# Patient Record
Sex: FEMALE | Race: WHITE | NOT HISPANIC OR LATINO | URBAN - METROPOLITAN AREA
[De-identification: names, ages, dates, MRNs, and addresses within clinical notes are randomized per-mention and may not be internally consistent; named-entity substitution may affect disease eponyms.]

---

## 2017-02-21 ENCOUNTER — INPATIENT (INPATIENT)
Facility: HOSPITAL | Age: 82
LOS: 2 days | Discharge: NURSING, REHAB OR LONG-TERM CARE | End: 2017-02-24
Attending: INTERNAL MEDICINE | Admitting: INTERNAL MEDICINE
Payer: MEDICARE

## 2017-02-21 VITALS
SYSTOLIC BLOOD PRESSURE: 182 MMHG | HEART RATE: 63 BPM | OXYGEN SATURATION: 100 % | DIASTOLIC BLOOD PRESSURE: 61 MMHG | TEMPERATURE: 98 F | RESPIRATION RATE: 14 BRPM

## 2017-02-21 DIAGNOSIS — R07.81 PLEURODYNIA: ICD-10-CM

## 2017-02-21 DIAGNOSIS — R55 SYNCOPE AND COLLAPSE: ICD-10-CM

## 2017-02-21 DIAGNOSIS — Z41.8 ENCOUNTER FOR OTHER PROCEDURES FOR PURPOSES OTHER THAN REMEDYING HEALTH STATE: ICD-10-CM

## 2017-02-21 DIAGNOSIS — M48.00 SPINAL STENOSIS, SITE UNSPECIFIED: ICD-10-CM

## 2017-02-21 DIAGNOSIS — F03.90 UNSPECIFIED DEMENTIA, UNSPECIFIED SEVERITY, WITHOUT BEHAVIORAL DISTURBANCE, PSYCHOTIC DISTURBANCE, MOOD DISTURBANCE, AND ANXIETY: ICD-10-CM

## 2017-02-21 LAB
ALBUMIN SERPL ELPH-MCNC: 4 G/DL — SIGNIFICANT CHANGE UP (ref 3.3–5)
ALP SERPL-CCNC: 72 U/L — SIGNIFICANT CHANGE UP (ref 40–120)
ALT FLD-CCNC: 22 U/L — SIGNIFICANT CHANGE UP (ref 4–33)
APPEARANCE UR: CLEAR — SIGNIFICANT CHANGE UP
AST SERPL-CCNC: 32 U/L — SIGNIFICANT CHANGE UP (ref 4–32)
BASE EXCESS BLDV CALC-SCNC: 1.6 MMOL/L — SIGNIFICANT CHANGE UP
BASOPHILS # BLD AUTO: 0.04 K/UL — SIGNIFICANT CHANGE UP (ref 0–0.2)
BASOPHILS NFR BLD AUTO: 0.4 % — SIGNIFICANT CHANGE UP (ref 0–2)
BILIRUB SERPL-MCNC: 0.4 MG/DL — SIGNIFICANT CHANGE UP (ref 0.2–1.2)
BILIRUB UR-MCNC: NEGATIVE — SIGNIFICANT CHANGE UP
BLOOD GAS VENOUS - CREATININE: SIGNIFICANT CHANGE UP MG/DL (ref 0.5–1.3)
BLOOD UR QL VISUAL: NEGATIVE — SIGNIFICANT CHANGE UP
BUN SERPL-MCNC: 22 MG/DL — SIGNIFICANT CHANGE UP (ref 7–23)
CALCIUM SERPL-MCNC: 9.1 MG/DL — SIGNIFICANT CHANGE UP (ref 8.4–10.5)
CHLORIDE BLDV-SCNC: 98 MMOL/L — SIGNIFICANT CHANGE UP (ref 96–108)
CHLORIDE SERPL-SCNC: 93 MMOL/L — LOW (ref 98–107)
CK MB BLD-MCNC: 24.37 NG/ML — HIGH (ref 1–4.7)
CK MB BLD-MCNC: 3.4 — HIGH (ref 0–2.5)
CK MB BLD-MCNC: 4 — HIGH (ref 0–2.5)
CK MB BLD-MCNC: 8.88 NG/ML — HIGH (ref 1–4.7)
CK SERPL-CCNC: 259 U/L — HIGH (ref 25–170)
CK SERPL-CCNC: 614 U/L — HIGH (ref 25–170)
CO2 SERPL-SCNC: 21 MMOL/L — LOW (ref 22–31)
COLOR SPEC: SIGNIFICANT CHANGE UP
CREAT SERPL-MCNC: 0.7 MG/DL — SIGNIFICANT CHANGE UP (ref 0.5–1.3)
EOSINOPHIL # BLD AUTO: 0.12 K/UL — SIGNIFICANT CHANGE UP (ref 0–0.5)
EOSINOPHIL NFR BLD AUTO: 1.2 % — SIGNIFICANT CHANGE UP (ref 0–6)
GAS PNL BLDV: 128 MMOL/L — LOW (ref 136–146)
GLUCOSE BLDV-MCNC: 93 — SIGNIFICANT CHANGE UP (ref 70–99)
GLUCOSE SERPL-MCNC: 93 MG/DL — SIGNIFICANT CHANGE UP (ref 70–99)
GLUCOSE UR-MCNC: NEGATIVE — SIGNIFICANT CHANGE UP
HCO3 BLDV-SCNC: 23 MMOL/L — SIGNIFICANT CHANGE UP (ref 20–27)
HCT VFR BLD CALC: 39.4 % — SIGNIFICANT CHANGE UP (ref 34.5–45)
HCT VFR BLDV CALC: 42 % — SIGNIFICANT CHANGE UP (ref 34.5–45)
HGB BLD-MCNC: 13.2 G/DL — SIGNIFICANT CHANGE UP (ref 11.5–15.5)
HGB BLDV-MCNC: 13.7 G/DL — SIGNIFICANT CHANGE UP (ref 11.5–15.5)
IMM GRANULOCYTES NFR BLD AUTO: 0.2 % — SIGNIFICANT CHANGE UP (ref 0–1.5)
KETONES UR-MCNC: NEGATIVE — SIGNIFICANT CHANGE UP
LACTATE BLDV-MCNC: 1.9 MMOL/L — SIGNIFICANT CHANGE UP (ref 0.5–2)
LEUKOCYTE ESTERASE UR-ACNC: NEGATIVE — SIGNIFICANT CHANGE UP
LIDOCAIN IGE QN: 60.3 U/L — HIGH (ref 7–60)
LYMPHOCYTES # BLD AUTO: 2 K/UL — SIGNIFICANT CHANGE UP (ref 1–3.3)
LYMPHOCYTES # BLD AUTO: 20.3 % — SIGNIFICANT CHANGE UP (ref 13–44)
MCHC RBC-ENTMCNC: 30.9 PG — SIGNIFICANT CHANGE UP (ref 27–34)
MCHC RBC-ENTMCNC: 33.5 % — SIGNIFICANT CHANGE UP (ref 32–36)
MCV RBC AUTO: 92.3 FL — SIGNIFICANT CHANGE UP (ref 80–100)
MONOCYTES # BLD AUTO: 1.01 K/UL — HIGH (ref 0–0.9)
MONOCYTES NFR BLD AUTO: 10.3 % — SIGNIFICANT CHANGE UP (ref 2–14)
NEUTROPHILS # BLD AUTO: 6.64 K/UL — SIGNIFICANT CHANGE UP (ref 1.8–7.4)
NEUTROPHILS NFR BLD AUTO: 67.6 % — SIGNIFICANT CHANGE UP (ref 43–77)
NITRITE UR-MCNC: NEGATIVE — SIGNIFICANT CHANGE UP
PCO2 BLDV: 56 MMHG — HIGH (ref 41–51)
PH BLDV: 7.31 PH — LOW (ref 7.32–7.43)
PH UR: 6.5 — SIGNIFICANT CHANGE UP (ref 4.6–8)
PLATELET # BLD AUTO: 195 K/UL — SIGNIFICANT CHANGE UP (ref 150–400)
PMV BLD: 10.6 FL — SIGNIFICANT CHANGE UP (ref 7–13)
PO2 BLDV: 27 MMHG — LOW (ref 35–40)
POTASSIUM BLDV-SCNC: 4.8 MMOL/L — HIGH (ref 3.4–4.5)
POTASSIUM SERPL-MCNC: 5.5 MMOL/L — HIGH (ref 3.5–5.3)
POTASSIUM SERPL-SCNC: 5.5 MMOL/L — HIGH (ref 3.5–5.3)
PROT SERPL-MCNC: 7 G/DL — SIGNIFICANT CHANGE UP (ref 6–8.3)
PROT UR-MCNC: NEGATIVE — SIGNIFICANT CHANGE UP
RBC # BLD: 4.27 M/UL — SIGNIFICANT CHANGE UP (ref 3.8–5.2)
RBC # FLD: 13.1 % — SIGNIFICANT CHANGE UP (ref 10.3–14.5)
SAO2 % BLDV: 40.2 % — LOW (ref 60–85)
SODIUM SERPL-SCNC: 131 MMOL/L — LOW (ref 135–145)
SP GR SPEC: 1.01 — SIGNIFICANT CHANGE UP (ref 1–1.03)
SQUAMOUS # UR AUTO: SIGNIFICANT CHANGE UP
TROPONIN T SERPL-MCNC: < 0.06 NG/ML — SIGNIFICANT CHANGE UP (ref 0–0.06)
TROPONIN T SERPL-MCNC: < 0.06 NG/ML — SIGNIFICANT CHANGE UP (ref 0–0.06)
TSH SERPL-MCNC: 5.05 UIU/ML — HIGH (ref 0.27–4.2)
UROBILINOGEN FLD QL: NORMAL E.U. — SIGNIFICANT CHANGE UP (ref 0.1–0.2)
VALPROATE SERPL-MCNC: 27.9 UG/ML — LOW (ref 50–100)
WBC # BLD: 9.83 K/UL — SIGNIFICANT CHANGE UP (ref 3.8–10.5)
WBC # FLD AUTO: 9.83 K/UL — SIGNIFICANT CHANGE UP (ref 3.8–10.5)
WBC UR QL: SIGNIFICANT CHANGE UP (ref 0–?)

## 2017-02-21 PROCEDURE — 74177 CT ABD & PELVIS W/CONTRAST: CPT | Mod: 26

## 2017-02-21 PROCEDURE — 70450 CT HEAD/BRAIN W/O DYE: CPT | Mod: 26

## 2017-02-21 PROCEDURE — 72125 CT NECK SPINE W/O DYE: CPT | Mod: 26

## 2017-02-21 PROCEDURE — 71260 CT THORAX DX C+: CPT | Mod: 26

## 2017-02-21 RX ORDER — HALOPERIDOL DECANOATE 100 MG/ML
2.5 INJECTION INTRAMUSCULAR ONCE
Qty: 0 | Refills: 0 | Status: DISCONTINUED | OUTPATIENT
Start: 2017-02-21 | End: 2017-02-21

## 2017-02-21 RX ORDER — METOPROLOL TARTRATE 50 MG
25 TABLET ORAL ONCE
Qty: 0 | Refills: 0 | Status: COMPLETED | OUTPATIENT
Start: 2017-02-21 | End: 2017-02-21

## 2017-02-21 RX ORDER — DIPHENHYDRAMINE HCL 50 MG
25 CAPSULE ORAL ONCE
Qty: 0 | Refills: 0 | Status: COMPLETED | OUTPATIENT
Start: 2017-02-21 | End: 2017-02-21

## 2017-02-21 RX ORDER — HALOPERIDOL DECANOATE 100 MG/ML
2.5 INJECTION INTRAMUSCULAR ONCE
Qty: 0 | Refills: 0 | Status: COMPLETED | OUTPATIENT
Start: 2017-02-21 | End: 2017-02-21

## 2017-02-21 RX ORDER — NIFEDIPINE 30 MG
60 TABLET, EXTENDED RELEASE 24 HR ORAL DAILY
Qty: 0 | Refills: 0 | Status: DISCONTINUED | OUTPATIENT
Start: 2017-02-21 | End: 2017-02-22

## 2017-02-21 RX ORDER — MORPHINE SULFATE 50 MG/1
2 CAPSULE, EXTENDED RELEASE ORAL ONCE
Qty: 0 | Refills: 0 | Status: DISCONTINUED | OUTPATIENT
Start: 2017-02-21 | End: 2017-02-21

## 2017-02-21 RX ORDER — MIDAZOLAM HYDROCHLORIDE 1 MG/ML
2 INJECTION, SOLUTION INTRAMUSCULAR; INTRAVENOUS ONCE
Qty: 0 | Refills: 0 | Status: DISCONTINUED | OUTPATIENT
Start: 2017-02-21 | End: 2017-02-21

## 2017-02-21 RX ORDER — ALPRAZOLAM 0.25 MG
0.25 TABLET ORAL
Qty: 0 | Refills: 0 | Status: DISCONTINUED | OUTPATIENT
Start: 2017-02-21 | End: 2017-02-24

## 2017-02-21 RX ORDER — ACETAMINOPHEN 500 MG
650 TABLET ORAL EVERY 6 HOURS
Qty: 0 | Refills: 0 | Status: DISCONTINUED | OUTPATIENT
Start: 2017-02-21 | End: 2017-02-24

## 2017-02-21 RX ORDER — ENOXAPARIN SODIUM 100 MG/ML
30 INJECTION SUBCUTANEOUS EVERY 24 HOURS
Qty: 0 | Refills: 0 | Status: DISCONTINUED | OUTPATIENT
Start: 2017-02-21 | End: 2017-02-24

## 2017-02-21 RX ORDER — DIVALPROEX SODIUM 500 MG/1
500 TABLET, DELAYED RELEASE ORAL
Qty: 0 | Refills: 0 | Status: DISCONTINUED | OUTPATIENT
Start: 2017-02-21 | End: 2017-02-23

## 2017-02-21 RX ORDER — SODIUM CHLORIDE 9 MG/ML
1000 INJECTION INTRAMUSCULAR; INTRAVENOUS; SUBCUTANEOUS
Qty: 0 | Refills: 0 | Status: COMPLETED | OUTPATIENT
Start: 2017-02-21 | End: 2017-02-21

## 2017-02-21 RX ADMIN — MIDAZOLAM HYDROCHLORIDE 2 MILLIGRAM(S): 1 INJECTION, SOLUTION INTRAMUSCULAR; INTRAVENOUS at 11:15

## 2017-02-21 RX ADMIN — DIVALPROEX SODIUM 500 MILLIGRAM(S): 500 TABLET, DELAYED RELEASE ORAL at 19:48

## 2017-02-21 RX ADMIN — SODIUM CHLORIDE 75 MILLILITER(S): 9 INJECTION INTRAMUSCULAR; INTRAVENOUS; SUBCUTANEOUS at 15:57

## 2017-02-21 RX ADMIN — Medication 25 MILLIGRAM(S): at 09:55

## 2017-02-21 RX ADMIN — HALOPERIDOL DECANOATE 2.5 MILLIGRAM(S): 100 INJECTION INTRAMUSCULAR at 11:15

## 2017-02-21 RX ADMIN — Medication 0.5 MILLIGRAM(S): at 20:25

## 2017-02-21 RX ADMIN — HALOPERIDOL DECANOATE 2.5 MILLIGRAM(S): 100 INJECTION INTRAMUSCULAR at 18:47

## 2017-02-21 RX ADMIN — Medication 25 MILLIGRAM(S): at 22:48

## 2017-02-21 RX ADMIN — MIDAZOLAM HYDROCHLORIDE 2 MILLIGRAM(S): 1 INJECTION, SOLUTION INTRAMUSCULAR; INTRAVENOUS at 10:44

## 2017-02-21 RX ADMIN — ENOXAPARIN SODIUM 30 MILLIGRAM(S): 100 INJECTION SUBCUTANEOUS at 22:31

## 2017-02-21 RX ADMIN — Medication 25 MILLIGRAM(S): at 18:47

## 2017-02-21 RX ADMIN — Medication 0.25 MILLIGRAM(S): at 19:49

## 2017-02-21 NOTE — H&P ADULT. - COMMENTS
unable to assess ROS as patient was a poor historian secondary to advanced dementia. unable to assess ROS as patient was a poor historian and answers questions inappropriately secondary to advanced dementia.

## 2017-02-21 NOTE — ED PROVIDER NOTE - PROGRESS NOTE DETAILS
Son called for hx of what happened this AM. spoke w/ son, agreed to iv contrast for patient spoke w/ son aware of admission  Discussed need to admit with patient & discussed risk and benefits.  Patient agreed to admission.  Discussed case w/ admitting cardiologist - agreed to admit to their service.  [place]aged TelePA at [phone] with patient information and my spectralink ( [phone])

## 2017-02-21 NOTE — H&P ADULT. - PROBLEM SELECTOR PLAN 1
Admit to Tele. Fall precautions. Nuclear stress test, Echo, Serial EKG's, Serial cardiac enzymes, A1C, TSH, CBC, BMP, UA. Neuro consult and CT head. Admit to Tele. Fall precautions. Echo, Serial EKG's, Serial cardiac enzymes, A1C, TSH, CBC, BMP, UA  Orthostatics Admit to Tele. Fall precautions. Echo, Serial EKG's, Serial cardiac enzymes, A1C, TSH, CBC, BMP, UA  Orthostatics  IV Fluid Hydration

## 2017-02-21 NOTE — H&P ADULT. - PROBLEM SELECTOR PLAN 3
Continue current medication regimen with Depakote 500mg BID and Xanax 0.25mg two tabs at bed time PRN. Monitor for changes in baseline mental status.

## 2017-02-21 NOTE — ED PROVIDER NOTE - PSH
After Cataract, Bilateral    History of Carotid Endarterectomy  right 2008  History of Laminectomy  1970's  S/P Cholecystectomy  laparoscopic 2 years ago  S/P Dilation and Curettage  1970's menorrhagia  S/P Knee Replacement  left knee 1997  right knee 2004

## 2017-02-21 NOTE — ED PROVIDER NOTE - PMH
Back Pain    Carotid Artery Disease    Hepatitis C  possible during transfusion in 1970's  History of Spinal Stenosis    Hyperlipidemia    Hypertension    Lung Nodules    Tendonitis  left hand: s/p steroid injection 3/10

## 2017-02-21 NOTE — H&P ADULT. - HISTORY OF PRESENT ILLNESS
89 yo F with a pmhx of advanced dementia and spinal stenosis presented to ED s/p unwitnessed fall this a.m. Pt's son was contacted via phone and states that he heard his mother fall at 4 a.m. the patient was walking from her bed to the bathroom and fell on her way. Pts son states that he got to his mother within a minute; she was lying on her back but was awake and talking immediately after the fall. Pt ambulates independently at her baseline. Pt was A&O x1 and therefore an unreliable historian secondary to advanced dementia so an accurate ROS could not be assessed at this time. Pts son states that his mother was not complaining of HA, CP, dizziness or lightheadedness last night before she went to sleep. 89 yo F with a pmhx of advanced dementia and spinal stenosis presented to ED s/p unwitnessed fall this a.m. Pt an unreliable historian secondary to advanced dementia so an accurate ROS could not be assessed at this time and obtained info from her son.  Pt's son was contacted via phone and states that he heard his mother fall at 4 a.m. while walking from her bed to the bathroom and fell on her way. Pt's son states that he got to his mother within a minute; she was lying on her back but was awake and talking immediately after the fall. Pt ambulates independently at her baseline.  Pt's son states that pt was not complaining of HA, CP, dizziness or lightheadedness last night before she went to sleep.

## 2017-02-21 NOTE — ED PROVIDER NOTE - NEUROLOGICAL, MLM
Alert and oriented, no focal deficits, no motor or sensory deficits. Alert, no focal deficits, no motor or sensory deficits. cn 2-12 intact bl. ms 5/5 bl ue and le. sensation intact bl.

## 2017-02-21 NOTE — ED ADULT NURSE NOTE - CHIEF COMPLAINT QUOTE
Pt brought in by [facility]  c/o right rib discomfort  + deformity noted to area of pain.  As per EMS pt was in bathroom by her son ([phone]). hx of dementia.

## 2017-02-21 NOTE — H&P ADULT. - RS GEN PE MLT RESP DETAILS PC
respirations non-labored/diminished breath sounds, L/diminished breath sounds, R/clear to auscultation bilaterally

## 2017-02-21 NOTE — ED PROVIDER NOTE - MEDICAL DECISION MAKING DETAILS
rib fx vs intraabdominal pathology. Basics, Depakote level, CT head/Cspine/ Abdomen, EKG, Jean Marie, UA/Cx., Reaccess.

## 2017-02-21 NOTE — ED PROVIDER NOTE - ATTENDING CONTRIBUTION TO CARE
att88 y/o f with h/o dementia, spinal stenosis, on oxycodone, xanax and depakote, no AC, presents after an unwitnessed fall. Pt was found by son on the floor on her back. + R lower rib pain/RUQ pain. Pt ambulated at home. Pt lives with son and family but on a different floor. aaox1. Pt cannot recall the fall. Exam as above. No midline spinal ttp. No gross head trauma. + TTP r lower anterior rib and RUQ abd. Normal neuro exam. Plan for labs, EKG, UA, CT head/cspine/c/a/p with unwitnessed fall, h/o dementia and pain. Will reassess. Transfer for trauma if needed.   I have personally performed a face to face bedside history and physical examination of this patient. I have discussed the history, examination, review of systems, assessment and plan of management with the resident. I have reviewed the electronic medical record and amended it to reflect my history, review of systems, physical exam, assessment and plan. att89 y/o f with h/o dementia, spinal stenosis, on oxycodone, xanax and depakote, no AC, presents after an unwitnessed fall. Pt was found by son on the floor on her back. + R lower rib pain/RUQ pain. Pt ambulated at home. Pt lives with son and family but on a different floor. aaox1. Pt cannot recall the fall. Exam as above. No midline spinal ttp. No gross head trauma. + TTP r lower anterior rib and RUQ abd. Normal neuro exam. Plan for labs, EKG, UA, CT head/cspine/c/a/p with unwitnessed fall, h/o dementia and pain. Will reassess. Transfer for trauma if needed. Plan to admit with unwitnessed fall and history. Will sign out patient.   I have personally performed a face to face bedside history and physical examination of this patient. I have discussed the history, examination, review of systems, assessment and plan of management with the resident. I have reviewed the electronic medical record and amended it to reflect my history, review of systems, physical exam, assessment and plan.

## 2017-02-21 NOTE — ED PROVIDER NOTE - OBJECTIVE STATEMENT
Per son, pt has a hx of advanced dementia and spinal stenosis presents to ED s/p fall this morning with R rib cage pain. Son heard a loud noise around 4 am, found the pt on floor on her back. Since then pt complains of R rib cage pain. Pt is at her baseline mental status. Pt also ambulates at her baseline (walked down from her apartment and walked to the ambulance). Pt lives with her son and family. Pt is on Oxycodone QID, Xanax 0.25 and Depakote 500 BID.

## 2017-02-21 NOTE — H&P ADULT. - PMH
Back Pain    Carotid Artery Disease    Dementia    Hepatitis C  possible during transfusion in 1970's  History of Spinal Stenosis    Hyperlipidemia    Hypertension    Lung Nodules    Tendonitis  left hand: s/p steroid injection 3/10

## 2017-02-21 NOTE — H&P ADULT. - ATTENDING COMMENTS
Pt seen and examined. agree with above,    In addition monitor Lipase level  GI eval  hyponatremia, ? dehydration, will get renal eval

## 2017-02-21 NOTE — H&P ADULT. - EKG AND INTERPRETATION
Sinus sera at 50 bpm. QTc at 410 ms. Good R wave progression. Q waves in V1, V2 and V3. Sinus sera at 50 bpm. QTc at 410 ms. Early repolarization in V1-V3.

## 2017-02-21 NOTE — ED ADULT TRIAGE NOTE - CHIEF COMPLAINT QUOTE
Pt brought in by [facility]  c/o right rib discomfort  + deformity noted to area of pain.  As per EMS pt was in bathroom by her son ([phone]). hx of dementia.

## 2017-02-21 NOTE — ED PROVIDER NOTE - CARE PLAN
Principal Discharge DX:	Rib pain on right side Principal Discharge DX:	Rib pain on right side  Secondary Diagnosis:	Fall, initial encounter

## 2017-02-21 NOTE — H&P ADULT. - ASSESSMENT
88 yo F with a pmhx of advanced dementia and spinal stenosis presented to ED s/p unwitnessed fall this a.m. 88 yo F with a pmhx of advanced dementia and spinal stenosis presented to ED s/p unwitnessed fall this a.m., admitted to tele for syncope, r/o ACS.

## 2017-02-21 NOTE — ED ADULT NURSE NOTE - OBJECTIVE STATEMENT
bernice rn note: pt calm pleasantly disoriented. oriented to Name and birthday...but not to year, month, or place. "I am in a old persons home. I don't know what happen...why am I here.?" As per Resident report:" Pt fell found on floor by Son" Pt presently appears to be in no acute distress. Pt st"Both my arms hurt me alittle more when I move them." No obvious deformity or bruising noted on arms. + rt Hip with bruise...otherwise skin is intact. Fall precautions maintained: Pt positioned for comfort. Pt is close to rn station, mary juarez elevated. call bell in reach.   placed on cm EKG done. vs as noted:   sl and labs obtained and sent. Handoff report to KENNETH Dillon to follow. Pt awaits radiology. bernice rn note:Hx of Dementia.. pt calm pleasantly disoriented. oriented to Name and birthday...but not to year, month, or place. "I am in a old persons home. I don't know what happen...why am I here.?" As per Resident report:" Pt fell found on floor by Son" Pt presently appears to be in no acute distress. Pt st"Both my arms hurt me alittle more when I move them." No obvious deformity or bruising noted on arms. + rt Hip with bruise...otherwise skin is intact. Fall precautions maintained: Pt positioned for comfort. Pt is close to rn station, bedlow, siderails elevated. call bell in reach.   placed on cm EKG done. vs as noted:   sl and labs obtained and sent. Handoff report to KENNETH Dillon to follow. Pt awaits radiology.

## 2017-02-22 LAB
BACTERIA UR CULT: SIGNIFICANT CHANGE UP
BUN SERPL-MCNC: 18 MG/DL — SIGNIFICANT CHANGE UP (ref 7–23)
CALCIUM SERPL-MCNC: 10 MG/DL — SIGNIFICANT CHANGE UP (ref 8.4–10.5)
CHLORIDE SERPL-SCNC: 91 MMOL/L — LOW (ref 98–107)
CHOLEST SERPL-MCNC: 202 MG/DL — HIGH (ref 120–199)
CK SERPL-CCNC: 681 U/L — HIGH (ref 25–170)
CO2 SERPL-SCNC: 24 MMOL/L — SIGNIFICANT CHANGE UP (ref 22–31)
CREAT SERPL-MCNC: 0.76 MG/DL — SIGNIFICANT CHANGE UP (ref 0.5–1.3)
GLUCOSE SERPL-MCNC: 135 MG/DL — HIGH (ref 70–99)
HCT VFR BLD CALC: 43.4 % — SIGNIFICANT CHANGE UP (ref 34.5–45)
HDLC SERPL-MCNC: 107 MG/DL — HIGH (ref 45–65)
HGB BLD-MCNC: 15.1 G/DL — SIGNIFICANT CHANGE UP (ref 11.5–15.5)
LIPID PNL WITH DIRECT LDL SERPL: 100 MG/DL — SIGNIFICANT CHANGE UP
MCHC RBC-ENTMCNC: 31.7 PG — SIGNIFICANT CHANGE UP (ref 27–34)
MCHC RBC-ENTMCNC: 34.8 % — SIGNIFICANT CHANGE UP (ref 32–36)
MCV RBC AUTO: 91.2 FL — SIGNIFICANT CHANGE UP (ref 80–100)
OSMOLALITY SERPL: 277 MOSMO/KG — SIGNIFICANT CHANGE UP (ref 275–295)
PLATELET # BLD AUTO: 142 K/UL — LOW (ref 150–400)
PMV BLD: 11.7 FL — SIGNIFICANT CHANGE UP (ref 7–13)
POTASSIUM SERPL-MCNC: 4.1 MMOL/L — SIGNIFICANT CHANGE UP (ref 3.5–5.3)
POTASSIUM SERPL-SCNC: 4.1 MMOL/L — SIGNIFICANT CHANGE UP (ref 3.5–5.3)
RBC # BLD: 4.76 M/UL — SIGNIFICANT CHANGE UP (ref 3.8–5.2)
RBC # FLD: 13 % — SIGNIFICANT CHANGE UP (ref 10.3–14.5)
SODIUM SERPL-SCNC: 133 MMOL/L — LOW (ref 135–145)
SPECIMEN SOURCE: SIGNIFICANT CHANGE UP
TRIGL SERPL-MCNC: 72 MG/DL — SIGNIFICANT CHANGE UP (ref 10–149)
TROPONIN T SERPL-MCNC: < 0.06 NG/ML — SIGNIFICANT CHANGE UP (ref 0–0.06)
URATE SERPL-MCNC: 3.3 MG/DL — SIGNIFICANT CHANGE UP (ref 2.5–7)
WBC # BLD: 13.11 K/UL — HIGH (ref 3.8–10.5)
WBC # FLD AUTO: 13.11 K/UL — HIGH (ref 3.8–10.5)

## 2017-02-22 PROCEDURE — 93880 EXTRACRANIAL BILAT STUDY: CPT | Mod: 26

## 2017-02-22 PROCEDURE — 93010 ELECTROCARDIOGRAM REPORT: CPT

## 2017-02-22 PROCEDURE — 90792 PSYCH DIAG EVAL W/MED SRVCS: CPT

## 2017-02-22 RX ORDER — NIFEDIPINE 30 MG
30 TABLET, EXTENDED RELEASE 24 HR ORAL DAILY
Qty: 0 | Refills: 0 | Status: DISCONTINUED | OUTPATIENT
Start: 2017-02-22 | End: 2017-02-24

## 2017-02-22 RX ORDER — HALOPERIDOL DECANOATE 100 MG/ML
0.5 INJECTION INTRAMUSCULAR EVERY 6 HOURS
Qty: 0 | Refills: 0 | Status: DISCONTINUED | OUTPATIENT
Start: 2017-02-22 | End: 2017-02-24

## 2017-02-22 RX ORDER — PANTOPRAZOLE SODIUM 20 MG/1
40 TABLET, DELAYED RELEASE ORAL
Qty: 0 | Refills: 0 | Status: DISCONTINUED | OUTPATIENT
Start: 2017-02-22 | End: 2017-02-24

## 2017-02-22 RX ADMIN — Medication 60 MILLIGRAM(S): at 05:57

## 2017-02-22 RX ADMIN — ENOXAPARIN SODIUM 30 MILLIGRAM(S): 100 INJECTION SUBCUTANEOUS at 22:29

## 2017-02-22 RX ADMIN — HALOPERIDOL DECANOATE 0.5 MILLIGRAM(S): 100 INJECTION INTRAMUSCULAR at 14:31

## 2017-02-22 RX ADMIN — DIVALPROEX SODIUM 500 MILLIGRAM(S): 500 TABLET, DELAYED RELEASE ORAL at 17:06

## 2017-02-22 RX ADMIN — DIVALPROEX SODIUM 500 MILLIGRAM(S): 500 TABLET, DELAYED RELEASE ORAL at 05:57

## 2017-02-22 NOTE — PHYSICAL THERAPY INITIAL EVALUATION ADULT - PERTINENT HX OF CURRENT PROBLEM, REHAB EVAL
admitted after unwitnessed fall at home, amb. from bedroom to bathroom, son present in home, pt. w/ hx. of dementia

## 2017-02-23 ENCOUNTER — TRANSCRIPTION ENCOUNTER (OUTPATIENT)
Age: 82
End: 2017-02-23

## 2017-02-23 LAB
AMMONIA BLD-MCNC: 40 UMOL/L — SIGNIFICANT CHANGE UP (ref 11–55)
BUN SERPL-MCNC: 28 MG/DL — HIGH (ref 7–23)
CALCIUM SERPL-MCNC: 9.3 MG/DL — SIGNIFICANT CHANGE UP (ref 8.4–10.5)
CHLORIDE SERPL-SCNC: 90 MMOL/L — LOW (ref 98–107)
CHLORIDE UR-SCNC: 21 MMOL/L — SIGNIFICANT CHANGE UP
CK SERPL-CCNC: 321 U/L — HIGH (ref 25–170)
CO2 SERPL-SCNC: 25 MMOL/L — SIGNIFICANT CHANGE UP (ref 22–31)
CREAT SERPL-MCNC: 0.85 MG/DL — SIGNIFICANT CHANGE UP (ref 0.5–1.3)
GLUCOSE SERPL-MCNC: 102 MG/DL — HIGH (ref 70–99)
HCT VFR BLD CALC: 39.3 % — SIGNIFICANT CHANGE UP (ref 34.5–45)
HGB BLD-MCNC: 13.4 G/DL — SIGNIFICANT CHANGE UP (ref 11.5–15.5)
MAGNESIUM SERPL-MCNC: 1.9 MG/DL — SIGNIFICANT CHANGE UP (ref 1.6–2.6)
MCHC RBC-ENTMCNC: 31.5 PG — SIGNIFICANT CHANGE UP (ref 27–34)
MCHC RBC-ENTMCNC: 34.1 % — SIGNIFICANT CHANGE UP (ref 32–36)
MCV RBC AUTO: 92.3 FL — SIGNIFICANT CHANGE UP (ref 80–100)
OSMOLALITY UR: 444 MOSMO/KG — SIGNIFICANT CHANGE UP (ref 50–1200)
PLATELET # BLD AUTO: 197 K/UL — SIGNIFICANT CHANGE UP (ref 150–400)
PMV BLD: 10.6 FL — SIGNIFICANT CHANGE UP (ref 7–13)
POTASSIUM SERPL-MCNC: 4.5 MMOL/L — SIGNIFICANT CHANGE UP (ref 3.5–5.3)
POTASSIUM SERPL-SCNC: 4.5 MMOL/L — SIGNIFICANT CHANGE UP (ref 3.5–5.3)
POTASSIUM UR-SCNC: 23.7 MEQ/L — SIGNIFICANT CHANGE UP
RBC # BLD: 4.26 M/UL — SIGNIFICANT CHANGE UP (ref 3.8–5.2)
RBC # FLD: 13.1 % — SIGNIFICANT CHANGE UP (ref 10.3–14.5)
SODIUM SERPL-SCNC: 130 MMOL/L — LOW (ref 135–145)
SODIUM UR-SCNC: 18 MEQ/L — SIGNIFICANT CHANGE UP
WBC # BLD: 9.42 K/UL — SIGNIFICANT CHANGE UP (ref 3.8–10.5)
WBC # FLD AUTO: 9.42 K/UL — SIGNIFICANT CHANGE UP (ref 3.8–10.5)

## 2017-02-23 PROCEDURE — 93306 TTE W/DOPPLER COMPLETE: CPT | Mod: 26

## 2017-02-23 RX ORDER — DIVALPROEX SODIUM 500 MG/1
500 TABLET, DELAYED RELEASE ORAL
Qty: 0 | Refills: 0 | Status: DISCONTINUED | OUTPATIENT
Start: 2017-02-23 | End: 2017-02-24

## 2017-02-23 RX ADMIN — PANTOPRAZOLE SODIUM 40 MILLIGRAM(S): 20 TABLET, DELAYED RELEASE ORAL at 05:38

## 2017-02-23 RX ADMIN — Medication 30 MILLIGRAM(S): at 05:45

## 2017-02-23 RX ADMIN — Medication 0.25 MILLIGRAM(S): at 21:06

## 2017-02-23 RX ADMIN — ENOXAPARIN SODIUM 30 MILLIGRAM(S): 100 INJECTION SUBCUTANEOUS at 21:07

## 2017-02-23 RX ADMIN — DIVALPROEX SODIUM 500 MILLIGRAM(S): 500 TABLET, DELAYED RELEASE ORAL at 06:36

## 2017-02-23 RX ADMIN — DIVALPROEX SODIUM 500 MILLIGRAM(S): 500 TABLET, DELAYED RELEASE ORAL at 18:13

## 2017-02-24 VITALS — WEIGHT: 127.21 LBS

## 2017-02-24 LAB
BUN SERPL-MCNC: 19 MG/DL — SIGNIFICANT CHANGE UP (ref 7–23)
CALCIUM SERPL-MCNC: 9.2 MG/DL — SIGNIFICANT CHANGE UP (ref 8.4–10.5)
CHLORIDE SERPL-SCNC: 92 MMOL/L — LOW (ref 98–107)
CK SERPL-CCNC: 224 U/L — HIGH (ref 25–170)
CO2 SERPL-SCNC: 24 MMOL/L — SIGNIFICANT CHANGE UP (ref 22–31)
CREAT SERPL-MCNC: 0.7 MG/DL — SIGNIFICANT CHANGE UP (ref 0.5–1.3)
GLUCOSE SERPL-MCNC: 134 MG/DL — HIGH (ref 70–99)
HCT VFR BLD CALC: 40.3 % — SIGNIFICANT CHANGE UP (ref 34.5–45)
HGB BLD-MCNC: 13.5 G/DL — SIGNIFICANT CHANGE UP (ref 11.5–15.5)
MAGNESIUM SERPL-MCNC: 1.8 MG/DL — SIGNIFICANT CHANGE UP (ref 1.6–2.6)
MCHC RBC-ENTMCNC: 31.1 PG — SIGNIFICANT CHANGE UP (ref 27–34)
MCHC RBC-ENTMCNC: 33.5 % — SIGNIFICANT CHANGE UP (ref 32–36)
MCV RBC AUTO: 92.9 FL — SIGNIFICANT CHANGE UP (ref 80–100)
PLATELET # BLD AUTO: 206 K/UL — SIGNIFICANT CHANGE UP (ref 150–400)
PMV BLD: 10.7 FL — SIGNIFICANT CHANGE UP (ref 7–13)
POTASSIUM SERPL-MCNC: 4.1 MMOL/L — SIGNIFICANT CHANGE UP (ref 3.5–5.3)
POTASSIUM SERPL-SCNC: 4.1 MMOL/L — SIGNIFICANT CHANGE UP (ref 3.5–5.3)
RBC # BLD: 4.34 M/UL — SIGNIFICANT CHANGE UP (ref 3.8–5.2)
RBC # FLD: 12.8 % — SIGNIFICANT CHANGE UP (ref 10.3–14.5)
SODIUM SERPL-SCNC: 132 MMOL/L — LOW (ref 135–145)
WBC # BLD: 9.02 K/UL — SIGNIFICANT CHANGE UP (ref 3.8–10.5)
WBC # FLD AUTO: 9.02 K/UL — SIGNIFICANT CHANGE UP (ref 3.8–10.5)

## 2017-02-24 RX ORDER — PANTOPRAZOLE SODIUM 20 MG/1
1 TABLET, DELAYED RELEASE ORAL
Qty: 0 | Refills: 0 | COMMUNITY
Start: 2017-02-24

## 2017-02-24 RX ORDER — ACETAMINOPHEN 500 MG
2 TABLET ORAL
Qty: 0 | Refills: 0 | COMMUNITY
Start: 2017-02-24

## 2017-02-24 RX ORDER — NIFEDIPINE 30 MG
1 TABLET, EXTENDED RELEASE 24 HR ORAL
Qty: 0 | Refills: 0 | COMMUNITY
Start: 2017-02-24

## 2017-02-24 RX ORDER — ALPRAZOLAM 0.25 MG
1 TABLET ORAL
Qty: 0 | Refills: 0 | COMMUNITY
Start: 2017-02-24

## 2017-02-24 RX ORDER — ALPRAZOLAM 0.25 MG
0.03 TABLET ORAL
Qty: 0 | Refills: 0 | COMMUNITY

## 2017-02-24 RX ORDER — ALPRAZOLAM 0.25 MG
2 TABLET ORAL
Qty: 0 | Refills: 0 | COMMUNITY
Start: 2017-02-24

## 2017-02-24 RX ADMIN — DIVALPROEX SODIUM 500 MILLIGRAM(S): 500 TABLET, DELAYED RELEASE ORAL at 06:08

## 2017-02-24 RX ADMIN — PANTOPRAZOLE SODIUM 40 MILLIGRAM(S): 20 TABLET, DELAYED RELEASE ORAL at 06:08

## 2017-02-24 RX ADMIN — Medication 30 MILLIGRAM(S): at 06:08

## 2017-02-24 NOTE — DISCHARGE NOTE ADULT - HOSPITAL COURSE
88 y/o female with a PMHx of HTN, GERD, spinal stenosis and advanced dementia presents to ED S/P unwitnessed fall.    + Unwitnessed fall- likely from deconditioned state vs dehydration/orthostasis, echo and carotid dopplers within normal limits  + Rhabdomyolysis- renal following (Paulie), S/P 1L NS  + Hyponatremia- renal following (Paulie), likely dehydrated, S/P 1L NS  + Agitation- psych following, Haldol PRN for severe agitation, no longer needs 1:1    Hospital Course:  EKG: Sinus bradycardia at 50 bpm, early repolarization in V1-V5  CE x2: Trop negative, -->614-->681  CT head and C-spine: No intracranial hemorrhage. No cervical spine fracture.  CT chest, abdomen, pelvis: No acute abnormality. Chronic appearing right fifth rib fracture.  Na: 131  UA: Negative  Lipase: 60    2/21 Renal consult (Dr. Apodaca: Rhabdomyolysis likely 2/2 to hemolysis. HTN suboptimal control. Procardia 60mg daily. No IVF for now. BMP daily. Check urine labs. Check Serum BMP. No treatment for K for now.  2/21 Cards note: Monitor lipase level. GI eval. Hyponatremia. Possible dehydration. Renal consult.    2/22 Cards note: Fall. Possibly secondary to orthostatics or deconditioned state. Check echo and carotid dopplers. Decrease Nifedipine to 30mg with hold parameters. Hyponatremia improving. Follow up with renal. Monitor lipase. Follow up with GI. Delirium. Follow up with psych.  2/22 Psych consult: Continue Depakote 500mg BID (hold for sedation). Continue Xanax 0.25mg BID PRN. Haldol 0.5mg PO/IV/IM q6hrs PRN for acute agitation. Use 1mg if 0.5mg not effective. Medical management. Monitor QT interval. Avoid anticholinergics. Check ammonia level.  2/22 GI consult (Dr. Anguiano): Elevated lipase with no abdominal pain. Pneumobilia on CT consistent with history of ERCP with CBD stone removal. Check labs. LFTs within normal limits. Monitor for abdominal pain. PPI daily. Senna and Colace. Regular diet.  2/22 Renal note: Stable renal dysfunction. Hyponatremia of unclear etiology. Re-check orthostatics. Cards recommended to decrease Nifedipine to 30mg daily. Recommend syncope workup per primary team.   2/22 Carotid dopplers: Mild heterogenous plaque noted within the proximal right and left internal carotid arteries, consistent with 16-49% stenoses in both proximal vessels. No hemodynamically significant stenoses noted.    2/23 Cards note: Fall likely from deconditioned state. Normal carotids. Check echo. If unremarkable, then D/C home.   2/23 GI note: Labs daily. PPI daily. Senna and Colace. Pain control PRN. Zofran PRN. Regular diet. Increased lipase not clinically significant.   2/23 Medicine consult (Dr. Reyes): Fall. Orthostatics negative. Carotid dopplers negative. Echo pending. Dementia. Supportive care.  2/23 Renal note: No renal objection to discharge. No need for outpatient renal follow up.   2/23 Psych note: Continue Depakote 500mg BID and Xanax 0.25mg BID PRN. Haldol 0.5mg PO/IV/IM q6hrs PRN for agitation. Medical management as per primary team.   2/23 Echo: Mitral annular calcification, otherwise normal mitral valve. Minimal mitral regurgitation. Endocardium not well visualized; grossly normal left ventricular systolic function. The right ventricle is not well visualized; grossly normal right ventricular systolic function.  2/23 Orthostatics: Negative 88 y/o female with a PMHx of HTN, GERD, spinal stenosis and advanced dementia presents to ED S/P unwitnessed fall. Upon arrival to ED, EKG revealed sinus bradycardia at 50 bpm, early repolarization in V1-V5. Pt ruled out for ACS with two sets of negative cardiac enzymes. CPK levels were elevated. CT head and C-spine showed, "No intracranial hemorrhage. No cervical spine fracture." CT chest, abdomen, pelvis showed, "No acute abnormality. Chronic appearing right fifth rib fracture."   Na level was 131. UA and urine cultures were negative. Lipase was 60. Pt was admitted to telemetry.     Pt was seen by cardiology and monitored on telemetry. Pt had no events on telemetry to correspond to her syncopal episode. Echocardiogram revealed, "Mitral annular calcification, otherwise normal mitral valve. Minimal mitral regurgitation. Endocardium not well visualized; grossly normal left ventricular systolic function. The right ventricle is not well visualized; grossly normal right ventricular systolic function." Carotid dopplers revealed, "Mild heterogenous plaque noted within the proximal right and left internal carotid arteries, consistent with 16-49% stenoses in both proximal vessels. No hemodynamically significant stenoses noted." No further cardiac workup is indicated.     Pt received IVF for patient's rhabdomyolysis and hyponatremia. Dr. Apodaca saw the patient from renal and her labs improved. No further renal workup. Pt was seen by GI () for elevated lipase levels in a patient with no abdominal pain, which they deemed to be of little significance. No further GI workup. Psych consulted and managed her agitation.     Case discussed with Dr. Briones on 2/24. Pt now stable for discharge to rehab.     2/21 Renal consult (Dr. Apodcaa: Rhabdomyolysis likely 2/2 to hemolysis. HTN suboptimal control. Procardia 60mg daily. No IVF for now. BMP daily. Check urine labs. Check Serum BMP. No treatment for K for now.  2/21 Cards note: Monitor lipase level. GI eval. Hyponatremia. Possible dehydration. Renal consult.    2/22 Cards note: Fall. Possibly secondary to orthostatics or deconditioned state. Check echo and carotid dopplers. Decrease Nifedipine to 30mg with hold parameters. Hyponatremia improving. Follow up with renal. Monitor lipase. Follow up with GI. Delirium. Follow up with psych.  2/22 Psych consult: Continue Depakote 500mg BID (hold for sedation). Continue Xanax 0.25mg BID PRN. Haldol 0.5mg PO/IV/IM q6hrs PRN for acute agitation. Use 1mg if 0.5mg not effective. Medical management. Monitor QT interval. Avoid anticholinergics. Check ammonia level.  2/22 GI consult (Dr. Anguiano): Elevated lipase with no abdominal pain. Pneumobilia on CT consistent with history of ERCP with CBD stone removal. Check labs. LFTs within normal limits. Monitor for abdominal pain. PPI daily. Senna and Colace. Regular diet.  2/22 Renal note: Stable renal dysfunction. Hyponatremia of unclear etiology. Re-check orthostatics. Cards recommended to decrease Nifedipine to 30mg daily. Recommend syncope workup per primary team.   2/22 Carotid dopplers: Mild heterogenous plaque noted within the proximal right and left internal carotid arteries, consistent with 16-49% stenoses in both proximal vessels. No hemodynamically significant stenoses noted.    2/23 Cards note: Fall likely from deconditioned state. Normal carotids. Check echo. If unremarkable, then D/C home.   2/23 GI note: Labs daily. PPI daily. Senna and Colace. Pain control PRN. Zofran PRN. Regular diet. Increased lipase not clinically significant.   2/23 Medicine consult (Dr. Reyes): Fall. Orthostatics negative. Carotid dopplers negative. Echo pending. Dementia. Supportive care.  2/23 Renal note: No renal objection to discharge. No need for outpatient renal follow up.   2/23 Psych note: Continue Depakote 500mg BID and Xanax 0.25mg BID PRN. Haldol 0.5mg PO/IV/IM q6hrs PRN for agitation. Medical management as per primary team.   2/23 Echo: Mitral annular calcification, otherwise normal mitral valve. Minimal mitral regurgitation. Endocardium not well visualized; grossly normal left ventricular systolic function. The right ventricle is not well visualized; grossly normal right ventricular systolic function.  2/23 Orthostatics: Negative

## 2017-02-24 NOTE — DISCHARGE NOTE ADULT - MEDICATION SUMMARY - MEDICATIONS TO TAKE
I will START or STAY ON the medications listed below when I get home from the hospital:    acetaminophen 325 mg oral tablet  -- 2 tab(s) by mouth every 6 hours, As needed, Mild Pain (1 - 3)  -- Indication: For Pain    Depakote 500 mg oral delayed release tablet  -- 500 milligram(s) by mouth 2 times a day  -- Indication: For Home medication    ALPRAZolam 0.25 mg oral tablet  -- 1 tab(s) by mouth 2 times a day, As needed, anxiety, agitation, restlessness  -- Indication: For Anxiety/agitation    NIFEdipine 30 mg oral tablet, extended release  -- 1 tab(s) by mouth once a day  -- Indication: For HTN (hypertension)    pantoprazole 40 mg oral delayed release tablet  -- 1 tab(s) by mouth once a day (before a meal)  -- Indication: For GERD

## 2017-02-24 NOTE — DISCHARGE NOTE ADULT - CARE PLAN
Principal Discharge DX:	Fall  Goal:	prevent fall  Instructions for follow-up, activity and diet:	- follow up with your PCP in 2 weeks  Secondary Diagnosis:	Dementia  Instructions for follow-up, activity and diet:	- follow up with outpatient psych  Secondary Diagnosis:	Spinal stenosis Principal Discharge DX:	Fall  Goal:	Prevent future falls. Proceed to rehab to build up your strength and gait.  Instructions for follow-up, activity and diet:	Follow up with PCP within one week of discharge. Call for appointment. Return to ED for any concerning symptoms. Continue medications as prescribed. Low salt, low fat, low cholesterol diet.  Secondary Diagnosis:	Rhabdomyolysis  Goal:	You had muscle breakdown likely from your fall. This resolved with fluids. Make sure you stay hydrated.  Instructions for follow-up, activity and diet:	Follow up with PCP within one week of discharge. Call for appointment. Return to ED for any concerning symptoms. Continue medications as prescribed. Low salt, low fat, low cholesterol diet.  Secondary Diagnosis:	Hyponatremia  Goal:	Monitor sodium levels. Make sure you stay hydrated.  Instructions for follow-up, activity and diet:	Follow up with PCP within one week of discharge. Call for appointment. Return to ED for any concerning symptoms. Continue medications as prescribed. Low salt, low fat, low cholesterol diet.  Secondary Diagnosis:	Agitation  Goal:	Prevent agitation. Take your medications as needed for severe agitation.  Instructions for follow-up, activity and diet:	Follow up with your PCP and/or psychiatrist as outpatient.  Secondary Diagnosis:	HTN (hypertension)  Goal:	Maintain adequate control of your blood pressure. Goal BP < 130/80. Continue low sodium diet.  Instructions for follow-up, activity and diet:	Follow up with PCP and/or cardiologist for ongoing medical management of your hypertension. Continue medications as prescribed. Low salt diet.  Secondary Diagnosis:	Dementia  Goal:	Prevent agitation. Take your medications as needed for severe agitation.  Instructions for follow-up, activity and diet:	Follow up with your PCP and/or psychiatrist as outpatient.

## 2017-02-24 NOTE — DISCHARGE NOTE ADULT - MEDICATION SUMMARY - MEDICATIONS TO CHANGE
I will SWITCH the dose or number of times a day I take the medications listed below when I get home from the hospital:    Xanax 0.25 mg oral tablet  -- 0.025 milligram(s) by mouth twice, As Needed  -- bed time

## 2017-02-24 NOTE — DISCHARGE NOTE ADULT - COMMUNITY RESOURCES
Pt to be transferred to [facility] [address] [address] [place], [place] [address], t: [phone] via [facility] [phone].

## 2017-02-24 NOTE — DISCHARGE NOTE ADULT - PLAN OF CARE
prevent fall - follow up with your PCP in 2 weeks - follow up with outpatient psych You had muscle breakdown likely from your fall. This resolved with fluids. Make sure you stay hydrated. Follow up with PCP within one week of discharge. Call for appointment. Return to ED for any concerning symptoms. Continue medications as prescribed. Low salt, low fat, low cholesterol diet. Monitor sodium levels. Make sure you stay hydrated. Prevent agitation. Take your medications as needed for severe agitation. Follow up with your PCP and/or psychiatrist as outpatient. Maintain adequate control of your blood pressure. Goal BP < 130/80. Continue low sodium diet. Follow up with PCP and/or cardiologist for ongoing medical management of your hypertension. Continue medications as prescribed. Low salt diet. Prevent future falls. Proceed to rehab to build up your strength and gait.

## 2017-02-24 NOTE — DISCHARGE NOTE ADULT - NS AS ACTIVITY OBS
Walking-Outdoors allowed/No Heavy lifting/straining/Do not make important decisions/Bathing allowed/Showering allowed/Walking-Indoors allowed

## 2017-02-24 NOTE — DISCHARGE NOTE ADULT - MEDICATION SUMMARY - MEDICATIONS TO STOP TAKING
I will STOP taking the medications listed below when I get home from the hospital:    Percocet 5/325 oral tablet  -- 1 tab(s) by mouth every 6 hours, As Needed

## 2017-02-24 NOTE — DISCHARGE NOTE ADULT - CARE PROVIDER_API CALL
Wade Morales), Internal Medicine; Pulmonary Disease  [address] [place] [address]  Phone: [phone]  Fax: [phone]    Gurpreet Briones), Cardiovascular Disease; Internal Medicine  [address] [place] [address]  Phone: [phone]  Fax: [phone]

## 2017-02-24 NOTE — DISCHARGE NOTE ADULT - PATIENT PORTAL LINK FT
“You can access the [facility] Patient Portal, offered by [facility], by registering with the following website: [email or web address]”

## 2017-02-24 NOTE — DISCHARGE NOTE ADULT - NS MD DC FALL RISK RISK
For information on Fall & Injury Prevention, visit [email or web address]

## 2017-02-24 NOTE — DISCHARGE NOTE ADULT - ADDITIONAL INSTRUCTIONS
Follow up with PCP within one week of discharge from rehab. Call for appointment. Return to ED for any concerning symptoms. Continue medications as prescribed. Low salt, low fat, low cholesterol diet.

## 2017-08-26 ENCOUNTER — INPATIENT (INPATIENT)
Facility: HOSPITAL | Age: 82
LOS: 10 days | Discharge: HOME | DRG: 184 | End: 2017-09-06
Attending: INTERNAL MEDICINE | Admitting: INTERNAL MEDICINE
Payer: MEDICARE

## 2017-08-26 VITALS
RESPIRATION RATE: 16 BRPM | DIASTOLIC BLOOD PRESSURE: 91 MMHG | OXYGEN SATURATION: 96 % | HEART RATE: 93 BPM | SYSTOLIC BLOOD PRESSURE: 205 MMHG | TEMPERATURE: 98 F

## 2017-08-26 LAB
APPEARANCE UR: CLEAR — SIGNIFICANT CHANGE UP
BACTERIA # UR AUTO: ABNORMAL /HPF
BILIRUB UR-MCNC: NEGATIVE — SIGNIFICANT CHANGE UP
COLOR SPEC: COLORLESS — SIGNIFICANT CHANGE UP
DIFF PNL FLD: NEGATIVE — SIGNIFICANT CHANGE UP
EPI CELLS # UR: SIGNIFICANT CHANGE UP /HPF
GLUCOSE UR QL: NEGATIVE — SIGNIFICANT CHANGE UP
KETONES UR-MCNC: NEGATIVE — SIGNIFICANT CHANGE UP
LEUKOCYTE ESTERASE UR-ACNC: ABNORMAL
NITRITE UR-MCNC: NEGATIVE — SIGNIFICANT CHANGE UP
PH UR: 7 — SIGNIFICANT CHANGE UP (ref 5–8)
PROT UR-MCNC: NEGATIVE — SIGNIFICANT CHANGE UP
RBC CASTS # UR COMP ASSIST: SIGNIFICANT CHANGE UP /HPF (ref 0–2)
SP GR SPEC: 1.01 — LOW (ref 1.01–1.02)
UROBILINOGEN FLD QL: NEGATIVE — SIGNIFICANT CHANGE UP
WBC UR QL: SIGNIFICANT CHANGE UP /HPF (ref 0–5)

## 2017-08-26 PROCEDURE — 99285 EMERGENCY DEPT VISIT HI MDM: CPT | Mod: GC

## 2017-08-26 RX ORDER — CEFTRIAXONE 500 MG/1
1 INJECTION, POWDER, FOR SOLUTION INTRAMUSCULAR; INTRAVENOUS ONCE
Qty: 0 | Refills: 0 | Status: COMPLETED | OUTPATIENT
Start: 2017-08-26 | End: 2017-08-26

## 2017-08-26 RX ORDER — HALOPERIDOL DECANOATE 100 MG/ML
2 INJECTION INTRAMUSCULAR ONCE
Qty: 0 | Refills: 0 | Status: COMPLETED | OUTPATIENT
Start: 2017-08-26 | End: 2017-08-26

## 2017-08-26 RX ORDER — NIFEDIPINE 30 MG
30 TABLET, EXTENDED RELEASE 24 HR ORAL ONCE
Qty: 0 | Refills: 0 | Status: COMPLETED | OUTPATIENT
Start: 2017-08-26 | End: 2017-08-26

## 2017-08-26 RX ADMIN — Medication 30 MILLIGRAM(S): at 23:23

## 2017-08-26 RX ADMIN — HALOPERIDOL DECANOATE 2 MILLIGRAM(S): 100 INJECTION INTRAMUSCULAR at 23:40

## 2017-08-26 NOTE — ED ADULT NURSE NOTE - OBJECTIVE STATEMENT
brought in by [facility] from private home status post fall at home; per ems, pt's son heard pt fall from standing position while in the other room; son reported no loc and pt was lying on left side on floor; ems arrived and assisted pt up and assisted pt to ambulate to bed; pt c/o low back pain and left hip pain; pt has dementia and only oriented to person; unable to obtain any info from pt brought in by [facility] from private home status post fall at home; per ems, pt's son heard pt fall from standing position while in the other room; son reported no loc and pt was lying on left side on floor; ems arrived and assisted pt up and assisted pt to ambulate to bed; pt c/o low back pain and left hip pain; pt has dementia and only oriented to person; unable to obtain any info from pt; son stayed home - not coming to hospital
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
## 2017-08-27 DIAGNOSIS — I10 ESSENTIAL (PRIMARY) HYPERTENSION: ICD-10-CM

## 2017-08-27 DIAGNOSIS — F03.91 UNSPECIFIED DEMENTIA WITH BEHAVIORAL DISTURBANCE: ICD-10-CM

## 2017-08-27 DIAGNOSIS — Z87.39 PERSONAL HISTORY OF OTHER DISEASES OF THE MUSCULOSKELETAL SYSTEM AND CONNECTIVE TISSUE: ICD-10-CM

## 2017-08-27 LAB
APTT BLD: 33.4 SEC — SIGNIFICANT CHANGE UP (ref 27.5–37.4)
BASOPHILS # BLD AUTO: 0.1 K/UL — SIGNIFICANT CHANGE UP (ref 0–0.2)
BASOPHILS NFR BLD AUTO: 0.5 % — SIGNIFICANT CHANGE UP (ref 0–2)
CK MB BLD-MCNC: 3.4 % — SIGNIFICANT CHANGE UP (ref 0–3.5)
CK MB CFR SERPL CALC: 4 NG/ML — HIGH (ref 0–3.8)
CK SERPL-CCNC: 116 U/L — SIGNIFICANT CHANGE UP (ref 25–170)
EOSINOPHIL # BLD AUTO: 0.1 K/UL — SIGNIFICANT CHANGE UP (ref 0–0.5)
EOSINOPHIL NFR BLD AUTO: 0.6 % — SIGNIFICANT CHANGE UP (ref 0–6)
HCT VFR BLD CALC: 43.5 % — SIGNIFICANT CHANGE UP (ref 34.5–45)
HGB BLD-MCNC: 14.7 G/DL — SIGNIFICANT CHANGE UP (ref 11.5–15.5)
INR BLD: 0.97 RATIO — SIGNIFICANT CHANGE UP (ref 0.88–1.16)
LYMPHOCYTES # BLD AUTO: 1.9 K/UL — SIGNIFICANT CHANGE UP (ref 1–3.3)
LYMPHOCYTES # BLD AUTO: 13.8 % — SIGNIFICANT CHANGE UP (ref 13–44)
MCHC RBC-ENTMCNC: 33.2 PG — SIGNIFICANT CHANGE UP (ref 27–34)
MCHC RBC-ENTMCNC: 33.9 GM/DL — SIGNIFICANT CHANGE UP (ref 32–36)
MCV RBC AUTO: 98.2 FL — SIGNIFICANT CHANGE UP (ref 80–100)
MONOCYTES # BLD AUTO: 1.3 K/UL — HIGH (ref 0–0.9)
MONOCYTES NFR BLD AUTO: 9.2 % — SIGNIFICANT CHANGE UP (ref 2–14)
NEUTROPHILS # BLD AUTO: 10.5 K/UL — HIGH (ref 1.8–7.4)
NEUTROPHILS NFR BLD AUTO: 76 % — SIGNIFICANT CHANGE UP (ref 43–77)
PLATELET # BLD AUTO: 201 K/UL — SIGNIFICANT CHANGE UP (ref 150–400)
PROTHROM AB SERPL-ACNC: 10.6 SEC — SIGNIFICANT CHANGE UP (ref 9.8–12.7)
RBC # BLD: 4.43 M/UL — SIGNIFICANT CHANGE UP (ref 3.8–5.2)
RBC # FLD: 11.7 % — SIGNIFICANT CHANGE UP (ref 10.3–14.5)
TROPONIN T SERPL-MCNC: <0.01 NG/ML — SIGNIFICANT CHANGE UP (ref 0–0.06)
TROPONIN T SERPL-MCNC: <0.01 NG/ML — SIGNIFICANT CHANGE UP (ref 0–0.06)
WBC # BLD: 13.9 K/UL — HIGH (ref 3.8–10.5)
WBC # FLD AUTO: 13.9 K/UL — HIGH (ref 3.8–10.5)

## 2017-08-27 PROCEDURE — 71250 CT THORAX DX C-: CPT | Mod: 26

## 2017-08-27 PROCEDURE — 70450 CT HEAD/BRAIN W/O DYE: CPT | Mod: 26

## 2017-08-27 PROCEDURE — 74176 CT ABD & PELVIS W/O CONTRAST: CPT | Mod: 26

## 2017-08-27 PROCEDURE — 99233 SBSQ HOSP IP/OBS HIGH 50: CPT

## 2017-08-27 PROCEDURE — 72125 CT NECK SPINE W/O DYE: CPT | Mod: 26

## 2017-08-27 RX ORDER — MIDAZOLAM HYDROCHLORIDE 1 MG/ML
2 INJECTION, SOLUTION INTRAMUSCULAR; INTRAVENOUS ONCE
Qty: 0 | Refills: 0 | Status: DISCONTINUED | OUTPATIENT
Start: 2017-08-27 | End: 2017-08-27

## 2017-08-27 RX ORDER — DIVALPROEX SODIUM 500 MG/1
500 TABLET, DELAYED RELEASE ORAL
Qty: 0 | Refills: 0 | Status: DISCONTINUED | OUTPATIENT
Start: 2017-08-27 | End: 2017-09-06

## 2017-08-27 RX ORDER — LIDOCAINE 4 G/100G
1 CREAM TOPICAL ONCE
Qty: 0 | Refills: 0 | Status: COMPLETED | OUTPATIENT
Start: 2017-08-27 | End: 2017-08-27

## 2017-08-27 RX ORDER — HEPARIN SODIUM 5000 [USP'U]/ML
5000 INJECTION INTRAVENOUS; SUBCUTANEOUS EVERY 8 HOURS
Qty: 0 | Refills: 0 | Status: DISCONTINUED | OUTPATIENT
Start: 2017-08-27 | End: 2017-09-06

## 2017-08-27 RX ORDER — ACETAMINOPHEN 500 MG
650 TABLET ORAL ONCE
Qty: 0 | Refills: 0 | Status: COMPLETED | OUTPATIENT
Start: 2017-08-27 | End: 2017-08-27

## 2017-08-27 RX ORDER — PANTOPRAZOLE SODIUM 20 MG/1
40 TABLET, DELAYED RELEASE ORAL
Qty: 0 | Refills: 0 | Status: DISCONTINUED | OUTPATIENT
Start: 2017-08-27 | End: 2017-09-06

## 2017-08-27 RX ORDER — SODIUM CHLORIDE 9 MG/ML
1000 INJECTION INTRAMUSCULAR; INTRAVENOUS; SUBCUTANEOUS
Qty: 0 | Refills: 0 | Status: DISCONTINUED | OUTPATIENT
Start: 2017-08-27 | End: 2017-08-29

## 2017-08-27 RX ORDER — HYDRALAZINE HCL 50 MG
10 TABLET ORAL ONCE
Qty: 0 | Refills: 0 | Status: COMPLETED | OUTPATIENT
Start: 2017-08-27 | End: 2017-08-27

## 2017-08-27 RX ORDER — HALOPERIDOL DECANOATE 100 MG/ML
2 INJECTION INTRAMUSCULAR ONCE
Qty: 0 | Refills: 0 | Status: COMPLETED | OUTPATIENT
Start: 2017-08-27 | End: 2017-08-27

## 2017-08-27 RX ORDER — AMLODIPINE BESYLATE 2.5 MG/1
10 TABLET ORAL ONCE
Qty: 0 | Refills: 0 | Status: COMPLETED | OUTPATIENT
Start: 2017-08-27 | End: 2017-08-27

## 2017-08-27 RX ORDER — ALPRAZOLAM 0.25 MG
0.25 TABLET ORAL ONCE
Qty: 0 | Refills: 0 | Status: DISCONTINUED | OUTPATIENT
Start: 2017-08-27 | End: 2017-08-27

## 2017-08-27 RX ORDER — ALPRAZOLAM 0.25 MG
0.25 TABLET ORAL
Qty: 0 | Refills: 0 | Status: DISCONTINUED | OUTPATIENT
Start: 2017-08-27 | End: 2017-09-03

## 2017-08-27 RX ORDER — NIFEDIPINE 30 MG
30 TABLET, EXTENDED RELEASE 24 HR ORAL DAILY
Qty: 0 | Refills: 0 | Status: DISCONTINUED | OUTPATIENT
Start: 2017-08-27 | End: 2017-09-06

## 2017-08-27 RX ORDER — DIVALPROEX SODIUM 500 MG/1
500 TABLET, DELAYED RELEASE ORAL
Qty: 0 | Refills: 0 | COMMUNITY

## 2017-08-27 RX ADMIN — Medication 650 MILLIGRAM(S): at 23:55

## 2017-08-27 RX ADMIN — MIDAZOLAM HYDROCHLORIDE 2 MILLIGRAM(S): 1 INJECTION, SOLUTION INTRAMUSCULAR; INTRAVENOUS at 07:28

## 2017-08-27 RX ADMIN — SODIUM CHLORIDE 60 MILLILITER(S): 9 INJECTION INTRAMUSCULAR; INTRAVENOUS; SUBCUTANEOUS at 09:55

## 2017-08-27 RX ADMIN — Medication 650 MILLIGRAM(S): at 11:47

## 2017-08-27 RX ADMIN — Medication 0.25 MILLIGRAM(S): at 23:27

## 2017-08-27 RX ADMIN — CEFTRIAXONE 100 GRAM(S): 500 INJECTION, POWDER, FOR SOLUTION INTRAMUSCULAR; INTRAVENOUS at 01:17

## 2017-08-27 RX ADMIN — Medication 0.25 MILLIGRAM(S): at 20:28

## 2017-08-27 RX ADMIN — HEPARIN SODIUM 5000 UNIT(S): 5000 INJECTION INTRAVENOUS; SUBCUTANEOUS at 07:28

## 2017-08-27 RX ADMIN — Medication 650 MILLIGRAM(S): at 23:28

## 2017-08-27 RX ADMIN — MIDAZOLAM HYDROCHLORIDE 2 MILLIGRAM(S): 1 INJECTION, SOLUTION INTRAMUSCULAR; INTRAVENOUS at 09:59

## 2017-08-27 RX ADMIN — HEPARIN SODIUM 5000 UNIT(S): 5000 INJECTION INTRAVENOUS; SUBCUTANEOUS at 22:57

## 2017-08-27 RX ADMIN — Medication 30 MILLIGRAM(S): at 17:52

## 2017-08-27 RX ADMIN — LIDOCAINE 1 PATCH: 4 CREAM TOPICAL at 21:00

## 2017-08-27 RX ADMIN — HALOPERIDOL DECANOATE 2 MILLIGRAM(S): 100 INJECTION INTRAMUSCULAR at 07:28

## 2017-08-27 RX ADMIN — Medication 10 MILLIGRAM(S): at 04:58

## 2017-08-27 RX ADMIN — DIVALPROEX SODIUM 500 MILLIGRAM(S): 500 TABLET, DELAYED RELEASE ORAL at 17:52

## 2017-08-27 RX ADMIN — HALOPERIDOL DECANOATE 2 MILLIGRAM(S): 100 INJECTION INTRAMUSCULAR at 05:12

## 2017-08-27 RX ADMIN — AMLODIPINE BESYLATE 10 MILLIGRAM(S): 2.5 TABLET ORAL at 04:57

## 2017-08-27 RX ADMIN — MIDAZOLAM HYDROCHLORIDE 2 MILLIGRAM(S): 1 INJECTION, SOLUTION INTRAMUSCULAR; INTRAVENOUS at 06:06

## 2017-08-27 RX ADMIN — LIDOCAINE 1 PATCH: 4 CREAM TOPICAL at 09:51

## 2017-08-27 NOTE — H&P ADULT - ASSESSMENT
89F BIBEMS after unwitnessed fall precautions. Per ED/EMS, pt lives with son; son heard his mother fall but did not witness fall precautions of unclear etiology

## 2017-08-27 NOTE — CONSULT NOTE ADULT - SUBJECTIVE AND OBJECTIVE BOX
TRAUMA SERVICE (Acute Care Surgery / [facility] - #9039) - CONSULT NOTE  --------------------------------------------------------------------------------------------    TRAUMA ACTIVATION LEVEL: 3    MECHANISM OF INJURY:      [] Blunt  	[] MVC	[X] Fall	[] Pedestrian Struck	[] Motorcycle accident      [] Penetrating  	[] Gun Shot Wound 		[] Stab Wound    GCS: 	E: 4	V: 5	M: 6    Patient is a 89y old  Female who presents with a chief complaint of     HPI: 89F BIBEMS after unwitnessed fall.  Per ED/EMS, pt lives with son; son heard his mother fall but did not witness fall.  Son called EMS to bring in patient, however son (HCP) did not accompany his mother to the ED and is now not able to be contacted despite multiple attempts by ED and writer. Attempted calling [phone] (number in prior records) as well as [phone] and [phone]. Unknown whether patient struck her head or had any LOC. Patient has advanced dementia and is unable to provide history. PMH from prior records indication hx of dementia (AAOx1; s/p multiple hospitalizations at [facility] for dementia with paranoia); spinal stenosis s/p laminectomy ; CAD s/p CEA; closed loop SBO s/p ex-lap, ASHLEY, SBR 2015 with Dr. Vargas; HTN; HLD; Hepatitis C (unknown if received treatment) with recent admission 2017 for a fall.  In the ED, the patient underwent CT Head and Cervical Spine which failed to demonstrate any intracranial pathology but did suggest an apical pneumothorax. Additional imaging was ordered, however the patient was extremely agitated and unable to participate in scan. The patient received 4mg Haldol, however the scan was still not tolerated. The patient subsequently received Versed and was able to tolerate the CT chest/Abdomen/Pelvis    Primary Survey:  Performed by ED physicians prior to consulting trauma surgery    Secondary Survey: ***  Chest: Left flank ecchymoses and TTP in region of ecchymoses on left flank    ROS: Patient unable to respond appropriately to questions regarding ROS    PAST MEDICAL & SURGICAL HISTORY (from EMR):  Dementia  History of Spinal Stenosis s/p laminectomy   Hepatitis C: possible during transfusion in   Carotid Artery Disease  Tendonitis: left hand: s/p steroid injection 3/10  Back Pain  Hypertension  Hyperlipidemia  History of Carotid Endarterectomy: right 2008  S/P Dilation and Curettage:  menorrhagia  After Cataract, Bilateral  S/P Cholecystectomy\  S/P Knee Replacement: left knee   right knee   s/p ExLap, SBR, ASHLEY for closed loop SBO 2015    FAMILY HISTORY:  Unable to obtain    SOCIAL HISTORY:  Unable to obtain on this interview. Per prior records, patient is a former smoker    ALLERGIES: No Known Allergies      HOME MEDICATIONS:   Unable to assess current medications as HCP is unable to be contacted. Per ED documentation, patient takes:  Pantoprozole  Nifedipine  Tylenol  Alprazolam  Depakote    CURRENT MEDICATIONS  MEDICATIONS (STANDING): sodium chloride 0.9%. 1000 milliLiter(s) IV Continuous <Continuous>  heparin  Injectable 5000 Unit(s) SubCutaneous every 8 hours  midazolam Injectable 2 milliGRAM(s) IV Push once    MEDICATIONS (PRN):  --------------------------------------------------------------------------------------------    Vitals:   T(C): 36.6 (17 @ 04:14), Max: 36.8 (17 @ 21:50)  HR: 93 (17 @ 08:28) (90 - 107)  BP: 134/82 (17 @ 08:28) (134/82 - 239/122)  BP(mean): --  RR: 18 (17 @ 08:28) (16 - 18)  SpO2: 100% (17 @ 08:28) (96% - 100%)  Wt(kg): --  CAPILLARY BLOOD GLUCOSE        CAPILLARY BLOOD GLUCOSE           @ 07:01  -   @ 07:00  --------------------------------------------------------  IN:    IV PiggyBack: 50 mL  Total IN: 50 mL    OUT:    Voided: 300 mL  Total OUT: 300 mL    Total NET: -250 mL            PHYSICAL EXAM:  General: NAD, appears lethargic.  Able to be awoken but does not respond to questions appropriately  HEENT: Normocephalic, atraumatic, pupils equal and reactive to light. No facial deformity or ecchymoses noted  Neck: Soft, midline trachea.  Chest: No anterior chest wall tenderness. No obvious respiratory distress. +Ecchymoses left flank. TTP left flank  Respiratory: Equal chest rise, no dyspnea  Abdomen: Soft, non-distended, non-tender no rebound, noguarding. Well-healed vertical midline incision. No masses palpated  Groin: Normal appearing  Ext: palp radial b/l UE, b/l DP palp in Lower Extrem.   Back: no TTP, no palpable runoff/stepoff/deformity      --------------------------------------------------------------------------------------------    LABS  CBC ( @ 00:00)                              14.7                           13.9<H>  )----------------(  201        76.0  % Neutrophils, 13.8  % Lymphocytes, ANC: 10.5<H>                              43.5      BMP ( @ 00:00)             136     |  95<L>   |  18    		Ca++ --      Ca 9.4                ---------------------------------( 153<H>		Mg --                 3.7     |  27      |  0.80  			Ph --        LFTs ( 00:00)      TPro 7.3 / Alb 4.0 / TBili 0.4 / DBili -- / AST 21 / ALT 14 / AlkPhos 75    Coags (:00)  aPTT 33.4 / INR 0.97 / PT 10.6    Cardiac Markers ( @ 05:16)     Trop: <0.01 -- / CKMB: -- / CK: --  Cardiac Markers (:00)     Trop: <0.01 -- / CKMB: -- / CK: 116        --------------------------------------------------------------------------------------------    MICROBIOLOGY  Urinalysis ( @ 23:05):     Color:  / Appearance: Clear / S.006<L> / pH: 7.0 / Gluc: Negative / Ketones: Negative / Bili: Negative / Urobili: Negative / Protein :Negative / Nitrites: Negative / Leuk.Est: Trace<!> / RBC: 0-2 / WBC: 6-10 / Sq Epi:  / Non Sq Epi: OCC / Bacteria Few<!>         --------------------------------------------------------------------------------------------    IMAGING      < from: CT Cervical Spine No Cont (17 @ 04:06) >  Head CT: No evidence for intracranial hemorrhage, extra-axial collection,   brain edema or calvarial fracture.    Cervical spine CT: No CT evidence of cervical spine fracture or traumatic   malalignment.  Multilevel degenerative changes as discussed above.  Tiny   pneumothorax is partially visualized in the left lung apex.  Dedicated   chest imaging is recommended.    < end of copied text >  --------------------------------------------------------------------------------------------  CT Chest/Abdomen/Pelvis: Official read is pending; per writer's conversation with radiology, appears that there are acute fractures of ribs 5 and 6 with a very small pneumothorax, best seen anteriorly TRAUMA SERVICE (Acute Care Surgery / [facility] - #9039) - CONSULT NOTE  --------------------------------------------------------------------------------------------    TRAUMA ACTIVATION LEVEL: 3    MECHANISM OF INJURY:      [] Blunt  	[] MVC	[X] Fall	[] Pedestrian Struck	[] Motorcycle accident      [] Penetrating  	[] Gun Shot Wound 		[] Stab Wound    GCS: 	E: 4	V: 5	M: 6    Patient is a 89y old  Female who presents with a chief complaint of unwitnessed fall.    HPI: 89F BIBEMS after unwitnessed fall.  Per ED/EMS, pt lives with son; son heard his mother fall but did not witness fall.  Son called EMS to bring in patient, however son (HCP) did not accompany his mother to the ED and is now not able to be contacted despite multiple attempts by ED and writer. Attempted calling [phone] (number in prior records) as well as [phone] and [phone]. Unknown whether patient struck her head or had any LOC. Patient has advanced dementia and is unable to provide history. PMH from prior records indication hx of dementia (AAOx1; s/p multiple hospitalizations at [facility] for dementia with paranoia); spinal stenosis s/p laminectomy ; CAD s/p CEA; closed loop SBO s/p ex-lap, ASHLEY, SBR 2015 with Dr. Vargas; HTN; HLD; Hepatitis C (unknown if received treatment) with recent admission 2017 for a fall.  In the ED, the patient underwent CT Head and Cervical Spine which failed to demonstrate any intracranial pathology but did suggest an apical pneumothorax. Additional imaging was ordered, however the patient was extremely agitated and unable to participate in scan. The patient received 4mg Haldol, however the scan was still not tolerated. The patient subsequently received Versed and was able to tolerate the CT chest/Abdomen/Pelvis    Primary Survey:  Intact    Secondary Survey: ***  Chest: Left flank ecchymoses and TTP in region of ecchymoses on left flank    ROS: Patient unable to respond appropriately to questions regarding ROS    PAST MEDICAL & SURGICAL HISTORY (from EMR):  Dementia  History of Spinal Stenosis s/p laminectomy   Hepatitis C: possible during transfusion in   Carotid Artery Disease  Tendonitis: left hand: s/p steroid injection 3/10  Back Pain  Hypertension  Hyperlipidemia  History of Carotid Endarterectomy: right 2008  S/P Dilation and Curettage:  menorrhagia  After Cataract, Bilateral  S/P Cholecystectomy\  S/P Knee Replacement: left knee   right knee   s/p ExLap, SBR, ASHLEY for closed loop SBO 2015    FAMILY HISTORY:  Unable to obtain    SOCIAL HISTORY:  Unable to obtain on this interview. Per prior records, patient is a former smoker    ALLERGIES: No Known Allergies      HOME MEDICATIONS:   Unable to assess current medications as HCP is unable to be contacted. Per ED documentation, patient takes:  Pantoprozole  Nifedipine  Tylenol  Alprazolam  Depakote    CURRENT MEDICATIONS  MEDICATIONS (STANDING): sodium chloride 0.9%. 1000 milliLiter(s) IV Continuous <Continuous>  heparin  Injectable 5000 Unit(s) SubCutaneous every 8 hours  midazolam Injectable 2 milliGRAM(s) IV Push once    MEDICATIONS (PRN):  --------------------------------------------------------------------------------------------    Vitals:   T(C): 36.6 (17 @ 04:14), Max: 36.8 (17 @ 21:50)  HR: 93 (17 @ 08:28) (90 - 107)  BP: 134/82 (17 @ 08:28) (134/82 - 239/122)  BP(mean): --  RR: 18 (17 @ 08:28) (16 - 18)  SpO2: 100% (17 @ 08:28) (96% - 100%)  Wt(kg): --  CAPILLARY BLOOD GLUCOSE        CAPILLARY BLOOD GLUCOSE           @ 07:01  -  08-27 @ 07:00  --------------------------------------------------------  IN:    IV PiggyBack: 50 mL  Total IN: 50 mL    OUT:    Voided: 300 mL  Total OUT: 300 mL    Total NET: -250 mL            PHYSICAL EXAM:  General: NAD, appears lethargic.  Able to be awoken but does not respond to questions appropriately  HEENT: Normocephalic, atraumatic, pupils equal and reactive to light. No facial deformity or ecchymoses noted  Neck: Soft, midline trachea.  Chest: No anterior chest wall tenderness. No obvious respiratory distress. +Ecchymoses left flank. TTP left flank  Respiratory: Equal chest rise, no dyspnea  Abdomen: Soft, non-distended, non-tender no rebound, noguarding. Well-healed vertical midline incision. No masses palpated  : Normal appearing external genitalia  Ext: palp radial b/l UE, b/l DP palp in Lower Extrem.   Back: no TTP, no palpable runoff/stepoff/deformity      --------------------------------------------------------------------------------------------    LABS  CBC ( @ 00:00)                              14.7                           13.9<H>  )----------------(  201        76.0  % Neutrophils, 13.8  % Lymphocytes, ANC: 10.5<H>                              43.5      BMP ( @ 00:00)             136     |  95<L>   |  18    		Ca++ --      Ca 9.4                ---------------------------------( 153<H>		Mg --                 3.7     |  27      |  0.80  			Ph --        LFTs ( 00:00)      TPro 7.3 / Alb 4.0 / TBili 0.4 / DBili -- / AST 21 / ALT 14 / AlkPhos 75    Coags (:00)  aPTT 33.4 / INR 0.97 / PT 10.6    Cardiac Markers ( @ 05:16)     Trop: <0.01 -- / CKMB: -- / CK: --  Cardiac Markers (:00)     Trop: <0.01 -- / CKMB: -- / CK: 116        --------------------------------------------------------------------------------------------    MICROBIOLOGY  Urinalysis ( @ 23:05):     Color:  / Appearance: Clear / S.006<L> / pH: 7.0 / Gluc: Negative / Ketones: Negative / Bili: Negative / Urobili: Negative / Protein :Negative / Nitrites: Negative / Leuk.Est: Trace<!> / RBC: 0-2 / WBC: 6-10 / Sq Epi:  / Non Sq Epi: OCC / Bacteria Few<!>         --------------------------------------------------------------------------------------------    IMAGING      < from: CT Cervical Spine No Cont (17 @ 04:06) >  Head CT: No evidence for intracranial hemorrhage, extra-axial collection,   brain edema or calvarial fracture.    Cervical spine CT: No CT evidence of cervical spine fracture or traumatic   malalignment.  Multilevel degenerative changes as discussed above.  Tiny   pneumothorax is partially visualized in the left lung apex.  Dedicated   chest imaging is recommended.    < end of copied text >  --------------------------------------------------------------------------------------------  CT Chest/Abdomen/Pelvis: Official read is pending; per writer's conversation with radiology, appears that there are acute fractures of ribs 5 and 6 with a very small pneumothorax, best seen anteriorly

## 2017-08-27 NOTE — ED ADULT NURSE REASSESSMENT NOTE - NS ED NURSE REASSESS COMMENT FT1
on 1:1 for safety
pt got oob and sat in bedside chair; asissted back to bed; placed in kelley for enhanced supervision; diaper changed; large wet diaper
pt very combative, hitting and kicking while attempt to draw blood and place iv; dr brewer made aware; haldol given
Received elderly female alert and oriented x4 agitated and un cooperative , pt medicated as ordered.
dr fox made aware of elevated bp
pt became very aggitated; yelling, kicking; "get out of the house!!"; plan for ct scans; dr pretty aware of  aggitation and nee for ct scan; medicated with versed
unable to perform ct scan due to pt uncooperative; MD's aware

## 2017-08-27 NOTE — ED PROVIDER NOTE - ATTENDING CONTRIBUTION TO CARE
attending Loyda: 89yF advanced dementia, spinal stenosis BIBEMS after unwitnessed fall at home. As per EMS pt's son heard thud and found her on the floor by her bed. Prior admissions for unwitnessed fall this year, workup negative. On exam, oriented x 1. attending Loyda: 89yF advanced dementia, spinal stenosis BIBEMS after unwitnessed fall at home. As per EMS pt's son heard thud and found her on the floor by her bed. Prior admissions for unwitnessed fall this year, workup negative. On exam, oriented x 1, agitated, JAIN, NCAT, PERRL, no midline spinal tenderness, no obvious signs of trauma. Given lack of clear history and son not present in ED will obtain bloodwork including cardiac enzymes, urinalysis, trauma pan-scan and reassess. Will place on constant observation and attempt to contact son via telephone.

## 2017-08-27 NOTE — H&P ADULT - NSHPPHYSICALEXAM_GEN_ALL_CORE
vital signs as above  in no apparent distress sleeping but arousable  HEENT: GEM EOMI  Neck: Supple, no JVD, no thyromegaly  Lungs: clear, no rhonchi, no wheeze, no crackles decreased effort  CVS: S1 S2 no M/R/G  Abdomen: no tenderness, no organomegaly, BS present  Neuro: confused but talking, no focal weakness evident, patient not cooperative  Psych: confused  Skin: warm, dry  Ext: no edema, no clubbing  Msk: no joint swelling or deformities  Back: no CVA tenderness, no kyphosis/scoliosis

## 2017-08-27 NOTE — ED PROVIDER NOTE - OBJECTIVE STATEMENT
89 y/o female BIBEMS s/p fall. Unknown if hit her head or LOC. Patient with baseline dementia, A&Ox1 to self. Son is health proxy and not here and not picking up phone. Complaining of low back pain, no headache, neck pain, hip pain.

## 2017-08-27 NOTE — ED ADULT NURSE REASSESSMENT NOTE - COMFORT CARE
voided/assisted to bedpan
side rails up/darkened lights/diaper changed; large amt of urine/repositioned

## 2017-08-27 NOTE — CONSULT NOTE ADULT - ASSESSMENT
ASSESSMENT: Patient is a 89y old f with 2 rib fractures following unwitnessed fall at home, also with a PMH dementia, HTN, HLD, spinal stenosis, CAD s/p CEA, hepatitis C, who is breathing well on room air    PLAN:   - Follow up official radiology interpretation of chest/abdomen images  - Multi-modal pain control for rib fractures  - Patient is not on any anticoagulants or antiplatelet agents per EMR and coags are normal so no need to reverse at this time.   - Recommend pulmonary toilet, deep breathing, and IS as tolerated to aid in prevention of pneumonia following rib fractures  - Obtain collateral information from son/HCP      - Plan to be discussed with Attending, Dr. Rinaldi ASSESSMENT: Patient is a 89y old f with 2 rib fractures following unwitnessed fall at home, also with a PMH dementia, HTN, HLD, spinal stenosis, CAD s/p CEA, hepatitis C, who is breathing well on room air    PLAN:   - Follow up official radiology interpretation of chest/abdomen images  - Multi-modal pain control for rib fractures  - Patient is not on any anticoagulants or antiplatelet agents per EMR and coags are normal so no need to reverse at this time.   - Recommend pulmonary toilet, deep breathing, and IS as tolerated to aid in prevention of pneumonia following rib fractures  - Obtain collateral information from son/HCP      - Plan discussed with Attending, Dr. Rinaldi

## 2017-08-27 NOTE — PATIENT PROFILE ADULT. - FALL HARM RISK
age(85 years old or older)/s/p fall in nursing home/bones(Osteoporosis,prev fx,steroid use,metastatic bone ca

## 2017-08-27 NOTE — ED PROVIDER NOTE - PHYSICAL EXAMINATION
Gen: NAD, AOx3, non-toxic // Head: NCAT // HEENT: EOMI, DMM, normal conjunctiva // Lung: CTAB, no respiratory distress, no wheezes/rhonchi/rales B/L, speaking in full sentences. // CV: RRR, no murmurs, rubs or gallops // Abd: soft, NTND, no guarding, no CVA tenderness // MSK: No tenderness to palpation throughout including midline spine or hips.  // Neuro: No focal sensory or motor deficits // Skin: Warm, well perfused, no rash // Psych: normal affect. ~Lupe Costello M.D., Ph.D. -Resident Gen: NAD, AOx3, non-toxic // Head: NCAT // HEENT: EOMI, DMM, normal conjunctiva // Lung: CTAB, no respiratory distress, no wheezes/rhonchi/rales B/L, speaking in full sentences. // CV: RRR, no murmurs, rubs or gallops // Abd: soft, NTND, no guarding, no CVA tenderness // MSK: No tenderness to palpation throughout including midline spine or hips.  // Neuro: No focal sensory or motor deficits // Skin: Warm, well perfused, no rash // Psych: agitated ~Lupe Costello M.D., Ph.D. -Resident

## 2017-08-27 NOTE — CONSULT NOTE ADULT - ATTENDING COMMENTS
Pt seen and examined tonight at 11pm, agree with above. Pt with h/o severe dementia, brought to hospital by her son, who states that the patient had an unwitnessed fall. Cannot obtain HPI, ROS, history (medical, family, social) from pt secondary to dementia. History obtained from chart. Injuries include L trace anterior hemopneumothorax, left 4-6 rib fractures. Recommend pain control (avoiding narcotics, would recommend IV Tylenol), OOB with PT, repeat CXR tomorrow to ensure H/PTX not enlarging.

## 2017-08-27 NOTE — ED PROVIDER NOTE - PROGRESS NOTE DETAILS
Patient's blood BP increased during course of visit, most recently to 230 systolic despite her home dose of antihypertensive. Will order other antihypertensives until improvement in BP. Also, patient became agitated in CT scaner and refused CT Chest abdomen and pelvis. Received call from radiologist that there is a trace apical pneumo on left so will give versed 2 mg and try to repeat CTCAP.

## 2017-08-27 NOTE — H&P ADULT - HISTORY OF PRESENT ILLNESS
89F BIBEMS after unwitnessed fall precautions. Per ED/EMS, pt lives with son; son heard his mother fall but did not witness fall.  Son called EMS to bring in patient, however son (HCP) did not accompany his mother to the ED and is now not able to be contacted despite multiple attempts by ED and writer. Attempted calling [phone] (number in prior records) as well as [phone] and [phone]. Unknown whether patient struck her head or had any LOC. Patient has advanced dementia and is unable to provide history.

## 2017-08-28 DIAGNOSIS — F02.80 DEMENTIA IN OTHER DISEASES CLASSIFIED ELSEWHERE, UNSPECIFIED SEVERITY, WITHOUT BEHAVIORAL DISTURBANCE, PSYCHOTIC DISTURBANCE, MOOD DISTURBANCE, AND ANXIETY: ICD-10-CM

## 2017-08-28 DIAGNOSIS — F05 DELIRIUM DUE TO KNOWN PHYSIOLOGICAL CONDITION: ICD-10-CM

## 2017-08-28 LAB
HCT VFR BLD CALC: 39.4 % — SIGNIFICANT CHANGE UP (ref 34.5–45)
HGB BLD-MCNC: 13.5 G/DL — SIGNIFICANT CHANGE UP (ref 11.5–15.5)
MCHC RBC-ENTMCNC: 31.5 PG — SIGNIFICANT CHANGE UP (ref 27–34)
MCHC RBC-ENTMCNC: 34.3 GM/DL — SIGNIFICANT CHANGE UP (ref 32–36)
MCV RBC AUTO: 91.8 FL — SIGNIFICANT CHANGE UP (ref 80–100)
PLATELET # BLD AUTO: 215 K/UL — SIGNIFICANT CHANGE UP (ref 150–400)
RBC # BLD: 4.29 M/UL — SIGNIFICANT CHANGE UP (ref 3.8–5.2)
RBC # FLD: 13.2 % — SIGNIFICANT CHANGE UP (ref 10.3–14.5)
WBC # BLD: 13.85 K/UL — HIGH (ref 3.8–10.5)
WBC # FLD AUTO: 13.85 K/UL — HIGH (ref 3.8–10.5)

## 2017-08-28 PROCEDURE — 99222 1ST HOSP IP/OBS MODERATE 55: CPT

## 2017-08-28 PROCEDURE — 71010: CPT | Mod: 26

## 2017-08-28 RX ORDER — ACETAMINOPHEN 500 MG
1000 TABLET ORAL ONCE
Qty: 0 | Refills: 0 | Status: COMPLETED | OUTPATIENT
Start: 2017-08-28 | End: 2017-08-28

## 2017-08-28 RX ADMIN — DIVALPROEX SODIUM 500 MILLIGRAM(S): 500 TABLET, DELAYED RELEASE ORAL at 17:37

## 2017-08-28 RX ADMIN — HEPARIN SODIUM 5000 UNIT(S): 5000 INJECTION INTRAVENOUS; SUBCUTANEOUS at 15:14

## 2017-08-28 RX ADMIN — SODIUM CHLORIDE 60 MILLILITER(S): 9 INJECTION INTRAMUSCULAR; INTRAVENOUS; SUBCUTANEOUS at 22:26

## 2017-08-28 RX ADMIN — DIVALPROEX SODIUM 500 MILLIGRAM(S): 500 TABLET, DELAYED RELEASE ORAL at 05:56

## 2017-08-28 RX ADMIN — Medication 1000 MILLIGRAM(S): at 17:38

## 2017-08-28 RX ADMIN — HEPARIN SODIUM 5000 UNIT(S): 5000 INJECTION INTRAVENOUS; SUBCUTANEOUS at 22:24

## 2017-08-28 RX ADMIN — PANTOPRAZOLE SODIUM 40 MILLIGRAM(S): 20 TABLET, DELAYED RELEASE ORAL at 06:05

## 2017-08-28 RX ADMIN — HEPARIN SODIUM 5000 UNIT(S): 5000 INJECTION INTRAVENOUS; SUBCUTANEOUS at 05:56

## 2017-08-28 RX ADMIN — Medication 0.25 MILLIGRAM(S): at 15:16

## 2017-08-28 RX ADMIN — Medication 30 MILLIGRAM(S): at 05:56

## 2017-08-28 RX ADMIN — Medication 400 MILLIGRAM(S): at 06:33

## 2017-08-28 NOTE — CHART NOTE - NSCHARTNOTEFT_GEN_A_CORE
Patient assessed as she on constant observation, still restless, intermittent attempts to climbs out of bed and pulling out iv access, constant observation continued.    Constant observation  Continue xanax PRN  Will continue to monitor  Will update with primary team    Jorge Lau FN-BC  Spectra#[id number]

## 2017-08-28 NOTE — BEHAVIORAL HEALTH ASSESSMENT NOTE - SUMMARY
Pt is a 89 y/o female, with dementia, unclear details of ppxh, pmhx, bib EMS for fall at home, seen for agitation. pt doesn't recall details of the fall, is confused, disoriented, agitated last night, received xanax prn, was on 1;1, doing better today, more calm.

## 2017-08-28 NOTE — BEHAVIORAL HEALTH ASSESSMENT NOTE - NSBHCHARTREVIEWINVESTIGATE_PSY_A_CORE FT
Ventricular Rate 96 BPM    Atrial Rate 96 BPM    P-R Interval 192 ms    QRS Duration 100 ms     ms    QTc 437 ms    P Axis 40 degrees    R Axis 58 degrees    T Axis 73 degrees    Diagnosis Line NORMAL SINUS RHYTHM  POSSIBLE ANTERIOR INFARCT ,AGE UNDETERMINED  ABNORMAL ECG

## 2017-08-28 NOTE — CONSULT NOTE ADULT - SUBJECTIVE AND OBJECTIVE BOX
CHIEF COMPLAINT:  fall  HISTORY OF PRESENT ILLNESS:    Due to altered mental status/intubation, subjective information were not able to be obtained from the patient. History was obtained, to the extent possible, from review of the chart and collateral sources of information.   89 f with history bellow admitted with fall now with rib fx and small hydropneumothorax     PAST MEDICAL & SURGICAL HISTORY:  Dementia  History of Spinal Stenosis  Lung Nodules  Hepatitis C: possible during transfusion in 1970&#x27;s  Carotid Artery Disease  Tendonitis: left hand: s/p steroid injection 3/10  Back Pain  Hypertension  Hyperlipidemia  History of Carotid Endarterectomy: right 2008  S/P Dilation and Curettage: 1970&#x27;s menorrhagia  After Cataract, Bilateral  S/P Cholecystectomy: laparoscopic 2 years ago  History of Laminectomy: 1970&#x27;s  S/P Knee Replacement: left knee 1997  right knee 2004          MEDICATIONS:  heparin  Injectable 5000 Unit(s) SubCutaneous every 8 hours  NIFEdipine XL 30 milliGRAM(s) Oral daily        diVALproex  milliGRAM(s) Oral two times a day  ALPRAZolam 0.25 milliGRAM(s) Oral two times a day PRN  acetaminophen  IVPB. 1000 milliGRAM(s) IV Intermittent once    pantoprazole    Tablet 40 milliGRAM(s) Oral before breakfast      sodium chloride 0.9%. 1000 milliLiter(s) IV Continuous <Continuous>      FAMILY HISTORY:  No pertinent family history in first degree relatives      Non-contributory    SOCIAL HISTORY:    No tobacco, drugs or etoh    Allergies    No Known Allergies    Intolerances    	    REVIEW OF SYSTEMS:  as above  The rest of the 14 points ROS reviewed and except above they are unremarkable.        PHYSICAL EXAM:  T(C): 36.5 (08-28-17 @ 05:14), Max: 36.9 (08-27-17 @ 10:45)  HR: 78 (08-28-17 @ 06:15) (78 - 92)  BP: 148/76 (08-28-17 @ 06:15) (105/65 - 159/78)  RR: 18 (08-28-17 @ 06:15) (16 - 18)  SpO2: 96% (08-28-17 @ 05:14) (95% - 98%)  Wt(kg): --  I&O's Summary    27 Aug 2017 07:01  -  28 Aug 2017 07:00  --------------------------------------------------------  IN: 1560 mL / OUT: 0 mL / NET: 1560 mL    28 Aug 2017 07:01  -  28 Aug 2017 09:05  --------------------------------------------------------  IN: 240 mL / OUT: 0 mL / NET: 240 mL        Appearance: Normal	  HEENT:   Normal oral mucosa, PERRL, EOMI	  Cardiovascular: Normal S1 S2,    Murmur:   Neck: JVP normal  Respiratory: Lungs clear to auscultation  Gastrointestinal:  Soft, Non-tender, + BS	  Skin: normal   Neuro: No gross deficits.   Psychiatry:  Mood & affect appropriate  Ext: No edema    TELEMETRY: 	    ECG:  	  RADIOLOGY:  OTHER: 	  	  LABS:	 	    CARDIAC MARKERS:  Troponin T, Serum: <0.01 ng/mL (08-27 @ 05:16)  Troponin T, Serum: <0.01 ng/mL (08-27 @ 00:00)                                  13.5   13.85 )-----------( 215      ( 28 Aug 2017 06:26 )             39.4     08-27    136  |  95<L>  |  18  ----------------------------<  153<H>  3.7   |  27  |  0.80    Ca    9.4      27 Aug 2017 00:00    TPro  7.3  /  Alb  4.0  /  TBili  0.4  /  DBili  x   /  AST  21  /  ALT  14  /  AlkPhos  75  08-27    proBNP:   Lipid Profile:   HgA1c:   TSH:

## 2017-08-28 NOTE — PROGRESS NOTE ADULT - PROBLEM SELECTOR PLAN 1
will monitor  no evidence of any fractures  Echo recent normal  cardiology attending note appreciated   follow recommendations

## 2017-08-28 NOTE — PROGRESS NOTE ADULT - SUBJECTIVE AND OBJECTIVE BOX
Patient is a 89y old  Female who presents with a chief complaint of unwitnessed fall       SUBJECTIVE / OVERNIGHT EVENTS: No nausea, vomiting or diarrhea, no fever or chills, no dizziness or chest pain, no dysuria or hematuria, no jt pain or swelling  lying in bed eating lunch unassisited    MEDICATIONS  (STANDING):  sodium chloride 0.9%. 1000 milliLiter(s) (60 mL/Hr) IV Continuous <Continuous>  heparin  Injectable 5000 Unit(s) SubCutaneous every 8 hours  diVALproex  milliGRAM(s) Oral two times a day  NIFEdipine XL 30 milliGRAM(s) Oral daily  pantoprazole    Tablet 40 milliGRAM(s) Oral before breakfast  acetaminophen  IVPB. 1000 milliGRAM(s) IV Intermittent once    MEDICATIONS  (PRN):  ALPRAZolam 0.25 milliGRAM(s) Oral two times a day PRN anxiety, agitation, restlessness        CAPILLARY BLOOD GLUCOSE        I&O's Summary    27 Aug 2017 07:  -  28 Aug 2017 07:00  --------------------------------------------------------  IN: 1560 mL / OUT: 0 mL / NET: 1560 mL    28 Aug 2017 07:  -  28 Aug 2017 13:02  --------------------------------------------------------  IN: 240 mL / OUT: 0 mL / NET: 240 mL    Physical Exam:  Physical Exam: vital signs as above in no apparent distress awake verbal HEENT: GEM EOMI Neck: Supple, no JVD, no thyromegaly Lungs: clear, no rhonchi, no wheeze, no crackles decreased effort CVS: S1 S2 no M/R/G Abdomen: no tenderness, no organomegaly, BS present Neuro: confused but talking, no focal weakness evident, patient not cooperative Psych: confused Skin: warm, dry Ext: no edema, no clubbing Msk: no joint swelling or deformities Back: no CVA tenderness, no kyphosis/scoliosis	      LABS:                        13.5   13.85 )-----------( 215      ( 28 Aug 2017 06:26 )             39.4         136  |  95<L>  |  18  ----------------------------<  153<H>  3.7   |  27  |  0.80    Ca    9.4      27 Aug 2017 00:00    TPro  7.3  /  Alb  4.0  /  TBili  0.4  /  DBili  x   /  AST  21  /  ALT  14  /  AlkPhos  75      PT/INR - ( 27 Aug 2017 00:00 )   PT: 10.6 sec;   INR: 0.97 ratio         PTT - ( 27 Aug 2017 00:00 )  PTT:33.4 sec  CARDIAC MARKERS ( 27 Aug 2017 05:16 )  x     / <0.01 ng/mL / x     / x     / x      CARDIAC MARKERS ( 27 Aug 2017 00:00 )  x     / <0.01 ng/mL / 116 U/L / x     / 4.0 ng/mL      Urinalysis Basic - ( 26 Aug 2017 23:05 )    Color: x / Appearance: Clear / S.006 / pH: x  Gluc: x / Ketone: Negative  / Bili: Negative / Urobili: Negative   Blood: x / Protein: Negative / Nitrite: Negative   Leuk Esterase: Trace / RBC: 0-2 /HPF / WBC 6-10 /HPF   Sq Epi: x / Non Sq Epi: OCC /HPF / Bacteria: Few /HPF          Consultant(s) Notes Reviewed:      Care Discussed with Consultants/Other Providers:    Contact Number, Dr Dexter [phone]

## 2017-08-28 NOTE — BEHAVIORAL HEALTH ASSESSMENT NOTE - NSBHCHARTREVIEWVS_PSY_A_CORE FT
Vital Signs Last 24 Hrs  T(C): 36.7 (28 Aug 2017 12:20), Max: 36.8 (27 Aug 2017 15:18)  T(F): 98.1 (28 Aug 2017 12:20), Max: 98.3 (27 Aug 2017 15:55)  HR: 92 (28 Aug 2017 12:20) (78 - 92)  BP: 169/84 (28 Aug 2017 12:20) (105/65 - 169/84)  BP(mean): --  RR: 17 (28 Aug 2017 12:20) (16 - 18)  SpO2: 96% (28 Aug 2017 12:20) (95% - 98%)

## 2017-08-28 NOTE — CONSULT NOTE ADULT - ASSESSMENT
s/p Fall  rib fx    orthostatic hypotension is suspected. once pt is able to stand , check orthostatic vitals. Pt eval    HTN  plan as above  BP is labile . may need to hold nifedipine pending on orthostatic vitals     elevated WBC  ? due to rib fx . fu with primary team

## 2017-08-28 NOTE — PROGRESS NOTE ADULT - SUBJECTIVE AND OBJECTIVE BOX
TERTIARY TRAUMA SURVEY  ------------------------------------------------------------------------------------------    Date of TTS: 17  Time: 0630  Admit Date: 17  Trauma Activation: 3  Admit GCS:  	GCS: 	E: 4	V: 5	M: 6    HPI:  89F BIBEMS after unwitnessed fall precautions. Per ED/EMS, pt lives with son; son heard his mother fall but did not witness fall.  Son called EMS to bring in patient, however son (HCP) did not accompany his mother to the ED and is now not able to be contacted despite multiple attempts by ED and writer. Attempted calling [phone] (number in prior records) as well as [phone] and [phone]. Unknown whether patient struck her head or had any LOC. Patient has advanced dementia and is unable to provide history. (27 Aug 2017 13:15)      INTERVAL EVENTS: no interval events    PAST MEDICAL & SURGICAL HISTORY:  Dementia  History of Spinal Stenosis  Lung Nodules  Hepatitis C: possible during transfusion in &#x27;s  Carotid Artery Disease  Tendonitis: left hand: s/p steroid injection 3/10  Back Pain  Hypertension  Hyperlipidemia  History of Carotid Endarterectomy: right   S/P Dilation and Curettage: 1970&#x27;s menorrhagia  After Cataract, Bilateral  S/P Cholecystectomy: laparoscopic 2 years ago  History of Laminectomy: 1970&#x27;s  S/P Knee Replacement: left knee   right knee         FAMILY HISTORY:  unable to obtain      SOCIAL HISTORY: see HPI    ALLERGIES: No Known Allergies      HOME MEDICATIONS:       CURRENT MEDICATIONS:   MEDICATIONS (STANDING): sodium chloride 0.9%. 1000 milliLiter(s) IV Continuous <Continuous>  heparin  Injectable 5000 Unit(s) SubCutaneous every 8 hours  diVALproex  milliGRAM(s) Oral two times a day  NIFEdipine XL 30 milliGRAM(s) Oral daily  pantoprazole    Tablet 40 milliGRAM(s) Oral before breakfast  acetaminophen  IVPB. 1000 milliGRAM(s) IV Intermittent once  acetaminophen  IVPB. 1000 milliGRAM(s) IV Intermittent once    MEDICATIONS (PRN):ALPRAZolam 0.25 milliGRAM(s) Oral two times a day PRN anxiety, agitation, restlessness    ------------------------------------------------------------------------------------------    VITAL SIGNS  T(C): 36.5 (17 @ 05:14), Max: 36.9 (17 @ 10:45)  HR: 89 (17 @ 05:14) (85 - 93)  BP: 152/75 (17 @ 05:14) (105/65 - 159/78)  BP(mean): --  RR: 18 (17 @ 05:14) (16 - 18)  SpO2: 96% (17 @ 05:14) (95% - 100%)  Wt(kg): --  CAPILLARY BLOOD GLUCOSE          INS/OUTS:     @ 07:  -   @ 07:00  --------------------------------------------------------  IN:    IV PiggyBack: 50 mL  Total IN: 50 mL    OUT:    Voided: 300 mL  Total OUT: 300 mL    Total NET: -250 mL       @ 07:01  -   @ 06:37  --------------------------------------------------------  IN:    Oral Fluid: 580 mL    sodium chloride 0.9%.: 300 mL  Total IN: 880 mL    OUT:  Total OUT: 0 mL    Total NET: 880 mL        Drug Dosing Weight  Height (cm): 160.02 (27 Aug 2017 15:55)  Weight (kg): 62 (27 Aug 2017 15:55)  BMI (kg/m2): 24.2 (27 Aug 2017 15:55)  BSA (m2): 1.64 (27 Aug 2017 15:55)    PHYSICAL EXAM:  ***  General: NAD, Sitting in bed comfortably, 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", A + O to name only  HEENT: NC/AT, EOMI  Neck: Soft, supple  Resp: no resp distress  Thorax: minimal L chest wall tenderness, mild ecchymosis  GI/Abd: Soft, NT/ND, no rebound/guarding, no masses palpated  Skin: Intact, no breakdown  Lymphatic/Nodes: No palpable lymphadenopathy  Musculoskeletal: All 4 extremities moving spontaneously, no limitations  ------------------------------------------------------------------------------------------    LABS  CBC ( @ 00:00)                              14.7                           13.9<H>  )----------------(  201        76.0  % Neutrophils, 13.8  % Lymphocytes, ANC: 10.5<H>                              43.5      BMP ( @ 00:00)             136     |  95<L>   |  18    		Ca++ --      Ca 9.4                ---------------------------------( 153<H>		Mg --                 3.7     |  27      |  0.80  			Ph --        LFTs ( @ 00:00)      TPro 7.3 / Alb 4.0 / TBili 0.4 / DBili -- / AST 21 / ALT 14 / AlkPhos 75    Coags ( @ 00:00)  aPTT 33.4 / INR 0.97 / PT 10.6    Cardiac Markers ( @ 05:16)     Trop: <0.01 -- / CKMB: -- / CK: --  Cardiac Markers (08-27 @ 00:00)     Trop: <0.01 -- / CKMB: -- / CK: 116          MICROBIOLOGY  Urinalysis ( @ 23:05):     Color:  / Appearance: Clear / S.006<L> / pH: 7.0 / Gluc: Negative / Ketones: Negative / Bili: Negative / Urobili: Negative / Protein :Negative / Nitrites: Negative / Leuk.Est: Trace<!> / RBC: 0-2 / WBC: 6-10 / Sq Epi:  / Non Sq Epi: OCC / Bacteria Few<!>         ------------------------------------------------------------------------------------------    RADIOLOGICAL FINDINGS REVIEW:  ***  CXR: f/u official read this am  C-Spine Films: degenerative changes  Head CT: no acute injury  C-Spine CT: degenerative change  Chest CT: L 4-6 rib fracture with small hydrompneumothorax, chronic L 5th rib fracture  ABD/Pelvis CT: no acute injury      List Injuries Identified to Date:  L 4-6 rib fracture with small hydropneumothorax  Fall, initial encounter      List Operative and Interventional Radiological Procedures:  no operative procedures.  see above for Rads      Consults (Date):  ***  [] Neurosurgery   [] Orthopedic Surgery  [] Spine Surgery  [] Plastic Surgery  [] ENT  [] Urology  [] PM&R  [] Social Work    INTERPRETATION:

## 2017-08-28 NOTE — BEHAVIORAL HEALTH ASSESSMENT NOTE - THOUGHT CONTENT
Return to WORK      June 7, 2017      Re:   Saranya Ospina  [id number] [facility] [address]             This is to certify that Saranya Ospina has been under my care from 06/06/2017 and can return to regular WORK on 06/09/2017.      RESTRICTIONS: none       REMARKS: none          SIGNATURE: ___________________________________________,   6/7/2017          HENRIK Morales APNP  [facility]  [address] [place]  [address]  Phone: [phone]   Unremarkable

## 2017-08-28 NOTE — BEHAVIORAL HEALTH ASSESSMENT NOTE - HPI (INCLUDE ILLNESS QUALITY, SEVERITY, DURATION, TIMING, CONTEXT, MODIFYING FACTORS, ASSOCIATED SIGNS AND SYMPTOMS)
Pt is a 89 y/o female, unclear past hx, bib EMS for fall at home, seen for agitation. Pt takes xanax prn at home as per h/p, unclear amounts. unable to reach son for collateral, unable to leave voicemail. Pt reports feeling "ok", unable to explain recent events, poor historian, confused, disoriented. Pt denies mood symptoms, no si/hi, no anxiety, no psychosis, uvaldo. As per staff, pt agitated last night, received xanax prns, haldol. pt currently calm today, but confused.

## 2017-08-29 DIAGNOSIS — S22.39XA FRACTURE OF ONE RIB, UNSPECIFIED SIDE, INITIAL ENCOUNTER FOR CLOSED FRACTURE: ICD-10-CM

## 2017-08-29 DIAGNOSIS — J93.9 PNEUMOTHORAX, UNSPECIFIED: ICD-10-CM

## 2017-08-29 LAB
ANION GAP SERPL CALC-SCNC: 16 MMOL/L — SIGNIFICANT CHANGE UP (ref 5–17)
BASE EXCESS BLDA CALC-SCNC: 3.3 MMOL/L — HIGH (ref -2–2)
BUN SERPL-MCNC: 12 MG/DL — SIGNIFICANT CHANGE UP (ref 7–23)
CALCIUM SERPL-MCNC: 8.9 MG/DL — SIGNIFICANT CHANGE UP (ref 8.4–10.5)
CHLORIDE SERPL-SCNC: 90 MMOL/L — LOW (ref 96–108)
CO2 BLDA-SCNC: 27 MMOL/L — SIGNIFICANT CHANGE UP (ref 22–30)
CO2 SERPL-SCNC: 22 MMOL/L — SIGNIFICANT CHANGE UP (ref 22–31)
CREAT SERPL-MCNC: 0.55 MG/DL — SIGNIFICANT CHANGE UP (ref 0.5–1.3)
GAS PNL BLDA: SIGNIFICANT CHANGE UP
GLUCOSE SERPL-MCNC: 103 MG/DL — HIGH (ref 70–99)
HCO3 BLDA-SCNC: 26 MMOL/L — SIGNIFICANT CHANGE UP (ref 21–29)
HCT VFR BLD CALC: 41.1 % — SIGNIFICANT CHANGE UP (ref 34.5–45)
HGB BLD-MCNC: 14 G/DL — SIGNIFICANT CHANGE UP (ref 11.5–15.5)
HOROWITZ INDEX BLDA+IHG-RTO: 21 — SIGNIFICANT CHANGE UP
MAGNESIUM SERPL-MCNC: 1.9 MG/DL — SIGNIFICANT CHANGE UP (ref 1.6–2.6)
MCHC RBC-ENTMCNC: 31 PG — SIGNIFICANT CHANGE UP (ref 27–34)
MCHC RBC-ENTMCNC: 34.1 GM/DL — SIGNIFICANT CHANGE UP (ref 32–36)
MCV RBC AUTO: 90.9 FL — SIGNIFICANT CHANGE UP (ref 80–100)
PCO2 BLDA: 34 MMHG — SIGNIFICANT CHANGE UP (ref 32–46)
PH BLDA: 7.49 — HIGH (ref 7.35–7.45)
PLATELET # BLD AUTO: 168 K/UL — SIGNIFICANT CHANGE UP (ref 150–400)
PO2 BLDA: 128 MMHG — HIGH (ref 74–108)
POTASSIUM SERPL-MCNC: 3.8 MMOL/L — SIGNIFICANT CHANGE UP (ref 3.5–5.3)
POTASSIUM SERPL-SCNC: 3.8 MMOL/L — SIGNIFICANT CHANGE UP (ref 3.5–5.3)
RBC # BLD: 4.52 M/UL — SIGNIFICANT CHANGE UP (ref 3.8–5.2)
RBC # FLD: 13 % — SIGNIFICANT CHANGE UP (ref 10.3–14.5)
SAO2 % BLDA: 99 % — HIGH (ref 92–96)
SODIUM SERPL-SCNC: 128 MMOL/L — LOW (ref 135–145)
WBC # BLD: 10.91 K/UL — HIGH (ref 3.8–10.5)
WBC # FLD AUTO: 10.91 K/UL — HIGH (ref 3.8–10.5)

## 2017-08-29 RX ORDER — OXYCODONE AND ACETAMINOPHEN 5; 325 MG/1; MG/1
1 TABLET ORAL EVERY 6 HOURS
Qty: 0 | Refills: 0 | Status: DISCONTINUED | OUTPATIENT
Start: 2017-08-29 | End: 2017-09-05

## 2017-08-29 RX ORDER — ACETAMINOPHEN 500 MG
650 TABLET ORAL EVERY 6 HOURS
Qty: 0 | Refills: 0 | Status: DISCONTINUED | OUTPATIENT
Start: 2017-08-29 | End: 2017-09-06

## 2017-08-29 RX ORDER — ACETAMINOPHEN 500 MG
975 TABLET ORAL ONCE
Qty: 0 | Refills: 0 | Status: COMPLETED | OUTPATIENT
Start: 2017-08-29 | End: 2017-08-29

## 2017-08-29 RX ADMIN — HEPARIN SODIUM 5000 UNIT(S): 5000 INJECTION INTRAVENOUS; SUBCUTANEOUS at 05:09

## 2017-08-29 RX ADMIN — Medication 30 MILLIGRAM(S): at 05:09

## 2017-08-29 RX ADMIN — Medication 975 MILLIGRAM(S): at 14:00

## 2017-08-29 RX ADMIN — HEPARIN SODIUM 5000 UNIT(S): 5000 INJECTION INTRAVENOUS; SUBCUTANEOUS at 21:01

## 2017-08-29 RX ADMIN — Medication 975 MILLIGRAM(S): at 13:07

## 2017-08-29 RX ADMIN — HEPARIN SODIUM 5000 UNIT(S): 5000 INJECTION INTRAVENOUS; SUBCUTANEOUS at 13:07

## 2017-08-29 RX ADMIN — DIVALPROEX SODIUM 500 MILLIGRAM(S): 500 TABLET, DELAYED RELEASE ORAL at 18:21

## 2017-08-29 RX ADMIN — OXYCODONE AND ACETAMINOPHEN 1 TABLET(S): 5; 325 TABLET ORAL at 20:14

## 2017-08-29 RX ADMIN — PANTOPRAZOLE SODIUM 40 MILLIGRAM(S): 20 TABLET, DELAYED RELEASE ORAL at 06:30

## 2017-08-29 RX ADMIN — OXYCODONE AND ACETAMINOPHEN 1 TABLET(S): 5; 325 TABLET ORAL at 19:32

## 2017-08-29 RX ADMIN — DIVALPROEX SODIUM 500 MILLIGRAM(S): 500 TABLET, DELAYED RELEASE ORAL at 05:09

## 2017-08-29 NOTE — CONSULT NOTE ADULT - PROBLEM SELECTOR RECOMMENDATION 9
start oxygen: has pretty small left sided hydropneumothorax: conservative treatment: pt is not in respiratory distress. Will also check ABG to see if she is ventilating fine: She seems to be doing ok from respiratory stand point at this time.

## 2017-08-29 NOTE — PROGRESS NOTE ADULT - PROBLEM SELECTOR PLAN 1
will monitor  positive for left 3 rib fractures with small hydropneumothorax  pulmonary consult called  Echo recent normal  cardiology attending note appreciated   follow recommendations

## 2017-08-29 NOTE — PROGRESS NOTE ADULT - SUBJECTIVE AND OBJECTIVE BOX
Patient is a 89y old  Female who presents with a chief complaint of unwitnessed fall precautions (27 Aug 2017 13:15)      SUBJECTIVE / OVERNIGHT EVENTS: No nausea, vomiting or diarrhea, no fever or chills, no dizziness or chest pain, no dysuria or hematuria, no jt pain or swelling    MEDICATIONS  (STANDING):  heparin  Injectable 5000 Unit(s) SubCutaneous every 8 hours  diVALproex  milliGRAM(s) Oral two times a day  NIFEdipine XL 30 milliGRAM(s) Oral daily  pantoprazole    Tablet 40 milliGRAM(s) Oral before breakfast    MEDICATIONS  (PRN):  ALPRAZolam 0.25 milliGRAM(s) Oral two times a day PRN anxiety, agitation, restlessness  acetaminophen   Tablet. 650 milliGRAM(s) Oral every 6 hours PRN Mild Pain (1 - 3)        CAPILLARY BLOOD GLUCOSE        I&O's Summary    28 Aug 2017 07:01  -  29 Aug 2017 07:00  --------------------------------------------------------  IN: 1650 mL / OUT: 0 mL / NET: 1650 mL    29 Aug 2017 07:01  -  29 Aug 2017 15:56  --------------------------------------------------------  IN: 560 mL / OUT: 0 mL / NET: 560 mL    PE:  vital signs as above  in no apparent distress awake verbal  HEENT: GEM EOMI  Neck: Supple, no JVD, no thyromegaly  Lungs: clear, no rhonchi, no wheeze, no crackles decreased effort  decreased breath sounds left base  CVS: S1 S2 no M/R/G  Abdomen: no tenderness, no organomegaly, BS present  Neuro: confused but talking, no focal weakness evident, patient not cooperative  Psych: confused  Skin: warm, dry  Ext: no edema, no clubbing  Msk: no joint swelling or deformities  Back: no CVA tenderness, no kyphosis/scoliosis    LABS:                        14.0   10.91 )-----------( 168      ( 29 Aug 2017 08:54 )             41.1     08-29    128<L>  |  90<L>  |  12  ----------------------------<  103<H>  3.8   |  22  |  0.55    Ca    8.9      29 Aug 2017 08:54  Mg     1.9     08-29                  Consultant(s) Notes Reviewed:      Care Discussed with Consultants/Other Providers:    Contact Number, Dr Dexter [phone]

## 2017-08-29 NOTE — CONSULT NOTE ADULT - SUBJECTIVE AND OBJECTIVE BOX
History is difficult to obtain from the patient: she is confused keeping her eyes closed    Patient is a 89y old  Female who presents with a chief complaint of unwitnessed fall precautions (27 Aug 2017 13:15)      HPI:  89F BIBEMS after unwitnessed fall precautions. Per ED/EMS, pt lives with son; son heard his mother fall but did not witness fall.  Son called EMS to bring in patient, however son (HCP) did not accompany his mother to the ED and is now not able to be contacted despite multiple attempts by ED and writer. Attempted calling [phone] (number in prior records) as well as [phone] and [phone]. Unknown whether patient struck her head or had any LOC. Patient has advanced dementia and is unable to provide history. (27 Aug 2017 13:15)      ?FOLLOWING PRESENT  [unk ] Hx of PE/DVT, [unk ] Hx COPD, [unk ] Hx of Asthma, [unk ] Hx of Hospitalization, [ unk]  Hx of BiPAP/CPAP use, [ unk] Hx of JUAN    Allergies    No Known Allergies    Intolerances        PAST MEDICAL & SURGICAL HISTORY:  Dementia  History of Spinal Stenosis  Lung Nodules  Hepatitis C: possible during transfusion in 1970&#x27;s  Carotid Artery Disease  Tendonitis: left hand: s/p steroid injection 3/10  Back Pain  Hypertension  Hyperlipidemia  History of Carotid Endarterectomy: right 2008  S/P Dilation and Curettage: 1970&#x27;s menorrhagia  After Cataract, Bilateral  S/P Cholecystectomy: laparoscopic 2 years ago  History of Laminectomy: 1970&#x27;s  S/P Knee Replacement: left knee 1997  right knee 2004      FAMILY HISTORY:  No pertinent family history in first degree relatives      Social History: [unk  ] TOBACCO                  [unk  ] ETOH                                 [ unk ] IVDA/DRUGS    REVIEW OF SYSTEMS      can't obtain    General:	    Skin/Breast:  	  Ophthalmologic:  	  ENMT:	    Respiratory and Thorax:  	  Cardiovascular:	    Gastrointestinal:	    Genitourinary:	    Musculoskeletal:	    Neurological:	    Psychiatric:	    Hematology/Lymphatics:	    Endocrine:	    Allergic/Immunologic:	    MEDICATIONS  (STANDING):  heparin  Injectable 5000 Unit(s) SubCutaneous every 8 hours  diVALproex  milliGRAM(s) Oral two times a day  NIFEdipine XL 30 milliGRAM(s) Oral daily  pantoprazole    Tablet 40 milliGRAM(s) Oral before breakfast    MEDICATIONS  (PRN):  ALPRAZolam 0.25 milliGRAM(s) Oral two times a day PRN anxiety, agitation, restlessness  acetaminophen   Tablet. 650 milliGRAM(s) Oral every 6 hours PRN Mild Pain (1 - 3)  oxyCODONE    5 mG/acetaminophen 325 mG 1 Tablet(s) Oral every 6 hours PRN Moderate Pain (4 - 6)       Vital Signs Last 24 Hrs  T(C): 36.4 (29 Aug 2017 11:30), Max: 37.1 (29 Aug 2017 04:43)  T(F): 97.6 (29 Aug 2017 11:30), Max: 98.7 (29 Aug 2017 04:43)  HR: 86 (29 Aug 2017 11:30) (85 - 87)  BP: 137/80 (29 Aug 2017 11:30) (137/80 - 158/72)  BP(mean): --  RR: 18 (29 Aug 2017 11:30) (18 - 19)  SpO2: 96% (29 Aug 2017 11:30) (96% - 96%)        I&O's Summary    28 Aug 2017 07:01  -  29 Aug 2017 07:00  --------------------------------------------------------  IN: 1650 mL / OUT: 0 mL / NET: 1650 mL    29 Aug 2017 07:01  -  29 Aug 2017 17:26  --------------------------------------------------------  IN: 560 mL / OUT: 0 mL / NET: 560 mL        Physical Exam:   GENERAL: NAD, well-groomed, well-developed  HEENT: GEM/   Atraumatic, Normocephalic  ENMT: No tonsillar erythema, exudates, or enlargement; Moist mucous membranes, Good dentition, No lesions  NECK: Supple, No JVD, Normal thyroid  CHEST/LUNG: decreased air entry bilaterally   CVS: Regular rate and rhythm; No murmurs, rubs, or gallops  GI: : Soft, Nontender, Nondistended; Bowel sounds present  NERVOUS SYSTEM:  keeping her eyes closed and mumble words   EXTREMITIES:  2+ Peripheral Pulses, No clubbing, cyanosis, or edema  LYMPH: No lymphadenopathy noted  SKIN: No rashes or lesions  ENDOCRINOLOGY: No Thyromegaly  PSYCH: clam    Labs:                              14.0   10.91 )-----------( 168      ( 29 Aug 2017 08:54 )             41.1                         13.5   13.85 )-----------( 215      ( 28 Aug 2017 06:26 )             39.4                         14.7   13.9  )-----------( 201      ( 27 Aug 2017 00:00 )             43.5     08-29    128<L>  |  90<L>  |  12  ----------------------------<  103<H>  3.8   |  22  |  0.55  08-27    136  |  95<L>  |  18  ----------------------------<  153<H>  3.7   |  27  |  0.80    Ca    8.9      29 Aug 2017 08:54  Mg     1.9     08-29    TPro  7.3  /  Alb  4.0  /  TBili  0.4  /  DBili  x   /  AST  21  /  ALT  14  /  AlkPhos  75  08-27    CAPILLARY BLOOD GLUCOSE              D DImer    Cultures:       Studies  Chest X-RAY  CT SCAN Chest   CT Abdomen  Venous Dopplers: LE:   Others    < from: CT Chest No Cont (08.27.17 @ 08:26) >  IMPRESSION:     Multiple left rib fractures with associated small left hydropneumothorax.    The above findings were discussed with Dr. Mccall by Dr. Pena on   8/27/2017 at 9:00 AM, with read-back.    < end of copied text >

## 2017-08-29 NOTE — PROGRESS NOTE ADULT - SUBJECTIVE AND OBJECTIVE BOX
Subjective: Patient seen and examined. No new events except as noted.     SUBJECTIVE/ROS:  Due to altered mental status/intubation, subjective information were not able to be obtained from the patient. History was obtained, to the extent possible, from review of the chart and collateral sources of information.     MEDICATIONS:  MEDICATIONS  (STANDING):  sodium chloride 0.9%. 1000 milliLiter(s) (60 mL/Hr) IV Continuous <Continuous>  heparin  Injectable 5000 Unit(s) SubCutaneous every 8 hours  diVALproex  milliGRAM(s) Oral two times a day  NIFEdipine XL 30 milliGRAM(s) Oral daily  pantoprazole    Tablet 40 milliGRAM(s) Oral before breakfast      PHYSICAL EXAM:  T(C): 37.1 (08-29-17 @ 04:43), Max: 37.1 (08-29-17 @ 04:43)  HR: 85 (08-29-17 @ 04:43) (85 - 92)  BP: 158/72 (08-29-17 @ 04:43) (145/72 - 169/84)  RR: 19 (08-29-17 @ 04:43) (17 - 19)  SpO2: 96% (08-29-17 @ 04:43) (96% - 96%)  Wt(kg): --  I&O's Summary    28 Aug 2017 07:01  -  29 Aug 2017 07:00  --------------------------------------------------------  IN: 1650 mL / OUT: 0 mL / NET: 1650 mL          Appearance: Normal	  HEENT:   Normal oral mucosa, PERRL, EOMI	  Cardiovascular: Normal S1 S2,    Murmur:   Neck: JVP normal  Respiratory: Lungs clear to auscultation  Gastrointestinal:  Soft, Non-tender, + BS	  Skin: normal   Neuro: No gross deficits.   Psychiatry:  Mood & affect appropriate  Ext: No edema        LABS:    CARDIAC MARKERS:  CARDIAC MARKERS ( 27 Aug 2017 05:16 )  x     / <0.01 ng/mL / x     / x     / x      CARDIAC MARKERS ( 27 Aug 2017 00:00 )  x     / <0.01 ng/mL / 116 U/L / x     / 4.0 ng/mL                                13.5   13.85 )-----------( 215      ( 28 Aug 2017 06:26 )             39.4           proBNP:   Lipid Profile:   HgA1c:   TSH:           TELEMETRY: 	    ECG:  	  RADIOLOGY:   DIAGNOSTIC TESTING:  Echocardiogram:  Catheterization:  Stress Test:    OTHER:

## 2017-08-30 ENCOUNTER — TRANSCRIPTION ENCOUNTER (OUTPATIENT)
Age: 82
End: 2017-08-30

## 2017-08-30 DIAGNOSIS — E87.1 HYPO-OSMOLALITY AND HYPONATREMIA: ICD-10-CM

## 2017-08-30 LAB
ANION GAP SERPL CALC-SCNC: 15 MMOL/L — SIGNIFICANT CHANGE UP (ref 5–17)
BUN SERPL-MCNC: 18 MG/DL — SIGNIFICANT CHANGE UP (ref 7–23)
CALCIUM SERPL-MCNC: 8.8 MG/DL — SIGNIFICANT CHANGE UP (ref 8.4–10.5)
CHLORIDE SERPL-SCNC: 92 MMOL/L — LOW (ref 96–108)
CO2 SERPL-SCNC: 24 MMOL/L — SIGNIFICANT CHANGE UP (ref 22–31)
CREAT SERPL-MCNC: 0.56 MG/DL — SIGNIFICANT CHANGE UP (ref 0.5–1.3)
GLUCOSE SERPL-MCNC: 108 MG/DL — HIGH (ref 70–99)
HCT VFR BLD CALC: 38 % — SIGNIFICANT CHANGE UP (ref 34.5–45)
HGB BLD-MCNC: 13 G/DL — SIGNIFICANT CHANGE UP (ref 11.5–15.5)
MCHC RBC-ENTMCNC: 31.2 PG — SIGNIFICANT CHANGE UP (ref 27–34)
MCHC RBC-ENTMCNC: 34.2 GM/DL — SIGNIFICANT CHANGE UP (ref 32–36)
MCV RBC AUTO: 91.1 FL — SIGNIFICANT CHANGE UP (ref 80–100)
PLATELET # BLD AUTO: 209 K/UL — SIGNIFICANT CHANGE UP (ref 150–400)
POTASSIUM SERPL-MCNC: 4 MMOL/L — SIGNIFICANT CHANGE UP (ref 3.5–5.3)
POTASSIUM SERPL-SCNC: 4 MMOL/L — SIGNIFICANT CHANGE UP (ref 3.5–5.3)
RBC # BLD: 4.17 M/UL — SIGNIFICANT CHANGE UP (ref 3.8–5.2)
RBC # FLD: 13.1 % — SIGNIFICANT CHANGE UP (ref 10.3–14.5)
SODIUM SERPL-SCNC: 131 MMOL/L — LOW (ref 135–145)
WBC # BLD: 11.89 K/UL — HIGH (ref 3.8–10.5)
WBC # FLD AUTO: 11.89 K/UL — HIGH (ref 3.8–10.5)

## 2017-08-30 PROCEDURE — 71010: CPT | Mod: 26

## 2017-08-30 RX ADMIN — DIVALPROEX SODIUM 500 MILLIGRAM(S): 500 TABLET, DELAYED RELEASE ORAL at 17:34

## 2017-08-30 RX ADMIN — HEPARIN SODIUM 5000 UNIT(S): 5000 INJECTION INTRAVENOUS; SUBCUTANEOUS at 14:25

## 2017-08-30 RX ADMIN — HEPARIN SODIUM 5000 UNIT(S): 5000 INJECTION INTRAVENOUS; SUBCUTANEOUS at 22:48

## 2017-08-30 RX ADMIN — Medication 0.25 MILLIGRAM(S): at 14:24

## 2017-08-30 RX ADMIN — PANTOPRAZOLE SODIUM 40 MILLIGRAM(S): 20 TABLET, DELAYED RELEASE ORAL at 05:13

## 2017-08-30 RX ADMIN — Medication 0.25 MILLIGRAM(S): at 08:01

## 2017-08-30 RX ADMIN — HEPARIN SODIUM 5000 UNIT(S): 5000 INJECTION INTRAVENOUS; SUBCUTANEOUS at 05:13

## 2017-08-30 RX ADMIN — DIVALPROEX SODIUM 500 MILLIGRAM(S): 500 TABLET, DELAYED RELEASE ORAL at 05:13

## 2017-08-30 RX ADMIN — Medication 30 MILLIGRAM(S): at 05:13

## 2017-08-30 NOTE — DISCHARGE NOTE ADULT - HOSPITAL COURSE
to be completed by attending 89 y/o female BIBEMS s/p fall. Unknown if hit her head or LOC. Patient with baseline dementia, A&Ox1 to self.       dx:	AMS   	s/p Fall    8/27	pt agitation - 1: 1 placed in ER  8/28 	CXR Several left rib fractures. No obvious pneumothorax is noted.  	Anali: orthostatic hypotension is suspected. may need to hold nifedipine pending 	on orthostatic vitals .elevated WBC ? due to rib fx .  8/30	Hydro-Pneumothorax on left. not in any respiratory distress. has three rib fractures: 	left 4-6th rib and chronic right 5th rib. no flail chest : O2 was to help quick resolution 	of left sided pneumothorax    	PT rec MICHELE, May d/c in AM.   8/31	await [facility]  9/5	no further labs as pt waiting for [facility] bed    discharged to [facility]

## 2017-08-30 NOTE — DISCHARGE NOTE ADULT - SECONDARY DIAGNOSIS.
Closed fracture of multiple ribs of left side, initial encounter Pneumothorax on left Essential hypertension Dementia

## 2017-08-30 NOTE — PHYSICAL THERAPY INITIAL EVALUATION ADULT - REFERRING PHYSICIAN, REHAB EVAL
Aman Dexter MD ORDER PT EVAL AND TREAT , ; WBC 13.9 now 10.91 ,  EKG 8/26/17 NSR , possible Anterior infarct , CXR L 4th -6th rib fx's with tiny hydropneumonthorax , Chronic 5th rib fx R , grade 1 anterolisthesis L2 on L3 ; CT Cervical Spine and HEAD CT 8/27/17 no acute event , no fx or malalignment , multilevel degenerative changes Cervical spine

## 2017-08-30 NOTE — PHYSICAL THERAPY INITIAL EVALUATION ADULT - PERTINENT HX OF CURRENT PROBLEM, REHAB EVAL
89y/o female  with unwitnessed fall pt heard noise then called EMS to have pt brought in by EMS , unknown if LOC or hit head

## 2017-08-30 NOTE — PROGRESS NOTE ADULT - SUBJECTIVE AND OBJECTIVE BOX
Patient is a 89y old  Female who presents with a chief complaint of unwitnessed fall precautions (30 Aug 2017 11:19)      SUBJECTIVE / OVERNIGHT EVENTS: No nausea, vomiting or diarrhea, no fever or chills, no dizziness or chest pain, no dysuria or hematuria, no jt pain or swelling. Lying in bed no distress    MEDICATIONS  (STANDING):  heparin  Injectable 5000 Unit(s) SubCutaneous every 8 hours  diVALproex  milliGRAM(s) Oral two times a day  NIFEdipine XL 30 milliGRAM(s) Oral daily  pantoprazole    Tablet 40 milliGRAM(s) Oral before breakfast    MEDICATIONS  (PRN):  ALPRAZolam 0.25 milliGRAM(s) Oral two times a day PRN anxiety, agitation, restlessness  acetaminophen   Tablet. 650 milliGRAM(s) Oral every 6 hours PRN Mild Pain (1 - 3)  oxyCODONE    5 mG/acetaminophen 325 mG 1 Tablet(s) Oral every 6 hours PRN Moderate Pain (4 - 6)        CAPILLARY BLOOD GLUCOSE        I&O's Summary    29 Aug 2017 07:01  -  30 Aug 2017 07:00  --------------------------------------------------------  IN: 1200 mL / OUT: 0 mL / NET: 1200 mL    30 Aug 2017 07:01  -  30 Aug 2017 15:26  --------------------------------------------------------  IN: 360 mL / OUT: 0 mL / NET: 360 mL      PE:  vital signs as above  in no apparent distress awake verbal  HEENT: GEM EOMI  Neck: Supple, no JVD, no thyromegaly  Lungs: clear, no rhonchi, no wheeze, no crackles decreased effort  decreased breath sounds left base  CVS: S1 S2 no M/R/G  Abdomen: no tenderness, no organomegaly, BS present  Neuro: confused but talking, no focal weakness evident, patient not cooperative  Psych: confused  Skin: warm, dry  Ext: no edema, no clubbing  Msk: no joint swelling or deformities  Back: no CVA tenderness, no kyphosis/scoliosis    LABS:                        13.0   11.89 )-----------( 209      ( 30 Aug 2017 07:38 )             38.0     08-30    131<L>  |  92<L>  |  18  ----------------------------<  108<H>  4.0   |  24  |  0.56    Ca    8.8      30 Aug 2017 07:38  Mg     1.9     08-29                  Consultant(s) Notes Reviewed:      Care Discussed with Consultants/Other Providers:    Contact Number, Dr Dexter [phone]

## 2017-08-30 NOTE — PHYSICAL THERAPY INITIAL EVALUATION ADULT - IMPAIRED TRANSFERS: SIT/STAND, REHAB EVAL
decreased strength/cognition/impaired balance/impaired postural control/mod to min unsteady ; vc to stand upright ; perform x 2 at rolling walker ; on 2nd attempt domingo Wick in and asist with clean buttocks + urine/decreased flexibility

## 2017-08-30 NOTE — PROGRESS NOTE ADULT - PROBLEM SELECTOR PLAN 5
likely secondary to excess water ingestion  no intervention except to limit free water  check osm studies if persisits

## 2017-08-30 NOTE — PHYSICAL THERAPY INITIAL EVALUATION ADULT - GAIT DEVIATIONS NOTED, PT EVAL
decreased weight-shifting ability/decreased brooke/decreased step length/pt placed on bedpan once seated on chair following amb/decreased stride length/increased time in double stance

## 2017-08-30 NOTE — PROGRESS NOTE ADULT - SUBJECTIVE AND OBJECTIVE BOX
Subjective: Patient seen and examined. No new events except as noted.     SUBJECTIVE/ROS:  Due to altered mental status/intubation, subjective information were not able to be obtained from the patient. History was obtained, to the extent possible, from review of the chart and collateral sources of information.     MEDICATIONS:  MEDICATIONS  (STANDING):  heparin  Injectable 5000 Unit(s) SubCutaneous every 8 hours  diVALproex  milliGRAM(s) Oral two times a day  NIFEdipine XL 30 milliGRAM(s) Oral daily  pantoprazole    Tablet 40 milliGRAM(s) Oral before breakfast      PHYSICAL EXAM:  T(C): 36.4 (08-30-17 @ 03:19), Max: 37.1 (08-29-17 @ 21:16)  HR: 83 (08-30-17 @ 03:19) (83 - 86)  BP: 164/93 (08-30-17 @ 03:19) (135/76 - 164/93)  RR: 18 (08-30-17 @ 03:19) (18 - 18)  SpO2: 97% (08-30-17 @ 03:19) (96% - 97%)  Wt(kg): --  I&O's Summary    29 Aug 2017 07:01  -  30 Aug 2017 07:00  --------------------------------------------------------  IN: 1200 mL / OUT: 0 mL / NET: 1200 mL          Appearance: Normal	  HEENT:   Normal oral mucosa, PERRL, EOMI	  Cardiovascular: Normal S1 S2,    Murmur:   Neck: JVP normal  Respiratory: Lungs clear to auscultation  Gastrointestinal:  Soft, Non-tender, + BS	  Skin: normal   Neuro: No gross deficits.   Psychiatry:  Mood & affect appropriate  Ext: No edema        LABS:    CARDIAC MARKERS:                                13.0   11.89 )-----------( 209      ( 30 Aug 2017 07:38 )             38.0     08-30    131<L>  |  92<L>  |  18  ----------------------------<  108<H>  4.0   |  24  |  0.56    Ca    8.8      30 Aug 2017 07:38  Mg     1.9     08-29      proBNP:   Lipid Profile:   HgA1c:   TSH:           TELEMETRY: 	    ECG:  	  RADIOLOGY:   DIAGNOSTIC TESTING:  Echocardiogram:  Catheterization:  Stress Test:    OTHER:

## 2017-08-30 NOTE — DISCHARGE NOTE ADULT - PLAN OF CARE
no further falls Fall precautions  Physical therapy at rehab Pain control as needed Resolved Take medications for your blood pressure as recommended.  Eat a heart healthy diet that is low in saturated fats and salt, and includes whole grains, fruits, vegetables and lean protein   Exercise regularly (consult with your physician or cardiologist first); maintain a heart healthy weight.   If you smoke - quit (A resource to help you stop smoking is the [facility] – phone number [phone].). Continue to follow with your primary physician or cardiologist.   Seek medical help for dizziness, Lightheadedness, Blurry vision, Headache, Chest pain, Shortness of breath  Follow up with your medical doctor to establish long term blood pressure treatment goals. Dementia causes memory problems and make it hard to think clear. The symptoms of dementia often start off very mild and get worse slowly. Symptoms are forgetfulness, confusion, trouble with language, getting lost in familiar places. As dementia gets worse, it can cause anger or aggression, see things that aren't there, decreased ability to eat, bathe, dress, or do other everyday tasks, or cause people to lose bladder and bowel control. Alzheimer disease, there are medicines that might help some. If you have vascular dementia, your doctor will focus on keeping your blood pressure and cholesterol as normal as possible. Sadly, there really aren't good treatments for most types of dementia.  Prevent falls and accidents by securing loose rugs or use non-skid rugs, loose wires or electrical cords. Wear sturdy, comfortable shoes, keep walkways well lit. There are no proven ways to prevent dementia. But encourage physical activity, healthy diet and social interaction to help keep the brain healthy. Keep medications, sharp knives, lighters, matches, power tools, and guns out of reach. Lower the temperature on water heaters. Keep familiar objects and people around. Use reminders, notes, or directions for daily activities or tasks. Keep a simple, consistent routine for waking, meals, bathing, dressing, and bedtime. Display emergency numbers and home address near all telephones.   SEEK MEDICAL CARE IF: New behavioral problems start such as moodiness, aggressiveness, or seeing things that are not there (hallucinations). Any new problem with brain function happens. This includes problems with balance, speech, swallowing difficulty or falling a lot. Small changes or worsening in any aspect of brain function can be a sign that the illness is getting worse or a sign of infection. Seeing a caregiver right away is important.   SEEK IMMEDIATE MEDICAL CARE IF: New or worsened confusion, sleepiness, or inability to sleep develops.

## 2017-08-30 NOTE — PHYSICAL THERAPY INITIAL EVALUATION ADULT - ADDITIONAL COMMENTS
lives with son Joe and family assist with adl's , amb independent lives with son Joe and family, assist with adl's , amb independent with no device per pt

## 2017-08-30 NOTE — PHYSICAL THERAPY INITIAL EVALUATION ADULT - IMPAIRMENTS CONTRIBUTING TO GAIT DEVIATIONS, PT EVAL
decreased strength/impaired postural control/decreased flexibility/impaired balance/cognition/mod to min unsteady mod of 1, vc to stand upright

## 2017-08-30 NOTE — PHYSICAL THERAPY INITIAL EVALUATION ADULT - FOLLOWS COMMANDS/ANSWERS QUESTIONS, REHAB EVAL
folaniws simple commands 75 % of time with verbal and visual cues , answers questions 25 %-50% of time forgetful and confused

## 2017-08-30 NOTE — PROGRESS NOTE ADULT - PROBLEM SELECTOR PLAN 1
very small pneumothorax on left side: pt is not in any respiratory distress. has three rib fractures: left 4-6th rib and chronic right 5th rib. There is no flail chest : Last night ABG noted: mild respiratory alkalosis: O2 was ordered to be given just to help quick resolution of left sided pneumothorax and the effusion is low attenuation, doesnot seem to be blood in the pleural cavity: discussed with Ivan: thoracic cardiologist: There is some mediastinal shift on the right side: which is same from 2015 on comparison with old chest radiograph films.

## 2017-08-30 NOTE — PHYSICAL THERAPY INITIAL EVALUATION ADULT - GENERAL OBSERVATIONS, REHAB EVAL
pt received in bed top rails up and 1 siderail + bed alarm active  nad pt confused but alert and pleasant ; pt agreeable for PT

## 2017-08-30 NOTE — PHYSICAL THERAPY INITIAL EVALUATION ADULT - DISCHARGE DISPOSITION, PT EVAL
subacute rehab to increase functional mobility , strength, endurance, balance , fall prevention education continued/rehabilitation facility

## 2017-08-30 NOTE — PHYSICAL THERAPY INITIAL EVALUATION ADULT - IMPAIRMENTS CONTRIBUTING IMPAIRED BED MOBILITY, REHAB EVAL
impaired postural control/cognition/decreased flexibility/impaired balance/decreased strength/decreased endurance , sat x 2 for few min each min of 1

## 2017-08-30 NOTE — PHYSICAL THERAPY INITIAL EVALUATION ADULT - ACTIVE RANGE OF MOTION EXAMINATION, REHAB EVAL
all perform aarom wfl's/bilateral  lower extremity Active ROM was WFL (within functional limits)/bilateral upper extremity Active ROM was WFL (within functional limits)

## 2017-08-30 NOTE — DISCHARGE NOTE ADULT - MEDICATION SUMMARY - MEDICATIONS TO TAKE
I will START or STAY ON the medications listed below when I get home from the hospital:    acetaminophen 325 mg oral tablet  -- 2 tab(s) by mouth every 6 hours, As needed, Mild Pain (1 - 3)  -- Indication: For Pain    acetaminophen-oxycodone 325 mg-5 mg oral tablet  -- 1 tab(s) by mouth every 6 hours, As needed, Moderate Pain (4 - 6)  -- Indication: For Pain    Depakote 250 mg oral delayed release tablet  -- 2 tab(s) by mouth 2 times a day  -- Indication: For seizure    ALPRAZolam 0.25 mg oral tablet  -- 1 tab(s) by mouth 2 times a day, As needed, anxiety, agitation, restlessness  -- Indication: For anxiety    NIFEdipine 30 mg oral tablet, extended release  -- 1 tab(s) by mouth once a day  -- Indication: For Htn    pantoprazole 40 mg oral delayed release tablet  -- 1 tab(s) by mouth once a day (before a meal)  -- Indication: For gerd

## 2017-08-30 NOTE — PROGRESS NOTE ADULT - SUBJECTIVE AND OBJECTIVE BOX
Patient is a 89y old  Female who presents with a chief complaint of unwitnessed fall precautions (27 Aug 2017 13:15)    pt is sitting in chair , confused  no respiratory distress      Any change in ROS:     MEDICATIONS  (STANDING):  heparin  Injectable 5000 Unit(s) SubCutaneous every 8 hours  diVALproex  milliGRAM(s) Oral two times a day  NIFEdipine XL 30 milliGRAM(s) Oral daily  pantoprazole    Tablet 40 milliGRAM(s) Oral before breakfast    MEDICATIONS  (PRN):  ALPRAZolam 0.25 milliGRAM(s) Oral two times a day PRN anxiety, agitation, restlessness  acetaminophen   Tablet. 650 milliGRAM(s) Oral every 6 hours PRN Mild Pain (1 - 3)  oxyCODONE    5 mG/acetaminophen 325 mG 1 Tablet(s) Oral every 6 hours PRN Moderate Pain (4 - 6)    Vital Signs Last 24 Hrs  T(C): 36.4 (30 Aug 2017 03:19), Max: 37.1 (29 Aug 2017 21:16)  T(F): 97.5 (30 Aug 2017 03:19), Max: 98.7 (29 Aug 2017 21:16)  HR: 83 (30 Aug 2017 03:19) (83 - 86)  BP: 164/93 (30 Aug 2017 03:19) (135/76 - 164/93)  BP(mean): --  RR: 18 (30 Aug 2017 03:19) (18 - 18)  SpO2: 97% (30 Aug 2017 03:19) (96% - 97%)    I&O's Summary    29 Aug 2017 07:01  -  30 Aug 2017 07:00  --------------------------------------------------------  IN: 1200 mL / OUT: 0 mL / NET: 1200 mL          Physical Exam:   GENERAL: NAD, well-groomed, well-developed  HEENT: GEM/   Atraumatic, Normocephalic  ENMT: No tonsillar erythema, exudates, or enlargement; Moist mucous membranes, Good dentition, No lesions  NECK: Supple, No JVD, Normal thyroid  CHEST/LUNG: decreased bilateral air entry   CVS: Regular rate and rhythm; No murmurs, rubs, or gallops  GI: : Soft, Nontender, Nondistended; Bowel sounds present  NERVOUS SYSTEM:  Alert & Oriented X0-1  EXTREMITIES:  2+ Peripheral Pulses, No clubbing, cyanosis, or edema  LYMPH: No lymphadenopathy noted  SKIN: No rashes or lesions  ENDOCRINOLOGY: No Thyromegaly  PSYCH: confused    Labs:  ABG - ( 29 Aug 2017 22:36 )  pH: 7.49  /  pCO2: 34    /  pO2: 128   / HCO3: 26    / Base Excess: 3.3   /  SaO2: 99                                13.0   11.89 )-----------( 209      ( 30 Aug 2017 07:38 )             38.0                         14.0   10.91 )-----------( 168      ( 29 Aug 2017 08:54 )             41.1                         13.5   13.85 )-----------( 215      ( 28 Aug 2017 06:26 )             39.4                         14.7   13.9  )-----------( 201      ( 27 Aug 2017 00:00 )             43.5     08-30    131<L>  |  92<L>  |  18  ----------------------------<  108<H>  4.0   |  24  |  0.56  08-29    128<L>  |  90<L>  |  12  ----------------------------<  103<H>  3.8   |  22  |  0.55  08-27    136  |  95<L>  |  18  ----------------------------<  153<H>  3.7   |  27  |  0.80    Ca    8.8      30 Aug 2017 07:38  Ca    8.9      29 Aug 2017 08:54  Mg     1.9     08-29    TPro  7.3  /  Alb  4.0  /  TBili  0.4  /  DBili  x   /  AST  21  /  ALT  14  /  AlkPhos  75  08-27    CAPILLARY BLOOD GLUCOSE    Studies  Chest X-RAY  CT SCAN Chest   Venous Dopplers: LE:   CT Abdomen  Others      < from: CT Chest No Cont (08.27.17 @ 08:26) >  VESSELS:  Atherosclerotic abdominal aorta and branches.  RETROPERITONEUM: No lymphadenopathy.    ABDOMINAL WALL: Within normal limits.  BONES: Left fourth through sixth rib fractures. There is a chronic right   fifth rib fracture. There are marked degenerative changes of the lumbar   spine with resultant grade 1 anterolisthesis of L2 on L3 which is stable   from prior CT.    IMPRESSION:     Multiple left rib fractures with associated small left hydropneumothorax.    The above findings were discussed with Dr. Mccall by Dr. Bingham on   8/27/2017 at 9:00 AM, with read-back.  IVANIA BINGHAM M.D., RADIOLOGY RESIDENT  This document has been electronically signed.  JOSÉ MANUEL JAIME M.D., ATTENDING RADIOLOGIST  This document has been electronically signed. Aug 27 2017 10:15AM    < end of copied text >

## 2017-08-30 NOTE — DISCHARGE NOTE ADULT - CARE PLAN
Principal Discharge DX:	Fall, initial encounter  Goal:	no further falls  Instructions for follow-up, activity and diet:	Fall precautions  Physical therapy at rehab  Secondary Diagnosis:	Closed fracture of multiple ribs of left side, initial encounter  Instructions for follow-up, activity and diet:	Pain control as needed  Secondary Diagnosis:	Pneumothorax on left  Instructions for follow-up, activity and diet:	Resolved  Secondary Diagnosis:	Essential hypertension  Instructions for follow-up, activity and diet:	Take medications for your blood pressure as recommended.  Eat a heart healthy diet that is low in saturated fats and salt, and includes whole grains, fruits, vegetables and lean protein   Exercise regularly (consult with your physician or cardiologist first); maintain a heart healthy weight.   If you smoke - quit (A resource to help you stop smoking is the [facility] for Tobacco Control – phone number 809-758-3018.). Continue to follow with your primary physician or cardiologist.   Seek medical help for dizziness, Lightheadedness, Blurry vision, Headache, Chest pain, Shortness of breath  Follow up with your medical doctor to establish long term blood pressure treatment goals.  Secondary Diagnosis:	Dementia  Instructions for follow-up, activity and diet:	Dementia causes memory problems and make it hard to think clear. The symptoms of dementia often start off very mild and get worse slowly. Symptoms are forgetfulness, confusion, trouble with language, getting lost in familiar places. As dementia gets worse, it can cause anger or aggression, see things that aren't there, decreased ability to eat, bathe, dress, or do other everyday tasks, or cause people to lose bladder and bowel control. Alzheimer disease, there are medicines that might help some. If you have vascular dementia, your doctor will focus on keeping your blood pressure and cholesterol as normal as possible. Sadly, there really aren't good treatments for most types of dementia.  Prevent falls and accidents by securing loose rugs or use non-skid rugs, loose wires or electrical cords. Wear sturdy, comfortable shoes, keep walkways well lit. There are no proven ways to prevent dementia. But encourage physical activity, healthy diet and social interaction to help keep the brain healthy. Keep medications, sharp knives, lighters, matches, power tools, and guns out of reach. Lower the temperature on water heaters. Keep familiar objects and people around. Use reminders, notes, or directions for daily activities or tasks. Keep a simple, consistent routine for waking, meals, bathing, dressing, and bedtime. Display emergency numbers and home address near all telephones.   SEEK MEDICAL CARE IF: New behavioral problems start such as moodiness, aggressiveness, or seeing things that are not there (hallucinations). Any new problem with brain function happens. This includes problems with balance, speech, swallowing difficulty or falling a lot. Small changes or worsening in any aspect of brain function can be a sign that the illness is getting worse or a sign of infection. Seeing a caregiver right away is important.   SEEK IMMEDIATE MEDICAL CARE IF: New or worsened confusion, sleepiness, or inability to sleep develops. Principal Discharge DX:	Fall, initial encounter  Goal:	no further falls  Instructions for follow-up, activity and diet:	Fall precautions  Physical therapy at rehab  Secondary Diagnosis:	Closed fracture of multiple ribs of left side, initial encounter  Instructions for follow-up, activity and diet:	Pain control as needed  Secondary Diagnosis:	Pneumothorax on left  Instructions for follow-up, activity and diet:	Resolved  Secondary Diagnosis:	Essential hypertension  Instructions for follow-up, activity and diet:	Take medications for your blood pressure as recommended.  Eat a heart healthy diet that is low in saturated fats and salt, and includes whole grains, fruits, vegetables and lean protein   Exercise regularly (consult with your physician or cardiologist first); maintain a heart healthy weight.   If you smoke - quit (A resource to help you stop smoking is the [facility] for Tobacco Control – phone number 421-285-7281.). Continue to follow with your primary physician or cardiologist.   Seek medical help for dizziness, Lightheadedness, Blurry vision, Headache, Chest pain, Shortness of breath  Follow up with your medical doctor to establish long term blood pressure treatment goals.  Secondary Diagnosis:	Dementia  Instructions for follow-up, activity and diet:	Dementia causes memory problems and make it hard to think clear. The symptoms of dementia often start off very mild and get worse slowly. Symptoms are forgetfulness, confusion, trouble with language, getting lost in familiar places. As dementia gets worse, it can cause anger or aggression, see things that aren't there, decreased ability to eat, bathe, dress, or do other everyday tasks, or cause people to lose bladder and bowel control. Alzheimer disease, there are medicines that might help some. If you have vascular dementia, your doctor will focus on keeping your blood pressure and cholesterol as normal as possible. Sadly, there really aren't good treatments for most types of dementia.  Prevent falls and accidents by securing loose rugs or use non-skid rugs, loose wires or electrical cords. Wear sturdy, comfortable shoes, keep walkways well lit. There are no proven ways to prevent dementia. But encourage physical activity, healthy diet and social interaction to help keep the brain healthy. Keep medications, sharp knives, lighters, matches, power tools, and guns out of reach. Lower the temperature on water heaters. Keep familiar objects and people around. Use reminders, notes, or directions for daily activities or tasks. Keep a simple, consistent routine for waking, meals, bathing, dressing, and bedtime. Display emergency numbers and home address near all telephones.   SEEK MEDICAL CARE IF: New behavioral problems start such as moodiness, aggressiveness, or seeing things that are not there (hallucinations). Any new problem with brain function happens. This includes problems with balance, speech, swallowing difficulty or falling a lot. Small changes or worsening in any aspect of brain function can be a sign that the illness is getting worse or a sign of infection. Seeing a caregiver right away is important.   SEEK IMMEDIATE MEDICAL CARE IF: New or worsened confusion, sleepiness, or inability to sleep develops. Principal Discharge DX:	Fall, initial encounter  Goal:	no further falls  Instructions for follow-up, activity and diet:	Fall precautions  Physical therapy at rehab  Secondary Diagnosis:	Closed fracture of multiple ribs of left side, initial encounter  Instructions for follow-up, activity and diet:	Pain control as needed  Secondary Diagnosis:	Pneumothorax on left  Instructions for follow-up, activity and diet:	Resolved  Secondary Diagnosis:	Essential hypertension  Instructions for follow-up, activity and diet:	Take medications for your blood pressure as recommended.  Eat a heart healthy diet that is low in saturated fats and salt, and includes whole grains, fruits, vegetables and lean protein   Exercise regularly (consult with your physician or cardiologist first); maintain a heart healthy weight.   If you smoke - quit (A resource to help you stop smoking is the [facility] for Tobacco Control – phone number 802-573-4359.). Continue to follow with your primary physician or cardiologist.   Seek medical help for dizziness, Lightheadedness, Blurry vision, Headache, Chest pain, Shortness of breath  Follow up with your medical doctor to establish long term blood pressure treatment goals.  Secondary Diagnosis:	Dementia  Instructions for follow-up, activity and diet:	Dementia causes memory problems and make it hard to think clear. The symptoms of dementia often start off very mild and get worse slowly. Symptoms are forgetfulness, confusion, trouble with language, getting lost in familiar places. As dementia gets worse, it can cause anger or aggression, see things that aren't there, decreased ability to eat, bathe, dress, or do other everyday tasks, or cause people to lose bladder and bowel control. Alzheimer disease, there are medicines that might help some. If you have vascular dementia, your doctor will focus on keeping your blood pressure and cholesterol as normal as possible. Sadly, there really aren't good treatments for most types of dementia.  Prevent falls and accidents by securing loose rugs or use non-skid rugs, loose wires or electrical cords. Wear sturdy, comfortable shoes, keep walkways well lit. There are no proven ways to prevent dementia. But encourage physical activity, healthy diet and social interaction to help keep the brain healthy. Keep medications, sharp knives, lighters, matches, power tools, and guns out of reach. Lower the temperature on water heaters. Keep familiar objects and people around. Use reminders, notes, or directions for daily activities or tasks. Keep a simple, consistent routine for waking, meals, bathing, dressing, and bedtime. Display emergency numbers and home address near all telephones.   SEEK MEDICAL CARE IF: New behavioral problems start such as moodiness, aggressiveness, or seeing things that are not there (hallucinations). Any new problem with brain function happens. This includes problems with balance, speech, swallowing difficulty or falling a lot. Small changes or worsening in any aspect of brain function can be a sign that the illness is getting worse or a sign of infection. Seeing a caregiver right away is important.   SEEK IMMEDIATE MEDICAL CARE IF: New or worsened confusion, sleepiness, or inability to sleep develops. Principal Discharge DX:	Fall, initial encounter  Goal:	no further falls  Instructions for follow-up, activity and diet:	Fall precautions  Physical therapy at rehab  Secondary Diagnosis:	Closed fracture of multiple ribs of left side, initial encounter  Instructions for follow-up, activity and diet:	Pain control as needed  Secondary Diagnosis:	Pneumothorax on left  Instructions for follow-up, activity and diet:	Resolved  Secondary Diagnosis:	Essential hypertension  Instructions for follow-up, activity and diet:	Take medications for your blood pressure as recommended.  Eat a heart healthy diet that is low in saturated fats and salt, and includes whole grains, fruits, vegetables and lean protein   Exercise regularly (consult with your physician or cardiologist first); maintain a heart healthy weight.   If you smoke - quit (A resource to help you stop smoking is the [facility] for Tobacco Control – phone number 102-970-7563.). Continue to follow with your primary physician or cardiologist.   Seek medical help for dizziness, Lightheadedness, Blurry vision, Headache, Chest pain, Shortness of breath  Follow up with your medical doctor to establish long term blood pressure treatment goals.  Secondary Diagnosis:	Dementia  Instructions for follow-up, activity and diet:	Dementia causes memory problems and make it hard to think clear. The symptoms of dementia often start off very mild and get worse slowly. Symptoms are forgetfulness, confusion, trouble with language, getting lost in familiar places. As dementia gets worse, it can cause anger or aggression, see things that aren't there, decreased ability to eat, bathe, dress, or do other everyday tasks, or cause people to lose bladder and bowel control. Alzheimer disease, there are medicines that might help some. If you have vascular dementia, your doctor will focus on keeping your blood pressure and cholesterol as normal as possible. Sadly, there really aren't good treatments for most types of dementia.  Prevent falls and accidents by securing loose rugs or use non-skid rugs, loose wires or electrical cords. Wear sturdy, comfortable shoes, keep walkways well lit. There are no proven ways to prevent dementia. But encourage physical activity, healthy diet and social interaction to help keep the brain healthy. Keep medications, sharp knives, lighters, matches, power tools, and guns out of reach. Lower the temperature on water heaters. Keep familiar objects and people around. Use reminders, notes, or directions for daily activities or tasks. Keep a simple, consistent routine for waking, meals, bathing, dressing, and bedtime. Display emergency numbers and home address near all telephones.   SEEK MEDICAL CARE IF: New behavioral problems start such as moodiness, aggressiveness, or seeing things that are not there (hallucinations). Any new problem with brain function happens. This includes problems with balance, speech, swallowing difficulty or falling a lot. Small changes or worsening in any aspect of brain function can be a sign that the illness is getting worse or a sign of infection. Seeing a caregiver right away is important.   SEEK IMMEDIATE MEDICAL CARE IF: New or worsened confusion, sleepiness, or inability to sleep develops. Principal Discharge DX:	Fall, initial encounter  Goal:	no further falls  Instructions for follow-up, activity and diet:	Fall precautions  Physical therapy at rehab  Secondary Diagnosis:	Closed fracture of multiple ribs of left side, initial encounter  Instructions for follow-up, activity and diet:	Pain control as needed  Secondary Diagnosis:	Pneumothorax on left  Instructions for follow-up, activity and diet:	Resolved  Secondary Diagnosis:	Essential hypertension  Instructions for follow-up, activity and diet:	Take medications for your blood pressure as recommended.  Eat a heart healthy diet that is low in saturated fats and salt, and includes whole grains, fruits, vegetables and lean protein   Exercise regularly (consult with your physician or cardiologist first); maintain a heart healthy weight.   If you smoke - quit (A resource to help you stop smoking is the [facility] for Tobacco Control – phone number 926-453-9928.). Continue to follow with your primary physician or cardiologist.   Seek medical help for dizziness, Lightheadedness, Blurry vision, Headache, Chest pain, Shortness of breath  Follow up with your medical doctor to establish long term blood pressure treatment goals.  Secondary Diagnosis:	Dementia  Instructions for follow-up, activity and diet:	Dementia causes memory problems and make it hard to think clear. The symptoms of dementia often start off very mild and get worse slowly. Symptoms are forgetfulness, confusion, trouble with language, getting lost in familiar places. As dementia gets worse, it can cause anger or aggression, see things that aren't there, decreased ability to eat, bathe, dress, or do other everyday tasks, or cause people to lose bladder and bowel control. Alzheimer disease, there are medicines that might help some. If you have vascular dementia, your doctor will focus on keeping your blood pressure and cholesterol as normal as possible. Sadly, there really aren't good treatments for most types of dementia.  Prevent falls and accidents by securing loose rugs or use non-skid rugs, loose wires or electrical cords. Wear sturdy, comfortable shoes, keep walkways well lit. There are no proven ways to prevent dementia. But encourage physical activity, healthy diet and social interaction to help keep the brain healthy. Keep medications, sharp knives, lighters, matches, power tools, and guns out of reach. Lower the temperature on water heaters. Keep familiar objects and people around. Use reminders, notes, or directions for daily activities or tasks. Keep a simple, consistent routine for waking, meals, bathing, dressing, and bedtime. Display emergency numbers and home address near all telephones.   SEEK MEDICAL CARE IF: New behavioral problems start such as moodiness, aggressiveness, or seeing things that are not there (hallucinations). Any new problem with brain function happens. This includes problems with balance, speech, swallowing difficulty or falling a lot. Small changes or worsening in any aspect of brain function can be a sign that the illness is getting worse or a sign of infection. Seeing a caregiver right away is important.   SEEK IMMEDIATE MEDICAL CARE IF: New or worsened confusion, sleepiness, or inability to sleep develops. Principal Discharge DX:	Fall, initial encounter  Goal:	no further falls  Instructions for follow-up, activity and diet:	Fall precautions  Physical therapy at rehab  Secondary Diagnosis:	Closed fracture of multiple ribs of left side, initial encounter  Instructions for follow-up, activity and diet:	Pain control as needed  Secondary Diagnosis:	Pneumothorax on left  Instructions for follow-up, activity and diet:	Resolved  Secondary Diagnosis:	Essential hypertension  Instructions for follow-up, activity and diet:	Take medications for your blood pressure as recommended.  Eat a heart healthy diet that is low in saturated fats and salt, and includes whole grains, fruits, vegetables and lean protein   Exercise regularly (consult with your physician or cardiologist first); maintain a heart healthy weight.   If you smoke - quit (A resource to help you stop smoking is the [facility] for Tobacco Control – phone number 011-443-7447.). Continue to follow with your primary physician or cardiologist.   Seek medical help for dizziness, Lightheadedness, Blurry vision, Headache, Chest pain, Shortness of breath  Follow up with your medical doctor to establish long term blood pressure treatment goals.  Secondary Diagnosis:	Dementia  Instructions for follow-up, activity and diet:	Dementia causes memory problems and make it hard to think clear. The symptoms of dementia often start off very mild and get worse slowly. Symptoms are forgetfulness, confusion, trouble with language, getting lost in familiar places. As dementia gets worse, it can cause anger or aggression, see things that aren't there, decreased ability to eat, bathe, dress, or do other everyday tasks, or cause people to lose bladder and bowel control. Alzheimer disease, there are medicines that might help some. If you have vascular dementia, your doctor will focus on keeping your blood pressure and cholesterol as normal as possible. Sadly, there really aren't good treatments for most types of dementia.  Prevent falls and accidents by securing loose rugs or use non-skid rugs, loose wires or electrical cords. Wear sturdy, comfortable shoes, keep walkways well lit. There are no proven ways to prevent dementia. But encourage physical activity, healthy diet and social interaction to help keep the brain healthy. Keep medications, sharp knives, lighters, matches, power tools, and guns out of reach. Lower the temperature on water heaters. Keep familiar objects and people around. Use reminders, notes, or directions for daily activities or tasks. Keep a simple, consistent routine for waking, meals, bathing, dressing, and bedtime. Display emergency numbers and home address near all telephones.   SEEK MEDICAL CARE IF: New behavioral problems start such as moodiness, aggressiveness, or seeing things that are not there (hallucinations). Any new problem with brain function happens. This includes problems with balance, speech, swallowing difficulty or falling a lot. Small changes or worsening in any aspect of brain function can be a sign that the illness is getting worse or a sign of infection. Seeing a caregiver right away is important.   SEEK IMMEDIATE MEDICAL CARE IF: New or worsened confusion, sleepiness, or inability to sleep develops. Principal Discharge DX:	Fall, initial encounter  Goal:	no further falls  Instructions for follow-up, activity and diet:	Fall precautions  Physical therapy at rehab  Secondary Diagnosis:	Closed fracture of multiple ribs of left side, initial encounter  Instructions for follow-up, activity and diet:	Pain control as needed  Secondary Diagnosis:	Pneumothorax on left  Instructions for follow-up, activity and diet:	Resolved  Secondary Diagnosis:	Essential hypertension  Instructions for follow-up, activity and diet:	Take medications for your blood pressure as recommended.  Eat a heart healthy diet that is low in saturated fats and salt, and includes whole grains, fruits, vegetables and lean protein   Exercise regularly (consult with your physician or cardiologist first); maintain a heart healthy weight.   If you smoke - quit (A resource to help you stop smoking is the [facility] for Tobacco Control – phone number 201-234-9816.). Continue to follow with your primary physician or cardiologist.   Seek medical help for dizziness, Lightheadedness, Blurry vision, Headache, Chest pain, Shortness of breath  Follow up with your medical doctor to establish long term blood pressure treatment goals.  Secondary Diagnosis:	Dementia  Instructions for follow-up, activity and diet:	Dementia causes memory problems and make it hard to think clear. The symptoms of dementia often start off very mild and get worse slowly. Symptoms are forgetfulness, confusion, trouble with language, getting lost in familiar places. As dementia gets worse, it can cause anger or aggression, see things that aren't there, decreased ability to eat, bathe, dress, or do other everyday tasks, or cause people to lose bladder and bowel control. Alzheimer disease, there are medicines that might help some. If you have vascular dementia, your doctor will focus on keeping your blood pressure and cholesterol as normal as possible. Sadly, there really aren't good treatments for most types of dementia.  Prevent falls and accidents by securing loose rugs or use non-skid rugs, loose wires or electrical cords. Wear sturdy, comfortable shoes, keep walkways well lit. There are no proven ways to prevent dementia. But encourage physical activity, healthy diet and social interaction to help keep the brain healthy. Keep medications, sharp knives, lighters, matches, power tools, and guns out of reach. Lower the temperature on water heaters. Keep familiar objects and people around. Use reminders, notes, or directions for daily activities or tasks. Keep a simple, consistent routine for waking, meals, bathing, dressing, and bedtime. Display emergency numbers and home address near all telephones.   SEEK MEDICAL CARE IF: New behavioral problems start such as moodiness, aggressiveness, or seeing things that are not there (hallucinations). Any new problem with brain function happens. This includes problems with balance, speech, swallowing difficulty or falling a lot. Small changes or worsening in any aspect of brain function can be a sign that the illness is getting worse or a sign of infection. Seeing a caregiver right away is important.   SEEK IMMEDIATE MEDICAL CARE IF: New or worsened confusion, sleepiness, or inability to sleep develops.

## 2017-08-30 NOTE — PHYSICAL THERAPY INITIAL EVALUATION ADULT - IMPAIRMENTS FOUND, PT EVAL
pulse ox 93 % on RA , Dr Boyce in and want pt on 2 L nc o2 due to tiny Pthx ,pt on 2 L in b/s chair/joint integrity and mobility/aerobic capacity/endurance/gait, locomotion, and balance/cognitive impairment/muscle strength

## 2017-08-30 NOTE — PHYSICAL THERAPY INITIAL EVALUATION ADULT - NS ASR RISK AREAS PT EVAL
safety awareness/reviewed and had pt perform use of call bell , pt understood/fall/cognitive impairment

## 2017-08-30 NOTE — DISCHARGE NOTE ADULT - CARE PROVIDER_API CALL
Gurpreet Briones), Cardiovascular Disease; Internal Medicine  [address] [address]  Phone: [phone]  Fax: [phone]    Aman Dexter), Internal Medicine  [address] [place] [address]  Phone: [phone]  Fax: [phone]

## 2017-08-30 NOTE — PROGRESS NOTE ADULT - PROBLEM SELECTOR PLAN 1
will monitor  positive for left 3 rib fractures with small hydropneumothorax  pulmonary consult noted  no intervention at present  Echo recent normal  cardiology attending note appreciated   follow recommendations

## 2017-08-31 LAB
ANION GAP SERPL CALC-SCNC: 13 MMOL/L — SIGNIFICANT CHANGE UP (ref 5–17)
BUN SERPL-MCNC: 15 MG/DL — SIGNIFICANT CHANGE UP (ref 7–23)
CALCIUM SERPL-MCNC: 9.1 MG/DL — SIGNIFICANT CHANGE UP (ref 8.4–10.5)
CHLORIDE SERPL-SCNC: 94 MMOL/L — LOW (ref 96–108)
CO2 SERPL-SCNC: 29 MMOL/L — SIGNIFICANT CHANGE UP (ref 22–31)
CREAT SERPL-MCNC: 0.67 MG/DL — SIGNIFICANT CHANGE UP (ref 0.5–1.3)
GLUCOSE SERPL-MCNC: 117 MG/DL — HIGH (ref 70–99)
HCT VFR BLD CALC: 41.2 % — SIGNIFICANT CHANGE UP (ref 34.5–45)
HGB BLD-MCNC: 13.7 G/DL — SIGNIFICANT CHANGE UP (ref 11.5–15.5)
MCHC RBC-ENTMCNC: 30.7 PG — SIGNIFICANT CHANGE UP (ref 27–34)
MCHC RBC-ENTMCNC: 33.3 GM/DL — SIGNIFICANT CHANGE UP (ref 32–36)
MCV RBC AUTO: 92.4 FL — SIGNIFICANT CHANGE UP (ref 80–100)
PLATELET # BLD AUTO: 234 K/UL — SIGNIFICANT CHANGE UP (ref 150–400)
POTASSIUM SERPL-MCNC: 4.2 MMOL/L — SIGNIFICANT CHANGE UP (ref 3.5–5.3)
POTASSIUM SERPL-SCNC: 4.2 MMOL/L — SIGNIFICANT CHANGE UP (ref 3.5–5.3)
RBC # BLD: 4.46 M/UL — SIGNIFICANT CHANGE UP (ref 3.8–5.2)
RBC # FLD: 13 % — SIGNIFICANT CHANGE UP (ref 10.3–14.5)
SODIUM SERPL-SCNC: 136 MMOL/L — SIGNIFICANT CHANGE UP (ref 135–145)
WBC # BLD: 10.22 K/UL — SIGNIFICANT CHANGE UP (ref 3.8–10.5)
WBC # FLD AUTO: 10.22 K/UL — SIGNIFICANT CHANGE UP (ref 3.8–10.5)

## 2017-08-31 RX ORDER — ACETAMINOPHEN 500 MG
2 TABLET ORAL
Qty: 0 | Refills: 0 | COMMUNITY
Start: 2017-08-31

## 2017-08-31 RX ORDER — OXYCODONE HYDROCHLORIDE 5 MG/1
1 TABLET ORAL
Qty: 0 | Refills: 0 | COMMUNITY
Start: 2017-08-31

## 2017-08-31 RX ORDER — ALPRAZOLAM 0.25 MG
1 TABLET ORAL
Qty: 0 | Refills: 0 | COMMUNITY
Start: 2017-08-31

## 2017-08-31 RX ADMIN — DIVALPROEX SODIUM 500 MILLIGRAM(S): 500 TABLET, DELAYED RELEASE ORAL at 17:22

## 2017-08-31 RX ADMIN — DIVALPROEX SODIUM 500 MILLIGRAM(S): 500 TABLET, DELAYED RELEASE ORAL at 06:05

## 2017-08-31 RX ADMIN — HEPARIN SODIUM 5000 UNIT(S): 5000 INJECTION INTRAVENOUS; SUBCUTANEOUS at 14:27

## 2017-08-31 RX ADMIN — Medication 0.25 MILLIGRAM(S): at 22:47

## 2017-08-31 RX ADMIN — Medication 30 MILLIGRAM(S): at 06:05

## 2017-08-31 RX ADMIN — HEPARIN SODIUM 5000 UNIT(S): 5000 INJECTION INTRAVENOUS; SUBCUTANEOUS at 06:05

## 2017-08-31 RX ADMIN — PANTOPRAZOLE SODIUM 40 MILLIGRAM(S): 20 TABLET, DELAYED RELEASE ORAL at 06:05

## 2017-08-31 RX ADMIN — HEPARIN SODIUM 5000 UNIT(S): 5000 INJECTION INTRAVENOUS; SUBCUTANEOUS at 22:45

## 2017-08-31 NOTE — PROGRESS NOTE ADULT - SUBJECTIVE AND OBJECTIVE BOX
Subjective: Patient seen and examined. No new events except as noted.     SUBJECTIVE/ROS:    Due to altered mental status/intubation, subjective information were not able to be obtained from the patient. History was obtained, to the extent possible, from review of the chart and collateral sources of information.   MEDICATIONS:  MEDICATIONS  (STANDING):  heparin  Injectable 5000 Unit(s) SubCutaneous every 8 hours  diVALproex  milliGRAM(s) Oral two times a day  NIFEdipine XL 30 milliGRAM(s) Oral daily  pantoprazole    Tablet 40 milliGRAM(s) Oral before breakfast      PHYSICAL EXAM:  T(C): 36.4 (08-31-17 @ 11:50), Max: 37.6 (08-30-17 @ 21:01)  HR: 76 (08-31-17 @ 11:50) (76 - 100)  BP: 160/70 (08-31-17 @ 12:54) (160/70 - 181/79)  RR: 18 (08-31-17 @ 11:50) (18 - 18)  SpO2: 97% (08-31-17 @ 11:50) (95% - 97%)  Wt(kg): --  I&O's Summary    30 Aug 2017 07:01  -  31 Aug 2017 07:00  --------------------------------------------------------  IN: 680 mL / OUT: 200 mL / NET: 480 mL    31 Aug 2017 07:01  -  31 Aug 2017 14:27  --------------------------------------------------------  IN: 60 mL / OUT: 0 mL / NET: 60 mL          Appearance: Normal	  HEENT:   Normal oral mucosa, PERRL, EOMI	  Cardiovascular: Normal S1 S2,    Murmur:   Neck: JVP normal  Respiratory: Lungs clear to auscultation  Gastrointestinal:  Soft, Non-tender, + BS	  Skin: normal   Neuro: No gross deficits.   Psychiatry:  Mood & affect appropriate  Ext: No edema        LABS:    CARDIAC MARKERS:                                13.7   10.22 )-----------( 234      ( 31 Aug 2017 07:21 )             41.2     08-31    136  |  94<L>  |  15  ----------------------------<  117<H>  4.2   |  29  |  0.67    Ca    9.1      31 Aug 2017 07:23      proBNP:   Lipid Profile:   HgA1c:   TSH:           TELEMETRY: 	    ECG:  	  RADIOLOGY:   DIAGNOSTIC TESTING:  Echocardiogram:  Catheterization:  Stress Test:    OTHER:

## 2017-08-31 NOTE — PROGRESS NOTE ADULT - PROBLEM SELECTOR PLAN 1
no definite pneumothorax on yesterdays chest x-ray:   pt has been doing well with no respiratory distress

## 2017-08-31 NOTE — PROGRESS NOTE ADULT - SUBJECTIVE AND OBJECTIVE BOX
Patient is a 89y old  Female who presents with a chief complaint of unwitnessed fall precautions (30 Aug 2017 11:19)      SUBJECTIVE / OVERNIGHT EVENTS: No nausea, vomiting or diarrhea, no fever or chills, no dizziness or chest pain, no dysuria or hematuria, no joint pain or swelling    confused    MEDICATIONS  (STANDING):  heparin  Injectable 5000 Unit(s) SubCutaneous every 8 hours  diVALproex  milliGRAM(s) Oral two times a day  NIFEdipine XL 30 milliGRAM(s) Oral daily  pantoprazole    Tablet 40 milliGRAM(s) Oral before breakfast    MEDICATIONS  (PRN):  ALPRAZolam 0.25 milliGRAM(s) Oral two times a day PRN anxiety, agitation, restlessness  acetaminophen   Tablet. 650 milliGRAM(s) Oral every 6 hours PRN Mild Pain (1 - 3)  oxyCODONE    5 mG/acetaminophen 325 mG 1 Tablet(s) Oral every 6 hours PRN Moderate Pain (4 - 6)        CAPILLARY BLOOD GLUCOSE        I&O's Summary    30 Aug 2017 07:01  -  31 Aug 2017 07:00  --------------------------------------------------------  IN: 680 mL / OUT: 200 mL / NET: 480 mL    31 Aug 2017 07:01  -  31 Aug 2017 17:33  --------------------------------------------------------  IN: 60 mL / OUT: 0 mL / NET: 60 mL      PE:  vital signs as above  in no apparent distress awake verbal  HEENT: GEM EOMI  Neck: Supple, no JVD, no thyromegaly  Lungs: clear, no rhonchi, no wheeze, no crackles decreased effort  decreased breath sounds left base  CVS: S1 S2 no M/R/G  Abdomen: no tenderness, no organomegaly, BS present  Neuro: confused but talking, no focal weakness evident  Psych: confused  Skin: warm, dry  Ext: no edema, no clubbing  Msk: no joint swelling or deformities  Back: no CVA tenderness, no kyphosis/scoliosis    LABS:                        13.7   10.22 )-----------( 234      ( 31 Aug 2017 07:21 )             41.2     08-31    136  |  94<L>  |  15  ----------------------------<  117<H>  4.2   |  29  |  0.67    Ca    9.1      31 Aug 2017 07:23                  Consultant(s) Notes Reviewed:      Care Discussed with Consultants/Other Providers:    Contact Number, Dr Dexter [phone]

## 2017-08-31 NOTE — PROGRESS NOTE ADULT - SUBJECTIVE AND OBJECTIVE BOX
Patient is a 89y old  Female who presents with a chief complaint of unwitnessed fall precautions (30 Aug 2017 11:19).    pt has been doing better today  more oriented and alert   denies any SOB       Any change in ROS:     MEDICATIONS  (STANDING):  heparin  Injectable 5000 Unit(s) SubCutaneous every 8 hours  diVALproex  milliGRAM(s) Oral two times a day  NIFEdipine XL 30 milliGRAM(s) Oral daily  pantoprazole    Tablet 40 milliGRAM(s) Oral before breakfast    MEDICATIONS  (PRN):  ALPRAZolam 0.25 milliGRAM(s) Oral two times a day PRN anxiety, agitation, restlessness  acetaminophen   Tablet. 650 milliGRAM(s) Oral every 6 hours PRN Mild Pain (1 - 3)  oxyCODONE    5 mG/acetaminophen 325 mG 1 Tablet(s) Oral every 6 hours PRN Moderate Pain (4 - 6)    Vital Signs Last 24 Hrs  T(C): 37.1 (31 Aug 2017 03:38), Max: 37.6 (30 Aug 2017 21:01)  T(F): 98.7 (31 Aug 2017 03:38), Max: 99.6 (30 Aug 2017 21:01)  HR: 98 (31 Aug 2017 03:38) (98 - 100)  BP: 170/96 (31 Aug 2017 03:38) (164/84 - 179/81)  BP(mean): --  RR: 18 (31 Aug 2017 03:38) (18 - 18)  SpO2: 95% (31 Aug 2017 03:38) (95% - 98%)    I&O's Summary    30 Aug 2017 07:01  -  31 Aug 2017 07:00  --------------------------------------------------------  IN: 680 mL / OUT: 200 mL / NET: 480 mL          Physical Exam:   GENERAL: NAD, well-groomed, well-developed  HEENT: GEM/   Atraumatic, Normocephalic  ENMT: No tonsillar erythema, exudates, or enlargement; Moist mucous membranes, Good dentition, No lesions  NECK: Supple, No JVD, Normal thyroid  CHEST/LUNG: Clear to auscultaion, ; No rales, rhonchi, wheezing, or rubs  CVS: Regular rate and rhythm; No murmurs, rubs, or gallops  GI: : Soft, Nontender, Nondistended; Bowel sounds present  NERVOUS SYSTEM:  Alert & awake  EXTREMITIES:  2+ Peripheral Pulses, No clubbing, cyanosis, or edema  LYMPH: No lymphadenopathy noted  SKIN: No rashes or lesions  ENDOCRINOLOGY: No Thyromegaly  PSYCH:dementia    Labs:  ABG - ( 29 Aug 2017 22:36 )  pH: 7.49  /  pCO2: 34    /  pO2: 128   / HCO3: 26    / Base Excess: 3.3   /  SaO2: 99                             13.7   10.22 )-----------( 234      ( 31 Aug 2017 07:21 )             41.2                         13.0   11.89 )-----------( 209      ( 30 Aug 2017 07:38 )             38.0                         14.0   10.91 )-----------( 168      ( 29 Aug 2017 08:54 )             41.1                         13.5   13.85 )-----------( 215      ( 28 Aug 2017 06:26 )             39.4     08-31    136  |  94<L>  |  15  ----------------------------<  117<H>  4.2   |  29  |  0.67  08-30    131<L>  |  92<L>  |  18  ----------------------------<  108<H>  4.0   |  24  |  0.56  08-29    128<L>  |  90<L>  |  12  ----------------------------<  103<H>  3.8   |  22  |  0.55    Ca    9.1      31 Aug 2017 07:23  Ca    8.8      30 Aug 2017 07:38      CAPILLARY BLOOD GLUCOSE    Studies  Chest X-RAY  CT SCAN Chest   Venous Dopplers: LE:   CT Abdomen  Others  < from: Xray Chest 1 View AP- PORTABLE-Urgent (08.30.17 @ 11:17) >  EXAM:  CHEST-PORTABLE URGENT                            PROCEDURE DATE:  08/30/2017            INTERPRETATION:  CLINICAL INFORMATION: Follow-up for left pneumothorax in   patient with unwitnessed fall with left rib fractures.    EXAM: Single frontal view chest radiograph.    COMPARISON:  Chest radiograph 8/28/2017    FINDINGS:   The cardiomediastinal silhouette cannot be accurately evaluated on this   projection.    The lungs are clear. There is no definite pneumothorax or pleural   effusion.     Multiple left-sided rib fractures better evaluated on recent CT.  There are are multilevel degenerative changes of the spine.    IMPRESSION:   Clear lungs.    Multiple rib fractures. No definite pneumothorax.                  TIFFANIE GOTTLIEB M.D., RADIOLOGY RESIDENT  This document has been electronically signed.    < end of copied text >  < from: Xray Chest 1 View AP- PORTABLE-Urgent (08.30.17 @ 11:17) >  EXAM:  CHEST-PORTABLE URGENT                            PROCEDURE DATE:  08/30/2017            INTERPRETATION:  CLINICAL INFORMATION: Follow-up for left pneumothorax in   patient with unwitnessed fall with left rib fractures.    EXAM: Single frontal view chest radiograph.    COMPARISON:  Chest radiograph 8/28/2017    FINDINGS:   The cardiomediastinal silhouette cannot be accurately evaluated on this   projection.    The lungs are clear. There is no definite pneumothorax or pleural   effusion.     Multiple left-sided rib fractures better evaluated on recent CT.  There are are multilevel degenerative changes of the spine.    IMPRESSION:   Clear lungs.    Multiple rib fractures. No definite pneumothorax.                  TIFFANIE GOTTLIEB M.D., RADIOLOGY RESIDENT  This document has been electronically signed.    < end of copied text >

## 2017-09-01 PROCEDURE — 99232 SBSQ HOSP IP/OBS MODERATE 35: CPT

## 2017-09-01 RX ADMIN — HEPARIN SODIUM 5000 UNIT(S): 5000 INJECTION INTRAVENOUS; SUBCUTANEOUS at 21:51

## 2017-09-01 RX ADMIN — OXYCODONE AND ACETAMINOPHEN 1 TABLET(S): 5; 325 TABLET ORAL at 21:50

## 2017-09-01 RX ADMIN — Medication 0.25 MILLIGRAM(S): at 16:20

## 2017-09-01 RX ADMIN — Medication 30 MILLIGRAM(S): at 05:48

## 2017-09-01 RX ADMIN — OXYCODONE AND ACETAMINOPHEN 1 TABLET(S): 5; 325 TABLET ORAL at 22:20

## 2017-09-01 RX ADMIN — HEPARIN SODIUM 5000 UNIT(S): 5000 INJECTION INTRAVENOUS; SUBCUTANEOUS at 05:48

## 2017-09-01 RX ADMIN — HEPARIN SODIUM 5000 UNIT(S): 5000 INJECTION INTRAVENOUS; SUBCUTANEOUS at 14:33

## 2017-09-01 RX ADMIN — DIVALPROEX SODIUM 500 MILLIGRAM(S): 500 TABLET, DELAYED RELEASE ORAL at 07:23

## 2017-09-01 RX ADMIN — DIVALPROEX SODIUM 500 MILLIGRAM(S): 500 TABLET, DELAYED RELEASE ORAL at 17:56

## 2017-09-01 RX ADMIN — PANTOPRAZOLE SODIUM 40 MILLIGRAM(S): 20 TABLET, DELAYED RELEASE ORAL at 07:23

## 2017-09-01 NOTE — PROGRESS NOTE ADULT - SUBJECTIVE AND OBJECTIVE BOX
Patient is a 89y old  Female who presents with a chief complaint of unwitnessed fall precautions (30 Aug 2017 11:19)  did ok overnight  no respiratory distress    Any change in ROS:     MEDICATIONS  (STANDING):  heparin  Injectable 5000 Unit(s) SubCutaneous every 8 hours  diVALproex  milliGRAM(s) Oral two times a day  NIFEdipine XL 30 milliGRAM(s) Oral daily  pantoprazole    Tablet 40 milliGRAM(s) Oral before breakfast    MEDICATIONS  (PRN):  ALPRAZolam 0.25 milliGRAM(s) Oral two times a day PRN anxiety, agitation, restlessness  acetaminophen   Tablet. 650 milliGRAM(s) Oral every 6 hours PRN Mild Pain (1 - 3)  oxyCODONE    5 mG/acetaminophen 325 mG 1 Tablet(s) Oral every 6 hours PRN Moderate Pain (4 - 6)    Vital Signs Last 24 Hrs  T(C): 36.4 (01 Sep 2017 04:17), Max: 36.7 (31 Aug 2017 21:10)  T(F): 97.5 (01 Sep 2017 04:17), Max: 98 (31 Aug 2017 21:10)  HR: 81 (01 Sep 2017 04:17) (76 - 88)  BP: 164/77 (01 Sep 2017 04:17) (156/73 - 181/79)  BP(mean): --  RR: 19 (01 Sep 2017 04:17) (18 - 19)  SpO2: 96% (01 Sep 2017 04:17) (96% - 97%)    I&O's Summary    31 Aug 2017 07:01  -  01 Sep 2017 07:00  --------------------------------------------------------  IN: 300 mL / OUT: 0 mL / NET: 300 mL          Physical Exam:   GENERAL: NAD, well-groomed, well-developed  HEENT: GEM/   Atraumatic, Normocephalic  ENMT: No tonsillar erythema, exudates, or enlargement; Moist mucous membranes, Good dentition, No lesions  NECK: Supple, No JVD, Normal thyroid  CHEST/LUNG: Clear to auscultaion, ; No rales, rhonchi, wheezing, or rubs  CVS: Regular rate and rhythm; No murmurs, rubs, or gallops  GI: : Soft, Nontender, Nondistended; Bowel sounds present  NERVOUS SYSTEM:  Alert & awake  EXTREMITIES:  2+ Peripheral Pulses, No clubbing, cyanosis, or edema  LYMPH: No lymphadenopathy noted  SKIN: No rashes or lesions  ENDOCRINOLOGY: No Thyromegaly  PSYCH: demented    Labs:                       13.7   10.22 )-----------( 234      ( 31 Aug 2017 07:21 )             41.2                         13.0   11.89 )-----------( 209      ( 30 Aug 2017 07:38 )             38.0                         14.0   10.91 )-----------( 168      ( 29 Aug 2017 08:54 )             41.1     08-31    136  |  94<L>  |  15  ----------------------------<  117<H>  4.2   |  29  |  0.67  08-30    131<L>  |  92<L>  |  18  ----------------------------<  108<H>  4.0   |  24  |  0.56  08-29    128<L>  |  90<L>  |  12  ----------------------------<  103<H>  3.8   |  22  |  0.55    Ca    9.1      31 Aug 2017 07:23      CAPILLARY BLOOD GLUCOSE      Studies  Chest X-RAY  CT SCAN Chest   Venous Dopplers: LE:   CT Abdomen  Others    < from: Xray Chest 1 View AP- PORTABLE-Urgent (08.30.17 @ 11:17) >      PROCEDURE DATE:  08/30/2017            INTERPRETATION:  CLINICAL INFORMATION: Follow-up for left pneumothorax in   patient with unwitnessed fall with left rib fractures.    EXAM: Single frontal view chest radiograph.    COMPARISON:  Chest radiograph 8/28/2017    FINDINGS:   The cardiomediastinal silhouette cannot be accurately evaluated on this   projection.    The lungs are clear. There is no definite pneumothorax or pleural   effusion.     Multiple left-sided rib fractures better evaluated on recent CT.  There are are multilevel degenerative changes of the spine.    IMPRESSION:   Clear lungs.    Multiple rib fractures. No definite pneumothorax.                  TIFFANIE GOTTLIEB M.D., RADIOLOGY RESIDENT  This document has been electronically signed.    < end of copied text >

## 2017-09-01 NOTE — PROGRESS NOTE ADULT - PROBLEM SELECTOR PLAN 1
no definite pneumothorax on previous chest x-ray:   pt has been doing well with no respiratory distress

## 2017-09-01 NOTE — PROGRESS NOTE BEHAVIORAL HEALTH - SUMMARY
Pt is a 89 y/o female, with dementia and multiple medical issues, admitted after a fall, seen for agitation. Patient continues to be confused, but is less of a management problem at this time.

## 2017-09-01 NOTE — PROGRESS NOTE BEHAVIORAL HEALTH - NSBHCHARTREVIEWLAB_PSY_A_CORE FT
13.7   10.22 )-----------( 234      ( 31 Aug 2017 07:21 )             41.2     08-31    136  |  94<L>  |  15  ----------------------------<  117<H>  4.2   |  29  |  0.67    Ca    9.1      31 Aug 2017 07:23

## 2017-09-01 NOTE — PROGRESS NOTE ADULT - SUBJECTIVE AND OBJECTIVE BOX
Subjective: Patient seen and examined. No new events except as noted.     SUBJECTIVE/ROS:    No chest pain, or sob.   MEDICATIONS:  MEDICATIONS  (STANDING):  heparin  Injectable 5000 Unit(s) SubCutaneous every 8 hours  diVALproex  milliGRAM(s) Oral two times a day  NIFEdipine XL 30 milliGRAM(s) Oral daily  pantoprazole    Tablet 40 milliGRAM(s) Oral before breakfast      PHYSICAL EXAM:  T(C): 36.6 (09-01-17 @ 11:46), Max: 36.7 (08-31-17 @ 21:10)  HR: 75 (09-01-17 @ 11:46) (75 - 88)  BP: 110/67 (09-01-17 @ 11:46) (110/67 - 164/77)  RR: 18 (09-01-17 @ 11:46) (18 - 19)  SpO2: 99% (09-01-17 @ 11:46) (96% - 99%)  Wt(kg): --  I&O's Summary    31 Aug 2017 07:01  -  01 Sep 2017 07:00  --------------------------------------------------------  IN: 300 mL / OUT: 0 mL / NET: 300 mL    01 Sep 2017 07:01  -  01 Sep 2017 15:22  --------------------------------------------------------  IN: 480 mL / OUT: 0 mL / NET: 480 mL          Appearance: Normal	  HEENT:   Normal oral mucosa, PERRL, EOMI	  Cardiovascular: Normal S1 S2,    Murmur:   Neck: JVP normal  Respiratory: Lungs clear to auscultation  Gastrointestinal:  Soft, Non-tender, + BS	  Skin: normal   Neuro: No gross deficits.   Psychiatry:  Mood & affect appropriate  Ext: No edema        LABS:    CARDIAC MARKERS:                                13.7   10.22 )-----------( 234      ( 31 Aug 2017 07:21 )             41.2     08-31    136  |  94<L>  |  15  ----------------------------<  117<H>  4.2   |  29  |  0.67    Ca    9.1      31 Aug 2017 07:23      proBNP:   Lipid Profile:   HgA1c:   TSH:           TELEMETRY: 	    ECG:  	  RADIOLOGY:   DIAGNOSTIC TESTING:  Echocardiogram:  Catheterization:  Stress Test:    OTHER:

## 2017-09-01 NOTE — PROGRESS NOTE BEHAVIORAL HEALTH - NSBHFUPINTERVALHXFT_PSY_A_CORE
Patient seen and evaluated covering for Dr. Bunn, staff consulted, chart, labs, meds reviewed. There are no reports no issues overnight. Patient is drowsy, barely able to react to verbal stimuli, prefers to be left alone. Mostly unable to answer questions.    Patient did not represent a management problem overnight.

## 2017-09-01 NOTE — PROGRESS NOTE ADULT - SUBJECTIVE AND OBJECTIVE BOX
Patient is a 89y old  Female who presents with a chief complaint of unwitnessed fall precautions (30 Aug 2017 11:19)      SUBJECTIVE / OVERNIGHT EVENTS: No nausea, vomiting or diarrhea, no fever or chills, no dizziness or chest pain, no dysuria or hematuria, no joint pain or swelling    MEDICATIONS  (STANDING):  heparin  Injectable 5000 Unit(s) SubCutaneous every 8 hours  diVALproex  milliGRAM(s) Oral two times a day  NIFEdipine XL 30 milliGRAM(s) Oral daily  pantoprazole    Tablet 40 milliGRAM(s) Oral before breakfast    MEDICATIONS  (PRN):  ALPRAZolam 0.25 milliGRAM(s) Oral two times a day PRN anxiety, agitation, restlessness  acetaminophen   Tablet. 650 milliGRAM(s) Oral every 6 hours PRN Mild Pain (1 - 3)  oxyCODONE    5 mG/acetaminophen 325 mG 1 Tablet(s) Oral every 6 hours PRN Moderate Pain (4 - 6)        CAPILLARY BLOOD GLUCOSE        I&O's Summary    31 Aug 2017 07:01  -  01 Sep 2017 07:00  --------------------------------------------------------  IN: 300 mL / OUT: 0 mL / NET: 300 mL    01 Sep 2017 07:01  -  01 Sep 2017 16:37  --------------------------------------------------------  IN: 480 mL / OUT: 0 mL / NET: 480 mL      PE:  vital signs as above  in no apparent distress awake verbal  HEENT: GEM EOMI  Neck: Supple, no JVD, no thyromegaly  Lungs: clear, no rhonchi, no wheeze, no crackles decreased effort  decreased breath sounds left base  CVS: S1 S2 no M/R/G  Abdomen: no tenderness, no organomegaly, BS present  Neuro: confused but talking, no focal weakness evident  Psych: confused  Skin: warm, dry  Ext: no edema, no clubbing  Msk: no joint swelling or deformities  Back: no CVA tenderness, no kyphosis/scoliosis    LABS:                        13.7   10.22 )-----------( 234      ( 31 Aug 2017 07:21 )             41.2     08-31    136  |  94<L>  |  15  ----------------------------<  117<H>  4.2   |  29  |  0.67    Ca    9.1      31 Aug 2017 07:23                  Consultant(s) Notes Reviewed:      Care Discussed with Consultants/Other Providers:    Contact Number, Dr Dexter [phone]

## 2017-09-01 NOTE — PROGRESS NOTE BEHAVIORAL HEALTH - NSBHCHARTREVIEWVS_PSY_A_CORE FT
Vital Signs Last 24 Hrs  T(C): 36.6 (01 Sep 2017 11:46), Max: 36.7 (31 Aug 2017 21:10)  T(F): 97.8 (01 Sep 2017 11:46), Max: 98 (31 Aug 2017 21:10)  HR: 75 (01 Sep 2017 11:46) (75 - 88)  BP: 110/67 (01 Sep 2017 11:46) (110/67 - 164/77)  BP(mean): --  RR: 18 (01 Sep 2017 11:46) (18 - 19)  SpO2: 99% (01 Sep 2017 11:46) (96% - 99%)

## 2017-09-02 RX ADMIN — OXYCODONE AND ACETAMINOPHEN 1 TABLET(S): 5; 325 TABLET ORAL at 22:34

## 2017-09-02 RX ADMIN — HEPARIN SODIUM 5000 UNIT(S): 5000 INJECTION INTRAVENOUS; SUBCUTANEOUS at 13:22

## 2017-09-02 RX ADMIN — PANTOPRAZOLE SODIUM 40 MILLIGRAM(S): 20 TABLET, DELAYED RELEASE ORAL at 06:23

## 2017-09-02 RX ADMIN — HEPARIN SODIUM 5000 UNIT(S): 5000 INJECTION INTRAVENOUS; SUBCUTANEOUS at 22:34

## 2017-09-02 RX ADMIN — HEPARIN SODIUM 5000 UNIT(S): 5000 INJECTION INTRAVENOUS; SUBCUTANEOUS at 06:23

## 2017-09-02 RX ADMIN — Medication 30 MILLIGRAM(S): at 06:23

## 2017-09-02 RX ADMIN — DIVALPROEX SODIUM 500 MILLIGRAM(S): 500 TABLET, DELAYED RELEASE ORAL at 18:18

## 2017-09-02 RX ADMIN — Medication 0.25 MILLIGRAM(S): at 16:33

## 2017-09-02 RX ADMIN — OXYCODONE AND ACETAMINOPHEN 1 TABLET(S): 5; 325 TABLET ORAL at 23:00

## 2017-09-02 RX ADMIN — DIVALPROEX SODIUM 500 MILLIGRAM(S): 500 TABLET, DELAYED RELEASE ORAL at 06:23

## 2017-09-02 NOTE — PROGRESS NOTE ADULT - PROBLEM SELECTOR PLAN 1
no definite pneumothorax on previous chest x-ray:   pt has been doing well with no respiratory distress no definite pneumothorax on previous chest x-ray:   pt has been doing well with no respiratory distress  doing pretty well from pulmonary point of view

## 2017-09-02 NOTE — PROGRESS NOTE ADULT - SUBJECTIVE AND OBJECTIVE BOX
Patient is a 89y old  Female who presents with a chief complaint of unwitnessed fall precautions (30 Aug 2017 11:19)      SUBJECTIVE / OVERNIGHT EVENTS: No nausea, vomiting or diarrhea, no fever or chills, no dizziness or chest pain, no dysuria or hematuria, no joint pain or swelling    MEDICATIONS  (STANDING):  heparin  Injectable 5000 Unit(s) SubCutaneous every 8 hours  diVALproex  milliGRAM(s) Oral two times a day  NIFEdipine XL 30 milliGRAM(s) Oral daily  pantoprazole    Tablet 40 milliGRAM(s) Oral before breakfast    MEDICATIONS  (PRN):  ALPRAZolam 0.25 milliGRAM(s) Oral two times a day PRN anxiety, agitation, restlessness  acetaminophen   Tablet. 650 milliGRAM(s) Oral every 6 hours PRN Mild Pain (1 - 3)  oxyCODONE    5 mG/acetaminophen 325 mG 1 Tablet(s) Oral every 6 hours PRN Moderate Pain (4 - 6)        CAPILLARY BLOOD GLUCOSE        I&O's Summary    01 Sep 2017 07:01  -  02 Sep 2017 07:00  --------------------------------------------------------  IN: 1440 mL / OUT: 0 mL / NET: 1440 mL    PE:  vital signs as above  in no apparent distress awake verbal  HEENT: GEM EOMI  Neck: Supple, no JVD, no thyromegaly  Lungs: clear, no rhonchi, no wheeze, no crackles decreased effort  decreased breath sounds left base  CVS: S1 S2 no M/R/G  Abdomen: no tenderness, no organomegaly, BS present  Neuro: confused but talking, no focal weakness evident  Psych: confused  Skin: warm, dry  Ext: no edema, no clubbing  Msk: no joint swelling or deformities  Back: no CVA tenderness, no kyphosis/scoliosis    LABS:                      Consultant(s) Notes Reviewed:      Care Discussed with Consultants/Other Providers:    Contact Number, Dr Dexter [phone]

## 2017-09-02 NOTE — PROGRESS NOTE ADULT - SUBJECTIVE AND OBJECTIVE BOX
Patient is a 89y old  Female who presents with a chief complaint of unwitnessed fall precautions (30 Aug 2017 11:19)      Any change in ROS:     MEDICATIONS  (STANDING):  heparin  Injectable 5000 Unit(s) SubCutaneous every 8 hours  diVALproex  milliGRAM(s) Oral two times a day  NIFEdipine XL 30 milliGRAM(s) Oral daily  pantoprazole    Tablet 40 milliGRAM(s) Oral before breakfast    MEDICATIONS  (PRN):  ALPRAZolam 0.25 milliGRAM(s) Oral two times a day PRN anxiety, agitation, restlessness  acetaminophen   Tablet. 650 milliGRAM(s) Oral every 6 hours PRN Mild Pain (1 - 3)  oxyCODONE    5 mG/acetaminophen 325 mG 1 Tablet(s) Oral every 6 hours PRN Moderate Pain (4 - 6)    Vital Signs Last 24 Hrs  T(C): 36.7 (01 Sep 2017 20:06), Max: 36.7 (01 Sep 2017 20:06)  T(F): 98 (01 Sep 2017 20:06), Max: 98 (01 Sep 2017 20:06)  HR: 80 (01 Sep 2017 22:07) (75 - 89)  BP: 156/80 (01 Sep 2017 22:07) (110/67 - 188/67)  BP(mean): --  RR: 18 (01 Sep 2017 20:06) (18 - 18)  SpO2: 94% (01 Sep 2017 20:06) (94% - 99%)    I&O's Summary    01 Sep 2017 07:01  -  02 Sep 2017 07:00  --------------------------------------------------------  IN: 1440 mL / OUT: 0 mL / NET: 1440 mL          Physical Exam:   GENERAL: NAD, well-groomed, well-developed  HEENT: GEM/   Atraumatic, Normocephalic  ENMT: No tonsillar erythema, exudates, or enlargement; Moist mucous membranes, Good dentition, No lesions  NECK: Supple, No JVD, Normal thyroid  CHEST/LUNG: Clear to auscultaion, ; No rales, rhonchi, wheezing, or rubs  CVS: Regular rate and rhythm; No murmurs, rubs, or gallops  GI: : Soft, Nontender, Nondistended; Bowel sounds present  NERVOUS SYSTEM:  Alert & Oriented X3, Good concentration; Motor Strength 5/5 B/L upper and lower extremities; DTRs 2+ intact and symmetric  EXTREMITIES:  2+ Peripheral Pulses, No clubbing, cyanosis, or edema  LYMPH: No lymphadenopathy noted  SKIN: No rashes or lesions  ENDOCRINOLOGY: No Thyromegaly  PSYCH: Appropriate    Labs:                              13.7   10.22 )-----------( 234      ( 31 Aug 2017 07:21 )             41.2                         13.0   11.89 )-----------( 209      ( 30 Aug 2017 07:38 )             38.0     08-31    136  |  94<L>  |  15  ----------------------------<  117<H>  4.2   |  29  |  0.67  08-30    131<L>  |  92<L>  |  18  ----------------------------<  108<H>  4.0   |  24  |  0.56        CAPILLARY BLOOD GLUCOSE                Cultures:                             Studies  Chest X-RAY  CT SCAN Chest   Venous Dopplers: LE:   CT Abdomen  Others Patient is a 89y old  Female who presents with a chief complaint of unwitnessed fall precautions (30 Aug 2017 11:19)      Any change in ROS:     MEDICATIONS  (STANDING):  heparin  Injectable 5000 Unit(s) SubCutaneous every 8 hours  diVALproex  milliGRAM(s) Oral two times a day  NIFEdipine XL 30 milliGRAM(s) Oral daily  pantoprazole    Tablet 40 milliGRAM(s) Oral before breakfast    MEDICATIONS  (PRN):  ALPRAZolam 0.25 milliGRAM(s) Oral two times a day PRN anxiety, agitation, restlessness  acetaminophen   Tablet. 650 milliGRAM(s) Oral every 6 hours PRN Mild Pain (1 - 3)  oxyCODONE    5 mG/acetaminophen 325 mG 1 Tablet(s) Oral every 6 hours PRN Moderate Pain (4 - 6)    Vital Signs Last 24 Hrs  T(C): 36.7 (01 Sep 2017 20:06), Max: 36.7 (01 Sep 2017 20:06)  T(F): 98 (01 Sep 2017 20:06), Max: 98 (01 Sep 2017 20:06)  HR: 80 (01 Sep 2017 22:07) (75 - 89)  BP: 156/80 (01 Sep 2017 22:07) (110/67 - 188/67)  BP(mean): --  RR: 18 (01 Sep 2017 20:06) (18 - 18)  SpO2: 94% (01 Sep 2017 20:06) (94% - 99%)    I&O's Summary    01 Sep 2017 07:01  -  02 Sep 2017 07:00  --------------------------------------------------------  IN: 1440 mL / OUT: 0 mL / NET: 1440 mL          Physical Exam:   GENERAL: NAD, well-groomed, well-developed  HEENT: GEM/   Atraumatic, Normocephalic  ENMT: No tonsillar erythema, exudates, or enlargement; Moist mucous membranes, Good dentition, No lesions  NECK: Supple, No JVD, Normal thyroid  CHEST/LUNG: Clear to auscultaion, ; No rales, rhonchi, wheezing, or rubs  CVS: Regular rate and rhythm; No murmurs, rubs, or gallops  GI: : Soft, Nontender, Nondistended; Bowel sounds present  NERVOUS SYSTEM:  Alert & Oriented X3, Good concentration; Motor Strength 5/5 B/L upper and lower extremities; DTRs 2+ intact and symmetric  EXTREMITIES:  2+ Peripheral Pulses, No clubbing, cyanosis, or edema  LYMPH: No lymphadenopathy noted  SKIN: No rashes or lesions  ENDOCRINOLOGY: No Thyromegaly  PSYCH: Appropriate    Labs:                              13.7   10.22 )-----------( 234      ( 31 Aug 2017 07:21 )             41.2                         13.0   11.89 )-----------( 209      ( 30 Aug 2017 07:38 )             38.0     08-31    136  |  94<L>  |  15  ----------------------------<  117<H>  4.2   |  29  |  0.67  08-30    131<L>  |  92<L>  |  18  ----------------------------<  108<H>  4.0   |  24  |  0.56        CAPILLARY BLOOD GLUCOSE                Cultures:                             Studies  Chest X-RAY  CT SCAN Chest   Venous Dopplers: LE:   CT Abdomen  Others    < from: Xray Chest 1 View AP- PORTABLE-Urgent (08.30.17 @ 11:17) >  EXAM:  CHEST-PORTABLE URGENT                            PROCEDURE DATE:  08/30/2017            INTERPRETATION:  CLINICAL INFORMATION: Follow-up for left pneumothorax in   patient with unwitnessed fall with left rib fractures.    EXAM: Single frontal view chest radiograph.    COMPARISON:  Chest radiograph 8/28/2017    FINDINGS:   The cardiomediastinal silhouette cannot be accurately evaluated on this   projection.    The lungs are clear. There is no definite pneumothorax or pleural   effusion.     Multiple left-sided rib fractures better evaluated on recent CT.  There are are multilevel degenerative changes of the spine.    IMPRESSION:   Clear lungs.    Multiple rib fractures. No definite pneumothorax.    < end of copied text >

## 2017-09-02 NOTE — PROGRESS NOTE ADULT - SUBJECTIVE AND OBJECTIVE BOX
Subjective: Patient seen and examined. No new events except as noted.     SUBJECTIVE/ROS:    No chest pain, or sob.   MEDICATIONS:  MEDICATIONS  (STANDING):  heparin  Injectable 5000 Unit(s) SubCutaneous every 8 hours  diVALproex  milliGRAM(s) Oral two times a day  NIFEdipine XL 30 milliGRAM(s) Oral daily  pantoprazole    Tablet 40 milliGRAM(s) Oral before breakfast      PHYSICAL EXAM:  T(C): 36.7 (09-01-17 @ 20:06), Max: 36.7 (09-01-17 @ 20:06)  HR: 80 (09-01-17 @ 22:07) (80 - 89)  BP: 156/80 (09-01-17 @ 22:07) (156/80 - 188/67)  RR: 18 (09-01-17 @ 20:06) (18 - 18)  SpO2: 94% (09-01-17 @ 20:06) (94% - 94%)  Wt(kg): --  I&O's Summary    01 Sep 2017 07:01  -  02 Sep 2017 07:00  --------------------------------------------------------  IN: 1440 mL / OUT: 0 mL / NET: 1440 mL          Appearance: Normal	  HEENT:   Normal oral mucosa, PERRL, EOMI	  Cardiovascular: Normal S1 S2,    Murmur:   Neck: JVP normal  Respiratory: Lungs clear to auscultation  Gastrointestinal:  Soft, Non-tender, + BS	  Skin: normal   Neuro: No gross deficits.   Psychiatry:  Mood & affect appropriate  Ext: No edema        LABS:    CARDIAC MARKERS:                  proBNP:   Lipid Profile:   HgA1c:   TSH:           TELEMETRY: 	    ECG:  	  RADIOLOGY:   DIAGNOSTIC TESTING:  Echocardiogram:  Catheterization:  Stress Test:    OTHER:

## 2017-09-03 DIAGNOSIS — F03.90 UNSPECIFIED DEMENTIA WITHOUT BEHAVIORAL DISTURBANCE: ICD-10-CM

## 2017-09-03 DIAGNOSIS — I10 ESSENTIAL (PRIMARY) HYPERTENSION: ICD-10-CM

## 2017-09-03 LAB
ANION GAP SERPL CALC-SCNC: 12 MMOL/L — SIGNIFICANT CHANGE UP (ref 5–17)
BUN SERPL-MCNC: 22 MG/DL — SIGNIFICANT CHANGE UP (ref 7–23)
CALCIUM SERPL-MCNC: 9.5 MG/DL — SIGNIFICANT CHANGE UP (ref 8.4–10.5)
CHLORIDE SERPL-SCNC: 96 MMOL/L — SIGNIFICANT CHANGE UP (ref 96–108)
CO2 SERPL-SCNC: 28 MMOL/L — SIGNIFICANT CHANGE UP (ref 22–31)
CREAT SERPL-MCNC: 0.68 MG/DL — SIGNIFICANT CHANGE UP (ref 0.5–1.3)
GLUCOSE SERPL-MCNC: 90 MG/DL — SIGNIFICANT CHANGE UP (ref 70–99)
HCT VFR BLD CALC: 38.5 % — SIGNIFICANT CHANGE UP (ref 34.5–45)
HGB BLD-MCNC: 12.5 G/DL — SIGNIFICANT CHANGE UP (ref 11.5–15.5)
MCHC RBC-ENTMCNC: 30.1 PG — SIGNIFICANT CHANGE UP (ref 27–34)
MCHC RBC-ENTMCNC: 32.5 GM/DL — SIGNIFICANT CHANGE UP (ref 32–36)
MCV RBC AUTO: 92.8 FL — SIGNIFICANT CHANGE UP (ref 80–100)
PLATELET # BLD AUTO: 280 K/UL — SIGNIFICANT CHANGE UP (ref 150–400)
POTASSIUM SERPL-MCNC: 4.5 MMOL/L — SIGNIFICANT CHANGE UP (ref 3.5–5.3)
POTASSIUM SERPL-SCNC: 4.5 MMOL/L — SIGNIFICANT CHANGE UP (ref 3.5–5.3)
RBC # BLD: 4.15 M/UL — SIGNIFICANT CHANGE UP (ref 3.8–5.2)
RBC # FLD: 13.1 % — SIGNIFICANT CHANGE UP (ref 10.3–14.5)
SODIUM SERPL-SCNC: 136 MMOL/L — SIGNIFICANT CHANGE UP (ref 135–145)
WBC # BLD: 7.95 K/UL — SIGNIFICANT CHANGE UP (ref 3.8–10.5)
WBC # FLD AUTO: 7.95 K/UL — SIGNIFICANT CHANGE UP (ref 3.8–10.5)

## 2017-09-03 RX ADMIN — HEPARIN SODIUM 5000 UNIT(S): 5000 INJECTION INTRAVENOUS; SUBCUTANEOUS at 21:05

## 2017-09-03 RX ADMIN — Medication 0.25 MILLIGRAM(S): at 18:45

## 2017-09-03 RX ADMIN — DIVALPROEX SODIUM 500 MILLIGRAM(S): 500 TABLET, DELAYED RELEASE ORAL at 18:24

## 2017-09-03 RX ADMIN — PANTOPRAZOLE SODIUM 40 MILLIGRAM(S): 20 TABLET, DELAYED RELEASE ORAL at 05:26

## 2017-09-03 RX ADMIN — HEPARIN SODIUM 5000 UNIT(S): 5000 INJECTION INTRAVENOUS; SUBCUTANEOUS at 05:27

## 2017-09-03 RX ADMIN — OXYCODONE AND ACETAMINOPHEN 1 TABLET(S): 5; 325 TABLET ORAL at 20:28

## 2017-09-03 RX ADMIN — DIVALPROEX SODIUM 500 MILLIGRAM(S): 500 TABLET, DELAYED RELEASE ORAL at 05:26

## 2017-09-03 RX ADMIN — HEPARIN SODIUM 5000 UNIT(S): 5000 INJECTION INTRAVENOUS; SUBCUTANEOUS at 16:49

## 2017-09-03 RX ADMIN — OXYCODONE AND ACETAMINOPHEN 1 TABLET(S): 5; 325 TABLET ORAL at 21:00

## 2017-09-03 RX ADMIN — Medication 30 MILLIGRAM(S): at 05:26

## 2017-09-03 NOTE — PROGRESS NOTE ADULT - SUBJECTIVE AND OBJECTIVE BOX
Subjective: Patient seen and examined. No new events except as noted.     SUBJECTIVE/ROS:    No chest pain, or sob.   MEDICATIONS:  MEDICATIONS  (STANDING):  heparin  Injectable 5000 Unit(s) SubCutaneous every 8 hours  diVALproex  milliGRAM(s) Oral two times a day  NIFEdipine XL 30 milliGRAM(s) Oral daily  pantoprazole    Tablet 40 milliGRAM(s) Oral before breakfast      PHYSICAL EXAM:  T(C): 37.2 (09-03-17 @ 03:58), Max: 37.4 (09-02-17 @ 20:57)  HR: 75 (09-03-17 @ 03:58) (71 - 79)  BP: 143/75 (09-03-17 @ 03:58) (112/73 - 143/75)  RR: 19 (09-03-17 @ 03:58) (18 - 19)  SpO2: 96% (09-03-17 @ 03:58) (96% - 97%)  Wt(kg): --  I&O's Summary    02 Sep 2017 07:01  -  03 Sep 2017 07:00  --------------------------------------------------------  IN: 240 mL / OUT: 0 mL / NET: 240 mL          Appearance: Normal	  HEENT:   Normal oral mucosa, PERRL, EOMI	  Cardiovascular: Normal S1 S2,    Murmur:   Neck: JVP normal  Respiratory: Lungs clear to auscultation  Gastrointestinal:  Soft, Non-tender, + BS	  Skin: normal   Neuro: No gross deficits.   Psychiatry:  Mood & affect appropriate  Ext: No edema        LABS:    CARDIAC MARKERS:                                12.5   7.95  )-----------( 280      ( 03 Sep 2017 08:57 )             38.5     09-03    136  |  96  |  22  ----------------------------<  90  4.5   |  28  |  0.68    Ca    9.5      03 Sep 2017 09:01      proBNP:   Lipid Profile:   HgA1c:   TSH:           TELEMETRY: 	    ECG:  	  RADIOLOGY:   DIAGNOSTIC TESTING:  Echocardiogram:  Catheterization:  Stress Test:    OTHER:

## 2017-09-03 NOTE — PROGRESS NOTE ADULT - SUBJECTIVE AND OBJECTIVE BOX
INTERVAL HPI/OVERNIGHT EVENTS: No new concerns   Vital Signs Last 24 Hrs  T(C): 37.1 (03 Sep 2017 13:32), Max: 37.4 (02 Sep 2017 20:57)  T(F): 98.8 (03 Sep 2017 13:32), Max: 99.3 (02 Sep 2017 20:57)  HR: 89 (03 Sep 2017 13:32) (75 - 89)  BP: 166/74 (03 Sep 2017 13:32) (112/73 - 166/74)  BP(mean): --  RR: 18 (03 Sep 2017 13:32) (18 - 19)  SpO2: 97% (03 Sep 2017 13:32) (96% - 97%)  I&O's Summary    02 Sep 2017 07:01  -  03 Sep 2017 07:00  --------------------------------------------------------  IN: 240 mL / OUT: 0 mL / NET: 240 mL    03 Sep 2017 07:01  -  03 Sep 2017 15:00  --------------------------------------------------------  IN: 120 mL / OUT: 0 mL / NET: 120 mL      MEDICATIONS  (STANDING):  heparin  Injectable 5000 Unit(s) SubCutaneous every 8 hours  diVALproex  milliGRAM(s) Oral two times a day  NIFEdipine XL 30 milliGRAM(s) Oral daily  pantoprazole    Tablet 40 milliGRAM(s) Oral before breakfast    MEDICATIONS  (PRN):  ALPRAZolam 0.25 milliGRAM(s) Oral two times a day PRN anxiety, agitation, restlessness  acetaminophen   Tablet. 650 milliGRAM(s) Oral every 6 hours PRN Mild Pain (1 - 3)  oxyCODONE    5 mG/acetaminophen 325 mG 1 Tablet(s) Oral every 6 hours PRN Moderate Pain (4 - 6)    LABS:                        12.5   7.95  )-----------( 280      ( 03 Sep 2017 08:57 )             38.5     09-03    136  |  96  |  22  ----------------------------<  90  4.5   |  28  |  0.68    Ca    9.5      03 Sep 2017 09:01          CAPILLARY BLOOD GLUCOSE                  Consultant(s) Notes Reviewed:  [x ] YES  [ ] NO    PHYSICAL EXAM:  GENERAL: Not in any distress ,  HEAD:  Atraumatic, Normocephalic  EYES: EOMI, PERRLA, conjunctiva and sclera clear  ENMT: No tonsillar erythema, exudates, or enlargement; Moist mucous membranes, Good dentition, No lesions  NECK: Supple, No JVD, Normal thyroid  NERVOUS SYSTEM:  Alert & demented , No focal deficit   CHEST/LUNG: Good air entry bilateral with no  rales, rhonchi, wheezing, or rubs  HEART: Regular rate and rhythm; No murmurs, rubs, or gallops  ABDOMEN: Soft, Nontender, Nondistended; Bowel sounds present  EXTREMITIES:  2+ Peripheral Pulses, No clubbing, cyanosis, or edema  SKIN: No rashes or lesions    Care Discussed with Consultants/Other Providers [ x] YES  [ ] NO

## 2017-09-03 NOTE — PROGRESS NOTE ADULT - PROBLEM SELECTOR PLAN 1
no definite pneumothorax on previous chest x-ray:   pt has been doing well with no respiratory distress  doing pretty well from pulmonary point of view

## 2017-09-03 NOTE — PROGRESS NOTE ADULT - SUBJECTIVE AND OBJECTIVE BOX
Patient is a 89y old  Female who presents with a chief complaint of unwitnessed fall precautions (30 Aug 2017 11:19)    doing ok  no respiratory distress  no cough  no phlegm   Any change in ROS:     MEDICATIONS  (STANDING):  heparin  Injectable 5000 Unit(s) SubCutaneous every 8 hours  diVALproex  milliGRAM(s) Oral two times a day  NIFEdipine XL 30 milliGRAM(s) Oral daily  pantoprazole    Tablet 40 milliGRAM(s) Oral before breakfast    MEDICATIONS  (PRN):  ALPRAZolam 0.25 milliGRAM(s) Oral two times a day PRN anxiety, agitation, restlessness  acetaminophen   Tablet. 650 milliGRAM(s) Oral every 6 hours PRN Mild Pain (1 - 3)  oxyCODONE    5 mG/acetaminophen 325 mG 1 Tablet(s) Oral every 6 hours PRN Moderate Pain (4 - 6)    Vital Signs Last 24 Hrs  T(C): 37.2 (03 Sep 2017 03:58), Max: 37.4 (02 Sep 2017 20:57)  T(F): 98.9 (03 Sep 2017 03:58), Max: 99.3 (02 Sep 2017 20:57)  HR: 75 (03 Sep 2017 03:58) (71 - 79)  BP: 143/75 (03 Sep 2017 03:58) (112/73 - 143/75)  BP(mean): --  RR: 19 (03 Sep 2017 03:58) (18 - 19)  SpO2: 96% (03 Sep 2017 03:58) (96% - 97%)    I&O's Summary    02 Sep 2017 07:01  -  03 Sep 2017 07:00  --------------------------------------------------------  IN: 240 mL / OUT: 0 mL / NET: 240 mL          Physical Exam:   GENERAL: NAD, well-groomed, well-developed  HEENT: GEM/   Atraumatic, Normocephalic  ENMT: No tonsillar erythema, exudates, or enlargement; Moist mucous membranes, Good dentition, No lesions  NECK: Supple, No JVD, Normal thyroid  CHEST/LUNG: Clear to auscultaion  CVS: Regular rate and rhythm; No murmurs, rubs, or gallops  GI: : Soft, Nontender, Nondistended; Bowel sounds present  NERVOUS SYSTEM:  Alert & awake: confused  EXTREMITIES:  2+ Peripheral Pulses, No clubbing, cyanosis, or edema  LYMPH: No lymphadenopathy noted  SKIN: No rashes or lesions  ENDOCRINOLOGY: No Thyromegaly  PSYCH: calm    Labs:                              12.5   7.95  )-----------( 280      ( 03 Sep 2017 08:57 )             38.5                         13.7   10.22 )-----------( 234      ( 31 Aug 2017 07:21 )             41.2     09-03    136  |  96  |  22  ----------------------------<  90  4.5   |  28  |  0.68  08-31    136  |  94<L>  |  15  ----------------------------<  117<H>  4.2   |  29  |  0.67    Ca    9.5      03 Sep 2017 09:01      CAPILLARY BLOOD GLUCOSE                Cultures:           Studies  Chest X-RAY  CT SCAN Chest   Venous Dopplers: LE:   CT Abdomen  Others      < from: Xray Chest 1 View AP- PORTABLE-Urgent (08.30.17 @ 11:17) >  INTERPRETATION:  CLINICAL INFORMATION: Follow-up for left pneumothorax in   patient with unwitnessed fall with left rib fractures.    EXAM: Single frontal view chest radiograph.    COMPARISON:  Chest radiograph 8/28/2017    FINDINGS:   The cardiomediastinal silhouette cannot be accurately evaluated on this   projection.    The lungs are clear. There is no definite pneumothorax or pleural   effusion.     Multiple left-sided rib fractures better evaluated on recent CT.  There are are multilevel degenerative changes of the spine.    IMPRESSION:   Clear lungs.    Multiple rib fractures. No definite pneumothorax.                  TIFFANIE GOTTLIEB M.D., RADIOLOGY RESIDENT  This document has been electronically signed.  JAMES SCHAFFER M.D., ATTENDING RADIOLOGIST  This document has been electronically signed. Aug 30 2017  2:01PM        < end of copied text >

## 2017-09-04 DIAGNOSIS — S22.42XA MULTIPLE FRACTURES OF RIBS, LEFT SIDE, INITIAL ENCOUNTER FOR CLOSED FRACTURE: ICD-10-CM

## 2017-09-04 LAB
ANION GAP SERPL CALC-SCNC: 15 MMOL/L — SIGNIFICANT CHANGE UP (ref 5–17)
BUN SERPL-MCNC: 20 MG/DL — SIGNIFICANT CHANGE UP (ref 7–23)
CALCIUM SERPL-MCNC: 10 MG/DL — SIGNIFICANT CHANGE UP (ref 8.4–10.5)
CHLORIDE SERPL-SCNC: 93 MMOL/L — LOW (ref 96–108)
CO2 SERPL-SCNC: 26 MMOL/L — SIGNIFICANT CHANGE UP (ref 22–31)
CREAT SERPL-MCNC: 0.77 MG/DL — SIGNIFICANT CHANGE UP (ref 0.5–1.3)
GLUCOSE SERPL-MCNC: 89 MG/DL — SIGNIFICANT CHANGE UP (ref 70–99)
HCT VFR BLD CALC: 39.5 % — SIGNIFICANT CHANGE UP (ref 34.5–45)
HGB BLD-MCNC: 13.5 G/DL — SIGNIFICANT CHANGE UP (ref 11.5–15.5)
MCHC RBC-ENTMCNC: 32.1 PG — SIGNIFICANT CHANGE UP (ref 27–34)
MCHC RBC-ENTMCNC: 34.2 GM/DL — SIGNIFICANT CHANGE UP (ref 32–36)
MCV RBC AUTO: 93.8 FL — SIGNIFICANT CHANGE UP (ref 80–100)
PLATELET # BLD AUTO: 317 K/UL — SIGNIFICANT CHANGE UP (ref 150–400)
POTASSIUM SERPL-MCNC: 4.7 MMOL/L — SIGNIFICANT CHANGE UP (ref 3.5–5.3)
POTASSIUM SERPL-SCNC: 4.7 MMOL/L — SIGNIFICANT CHANGE UP (ref 3.5–5.3)
RBC # BLD: 4.21 M/UL — SIGNIFICANT CHANGE UP (ref 3.8–5.2)
RBC # FLD: 13 % — SIGNIFICANT CHANGE UP (ref 10.3–14.5)
SODIUM SERPL-SCNC: 134 MMOL/L — LOW (ref 135–145)
WBC # BLD: 8.07 K/UL — SIGNIFICANT CHANGE UP (ref 3.8–10.5)
WBC # FLD AUTO: 8.07 K/UL — SIGNIFICANT CHANGE UP (ref 3.8–10.5)

## 2017-09-04 RX ORDER — ALPRAZOLAM 0.25 MG
0.25 TABLET ORAL
Qty: 0 | Refills: 0 | Status: DISCONTINUED | OUTPATIENT
Start: 2017-09-04 | End: 2017-09-06

## 2017-09-04 RX ADMIN — PANTOPRAZOLE SODIUM 40 MILLIGRAM(S): 20 TABLET, DELAYED RELEASE ORAL at 05:36

## 2017-09-04 RX ADMIN — HEPARIN SODIUM 5000 UNIT(S): 5000 INJECTION INTRAVENOUS; SUBCUTANEOUS at 13:56

## 2017-09-04 RX ADMIN — OXYCODONE AND ACETAMINOPHEN 1 TABLET(S): 5; 325 TABLET ORAL at 13:56

## 2017-09-04 RX ADMIN — Medication 0.25 MILLIGRAM(S): at 15:23

## 2017-09-04 RX ADMIN — OXYCODONE AND ACETAMINOPHEN 1 TABLET(S): 5; 325 TABLET ORAL at 05:36

## 2017-09-04 RX ADMIN — OXYCODONE AND ACETAMINOPHEN 1 TABLET(S): 5; 325 TABLET ORAL at 15:04

## 2017-09-04 RX ADMIN — Medication 0.25 MILLIGRAM(S): at 19:57

## 2017-09-04 RX ADMIN — DIVALPROEX SODIUM 500 MILLIGRAM(S): 500 TABLET, DELAYED RELEASE ORAL at 17:48

## 2017-09-04 RX ADMIN — DIVALPROEX SODIUM 500 MILLIGRAM(S): 500 TABLET, DELAYED RELEASE ORAL at 05:36

## 2017-09-04 RX ADMIN — OXYCODONE AND ACETAMINOPHEN 1 TABLET(S): 5; 325 TABLET ORAL at 06:03

## 2017-09-04 RX ADMIN — HEPARIN SODIUM 5000 UNIT(S): 5000 INJECTION INTRAVENOUS; SUBCUTANEOUS at 21:56

## 2017-09-04 RX ADMIN — Medication 30 MILLIGRAM(S): at 05:36

## 2017-09-04 RX ADMIN — HEPARIN SODIUM 5000 UNIT(S): 5000 INJECTION INTRAVENOUS; SUBCUTANEOUS at 05:36

## 2017-09-04 NOTE — PROGRESS NOTE ADULT - SUBJECTIVE AND OBJECTIVE BOX
Subjective: Patient seen and examined. No new events except as noted.     SUBJECTIVE/ROS:    No chest pain, or sob.   MEDICATIONS:  MEDICATIONS  (STANDING):  heparin  Injectable 5000 Unit(s) SubCutaneous every 8 hours  diVALproex  milliGRAM(s) Oral two times a day  NIFEdipine XL 30 milliGRAM(s) Oral daily  pantoprazole    Tablet 40 milliGRAM(s) Oral before breakfast      PHYSICAL EXAM:  T(C): 36.8 (09-04-17 @ 04:38), Max: 37.1 (09-03-17 @ 13:32)  HR: 88 (09-04-17 @ 04:38) (82 - 89)  BP: 149/77 (09-04-17 @ 04:38) (132/76 - 166/74)  RR: 18 (09-04-17 @ 04:38) (18 - 18)  SpO2: 95% (09-04-17 @ 04:38) (95% - 98%)  Wt(kg): --  I&O's Summary    03 Sep 2017 07:01  -  04 Sep 2017 07:00  --------------------------------------------------------  IN: 480 mL / OUT: 0 mL / NET: 480 mL          Appearance: Normal	  HEENT:   Normal oral mucosa, PERRL, EOMI	  Cardiovascular: Normal S1 S2,    Murmur:   Neck: JVP normal  Respiratory: Lungs clear to auscultation  Gastrointestinal:  Soft, Non-tender, + BS	  Skin: normal   Neuro: No gross deficits.   Psychiatry:  Mood & affect appropriate  Ext: No edema        LABS:    CARDIAC MARKERS:                                13.5   8.07  )-----------( 317      ( 04 Sep 2017 08:18 )             39.5     09-04    134<L>  |  93<L>  |  20  ----------------------------<  89  4.7   |  26  |  0.77    Ca    10.0      04 Sep 2017 08:24      proBNP:   Lipid Profile:   HgA1c:   TSH:           TELEMETRY: 	    ECG:  	  RADIOLOGY:   DIAGNOSTIC TESTING:  Echocardiogram:  Catheterization:  Stress Test:    OTHER:

## 2017-09-04 NOTE — PROGRESS NOTE ADULT - SUBJECTIVE AND OBJECTIVE BOX
INTERVAL HPI/OVERNIGHT EVENTS: feel fine.   Vital Signs Last 24 Hrs  T(C): 36.4 (04 Sep 2017 11:36), Max: 37.1 (03 Sep 2017 13:32)  T(F): 97.5 (04 Sep 2017 11:36), Max: 98.8 (03 Sep 2017 13:32)  HR: 76 (04 Sep 2017 11:36) (76 - 89)  BP: 149/77 (04 Sep 2017 04:38) (132/76 - 166/74)  BP(mean): --  RR: 18 (04 Sep 2017 11:36) (18 - 18)  SpO2: 94% (04 Sep 2017 11:36) (94% - 98%)  I&O's Summary    03 Sep 2017 07:01  -  04 Sep 2017 07:00  --------------------------------------------------------  IN: 480 mL / OUT: 0 mL / NET: 480 mL    04 Sep 2017 07:01  -  04 Sep 2017 11:39  --------------------------------------------------------  IN: 200 mL / OUT: 0 mL / NET: 200 mL      MEDICATIONS  (STANDING):  heparin  Injectable 5000 Unit(s) SubCutaneous every 8 hours  diVALproex  milliGRAM(s) Oral two times a day  NIFEdipine XL 30 milliGRAM(s) Oral daily  pantoprazole    Tablet 40 milliGRAM(s) Oral before breakfast    MEDICATIONS  (PRN):  acetaminophen   Tablet. 650 milliGRAM(s) Oral every 6 hours PRN Mild Pain (1 - 3)  oxyCODONE    5 mG/acetaminophen 325 mG 1 Tablet(s) Oral every 6 hours PRN Moderate Pain (4 - 6)    LABS:                        13.5   8.07  )-----------( 317      ( 04 Sep 2017 08:18 )             39.5     09-04    134<L>  |  93<L>  |  20  ----------------------------<  89  4.7   |  26  |  0.77    Ca    10.0      04 Sep 2017 08:24            Consultant(s) Notes Reviewed:  [x ] YES  [ ] NO    PHYSICAL EXAM:  GENERAL: NAD, well-groomed, well-developed,not in any distress ,  HEAD:  Atraumatic, Normocephalic  EYES: EOMI, PERRLA, conjunctiva and sclera clear  ENMT: No tonsillar erythema, exudates, or enlargement; Moist mucous membranes, Good dentition, No lesions  NECK: Supple, No JVD, Normal thyroid  NERVOUS SYSTEM:  Alert &demented , No focal deficit   CHEST/LUNG: Good air entry bilateral with no  rales, rhonchi, wheezing, or rubs  HEART: Regular rate and rhythm; No murmurs, rubs, or gallops  ABDOMEN: Soft, Nontender, Nondistended; Bowel sounds present  EXTREMITIES:  2+ Peripheral Pulses, No clubbing, cyanosis, or edema  SKIN: No rashes or lesions    Care Discussed with Consultants/Other Providers [ x] YES  [ ] NO

## 2017-09-04 NOTE — PROGRESS NOTE ADULT - PROBLEM SELECTOR PLAN 1
no definite pneumothorax on previous chest x-ray:  pt has been doing well with no respiratory distress : still on room air without any SOB or overnight events.

## 2017-09-04 NOTE — PROGRESS NOTE ADULT - SUBJECTIVE AND OBJECTIVE BOX
Patient is a 89y old  Female who presents with a chief complaint of unwitnessed fall precautions (30 Aug 2017 11:19)    doing ok  no sob   no chest pain   Any change in ROS:     MEDICATIONS  (STANDING):  heparin  Injectable 5000 Unit(s) SubCutaneous every 8 hours  diVALproex  milliGRAM(s) Oral two times a day  NIFEdipine XL 30 milliGRAM(s) Oral daily  pantoprazole    Tablet 40 milliGRAM(s) Oral before breakfast    MEDICATIONS  (PRN):  acetaminophen   Tablet. 650 milliGRAM(s) Oral every 6 hours PRN Mild Pain (1 - 3)  oxyCODONE    5 mG/acetaminophen 325 mG 1 Tablet(s) Oral every 6 hours PRN Moderate Pain (4 - 6)    Vital Signs Last 24 Hrs  T(C): 36.8 (04 Sep 2017 04:38), Max: 37.1 (03 Sep 2017 13:32)  T(F): 98.2 (04 Sep 2017 04:38), Max: 98.8 (03 Sep 2017 13:32)  HR: 88 (04 Sep 2017 04:38) (82 - 89)  BP: 149/77 (04 Sep 2017 04:38) (132/76 - 166/74)  BP(mean): --  RR: 18 (04 Sep 2017 04:38) (18 - 18)  SpO2: 95% (04 Sep 2017 04:38) (95% - 98%)    I&O's Summary    03 Sep 2017 07:01  -  04 Sep 2017 07:00  --------------------------------------------------------  IN: 480 mL / OUT: 0 mL / NET: 480 mL    04 Sep 2017 07:01  -  04 Sep 2017 11:06  --------------------------------------------------------  IN: 200 mL / OUT: 0 mL / NET: 200 mL          Physical Exam:   GENERAL: NAD, well-groomed, well-developed  HEENT: GEM/   Atraumatic, Normocephalic  ENMT: No tonsillar erythema, exudates, or enlargement; Moist mucous membranes, Good dentition, No lesions  NECK: Supple, No JVD, Normal thyroid  CHEST/LUNG: Clear to auscultaion, ; No rales, rhonchi, wheezing, or rubs  CVS: Regular rate and rhythm; No murmurs, rubs, or gallops  GI: : Soft, Nontender, Nondistended; Bowel sounds present  NERVOUS SYSTEM:  Alert & Awake   EXTREMITIES:  2+ Peripheral Pulses, No clubbing, cyanosis, or edema  LYMPH: No lymphadenopathy noted  SKIN: No rashes or lesions  ENDOCRINOLOGY: No Thyromegaly  PSYCH: demented    Labs:                              13.5   8.07  )-----------( 317      ( 04 Sep 2017 08:18 )             39.5                         12.5   7.95  )-----------( 280      ( 03 Sep 2017 08:57 )             38.5     09-04    134<L>  |  93<L>  |  20  ----------------------------<  89  4.7   |  26  |  0.77  09-03    136  |  96  |  22  ----------------------------<  90  4.5   |  28  |  0.68    Ca    10.0      04 Sep 2017 08:24  Ca    9.5      03 Sep 2017 09:01      CAPILLARY BLOOD GLUCOSE                Cultures:                             Studies  Chest X-RAY  CT SCAN Chest   Venous Dopplers: LE:   CT Abdomen  Others  < from: Xray Chest 1 View AP- PORTABLE-Urgent (08.30.17 @ 11:17) >    EXAM:  CHEST-PORTABLE URGENT                            PROCEDURE DATE:  08/30/2017            INTERPRETATION:  CLINICAL INFORMATION: Follow-up for left pneumothorax in   patient with unwitnessed fall with left rib fractures.    EXAM: Single frontal view chest radiograph.    COMPARISON:  Chest radiograph 8/28/2017    FINDINGS:   The cardiomediastinal silhouette cannot be accurately evaluated on this   projection.    The lungs are clear. There is no definite pneumothorax or pleural   effusion.     Multiple left-sided rib fractures better evaluated on recent CT.  There are are multilevel degenerative changes of the spine.    IMPRESSION:   Clear lungs.    Multiple rib fractures. No definite pneumothorax.                  TIFFANIE GOTTLIEB M.D., RADIOLOGY RESIDENT    < end of copied text >

## 2017-09-05 LAB
ANION GAP SERPL CALC-SCNC: 15 MMOL/L — SIGNIFICANT CHANGE UP (ref 5–17)
BUN SERPL-MCNC: 21 MG/DL — SIGNIFICANT CHANGE UP (ref 7–23)
CALCIUM SERPL-MCNC: 10.2 MG/DL — SIGNIFICANT CHANGE UP (ref 8.4–10.5)
CHLORIDE SERPL-SCNC: 93 MMOL/L — LOW (ref 96–108)
CO2 SERPL-SCNC: 25 MMOL/L — SIGNIFICANT CHANGE UP (ref 22–31)
CREAT SERPL-MCNC: 0.79 MG/DL — SIGNIFICANT CHANGE UP (ref 0.5–1.3)
GLUCOSE SERPL-MCNC: 113 MG/DL — HIGH (ref 70–99)
HCT VFR BLD CALC: 39.4 % — SIGNIFICANT CHANGE UP (ref 34.5–45)
HGB BLD-MCNC: 13.4 G/DL — SIGNIFICANT CHANGE UP (ref 11.5–15.5)
MCHC RBC-ENTMCNC: 31.2 PG — SIGNIFICANT CHANGE UP (ref 27–34)
MCHC RBC-ENTMCNC: 34 GM/DL — SIGNIFICANT CHANGE UP (ref 32–36)
MCV RBC AUTO: 91.6 FL — SIGNIFICANT CHANGE UP (ref 80–100)
PLATELET # BLD AUTO: 369 K/UL — SIGNIFICANT CHANGE UP (ref 150–400)
POTASSIUM SERPL-MCNC: 4.4 MMOL/L — SIGNIFICANT CHANGE UP (ref 3.5–5.3)
POTASSIUM SERPL-SCNC: 4.4 MMOL/L — SIGNIFICANT CHANGE UP (ref 3.5–5.3)
RBC # BLD: 4.3 M/UL — SIGNIFICANT CHANGE UP (ref 3.8–5.2)
RBC # FLD: 12.9 % — SIGNIFICANT CHANGE UP (ref 10.3–14.5)
SODIUM SERPL-SCNC: 133 MMOL/L — LOW (ref 135–145)
WBC # BLD: 9.14 K/UL — SIGNIFICANT CHANGE UP (ref 3.8–10.5)
WBC # FLD AUTO: 9.14 K/UL — SIGNIFICANT CHANGE UP (ref 3.8–10.5)

## 2017-09-05 RX ADMIN — Medication 30 MILLIGRAM(S): at 06:04

## 2017-09-05 RX ADMIN — OXYCODONE AND ACETAMINOPHEN 1 TABLET(S): 5; 325 TABLET ORAL at 06:36

## 2017-09-05 RX ADMIN — HEPARIN SODIUM 5000 UNIT(S): 5000 INJECTION INTRAVENOUS; SUBCUTANEOUS at 13:05

## 2017-09-05 RX ADMIN — HEPARIN SODIUM 5000 UNIT(S): 5000 INJECTION INTRAVENOUS; SUBCUTANEOUS at 06:04

## 2017-09-05 RX ADMIN — DIVALPROEX SODIUM 500 MILLIGRAM(S): 500 TABLET, DELAYED RELEASE ORAL at 17:25

## 2017-09-05 RX ADMIN — PANTOPRAZOLE SODIUM 40 MILLIGRAM(S): 20 TABLET, DELAYED RELEASE ORAL at 06:04

## 2017-09-05 RX ADMIN — Medication 0.25 MILLIGRAM(S): at 17:45

## 2017-09-05 RX ADMIN — OXYCODONE AND ACETAMINOPHEN 1 TABLET(S): 5; 325 TABLET ORAL at 06:04

## 2017-09-05 RX ADMIN — DIVALPROEX SODIUM 500 MILLIGRAM(S): 500 TABLET, DELAYED RELEASE ORAL at 06:04

## 2017-09-05 NOTE — PROGRESS NOTE ADULT - SUBJECTIVE AND OBJECTIVE BOX
Subjective: Patient seen and examined. No new events except as noted.     SUBJECTIVE/ROS:  No chest pain, or sob.     MEDICATIONS:  MEDICATIONS  (STANDING):  heparin  Injectable 5000 Unit(s) SubCutaneous every 8 hours  diVALproex  milliGRAM(s) Oral two times a day  NIFEdipine XL 30 milliGRAM(s) Oral daily  pantoprazole    Tablet 40 milliGRAM(s) Oral before breakfast      PHYSICAL EXAM:  T(C): 36.5 (09-05-17 @ 05:51), Max: 36.7 (09-04-17 @ 20:02)  HR: 82 (09-05-17 @ 11:00) (79 - 86)  BP: 132/66 (09-05-17 @ 11:00) (132/66 - 191/89)  RR: 18 (09-05-17 @ 05:51) (17 - 18)  SpO2: 96% (09-05-17 @ 05:51) (96% - 96%)  Wt(kg): --  I&O's Summary    04 Sep 2017 07:01  -  05 Sep 2017 07:00  --------------------------------------------------------  IN: 680 mL / OUT: 0 mL / NET: 680 mL          Appearance: Normal	  HEENT:   Normal oral mucosa, PERRL, EOMI	  Cardiovascular: Normal S1 S2,    Murmur:   Neck: JVP normal  Respiratory: Lungs clear to auscultation  Gastrointestinal:  Soft, Non-tender, + BS	  Skin: normal   Neuro: No gross deficits.   Psychiatry:  Mood & affect appropriate  Ext: No edema        LABS:    CARDIAC MARKERS:                                13.4   9.14  )-----------( 369      ( 05 Sep 2017 07:36 )             39.4     09-05    133<L>  |  93<L>  |  21  ----------------------------<  113<H>  4.4   |  25  |  0.79    Ca    10.2      05 Sep 2017 07:20      proBNP:   Lipid Profile:   HgA1c:   TSH:           TELEMETRY: 	    ECG:  	  RADIOLOGY:   DIAGNOSTIC TESTING:  Echocardiogram:  Catheterization:  Stress Test:    OTHER:

## 2017-09-05 NOTE — DIETITIAN INITIAL EVALUATION ADULT. - ENERGY NEEDS
Height: 63 inches, Weight: 136 pounds  BMI: 24 kg/m2 IBW: 115 pounds (+/-10%), %IBW: 118%  Pertinent Info: Per chart, 89 y/o female BIBEMS 2/2 unwitnessed fall at home, pending placement. No edema, no pressure ulcers noted at this time.

## 2017-09-05 NOTE — PROGRESS NOTE ADULT - PROBLEM SELECTOR PLAN 5
mild   likely secondary to excess water ingestion  will continue to monitor  no intervention at this stage

## 2017-09-05 NOTE — PROGRESS NOTE ADULT - SUBJECTIVE AND OBJECTIVE BOX
Patient is a 89y old  Female who presents with a chief complaint of unwitnessed fall precautions (30 Aug 2017 11:19)      SUBJECTIVE / OVERNIGHT EVENTS: No nausea, vomiting or diarrhea, no fever or chills, no dizziness or chest pain, no dysuria or hematuria, no joint pain or swelling    no events overnight     MEDICATIONS  (STANDING):  heparin  Injectable 5000 Unit(s) SubCutaneous every 8 hours  diVALproex  milliGRAM(s) Oral two times a day  NIFEdipine XL 30 milliGRAM(s) Oral daily  pantoprazole    Tablet 40 milliGRAM(s) Oral before breakfast    MEDICATIONS  (PRN):  acetaminophen   Tablet. 650 milliGRAM(s) Oral every 6 hours PRN Mild Pain (1 - 3)  oxyCODONE    5 mG/acetaminophen 325 mG 1 Tablet(s) Oral every 6 hours PRN Moderate Pain (4 - 6)  ALPRAZolam 0.25 milliGRAM(s) Oral two times a day PRN anxiety, agitation, restlessness        CAPILLARY BLOOD GLUCOSE        I&O's Summary    04 Sep 2017 07:01  -  05 Sep 2017 07:00  --------------------------------------------------------  IN: 680 mL / OUT: 0 mL / NET: 680 mL    05 Sep 2017 07:01  -  05 Sep 2017 17:37  --------------------------------------------------------  IN: 240 mL / OUT: 0 mL / NET: 240 mL    PE:  vital signs as above  in no apparent distress awake verbal  HEENT: GEM EOMI  Neck: Supple, no JVD, no thyromegaly  Lungs: clear, no rhonchi, no wheeze, no crackles decreased effort  improved breath sounds left base   CVS: S1 S2 no M/R/G  Abdomen: no tenderness, no organomegaly, BS present  Neuro: confused but talking, no focal weakness evident  Psych: confused  Skin: warm, dry  Ext: no edema, no clubbing  Msk: no joint swelling or deformities  Back: no CVA tenderness, no kyphosis/scoliosis    LABS:                        13.4   9.14  )-----------( 369      ( 05 Sep 2017 07:36 )             39.4     09-05    133<L>  |  93<L>  |  21  ----------------------------<  113<H>  4.4   |  25  |  0.79    Ca    10.2      05 Sep 2017 07:20                  Consultant(s) Notes Reviewed:      Care Discussed with Consultants/Other Providers:    Contact Number, Dr Dexter [phone]

## 2017-09-05 NOTE — DIETITIAN INITIAL EVALUATION ADULT. - OTHER INFO
Pt seen for LOS, limited information obtained at this time. Per previous H&P, pt wt was 114.6 pounds (6/17/2016) and 130 pounds (2/21/2017), current dosing wt is stable from previous admission wt at 136 pounds. Pt with good po intakes per chart, % po intakes of Soft diet noted per flowsheet. No GI distress noted at this time, +BM 9/1. No chewing/swallowing difficulty noted at this time, but pt needs assistance during meals 2/2 current mental status. NKFA per previous RD note (8/11/2014).

## 2017-09-05 NOTE — PROGRESS NOTE ADULT - SUBJECTIVE AND OBJECTIVE BOX
Patient is a 89y old  Female who presents with a chief complaint of unwitnessed fall precautions (30 Aug 2017 11:19)    pt doing ok  no SOB   NO Chest pain   Any change in ROS:     MEDICATIONS  (STANDING):  heparin  Injectable 5000 Unit(s) SubCutaneous every 8 hours  diVALproex  milliGRAM(s) Oral two times a day  NIFEdipine XL 30 milliGRAM(s) Oral daily  pantoprazole    Tablet 40 milliGRAM(s) Oral before breakfast    MEDICATIONS  (PRN):  acetaminophen   Tablet. 650 milliGRAM(s) Oral every 6 hours PRN Mild Pain (1 - 3)  oxyCODONE    5 mG/acetaminophen 325 mG 1 Tablet(s) Oral every 6 hours PRN Moderate Pain (4 - 6)  ALPRAZolam 0.25 milliGRAM(s) Oral two times a day PRN anxiety, agitation, restlessness    Vital Signs Last 24 Hrs  T(C): 36.5 (05 Sep 2017 05:51), Max: 36.7 (04 Sep 2017 20:02)  T(F): 97.7 (05 Sep 2017 05:51), Max: 98.1 (04 Sep 2017 20:02)  HR: 86 (05 Sep 2017 05:51) (79 - 86)  BP: 191/89 (05 Sep 2017 05:51) (136/72 - 191/89)  BP(mean): --  RR: 18 (05 Sep 2017 05:51) (17 - 18)  SpO2: 96% (05 Sep 2017 05:51) (96% - 96%)    I&O's Summary    04 Sep 2017 07:01  -  05 Sep 2017 07:00  --------------------------------------------------------  IN: 680 mL / OUT: 0 mL / NET: 680 mL          Physical Exam:   GENERAL: NAD, well-groomed, well-developed  HEENT: GEM/   Atraumatic, Normocephalic  ENMT: No tonsillar erythema, exudates, or enlargement; Moist mucous membranes, Good dentition, No lesions  NECK: Supple, No JVD, Normal thyroid  CHEST/LUNG: Clear to auscultaion, ; No rales, rhonchi, wheezing, or rubs  CVS: Regular rate and rhythm; No murmurs, rubs, or gallops  GI: : Soft, Nontender, Nondistended; Bowel sounds present  NERVOUS SYSTEM:  Alert & Oriented X 0  EXTREMITIES:  2+ Peripheral Pulses, No clubbing, cyanosis, or edema  LYMPH: No lymphadenopathy noted  SKIN: No rashes or lesions  ENDOCRINOLOGY: No Thyromegaly  PSYCH: demented     Labs:                              13.4   9.14  )-----------( 369      ( 05 Sep 2017 07:36 )             39.4                         13.5   8.07  )-----------( 317      ( 04 Sep 2017 08:18 )             39.5                         12.5   7.95  )-----------( 280      ( 03 Sep 2017 08:57 )             38.5     09-05    133<L>  |  93<L>  |  21  ----------------------------<  113<H>  4.4   |  25  |  0.79  09-04    134<L>  |  93<L>  |  20  ----------------------------<  89  4.7   |  26  |  0.77  09-03    136  |  96  |  22  ----------------------------<  90  4.5   |  28  |  0.68    Ca    10.2      05 Sep 2017 07:20  Ca    10.0      04 Sep 2017 08:24      CAPILLARY BLOOD GLUCOSE        Studies  Chest X-RAY  CT SCAN Chest   Venous Dopplers: LE:   CT Abdomen  Others      < from: Xray Chest 1 View AP- PORTABLE-Urgent (08.30.17 @ 11:17) >        INTERPRETATION:  CLINICAL INFORMATION: Follow-up for left pneumothorax in   patient with unwitnessed fall with left rib fractures.    EXAM: Single frontal view chest radiograph.    COMPARISON:  Chest radiograph 8/28/2017    FINDINGS:   The cardiomediastinal silhouette cannot be accurately evaluated on this   projection.    The lungs are clear. There is no definite pneumothorax or pleural   effusion.     Multiple left-sided rib fractures better evaluated on recent CT.  There are are multilevel degenerative changes of the spine.    IMPRESSION:   Clear lungs.    Multiple rib fractures. No definite pneumothorax.                  TIFFANIE GOTTLIEB M.D., RADIOLOGY RESIDENT  This document has been electronically signed.  JAMES SCHAFFER M.D., ATTENDING RADIOLOGIST  This document has been electronically signed. Aug 30 2017  2:01PM    < end of copied text >

## 2017-09-06 VITALS
TEMPERATURE: 100 F | DIASTOLIC BLOOD PRESSURE: 65 MMHG | HEART RATE: 92 BPM | SYSTOLIC BLOOD PRESSURE: 143 MMHG | RESPIRATION RATE: 18 BRPM | OXYGEN SATURATION: 95 %

## 2017-09-06 LAB
ANION GAP SERPL CALC-SCNC: 12 MMOL/L — SIGNIFICANT CHANGE UP (ref 5–17)
BUN SERPL-MCNC: 20 MG/DL — SIGNIFICANT CHANGE UP (ref 7–23)
CALCIUM SERPL-MCNC: 10 MG/DL — SIGNIFICANT CHANGE UP (ref 8.4–10.5)
CHLORIDE SERPL-SCNC: 95 MMOL/L — LOW (ref 96–108)
CO2 SERPL-SCNC: 28 MMOL/L — SIGNIFICANT CHANGE UP (ref 22–31)
CREAT SERPL-MCNC: 0.69 MG/DL — SIGNIFICANT CHANGE UP (ref 0.5–1.3)
GLUCOSE SERPL-MCNC: 112 MG/DL — HIGH (ref 70–99)
POTASSIUM SERPL-MCNC: 4.2 MMOL/L — SIGNIFICANT CHANGE UP (ref 3.5–5.3)
POTASSIUM SERPL-SCNC: 4.2 MMOL/L — SIGNIFICANT CHANGE UP (ref 3.5–5.3)
SODIUM SERPL-SCNC: 135 MMOL/L — SIGNIFICANT CHANGE UP (ref 135–145)

## 2017-09-06 RX ADMIN — HEPARIN SODIUM 5000 UNIT(S): 5000 INJECTION INTRAVENOUS; SUBCUTANEOUS at 05:41

## 2017-09-06 RX ADMIN — PANTOPRAZOLE SODIUM 40 MILLIGRAM(S): 20 TABLET, DELAYED RELEASE ORAL at 06:01

## 2017-09-06 RX ADMIN — DIVALPROEX SODIUM 500 MILLIGRAM(S): 500 TABLET, DELAYED RELEASE ORAL at 05:41

## 2017-09-06 RX ADMIN — Medication 30 MILLIGRAM(S): at 05:41

## 2017-09-06 NOTE — PROGRESS NOTE ADULT - ASSESSMENT
ASSESSMENT: Patient is a 89y old f with 2 rib fractures following unwitnessed fall at home, also with a PMH dementia, HTN, HLD, spinal stenosis, CAD s/p CEA, hepatitis C, who is breathing well on room air    PLAN:   - Follow up official radiology read of CXR from this am for evolution of pneumothorax  - Multi-modal pain control for rib fractures  - Patient is not on any anticoagulants or antiplatelet agents per EMR and coags are normal so no need to reverse at this time.   - Recommend pulmonary toilet, deep breathing, and IS as tolerated to aid in prevention of pneumonia following rib fractures  - Obtain collateral information from son/HCP    -no further trauma surgery intervention needed at this time
89F BIBEMS after unwitnessed fall precautions. Per ED/EMS, pt lives with son; son heard his mother fall but did not witness fall of unclear etiology
89F BIBEMS after unwitnessed fall precautions. Per ED/EMS, pt lives with son; son heard his mother fall but did not witness fall precautions of unclear etiology
s/p Fall  rib fx    orthostatic hypotension is suspected. once pt is able to stand , check orthostatic vitals. Pt eval    HTN  plan as above  BP is labile . may need to hold nifedipine pending on orthostatic vitals     elevated WBC  ? due to rib fx . fu with primary team
s/p Fall  rib fx    orthostatic hypotension is suspected. once pt is able to stand , check orthostatic vitals. Pt eval    HTN  plan as above  BP is labile cont to monitor
s/p Fall  rib fx    orthostatic hypotension is suspected. once pt is able to stand , check orthostatic vitals. Pt eval  however pt cant stant    HTN  plan as above  BP is labile cont to monitor

## 2017-09-06 NOTE — PROGRESS NOTE ADULT - PROBLEM SELECTOR PROBLEM 2
Dementia with behavioral disturbance, unspecified dementia type
Rib fractures
Dementia
Essential hypertension

## 2017-09-06 NOTE — PROGRESS NOTE ADULT - SUBJECTIVE AND OBJECTIVE BOX
Subjective: Patient seen and examined. No new events except as noted.     SUBJECTIVE/ROS:    No chest pain, or sob.   MEDICATIONS:  MEDICATIONS  (STANDING):  heparin  Injectable 5000 Unit(s) SubCutaneous every 8 hours  diVALproex  milliGRAM(s) Oral two times a day  NIFEdipine XL 30 milliGRAM(s) Oral daily  pantoprazole    Tablet 40 milliGRAM(s) Oral before breakfast      PHYSICAL EXAM:  T(C): 36.8 (09-06-17 @ 05:47), Max: 36.8 (09-06-17 @ 05:47)  HR: 81 (09-06-17 @ 05:47) (77 - 90)  BP: 157/82 (09-06-17 @ 05:47) (123/77 - 157/82)  RR: 18 (09-06-17 @ 05:47) (18 - 18)  SpO2: 96% (09-06-17 @ 05:47) (95% - 96%)  Wt(kg): --  I&O's Summary    05 Sep 2017 07:01  -  06 Sep 2017 07:00  --------------------------------------------------------  IN: 240 mL / OUT: 0 mL / NET: 240 mL          Appearance: Normal	  HEENT:   Normal oral mucosa, PERRL, EOMI	  Cardiovascular: Normal S1 S2,    Murmur:   Neck: JVP normal  Respiratory: Lungs clear to auscultation  Gastrointestinal:  Soft, Non-tender, + BS	  Skin: normal   Neuro: No gross deficits.   Psychiatry:  Mood & affect appropriate  Ext: No edema        LABS:    CARDIAC MARKERS:                                13.4   9.14  )-----------( 369      ( 05 Sep 2017 07:36 )             39.4     09-06    135  |  95<L>  |  20  ----------------------------<  112<H>  4.2   |  28  |  0.69    Ca    10.0      06 Sep 2017 06:15      proBNP:   Lipid Profile:   HgA1c:   TSH:           TELEMETRY: 	    ECG:  	  RADIOLOGY:   DIAGNOSTIC TESTING:  Echocardiogram:  Catheterization:  Stress Test:    OTHER:

## 2017-09-06 NOTE — PROGRESS NOTE ADULT - ATTENDING COMMENTS
PT evaluation for disposition
eventual discharge possibly to  subacute rehab when cleared by all services
ok for discharge when bed available
awaiting placement in Subacute Rehab
discharge to Subacute Rehab today
discharge to Subacute Rehab when bed available
eventual discharge to subacute rehab when cleared by all services
eventual discharge to subacute rehab when cleared by all services
Awaiting placement

## 2017-09-06 NOTE — PROGRESS NOTE ADULT - PROBLEM SELECTOR PROBLEM 5
Hypertension
Hyponatremia

## 2017-09-06 NOTE — PROGRESS NOTE ADULT - PROBLEM SELECTOR PLAN 4
BP meds and running little high so will watch .
conservative measures   fall prevention
fall precautions   no weakness  PT evaluation Recommend Subacute Rehab
fall precautions   no weakness  PT evaluation if mental status improves
fall precautions   no weakness  PT evaluation if mental status improves
going to Subacute Rehab today
resolved
conservative measures   fall prevention

## 2017-09-06 NOTE — PROGRESS NOTE ADULT - PROBLEM SELECTOR PLAN 5
resolved  likely secondary to excess water ingestion  will continue to monitor  no intervention at this stage

## 2017-09-06 NOTE — PROGRESS NOTE ADULT - PROVIDER SPECIALTY LIST ADULT
Cardiology
Internal Medicine
Pulmonology
Cardiology
Trauma Surgery

## 2017-09-06 NOTE — PROGRESS NOTE ADULT - PROBLEM SELECTOR PROBLEM 3
Fall, initial encounter
Hypertension
Closed fracture of multiple ribs of left side, initial encounter
Fall, initial encounter
Hypertension

## 2017-09-06 NOTE — PROGRESS NOTE ADULT - PROBLEM SELECTOR PLAN 2
As above; no flail chest
BP meds
continue meds PRN  calm today  no agitation noted
psych evaluation for help with meds called by me  1:1 observation  fall precautions
psych evaluation for help with meds called by me  1:1 observation  fall precautions
psych evaluation noted  follow Recommendations  calm today
psych evaluation noted  follow Recommendations  calm today
psych evaluation noted  follow Recommendations  calm today, no agitation  answering questions
psych evaluation noted  follow recommendations
psych evaluation noted  folow recommendations    1:1 observation  fall precautions
supportive care
As above; no flail chest

## 2017-09-06 NOTE — PROGRESS NOTE ADULT - PROBLEM SELECTOR PLAN 3
PT/OT : for rehab transfer
Pain control and awaiting placement .
continue nifedipine for now  continue to monitor  check orthostatics
continue nifedipine for now  continue to monitor  tolerating Nifedipine XL
pain control
tolerating Nifedipine XL  will continue
tolerating Nifedipine XL  will continue
PT/OT : for rehab transfer

## 2017-09-06 NOTE — PROGRESS NOTE ADULT - PROBLEM SELECTOR PLAN 1
pt has been stable: remains on room air:   no pneumothorax on last chest radiograph: pt has been stable:

## 2017-09-06 NOTE — PROGRESS NOTE ADULT - PROBLEM SELECTOR PROBLEM 1
Fall, initial encounter
Pneumothorax on left
Dementia
Hyponatremia
Pneumothorax on left

## 2017-09-06 NOTE — PROGRESS NOTE ADULT - SUBJECTIVE AND OBJECTIVE BOX
Patient is a 89y old  Female who presents with a chief complaint of unwitnessed fall precautions (30 Aug 2017 11:19)    restless but not in respiratory distress  Any change in ROS:     MEDICATIONS  (STANDING):  heparin  Injectable 5000 Unit(s) SubCutaneous every 8 hours  diVALproex  milliGRAM(s) Oral two times a day  NIFEdipine XL 30 milliGRAM(s) Oral daily  pantoprazole    Tablet 40 milliGRAM(s) Oral before breakfast    MEDICATIONS  (PRN):  acetaminophen   Tablet. 650 milliGRAM(s) Oral every 6 hours PRN Mild Pain (1 - 3)  ALPRAZolam 0.25 milliGRAM(s) Oral two times a day PRN anxiety, agitation, restlessness    Vital Signs Last 24 Hrs  T(C): 36.8 (06 Sep 2017 05:47), Max: 36.8 (06 Sep 2017 05:47)  T(F): 98.3 (06 Sep 2017 05:47), Max: 98.3 (06 Sep 2017 05:47)  HR: 81 (06 Sep 2017 05:47) (77 - 90)  BP: 157/82 (06 Sep 2017 05:47) (123/77 - 157/82)  BP(mean): --  RR: 18 (06 Sep 2017 05:47) (18 - 18)  SpO2: 96% (06 Sep 2017 05:47) (95% - 96%)    I&O's Summary    05 Sep 2017 07:01  -  06 Sep 2017 07:00  --------------------------------------------------------  IN: 240 mL / OUT: 0 mL / NET: 240 mL          Physical Exam:   GENERAL: NAD, well-groomed, well-developed  HEENT: GEM/   Atraumatic, Normocephalic  ENMT: No tonsillar erythema, exudates, or enlargement; Moist mucous membranes, Good dentition, No lesions  NECK: Supple, No JVD, Normal thyroid  CHEST/LUNG: Clear   CVS: Regular rate and rhythm; No murmurs, rubs, or gallops  GI: : Soft, Nontender, Nondistended; Bowel sounds present  NERVOUS SYSTEM:  Alert & Oriented X0  EXTREMITIES:  2+ Peripheral Pulses, No clubbing, cyanosis, or edema  LYMPH: No lymphadenopathy noted  SKIN: No rashes or lesions  ENDOCRINOLOGY: No Thyromegaly  PSYCH: demented and confused    Labs:                              13.4   9.14  )-----------( 369      ( 05 Sep 2017 07:36 )             39.4                         13.5   8.07  )-----------( 317      ( 04 Sep 2017 08:18 )             39.5                         12.5   7.95  )-----------( 280      ( 03 Sep 2017 08:57 )             38.5     09-06    135  |  95<L>  |  20  ----------------------------<  112<H>  4.2   |  28  |  0.69  09-05    133<L>  |  93<L>  |  21  ----------------------------<  113<H>  4.4   |  25  |  0.79  09-04    134<L>  |  93<L>  |  20  ----------------------------<  89  4.7   |  26  |  0.77  09-03    136  |  96  |  22  ----------------------------<  90  4.5   |  28  |  0.68    Ca    10.0      06 Sep 2017 06:15  Ca    10.2      05 Sep 2017 07:20      CAPILLARY BLOOD GLUCOSE                Cultures: Studies  Chest X-RAY  CT SCAN Chest   Venous Dopplers: LE:   CT Abdomen  Others    < from: Xray Chest 1 View AP- PORTABLE-Urgent (08.30.17 @ 11:17) >  tient with unwitnessed fall with left rib fractures.    EXAM: Single frontal view chest radiograph.    COMPARISON:  Chest radiograph 8/28/2017    FINDINGS:   The cardiomediastinal silhouette cannot be accurately evaluated on this   projection.    The lungs are clear. There is no definite pneumothorax or pleural   effusion.     Multiple left-sided rib fractures better evaluated on recent CT.  There are are multilevel degenerative changes of the spine.    IMPRESSION:   Clear lungs.    Multiple rib fractures. No definite pneumothorax.    < end of copied text >

## 2017-09-06 NOTE — PROGRESS NOTE ADULT - PROBLEM SELECTOR PROBLEM 4
Dementia with behavioral disturbance, unspecified dementia type
History of Spinal Stenosis
Essential hypertension
Hyponatremia

## 2017-09-19 ENCOUNTER — EMERGENCY (EMERGENCY)
Facility: HOSPITAL | Age: 82
LOS: 1 days | Discharge: HOME | End: 2017-09-19
Attending: EMERGENCY MEDICINE | Admitting: EMERGENCY MEDICINE
Payer: MEDICARE

## 2017-09-19 VITALS
SYSTOLIC BLOOD PRESSURE: 133 MMHG | TEMPERATURE: 98 F | RESPIRATION RATE: 14 BRPM | HEART RATE: 88 BPM | OXYGEN SATURATION: 97 % | DIASTOLIC BLOOD PRESSURE: 80 MMHG

## 2017-09-19 VITALS
HEART RATE: 84 BPM | SYSTOLIC BLOOD PRESSURE: 184 MMHG | OXYGEN SATURATION: 97 % | DIASTOLIC BLOOD PRESSURE: 94 MMHG | RESPIRATION RATE: 17 BRPM | TEMPERATURE: 98 F

## 2017-09-19 LAB
ALBUMIN SERPL ELPH-MCNC: 4 G/DL — SIGNIFICANT CHANGE UP (ref 3.3–5)
ALP SERPL-CCNC: 113 U/L — SIGNIFICANT CHANGE UP (ref 40–120)
ALT FLD-CCNC: 17 U/L RC — SIGNIFICANT CHANGE UP (ref 10–45)
ANION GAP SERPL CALC-SCNC: 15 MMOL/L — SIGNIFICANT CHANGE UP (ref 5–17)
APPEARANCE UR: CLEAR — SIGNIFICANT CHANGE UP
AST SERPL-CCNC: 36 U/L — SIGNIFICANT CHANGE UP (ref 10–40)
BASOPHILS # BLD AUTO: 0.1 K/UL — SIGNIFICANT CHANGE UP (ref 0–0.2)
BASOPHILS NFR BLD AUTO: 0.7 % — SIGNIFICANT CHANGE UP (ref 0–2)
BILIRUB SERPL-MCNC: 0.3 MG/DL — SIGNIFICANT CHANGE UP (ref 0.2–1.2)
BILIRUB UR-MCNC: NEGATIVE — SIGNIFICANT CHANGE UP
BUN SERPL-MCNC: 29 MG/DL — HIGH (ref 7–23)
CALCIUM SERPL-MCNC: 9.3 MG/DL — SIGNIFICANT CHANGE UP (ref 8.4–10.5)
CHLORIDE SERPL-SCNC: 92 MMOL/L — LOW (ref 96–108)
CO2 SERPL-SCNC: 25 MMOL/L — SIGNIFICANT CHANGE UP (ref 22–31)
COLOR SPEC: YELLOW — SIGNIFICANT CHANGE UP
CREAT SERPL-MCNC: 1.18 MG/DL — SIGNIFICANT CHANGE UP (ref 0.5–1.3)
DIFF PNL FLD: NEGATIVE — SIGNIFICANT CHANGE UP
EOSINOPHIL # BLD AUTO: 0.1 K/UL — SIGNIFICANT CHANGE UP (ref 0–0.5)
EOSINOPHIL NFR BLD AUTO: 1.2 % — SIGNIFICANT CHANGE UP (ref 0–6)
GLUCOSE SERPL-MCNC: 85 MG/DL — SIGNIFICANT CHANGE UP (ref 70–99)
GLUCOSE UR QL: NEGATIVE — SIGNIFICANT CHANGE UP
HCT VFR BLD CALC: 40 % — SIGNIFICANT CHANGE UP (ref 34.5–45)
HGB BLD-MCNC: 13.7 G/DL — SIGNIFICANT CHANGE UP (ref 11.5–15.5)
KETONES UR-MCNC: NEGATIVE — SIGNIFICANT CHANGE UP
LEUKOCYTE ESTERASE UR-ACNC: NEGATIVE — SIGNIFICANT CHANGE UP
LYMPHOCYTES # BLD AUTO: 2.4 K/UL — SIGNIFICANT CHANGE UP (ref 1–3.3)
LYMPHOCYTES # BLD AUTO: 23.6 % — SIGNIFICANT CHANGE UP (ref 13–44)
MCHC RBC-ENTMCNC: 33.7 PG — SIGNIFICANT CHANGE UP (ref 27–34)
MCHC RBC-ENTMCNC: 34.2 GM/DL — SIGNIFICANT CHANGE UP (ref 32–36)
MCV RBC AUTO: 98.7 FL — SIGNIFICANT CHANGE UP (ref 80–100)
MONOCYTES # BLD AUTO: 1.2 K/UL — HIGH (ref 0–0.9)
MONOCYTES NFR BLD AUTO: 12.3 % — SIGNIFICANT CHANGE UP (ref 2–14)
NEUTROPHILS # BLD AUTO: 6.3 K/UL — SIGNIFICANT CHANGE UP (ref 1.8–7.4)
NEUTROPHILS NFR BLD AUTO: 62.1 % — SIGNIFICANT CHANGE UP (ref 43–77)
NITRITE UR-MCNC: NEGATIVE — SIGNIFICANT CHANGE UP
PH UR: 6.5 — SIGNIFICANT CHANGE UP (ref 5–8)
PLATELET # BLD AUTO: 285 K/UL — SIGNIFICANT CHANGE UP (ref 150–400)
POTASSIUM SERPL-MCNC: 6 MMOL/L — HIGH (ref 3.5–5.3)
POTASSIUM SERPL-SCNC: 6 MMOL/L — HIGH (ref 3.5–5.3)
PROT SERPL-MCNC: 7.1 G/DL — SIGNIFICANT CHANGE UP (ref 6–8.3)
PROT UR-MCNC: SIGNIFICANT CHANGE UP
RBC # BLD: 4.06 M/UL — SIGNIFICANT CHANGE UP (ref 3.8–5.2)
RBC # FLD: 11.8 % — SIGNIFICANT CHANGE UP (ref 10.3–14.5)
RBC CASTS # UR COMP ASSIST: SIGNIFICANT CHANGE UP /HPF (ref 0–2)
SODIUM SERPL-SCNC: 132 MMOL/L — LOW (ref 135–145)
SP GR SPEC: 1.01 — SIGNIFICANT CHANGE UP (ref 1.01–1.02)
UROBILINOGEN FLD QL: NEGATIVE — SIGNIFICANT CHANGE UP
VALPROATE SERPL-MCNC: 50 UG/ML — SIGNIFICANT CHANGE UP (ref 50–100)
WBC # BLD: 10.1 K/UL — SIGNIFICANT CHANGE UP (ref 3.8–10.5)
WBC # FLD AUTO: 10.1 K/UL — SIGNIFICANT CHANGE UP (ref 3.8–10.5)
WBC UR QL: SIGNIFICANT CHANGE UP /HPF (ref 0–5)

## 2017-09-19 PROCEDURE — 72125 CT NECK SPINE W/O DYE: CPT | Mod: 26

## 2017-09-19 PROCEDURE — 85027 COMPLETE CBC AUTOMATED: CPT

## 2017-09-19 PROCEDURE — 73562 X-RAY EXAM OF KNEE 3: CPT | Mod: 26,RT

## 2017-09-19 PROCEDURE — 99284 EMERGENCY DEPT VISIT MOD MDM: CPT | Mod: GC

## 2017-09-19 PROCEDURE — 73562 X-RAY EXAM OF KNEE 3: CPT

## 2017-09-19 PROCEDURE — 80053 COMPREHEN METABOLIC PANEL: CPT

## 2017-09-19 PROCEDURE — 72125 CT NECK SPINE W/O DYE: CPT

## 2017-09-19 PROCEDURE — 99285 EMERGENCY DEPT VISIT HI MDM: CPT | Mod: 25

## 2017-09-19 PROCEDURE — 71045 X-RAY EXAM CHEST 1 VIEW: CPT

## 2017-09-19 PROCEDURE — 73564 X-RAY EXAM KNEE 4 OR MORE: CPT

## 2017-09-19 PROCEDURE — 71010: CPT | Mod: 26

## 2017-09-19 PROCEDURE — 80164 ASSAY DIPROPYLACETIC ACD TOT: CPT

## 2017-09-19 PROCEDURE — 70450 CT HEAD/BRAIN W/O DYE: CPT | Mod: 26

## 2017-09-19 PROCEDURE — 72170 X-RAY EXAM OF PELVIS: CPT

## 2017-09-19 PROCEDURE — 70450 CT HEAD/BRAIN W/O DYE: CPT

## 2017-09-19 PROCEDURE — 73564 X-RAY EXAM KNEE 4 OR MORE: CPT | Mod: 26,RT

## 2017-09-19 PROCEDURE — 51701 INSERT BLADDER CATHETER: CPT

## 2017-09-19 PROCEDURE — 81001 URINALYSIS AUTO W/SCOPE: CPT

## 2017-09-19 PROCEDURE — 72170 X-RAY EXAM OF PELVIS: CPT | Mod: 26

## 2017-09-19 RX ORDER — NIFEDIPINE 30 MG
30 TABLET, EXTENDED RELEASE 24 HR ORAL ONCE
Qty: 0 | Refills: 0 | Status: COMPLETED | OUTPATIENT
Start: 2017-09-19 | End: 2017-09-19

## 2017-09-19 RX ADMIN — Medication 30 MILLIGRAM(S): at 23:30

## 2017-09-19 NOTE — ED PROVIDER NOTE - CARE PLAN
Principal Discharge DX:	Fall  Instructions for follow-up, activity and diet:	1) Please follow-up with your primary care doctor within the next 3 days.  Please call today or tomorrow for an appointment.  If you cannot follow-up with your doctor(s), please return to the ED for any urgent issues.  2) If you have any worsening of symptoms or any other concerns please return to the ED immediately.  3) Please continue taking your home medications as directed.  4) You may have been given a copy of your labs and/or imaging.  Please go over these with your primary care doctor.

## 2017-09-19 NOTE — ED ADULT NURSE NOTE - CAS DISCH CONDITION
Patient BP was elevated at 2305 pm Dr Apodaca made aware, Nifedipine 30 mg given, Rn Evangelist aware, Okay to sent patient to facility./Stable

## 2017-09-19 NOTE — ED ADULT NURSE REASSESSMENT NOTE - NS ED NURSE REASSESS COMMENT FT1
pt not listening to repeated times instructed not to get out of bed attemping to get out multiple times Dr Apodaca notified pt put on constant obs for safety pt straight cath aseptically with dark armber urnie u/a sent to lab

## 2017-09-19 NOTE — ED PROVIDER NOTE - OBJECTIVE STATEMENT
Attending Apodaca: 89 y/o female h/o bipolar and dementia, reportedly nonambulatory presenting after unwitnessed fall. unclear how it occurred. per report found on ground. pt is complaining or right knee pain. no reported new medication changes. no fevers or chills. pt reportedly slightly more agitated recently then at baseline. reports some lower back pain

## 2017-09-19 NOTE — ED PROVIDER NOTE - MEDICAL DECISION MAKING DETAILS
Attending Paulie: 89 y/o female with multiple medical issues from nursing home presenting after reported unwitnessed fall. upon arrival pt awke and following commands. per ems slightly more agitated then her baseline. no evidence of trauma. with reports of possible closed head injury will obtain ct scans. additionally with increased agitation will obtain labs, ua. will re-eval

## 2017-09-19 NOTE — ED ADULT NURSE NOTE - OBJECTIVE STATEMENT
pt 89 yeAR OLD FEMALE VIA [facility] from nursing facility whon states pt had fall with knees bruised pt hx bipolar alert oriented x 1 denies any pain on arrival pt incontinent of large amt brown stool soft no blood bruise to left knee pt skin warm dry no bleeding pt exam by md labs sent as ordered

## 2017-09-19 NOTE — ED ADULT NURSE NOTE - PMH
Back Pain    Carotid Artery Disease    Dementia    Hepatitis C  possible during transfusion in 1970's  History of Spinal Stenosis    Hyperlipidemia    Hypertension    Lung Nodules    Tendonitis  left hand: s/p steroid injection 3/10 normal (ped)...

## 2017-09-19 NOTE — ED PROVIDER NOTE - PHYSICAL EXAMINATION
Attending Apodaca: Gen: NAD, heent: atrauamtic, eomi, perrla, mmm, op pink, uvula midline, neck; nttp, no nuchal rigidity, chest: nttp, no crepitus, cv: rrr, no murmurs, lungs: ctab, abd: soft, nontender, nondistended, no peritoneal signs, +BS, no guarding, ext: wwp, neg homans, no deformity. no spinal ttp. skin: old abrasions to right knee, no sig edemah, neuro: awake and alert, following commands, speech clear, sensation and strength intact, no focal deficits

## 2017-09-19 NOTE — ED PROVIDER NOTE - PLAN OF CARE
1) Please follow-up with your primary care doctor within the next 3 days.  Please call today or tomorrow for an appointment.  If you cannot follow-up with your doctor(s), please return to the ED for any urgent issues.  2) If you have any worsening of symptoms or any other concerns please return to the ED immediately.  3) Please continue taking your home medications as directed.  4) You may have been given a copy of your labs and/or imaging.  Please go over these with your primary care doctor.

## 2017-10-06 ENCOUNTER — INPATIENT (INPATIENT)
Facility: HOSPITAL | Age: 82
LOS: 11 days | Discharge: NURSING, REHAB OR LONG-TERM CARE | DRG: 640 | End: 2017-10-18
Attending: INTERNAL MEDICINE | Admitting: INTERNAL MEDICINE
Payer: MEDICARE

## 2017-10-06 VITALS
HEART RATE: 58 BPM | DIASTOLIC BLOOD PRESSURE: 70 MMHG | RESPIRATION RATE: 16 BRPM | OXYGEN SATURATION: 97 % | SYSTOLIC BLOOD PRESSURE: 126 MMHG | TEMPERATURE: 98 F

## 2017-10-06 DIAGNOSIS — R29.6 REPEATED FALLS: ICD-10-CM

## 2017-10-06 DIAGNOSIS — E78.5 HYPERLIPIDEMIA, UNSPECIFIED: ICD-10-CM

## 2017-10-06 DIAGNOSIS — I10 ESSENTIAL (PRIMARY) HYPERTENSION: ICD-10-CM

## 2017-10-06 DIAGNOSIS — F03.90 UNSPECIFIED DEMENTIA, UNSPECIFIED SEVERITY, WITHOUT BEHAVIORAL DISTURBANCE, PSYCHOTIC DISTURBANCE, MOOD DISTURBANCE, AND ANXIETY: ICD-10-CM

## 2017-10-06 DIAGNOSIS — N39.0 URINARY TRACT INFECTION, SITE NOT SPECIFIED: ICD-10-CM

## 2017-10-06 DIAGNOSIS — R55 SYNCOPE AND COLLAPSE: ICD-10-CM

## 2017-10-06 LAB
ALBUMIN SERPL ELPH-MCNC: 4.1 G/DL — SIGNIFICANT CHANGE UP (ref 3.3–5)
ALP SERPL-CCNC: 83 U/L — SIGNIFICANT CHANGE UP (ref 40–120)
ALT FLD-CCNC: 8 U/L RC — LOW (ref 10–45)
ANION GAP SERPL CALC-SCNC: 12 MMOL/L — SIGNIFICANT CHANGE UP (ref 5–17)
APPEARANCE UR: ABNORMAL
APTT BLD: 35 SEC — SIGNIFICANT CHANGE UP (ref 27.5–37.4)
AST SERPL-CCNC: 20 U/L — SIGNIFICANT CHANGE UP (ref 10–40)
BACTERIA # UR AUTO: ABNORMAL /HPF
BASE EXCESS BLDV CALC-SCNC: 5.8 MMOL/L — HIGH (ref -2–2)
BASOPHILS # BLD AUTO: 0.1 K/UL — SIGNIFICANT CHANGE UP (ref 0–0.2)
BASOPHILS NFR BLD AUTO: 0.8 % — SIGNIFICANT CHANGE UP (ref 0–2)
BILIRUB SERPL-MCNC: 0.4 MG/DL — SIGNIFICANT CHANGE UP (ref 0.2–1.2)
BILIRUB UR-MCNC: NEGATIVE — SIGNIFICANT CHANGE UP
BUN SERPL-MCNC: 27 MG/DL — HIGH (ref 7–23)
CA-I SERPL-SCNC: 1.23 MMOL/L — SIGNIFICANT CHANGE UP (ref 1.12–1.3)
CALCIUM SERPL-MCNC: 9.4 MG/DL — SIGNIFICANT CHANGE UP (ref 8.4–10.5)
CHLORIDE BLDV-SCNC: 99 MMOL/L — SIGNIFICANT CHANGE UP (ref 96–108)
CHLORIDE SERPL-SCNC: 99 MMOL/L — SIGNIFICANT CHANGE UP (ref 96–108)
CK MB CFR SERPL CALC: 3 NG/ML — SIGNIFICANT CHANGE UP (ref 0–3.8)
CK SERPL-CCNC: 76 U/L — SIGNIFICANT CHANGE UP (ref 25–170)
CO2 BLDV-SCNC: 33 MMOL/L — HIGH (ref 22–30)
CO2 SERPL-SCNC: 28 MMOL/L — SIGNIFICANT CHANGE UP (ref 22–31)
COLOR SPEC: YELLOW — SIGNIFICANT CHANGE UP
CREAT SERPL-MCNC: 0.79 MG/DL — SIGNIFICANT CHANGE UP (ref 0.5–1.3)
DIFF PNL FLD: ABNORMAL
EOSINOPHIL # BLD AUTO: 0.2 K/UL — SIGNIFICANT CHANGE UP (ref 0–0.5)
EOSINOPHIL NFR BLD AUTO: 1.8 % — SIGNIFICANT CHANGE UP (ref 0–6)
GAS PNL BLDV: 135 MMOL/L — LOW (ref 136–145)
GAS PNL BLDV: SIGNIFICANT CHANGE UP
GLUCOSE BLDV-MCNC: 90 MG/DL — SIGNIFICANT CHANGE UP (ref 70–99)
GLUCOSE SERPL-MCNC: 93 MG/DL — SIGNIFICANT CHANGE UP (ref 70–99)
GLUCOSE UR QL: NEGATIVE — SIGNIFICANT CHANGE UP
HCO3 BLDV-SCNC: 31 MMOL/L — HIGH (ref 21–29)
HCT VFR BLD CALC: 38.6 % — SIGNIFICANT CHANGE UP (ref 34.5–45)
HCT VFR BLDA CALC: 41 % — SIGNIFICANT CHANGE UP (ref 39–50)
HGB BLD CALC-MCNC: 13.2 G/DL — SIGNIFICANT CHANGE UP (ref 11.5–15.5)
HGB BLD-MCNC: 13.2 G/DL — SIGNIFICANT CHANGE UP (ref 11.5–15.5)
INR BLD: 0.93 RATIO — SIGNIFICANT CHANGE UP (ref 0.88–1.16)
KETONES UR-MCNC: NEGATIVE — SIGNIFICANT CHANGE UP
LACTATE BLDV-MCNC: SIGNIFICANT CHANGE UP MMOL/L (ref 0.7–2)
LEUKOCYTE ESTERASE UR-ACNC: ABNORMAL
LYMPHOCYTES # BLD AUTO: 2.1 K/UL — SIGNIFICANT CHANGE UP (ref 1–3.3)
LYMPHOCYTES # BLD AUTO: 24.9 % — SIGNIFICANT CHANGE UP (ref 13–44)
MCHC RBC-ENTMCNC: 33.9 PG — SIGNIFICANT CHANGE UP (ref 27–34)
MCHC RBC-ENTMCNC: 34.2 GM/DL — SIGNIFICANT CHANGE UP (ref 32–36)
MCV RBC AUTO: 99 FL — SIGNIFICANT CHANGE UP (ref 80–100)
MONOCYTES # BLD AUTO: 1.1 K/UL — HIGH (ref 0–0.9)
MONOCYTES NFR BLD AUTO: 12.6 % — SIGNIFICANT CHANGE UP (ref 2–14)
NEUTROPHILS # BLD AUTO: 5.1 K/UL — SIGNIFICANT CHANGE UP (ref 1.8–7.4)
NEUTROPHILS NFR BLD AUTO: 59.9 % — SIGNIFICANT CHANGE UP (ref 43–77)
NITRITE UR-MCNC: POSITIVE
PCO2 BLDV: 50 MMHG — SIGNIFICANT CHANGE UP (ref 35–50)
PH BLDV: 7.41 — SIGNIFICANT CHANGE UP (ref 7.35–7.45)
PH UR: 7 — SIGNIFICANT CHANGE UP (ref 5–8)
PLATELET # BLD AUTO: 229 K/UL — SIGNIFICANT CHANGE UP (ref 150–400)
PO2 BLDV: 30 MMHG — SIGNIFICANT CHANGE UP (ref 25–45)
POTASSIUM BLDV-SCNC: 4.8 MMOL/L — SIGNIFICANT CHANGE UP (ref 3.5–5)
POTASSIUM SERPL-MCNC: 4.8 MMOL/L — SIGNIFICANT CHANGE UP (ref 3.5–5.3)
POTASSIUM SERPL-SCNC: 4.8 MMOL/L — SIGNIFICANT CHANGE UP (ref 3.5–5.3)
PROT SERPL-MCNC: 6.9 G/DL — SIGNIFICANT CHANGE UP (ref 6–8.3)
PROT UR-MCNC: SIGNIFICANT CHANGE UP
PROTHROM AB SERPL-ACNC: 10.1 SEC — SIGNIFICANT CHANGE UP (ref 9.8–12.7)
RBC # BLD: 3.89 M/UL — SIGNIFICANT CHANGE UP (ref 3.8–5.2)
RBC # FLD: 11.4 % — SIGNIFICANT CHANGE UP (ref 10.3–14.5)
RBC CASTS # UR COMP ASSIST: SIGNIFICANT CHANGE UP /HPF (ref 0–2)
SAO2 % BLDV: 52 % — LOW (ref 67–88)
SODIUM SERPL-SCNC: 139 MMOL/L — SIGNIFICANT CHANGE UP (ref 135–145)
SP GR SPEC: 1.01 — SIGNIFICANT CHANGE UP (ref 1.01–1.02)
TROPONIN T SERPL-MCNC: 0.01 NG/ML — SIGNIFICANT CHANGE UP (ref 0–0.06)
UROBILINOGEN FLD QL: NEGATIVE — SIGNIFICANT CHANGE UP
WBC # BLD: 8.6 K/UL — SIGNIFICANT CHANGE UP (ref 3.8–10.5)
WBC # FLD AUTO: 8.6 K/UL — SIGNIFICANT CHANGE UP (ref 3.8–10.5)
WBC UR QL: >50 /HPF (ref 0–5)

## 2017-10-06 PROCEDURE — 93010 ELECTROCARDIOGRAM REPORT: CPT

## 2017-10-06 PROCEDURE — 99285 EMERGENCY DEPT VISIT HI MDM: CPT | Mod: 25

## 2017-10-06 PROCEDURE — 70450 CT HEAD/BRAIN W/O DYE: CPT | Mod: 26

## 2017-10-06 PROCEDURE — 72125 CT NECK SPINE W/O DYE: CPT | Mod: 26

## 2017-10-06 PROCEDURE — 73030 X-RAY EXAM OF SHOULDER: CPT | Mod: 26,50

## 2017-10-06 PROCEDURE — 72170 X-RAY EXAM OF PELVIS: CPT | Mod: 26

## 2017-10-06 PROCEDURE — 71010: CPT | Mod: 26

## 2017-10-06 RX ORDER — CEFTRIAXONE 500 MG/1
1 INJECTION, POWDER, FOR SOLUTION INTRAMUSCULAR; INTRAVENOUS EVERY 24 HOURS
Qty: 0 | Refills: 0 | Status: DISCONTINUED | OUTPATIENT
Start: 2017-10-07 | End: 2017-10-10

## 2017-10-06 RX ORDER — HALOPERIDOL DECANOATE 100 MG/ML
1 INJECTION INTRAMUSCULAR ONCE
Qty: 0 | Refills: 0 | Status: COMPLETED | OUTPATIENT
Start: 2017-10-06 | End: 2017-10-06

## 2017-10-06 RX ORDER — DIVALPROEX SODIUM 500 MG/1
2 TABLET, DELAYED RELEASE ORAL
Qty: 0 | Refills: 0 | COMMUNITY

## 2017-10-06 RX ORDER — CEFTRIAXONE 500 MG/1
1 INJECTION, POWDER, FOR SOLUTION INTRAMUSCULAR; INTRAVENOUS ONCE
Qty: 0 | Refills: 0 | Status: COMPLETED | OUTPATIENT
Start: 2017-10-06 | End: 2017-10-06

## 2017-10-06 RX ORDER — HEPARIN SODIUM 5000 [USP'U]/ML
5000 INJECTION INTRAVENOUS; SUBCUTANEOUS EVERY 8 HOURS
Qty: 0 | Refills: 0 | Status: DISCONTINUED | OUTPATIENT
Start: 2017-10-06 | End: 2017-10-18

## 2017-10-06 RX ADMIN — CEFTRIAXONE 100 GRAM(S): 500 INJECTION, POWDER, FOR SOLUTION INTRAMUSCULAR; INTRAVENOUS at 15:26

## 2017-10-06 RX ADMIN — Medication 2 MILLIGRAM(S): at 15:27

## 2017-10-06 RX ADMIN — HEPARIN SODIUM 5000 UNIT(S): 5000 INJECTION INTRAVENOUS; SUBCUTANEOUS at 21:36

## 2017-10-06 RX ADMIN — HALOPERIDOL DECANOATE 1 MILLIGRAM(S): 100 INJECTION INTRAMUSCULAR at 23:24

## 2017-10-06 NOTE — CONSULT NOTE ADULT - ASSESSMENT
s/p Fall  ? orthostatic hypotension  check ortho vitals if feasible     HTN  BP stable  off med for now    UTI  on anbx

## 2017-10-06 NOTE — ED PROVIDER NOTE - PROGRESS NOTE DETAILS
Dr. Gonzalez Note: pt fell while climbing out of beds with rails up, landed on buttock, no head injury, got up with my assistance, ambulatory and very unsteady.  Pt likely with multiple falls due to spinal stenosis with chronic weakness and inability to follow directions due to bipolar and dementia.  Pt recently admitted for same symptoms with negative w/u for anything else.  Will discuss with rehab doctor. Spoke with Dr. Cavanaugh from nursing home, he states that patient has been having frequent falls and they are unable to control the patient. Urinalysis demonstrates UTI, will admit to hospital. Jimy Zhu PA-C.

## 2017-10-06 NOTE — ED ADULT NURSE NOTE - OBJECTIVE STATEMENT
89yo f a&ox1 at baseline [facility]a from [facility] s/p unwitnessed fall. as per ems, pt was ambulatory after fall, denies any new pains. pt has ecchymosis and abrasion to L eyebrow from a recent fall and ecchymosis to R knee from recent fall in nursing home as per ems. pt is poor historian, asking repetitive questions. +maex4, ambulatory with assistance of ems

## 2017-10-06 NOTE — ED PROVIDER NOTE - OBJECTIVE STATEMENT
89 y/o female h/o bipolar and dementia, reportedly nonambulatory presenting after unwitnessed fall. unclear how it occurred. per report found on ground. pt currently has no complaints, denies musculoskeletal pain, does not remember fall, denies headache, vision changes, neck pain, abdominal pain, chest pain.

## 2017-10-06 NOTE — H&P ADULT - NSHPPHYSICALEXAM_GEN_ALL_CORE
vital signs as above  in no apparent distress  HEENT: GEM EOMI  Neck: Supple, no JVD, no thyromegaly  Lungs: clear, no rhonchi, no wheeze, no crackles poorly cooperative  CVS: S1 S2 no M/R/G  Abdomen: no tenderness, no organomegaly, BS present  Neuro: Alert, talkative no focal weakness, moving all 4 extremities  Psych: confused at baseline  talkative  Skin: warm, dry  Ext: no edema, no clubbing  Msk: no joint swelling or deformities  Back: no CVA tenderness, no kyphosis/scoliosis

## 2017-10-06 NOTE — ED PROVIDER NOTE - MEDICAL DECISION MAKING DETAILS
Dr. Gonzalez Note: multiple falls with suscipion for malignant syncope, admit for further workup as requested by pt's rehab doctor. Dr. Gonzalez Note: multiple falls in the elderly.  See Progress Note.

## 2017-10-06 NOTE — CONSULT NOTE ADULT - SUBJECTIVE AND OBJECTIVE BOX
CHIEF COMPLAINT:  s/p fall    HISTORY OF PRESENT ILLNESS:  Due to altered mental status, subjective information were not able to be obtained from the patient. History was obtained, to the extent possible, from review of the chart and collateral sources of information.  This is a 89 f with history as below s/p fall    PAST MEDICAL & SURGICAL HISTORY:  Dementia  History of Spinal Stenosis  Lung Nodules  Hepatitis C: possible during transfusion in 1970&#x27;s  Carotid Artery Disease  Tendonitis: left hand: s/p steroid injection 3/10  Back Pain  Hypertension  Hyperlipidemia  History of Carotid Endarterectomy: right 2008  S/P Dilation and Curettage: 1970&#x27;s menorrhagia  After Cataract, Bilateral  S/P Cholecystectomy: laparoscopic 2 years ago  History of Laminectomy: 1970&#x27;s  S/P Knee Replacement: left knee 1997  right knee 2004          MEDICATIONS:  heparin  Injectable 5000 Unit(s) SubCutaneous every 8 hours                  FAMILY HISTORY:  No pertinent family history in first degree relatives      Non-contributory    SOCIAL HISTORY:    No tobacco, drugs or etoh    Allergies    Risperdal (Unknown)    Intolerances    	    REVIEW OF SYSTEMS:  as above  The rest of the 14 points ROS reviewed and except above they are unremarkable.        PHYSICAL EXAM:  T(C): 36.4 (10-06-17 @ 17:25), Max: 36.6 (10-06-17 @ 11:11)  HR: 73 (10-06-17 @ 17:25) (58 - 73)  BP: 133/71 (10-06-17 @ 17:25) (115/101 - 133/71)  RR: 18 (10-06-17 @ 17:25) (16 - 18)  SpO2: 93% (10-06-17 @ 17:25) (93% - 99%)  Wt(kg): --  I&O's Summary      Appearance: mildly agitated 	  HEENT:   Normal oral mucosa, PERRL, EOMI	  Cardiovascular: Normal S1 S2,    Murmur:   Neck: JVP normal  Respiratory: Lungs clear to auscultation  Gastrointestinal:  Soft, Non-tender, + BS	  Skin: normal   Neuro: confused   Ext: No edema    TELEMETRY: 	    ECG:  	  RADIOLOGY:  OTHER: 	  	  LABS:	 	    CARDIAC MARKERS:  Troponin T, Serum: 0.01 ng/mL (10-06 @ 13:09)                                  13.2   8.6   )-----------( 229      ( 06 Oct 2017 13:09 )             38.6     10-06    139  |  99  |  27<H>  ----------------------------<  93  4.8   |  28  |  0.79    Ca    9.4      06 Oct 2017 13:09    TPro  6.9  /  Alb  4.1  /  TBili  0.4  /  DBili  x   /  AST  20  /  ALT  8<L>  /  AlkPhos  83  10-06    proBNP:   Lipid Profile:   HgA1c:   TSH:

## 2017-10-06 NOTE — H&P ADULT - ASSESSMENT
89 y/o female h/o bipolar and dementia, reportedly nonambulatory presenting after unwitnessed fall.As per MD at SNF patient has fallen frequently in the SNF, incidental discovery of likely UTI

## 2017-10-06 NOTE — ED PROVIDER NOTE - ATTENDING CONTRIBUTION TO CARE
Pt with fall, presumed syncope, no prodrome, no complaints currently.  Appears well, nontoxic, no bony td, neuro non-focal. Pt with fall, unwitnessed, no complaints currently.  Appears well, nontoxic, no bony td, neuro non-focal.

## 2017-10-06 NOTE — H&P ADULT - HISTORY OF PRESENT ILLNESS
89 y/o female h/o bipolar and dementia, reportedly nonambulatory presenting after unwitnessed fall. As per MD at SNF, patient keeps falling. This fall circumstances unclear. As per report she was found on the ground. No witnessed trauma or injury. Pt currently has no complaints, denies musculoskeletal pain, does not remember fall, denies headache, vision changes, neck pain, abdominal pain, chest pain.

## 2017-10-06 NOTE — ED ADULT NURSE REASSESSMENT NOTE - NS ED NURSE REASSESS COMMENT FT1
1:1 watch at pt bedside. pt placed on constant observation for safety.
pt pulled out IV from L AC. pt very agitated, attempting to get out of bed and refusing to have vitals taken. pt resisting bp cuff. pt refusing second IV.  SUSIE bennett.

## 2017-10-06 NOTE — H&P ADULT - NSHPREVIEWOFSYSTEMS_GEN_ALL_CORE
Gen: no loss of wt or appetite  ENT: no dizziness or hearing loss  Ophth: no blurring of vision or loss of vision  Resp: No cough or sputum production  CVS: No CP or palpitations or orthopnea  GI: no N/V/D  : no dysuria, hematuria  Endo: no polyuria or excessive sweating  Neuro: no weakness or paresthesias  Psych: No suicidal or depressive ideation  Heme: No petechiae or easy bruising  Msk: No joint pain or swelling  All other ROS negative

## 2017-10-07 LAB
ANION GAP SERPL CALC-SCNC: 14 MMOL/L — SIGNIFICANT CHANGE UP (ref 5–17)
BUN SERPL-MCNC: 19 MG/DL — SIGNIFICANT CHANGE UP (ref 7–23)
CALCIUM SERPL-MCNC: 9.5 MG/DL — SIGNIFICANT CHANGE UP (ref 8.4–10.5)
CHLORIDE SERPL-SCNC: 96 MMOL/L — SIGNIFICANT CHANGE UP (ref 96–108)
CO2 SERPL-SCNC: 24 MMOL/L — SIGNIFICANT CHANGE UP (ref 22–31)
CREAT SERPL-MCNC: 0.67 MG/DL — SIGNIFICANT CHANGE UP (ref 0.5–1.3)
GLUCOSE SERPL-MCNC: 89 MG/DL — SIGNIFICANT CHANGE UP (ref 70–99)
HCT VFR BLD CALC: 37.6 % — SIGNIFICANT CHANGE UP (ref 34.5–45)
HGB BLD-MCNC: 12.7 G/DL — SIGNIFICANT CHANGE UP (ref 11.5–15.5)
MCHC RBC-ENTMCNC: 32.3 PG — SIGNIFICANT CHANGE UP (ref 27–34)
MCHC RBC-ENTMCNC: 33.8 GM/DL — SIGNIFICANT CHANGE UP (ref 32–36)
MCV RBC AUTO: 95.7 FL — SIGNIFICANT CHANGE UP (ref 80–100)
PLATELET # BLD AUTO: 193 K/UL — SIGNIFICANT CHANGE UP (ref 150–400)
POTASSIUM SERPL-MCNC: 4.6 MMOL/L — SIGNIFICANT CHANGE UP (ref 3.5–5.3)
POTASSIUM SERPL-SCNC: 4.6 MMOL/L — SIGNIFICANT CHANGE UP (ref 3.5–5.3)
RBC # BLD: 3.93 M/UL — SIGNIFICANT CHANGE UP (ref 3.8–5.2)
RBC # FLD: 12.6 % — SIGNIFICANT CHANGE UP (ref 10.3–14.5)
SODIUM SERPL-SCNC: 134 MMOL/L — LOW (ref 135–145)
WBC # BLD: 7.53 K/UL — SIGNIFICANT CHANGE UP (ref 3.8–10.5)
WBC # FLD AUTO: 7.53 K/UL — SIGNIFICANT CHANGE UP (ref 3.8–10.5)

## 2017-10-07 PROCEDURE — 93010 ELECTROCARDIOGRAM REPORT: CPT

## 2017-10-07 RX ORDER — HALOPERIDOL DECANOATE 100 MG/ML
1 INJECTION INTRAMUSCULAR ONCE
Qty: 0 | Refills: 0 | Status: COMPLETED | OUTPATIENT
Start: 2017-10-07 | End: 2017-10-07

## 2017-10-07 RX ORDER — ACETAMINOPHEN 500 MG
650 TABLET ORAL EVERY 6 HOURS
Qty: 0 | Refills: 0 | Status: DISCONTINUED | OUTPATIENT
Start: 2017-10-07 | End: 2017-10-18

## 2017-10-07 RX ADMIN — HEPARIN SODIUM 5000 UNIT(S): 5000 INJECTION INTRAVENOUS; SUBCUTANEOUS at 21:11

## 2017-10-07 RX ADMIN — Medication 650 MILLIGRAM(S): at 13:45

## 2017-10-07 RX ADMIN — HEPARIN SODIUM 5000 UNIT(S): 5000 INJECTION INTRAVENOUS; SUBCUTANEOUS at 05:22

## 2017-10-07 RX ADMIN — CEFTRIAXONE 100 GRAM(S): 500 INJECTION, POWDER, FOR SOLUTION INTRAMUSCULAR; INTRAVENOUS at 14:39

## 2017-10-07 RX ADMIN — Medication 650 MILLIGRAM(S): at 14:44

## 2017-10-07 RX ADMIN — HEPARIN SODIUM 5000 UNIT(S): 5000 INJECTION INTRAVENOUS; SUBCUTANEOUS at 13:56

## 2017-10-07 RX ADMIN — HALOPERIDOL DECANOATE 1 MILLIGRAM(S): 100 INJECTION INTRAMUSCULAR at 18:50

## 2017-10-07 RX ADMIN — Medication 650 MILLIGRAM(S): at 13:44

## 2017-10-07 NOTE — PROGRESS NOTE ADULT - PROBLEM SELECTOR PLAN 1
no sepsis  urine culture pending  on IV ceftriaxone  will await sensitivity no sepsis  urine culture pending so far >100,000 CFU GNR  on IV ceftriaxone  will await sensitivity

## 2017-10-07 NOTE — PROGRESS NOTE ADULT - SUBJECTIVE AND OBJECTIVE BOX
Patient is a 89y old  Female who presents with a chief complaint of frequent falls (06 Oct 2017 16:45)      SUBJECTIVE / OVERNIGHT EVENTS: No nausea, vomiting or diarrhea, no fever or chills, no dizziness or chest pain, no dysuria or hematuria, no joint pain or swelling per RN and 1:1 person  confused    MEDICATIONS  (STANDING):  cefTRIAXone   IVPB 1 Gram(s) IV Intermittent every 24 hours  heparin  Injectable 5000 Unit(s) SubCutaneous every 8 hours    MEDICATIONS  (PRN):  acetaminophen   Tablet. 650 milliGRAM(s) Oral every 6 hours PRN Mild Pain (1 - 3)        CAPILLARY BLOOD GLUCOSE        I&O's Summary    07 Oct 2017 07:01  -  07 Oct 2017 13:57  --------------------------------------------------------  IN: 600 mL / OUT: 0 mL / NET: 600 mL    PHYSICAL EXAM:  vital signs as above  in no apparent distress  HEENT: GEM EOMI  Neck: Supple, no JVD, no thyromegaly  Lungs: clear, no rhonchi, no wheeze, no crackles poorly cooperative  CVS: S1 S2 no M/R/G  Abdomen: no tenderness, no organomegaly, BS present  Neuro: Alert, talkative no focal weakness, moving all 4 extremities  Psych: confused at baseline  talkative  Skin: warm, dry  Ext: no edema, no clubbing  Msk: no joint swelling or deformities  Back: no CVA tenderness, no kyphosis/scoliosis    LABS:                        12.7   7.53  )-----------( 193      ( 07 Oct 2017 08:24 )             37.6     10-07    134<L>  |  96  |  19  ----------------------------<  89  4.6   |  24  |  0.67    Ca    9.5      07 Oct 2017 08:26    TPro  6.9  /  Alb  4.1  /  TBili  0.4  /  DBili  x   /  AST  20  /  ALT  8<L>  /  AlkPhos  83  10-06    PT/INR - ( 06 Oct 2017 13:09 )   PT: 10.1 sec;   INR: 0.93 ratio         PTT - ( 06 Oct 2017 13:09 )  PTT:35.0 sec  CARDIAC MARKERS ( 06 Oct 2017 13:09 )  x     / 0.01 ng/mL / 76 U/L / x     / 3.0 ng/mL      Urinalysis Basic - ( 06 Oct 2017 13:41 )    Color: Yellow / Appearance: SL Turbid / S.014 / pH: x  Gluc: x / Ketone: Negative  / Bili: Negative / Urobili: Negative   Blood: x / Protein: Trace / Nitrite: Positive   Leuk Esterase: Large / RBC: 3-5 /HPF / WBC >50 /HPF   Sq Epi: x / Non Sq Epi: x / Bacteria: Many /HPF          Consultant(s) Notes Reviewed:      Care Discussed with Consultants/Other Providers:    Contact Number, Dr Dexter [phone] Patient is a 89y old  Female who presents with a chief complaint of frequent falls (06 Oct 2017 16:45)      SUBJECTIVE / OVERNIGHT EVENTS: No nausea, vomiting or diarrhea, no fever or chills, no dizziness or chest pain, no dysuria or hematuria, no joint pain or swelling per RN and 1:1 person  confused    MEDICATIONS  (STANDING):  cefTRIAXone   IVPB 1 Gram(s) IV Intermittent every 24 hours  heparin  Injectable 5000 Unit(s) SubCutaneous every 8 hours    MEDICATIONS  (PRN):  acetaminophen   Tablet. 650 milliGRAM(s) Oral every 6 hours PRN Mild Pain (1 - 3)          I&O's Summary    07 Oct 2017 07:01  -  07 Oct 2017 13:57  --------------------------------------------------------  IN: 600 mL / OUT: 0 mL / NET: 600 mL    PHYSICAL EXAM:  vital signs as above  in no apparent distress  HEENT: GEM EOMI  Neck: Supple, no JVD, no thyromegaly  Lungs: clear, no rhonchi, no wheeze, no crackles poorly cooperative  CVS: S1 S2 no M/R/G  Abdomen: no tenderness, no organomegaly, BS present  Neuro: Alert, talkative no focal weakness, moving all 4 extremities  Psych: confused at baseline  talkative  Skin: warm, dry  Ext: no edema, no clubbing  Msk: no joint swelling or deformities  Back: no CVA tenderness, no kyphosis/scoliosis    LABS:                        12.7   7.53  )-----------( 193      ( 07 Oct 2017 08:24 )             37.6     10-07    134<L>  |  96  |  19  ----------------------------<  89  4.6   |  24  |  0.67    Ca    9.5      07 Oct 2017 08:26    TPro  6.9  /  Alb  4.1  /  TBili  0.4  /  DBili  x   /  AST  20  /  ALT  8<L>  /  AlkPhos  83  10-06    PT/INR - ( 06 Oct 2017 13:09 )   PT: 10.1 sec;   INR: 0.93 ratio         PTT - ( 06 Oct 2017 13:09 )  PTT:35.0 sec  CARDIAC MARKERS ( 06 Oct 2017 13:09 )  x     / 0.01 ng/mL / 76 U/L / x     / 3.0 ng/mL      Urinalysis Basic - ( 06 Oct 2017 13:41 )    Color: Yellow / Appearance: SL Turbid / S.014 / pH: x  Gluc: x / Ketone: Negative  / Bili: Negative / Urobili: Negative   Blood: x / Protein: Trace / Nitrite: Positive   Leuk Esterase: Large / RBC: 3-5 /HPF / WBC >50 /HPF   Sq Epi: x / Non Sq Epi: x / Bacteria: Many /HPF          Consultant(s) Notes Reviewed:      Care Discussed with Consultants/Other Providers:    Contact Number, Dr Dexter [phone]

## 2017-10-07 NOTE — CHART NOTE - NSCHARTNOTEFT_GEN_A_CORE
89 y/o female with h/o bipolar and dementia, reportedly nonambulatory presenting after unwitnessed fall from nursing home facility.  Patient with altered mental status was admitted with syncope, and UTI, started on antibiotics for UTI, and placed on constant observation for agitation and high risk for fall.  Patient seen and examined this am, family had request to call to discuss patient's status. I called patient's son Zac Velazquez who wanted to know how his mother was admitted without his consent, I had explained to him that she was transferred from facility and was assessed in ER and noted to have UTI and was admitted to treat UTI.  Patient's son insist to find out why he was not notified prior admission I had directed him to get in touch with nursing home facility where she was transferred from.  Administration made aware and will follow up with patient's son further.   Carina Leo NP  #[id number]

## 2017-10-07 NOTE — PROGRESS NOTE ADULT - SUBJECTIVE AND OBJECTIVE BOX
Subjective: Patient seen and examined. No new events except as noted.     SUBJECTIVE/ROS:  Due to altered mental status, subjective information were not able to be obtained from the patient. History was obtained, to the extent possible, from review of the chart and collateral sources of information.       MEDICATIONS:  MEDICATIONS  (STANDING):  cefTRIAXone   IVPB 1 Gram(s) IV Intermittent every 24 hours  heparin  Injectable 5000 Unit(s) SubCutaneous every 8 hours      PHYSICAL EXAM:  T(C): 36.4 (10-06-17 @ 17:25), Max: 36.6 (10-06-17 @ 11:11)  HR: 73 (10-06-17 @ 17:25) (58 - 73)  BP: 133/71 (10-06-17 @ 17:25) (115/101 - 133/71)  RR: 18 (10-06-17 @ 17:25) (16 - 18)  SpO2: 93% (10-06-17 @ 17:25) (93% - 99%)  Wt(kg): --  I&O's Summary    07 Oct 2017 07:01  -  07 Oct 2017 09:26  --------------------------------------------------------  IN: 240 mL / OUT: 0 mL / NET: 240 mL        Weight (kg): 63.2 (10-06 @ 17:16)    Appearance: Normal	  HEENT:   Normal oral mucosa, PERRL, EOMI	  Cardiovascular: Normal S1 S2,    Murmur:   Neck: JVP normal  Respiratory: Lungs clear to auscultation  Gastrointestinal:  Soft, Non-tender, + BS	  Skin: normal   Neuro: No gross deficits.   Psychiatry:  Mood & affect appropriate  Ext: No edema        LABS:    CARDIAC MARKERS:  CARDIAC MARKERS ( 06 Oct 2017 13:09 )  x     / 0.01 ng/mL / 76 U/L / x     / 3.0 ng/mL                                12.7   7.53  )-----------( 193      ( 07 Oct 2017 08:24 )             37.6     10-07    134<L>  |  96  |  19  ----------------------------<  89  4.6   |  24  |  0.67    Ca    9.5      07 Oct 2017 08:26    TPro  6.9  /  Alb  4.1  /  TBili  0.4  /  DBili  x   /  AST  20  /  ALT  8<L>  /  AlkPhos  83  10-06    proBNP:   Lipid Profile:   HgA1c:   TSH:           TELEMETRY: 	    ECG:  	  RADIOLOGY:   DIAGNOSTIC TESTING:  Echocardiogram:  Catheterization:  Stress Test:    OTHER:

## 2017-10-07 NOTE — PROGRESS NOTE ADULT - PROBLEM SELECTOR PLAN 2
cardiology attending Dr Briones to see  will follow recommendations cardiology attending Dr Briones note appreciated  will follow recommendations  check orthostatic BP

## 2017-10-08 LAB
-  AMIKACIN: SIGNIFICANT CHANGE UP
-  AMPICILLIN/SULBACTAM: SIGNIFICANT CHANGE UP
-  AMPICILLIN: SIGNIFICANT CHANGE UP
-  AZTREONAM: SIGNIFICANT CHANGE UP
-  CEFAZOLIN: SIGNIFICANT CHANGE UP
-  CEFEPIME: SIGNIFICANT CHANGE UP
-  CEFOXITIN: SIGNIFICANT CHANGE UP
-  CEFTAZIDIME: SIGNIFICANT CHANGE UP
-  CEFTRIAXONE: SIGNIFICANT CHANGE UP
-  CIPROFLOXACIN: SIGNIFICANT CHANGE UP
-  ERTAPENEM: SIGNIFICANT CHANGE UP
-  GENTAMICIN: SIGNIFICANT CHANGE UP
-  IMIPENEM: SIGNIFICANT CHANGE UP
-  LEVOFLOXACIN: SIGNIFICANT CHANGE UP
-  MEROPENEM: SIGNIFICANT CHANGE UP
-  NITROFURANTOIN: SIGNIFICANT CHANGE UP
-  PIPERACILLIN/TAZOBACTAM: SIGNIFICANT CHANGE UP
-  TOBRAMYCIN: SIGNIFICANT CHANGE UP
-  TRIMETHOPRIM/SULFAMETHOXAZOLE: SIGNIFICANT CHANGE UP
ANION GAP SERPL CALC-SCNC: 14 MMOL/L — SIGNIFICANT CHANGE UP (ref 5–17)
BUN SERPL-MCNC: 17 MG/DL — SIGNIFICANT CHANGE UP (ref 7–23)
CALCIUM SERPL-MCNC: 9.5 MG/DL — SIGNIFICANT CHANGE UP (ref 8.4–10.5)
CHLORIDE SERPL-SCNC: 91 MMOL/L — LOW (ref 96–108)
CO2 SERPL-SCNC: 24 MMOL/L — SIGNIFICANT CHANGE UP (ref 22–31)
CREAT SERPL-MCNC: 0.69 MG/DL — SIGNIFICANT CHANGE UP (ref 0.5–1.3)
CULTURE RESULTS: SIGNIFICANT CHANGE UP
GLUCOSE SERPL-MCNC: 103 MG/DL — HIGH (ref 70–99)
METHOD TYPE: SIGNIFICANT CHANGE UP
ORGANISM # SPEC MICROSCOPIC CNT: SIGNIFICANT CHANGE UP
ORGANISM # SPEC MICROSCOPIC CNT: SIGNIFICANT CHANGE UP
POTASSIUM SERPL-MCNC: 3.8 MMOL/L — SIGNIFICANT CHANGE UP (ref 3.5–5.3)
POTASSIUM SERPL-SCNC: 3.8 MMOL/L — SIGNIFICANT CHANGE UP (ref 3.5–5.3)
SODIUM SERPL-SCNC: 129 MMOL/L — LOW (ref 135–145)
SPECIMEN SOURCE: SIGNIFICANT CHANGE UP

## 2017-10-08 PROCEDURE — 93010 ELECTROCARDIOGRAM REPORT: CPT

## 2017-10-08 RX ORDER — HALOPERIDOL DECANOATE 100 MG/ML
1 INJECTION INTRAMUSCULAR ONCE
Qty: 0 | Refills: 0 | Status: COMPLETED | OUTPATIENT
Start: 2017-10-08 | End: 2017-10-08

## 2017-10-08 RX ADMIN — HEPARIN SODIUM 5000 UNIT(S): 5000 INJECTION INTRAVENOUS; SUBCUTANEOUS at 05:09

## 2017-10-08 RX ADMIN — HEPARIN SODIUM 5000 UNIT(S): 5000 INJECTION INTRAVENOUS; SUBCUTANEOUS at 21:34

## 2017-10-08 RX ADMIN — HALOPERIDOL DECANOATE 1 MILLIGRAM(S): 100 INJECTION INTRAMUSCULAR at 01:13

## 2017-10-08 RX ADMIN — CEFTRIAXONE 100 GRAM(S): 500 INJECTION, POWDER, FOR SOLUTION INTRAMUSCULAR; INTRAVENOUS at 11:50

## 2017-10-08 RX ADMIN — HALOPERIDOL DECANOATE 1 MILLIGRAM(S): 100 INJECTION INTRAMUSCULAR at 16:14

## 2017-10-08 RX ADMIN — HEPARIN SODIUM 5000 UNIT(S): 5000 INJECTION INTRAVENOUS; SUBCUTANEOUS at 11:50

## 2017-10-08 NOTE — PATIENT PROFILE ADULT. - FALL HARM RISK
bones(Osteoporosis,prev fx,steroid use,metastatic bone ca/s/p fall in nursing home/age(85 years old or older)

## 2017-10-08 NOTE — PHYSICAL THERAPY INITIAL EVALUATION ADULT - MANUAL MUSCLE TESTING RESULTS, REHAB EVAL
MMT assessed functionally secondary to pt. decreased cognitive status & unable to follow multi-step commands. grossly 3/5 throughout all extremities/grossly assessed due to

## 2017-10-08 NOTE — PHYSICAL THERAPY INITIAL EVALUATION ADULT - PERTINENT HX OF CURRENT PROBLEM, REHAB EVAL
89 y/o female h/o bipolar and dementia, reportedly nonambulatory presenting after unwitnessed fall. unclear how it occurred. per report found on ground. pt currently has no complaints, denies musculoskeletal pain, does not remember fall.

## 2017-10-08 NOTE — PHYSICAL THERAPY INITIAL EVALUATION ADULT - TRANSFER SAFETY CONCERNS NOTED: SIT/STAND, REHAB EVAL
decreased weight-shifting ability/decreased safety awareness/decreased balance during turns/decreased step length

## 2017-10-08 NOTE — PROGRESS NOTE ADULT - SUBJECTIVE AND OBJECTIVE BOX
Patient is a 89y old  Female who presents with a chief complaint of frequent falls (06 Oct 2017 16:45)      SUBJECTIVE / OVERNIGHT EVENTS: No nausea, vomiting or diarrhea, no fever or chills, no dizziness or chest pain, no dysuria or hematuria, no joint pain or swelling    sitting in bed eating lunch on her own    MEDICATIONS  (STANDING):  cefTRIAXone   IVPB 1 Gram(s) IV Intermittent every 24 hours  heparin  Injectable 5000 Unit(s) SubCutaneous every 8 hours    MEDICATIONS  (PRN):  acetaminophen   Tablet. 650 milliGRAM(s) Oral every 6 hours PRN Mild Pain (1 - 3)        CAPILLARY BLOOD GLUCOSE        I&O's Summary    07 Oct 2017 07:01  -  08 Oct 2017 07:00  --------------------------------------------------------  IN: 1190 mL / OUT: 550 mL / NET: 640 mL        PHYSICAL EXAM:  vital signs as above  in no apparent distress  HEENT: GEM EOMI  Neck: Supple, no JVD, no thyromegaly  Lungs: clear, no rhonchi, no wheeze, no crackles poorly cooperative  CVS: S1 S2 no M/R/G  Abdomen: no tenderness, no organomegaly, BS present  Neuro: Alert, talkative no focal weakness, moving all 4 extremities  Psych: confused at baseline  Skin: warm, dry  Ext: no edema, no clubbing  Msk: no joint swelling or deformities  Back: no CVA tenderness, no kyphosis/scoliosis    LABS:                        12.7   7.53  )-----------( 193      ( 07 Oct 2017 08:24 )             37.6     10-08    129<L>  |  91<L>  |  17  ----------------------------<  103<H>  3.8   |  24  |  0.69    Ca    9.5      08 Oct 2017 08:58            Urinalysis Basic - ( 06 Oct 2017 13:41 )    Color: Yellow / Appearance: SL Turbid / S.014 / pH: x  Gluc: x / Ketone: Negative  / Bili: Negative / Urobili: Negative   Blood: x / Protein: Trace / Nitrite: Positive   Leuk Esterase: Large / RBC: 3-5 /HPF / WBC >50 /HPF   Sq Epi: x / Non Sq Epi: x / Bacteria: Many /HPF          Consultant(s) Notes Reviewed:      Care Discussed with Consultants/Other Providers:    Contact Number, Dr Dexter [phone]

## 2017-10-08 NOTE — PROGRESS NOTE ADULT - SUBJECTIVE AND OBJECTIVE BOX
Subjective: Patient seen and examined. No new events except as noted.     SUBJECTIVE/ROS:  Due to altered mental status, subjective information were not able to be obtained from the patient. History was obtained, to the extent possible, from review of the chart and collateral sources of information.       MEDICATIONS:  MEDICATIONS  (STANDING):  cefTRIAXone   IVPB 1 Gram(s) IV Intermittent every 24 hours  heparin  Injectable 5000 Unit(s) SubCutaneous every 8 hours      PHYSICAL EXAM:  T(C): 36.9 (10-07-17 @ 21:41), Max: 37 (10-07-17 @ 16:46)  HR: 76 (10-07-17 @ 21:41) (67 - 76)  BP: 158/91 (10-07-17 @ 21:41) (158/91 - 172/80)  RR: 18 (10-07-17 @ 21:41) (18 - 18)  SpO2: 95% (10-07-17 @ 21:41) (95% - 96%)  Wt(kg): --  I&O's Summary    07 Oct 2017 07:01  -  08 Oct 2017 07:00  --------------------------------------------------------  IN: 1190 mL / OUT: 550 mL / NET: 640 mL          Appearance: Normal	  HEENT:   Normal oral mucosa, PERRL, EOMI	  Cardiovascular: Normal S1 S2,    Murmur:   Neck: JVP normal  Respiratory: Lungs clear to auscultation  Gastrointestinal:  Soft, Non-tender, + BS	  Skin: normal   Neuro: No gross deficits.   Psychiatry:  Mood & affect appropriate  Ext: No edema        LABS:    CARDIAC MARKERS:  CARDIAC MARKERS ( 06 Oct 2017 13:09 )  x     / 0.01 ng/mL / 76 U/L / x     / 3.0 ng/mL                                12.7   7.53  )-----------( 193      ( 07 Oct 2017 08:24 )             37.6     10-07    134<L>  |  96  |  19  ----------------------------<  89  4.6   |  24  |  0.67    Ca    9.5      07 Oct 2017 08:26    TPro  6.9  /  Alb  4.1  /  TBili  0.4  /  DBili  x   /  AST  20  /  ALT  8<L>  /  AlkPhos  83  10-06    proBNP:   Lipid Profile:   HgA1c:   TSH:           TELEMETRY: 	    ECG:  	  RADIOLOGY:   DIAGNOSTIC TESTING:  Echocardiogram:  Catheterization:  Stress Test:    OTHER:

## 2017-10-08 NOTE — PHYSICAL THERAPY INITIAL EVALUATION ADULT - ADDITIONAL COMMENTS
Spoke with Son tonya (phone), states his mother was in Subacute rehab where she was ambulating bed to chair with 1 person assist using RW, pt. required minimal assist with toileting and dressing/grooming. Has been in Rehab for 25 days and would like her to return to subacute rehab.

## 2017-10-09 LAB
ANION GAP SERPL CALC-SCNC: 20 MMOL/L — HIGH (ref 5–17)
BUN SERPL-MCNC: 19 MG/DL — SIGNIFICANT CHANGE UP (ref 7–23)
CALCIUM SERPL-MCNC: 10 MG/DL — SIGNIFICANT CHANGE UP (ref 8.4–10.5)
CHLORIDE SERPL-SCNC: 93 MMOL/L — LOW (ref 96–108)
CO2 SERPL-SCNC: 20 MMOL/L — LOW (ref 22–31)
CREAT SERPL-MCNC: 0.87 MG/DL — SIGNIFICANT CHANGE UP (ref 0.5–1.3)
GLUCOSE SERPL-MCNC: 94 MG/DL — SIGNIFICANT CHANGE UP (ref 70–99)
HCT VFR BLD CALC: 39.9 % — SIGNIFICANT CHANGE UP (ref 34.5–45)
HGB BLD-MCNC: 13.7 G/DL — SIGNIFICANT CHANGE UP (ref 11.5–15.5)
MCHC RBC-ENTMCNC: 31.6 PG — SIGNIFICANT CHANGE UP (ref 27–34)
MCHC RBC-ENTMCNC: 34.3 GM/DL — SIGNIFICANT CHANGE UP (ref 32–36)
MCV RBC AUTO: 92.1 FL — SIGNIFICANT CHANGE UP (ref 80–100)
PLATELET # BLD AUTO: 295 K/UL — SIGNIFICANT CHANGE UP (ref 150–400)
POTASSIUM SERPL-MCNC: 4.8 MMOL/L — SIGNIFICANT CHANGE UP (ref 3.5–5.3)
POTASSIUM SERPL-SCNC: 4.8 MMOL/L — SIGNIFICANT CHANGE UP (ref 3.5–5.3)
RBC # BLD: 4.33 M/UL — SIGNIFICANT CHANGE UP (ref 3.8–5.2)
RBC # FLD: 12.5 % — SIGNIFICANT CHANGE UP (ref 10.3–14.5)
SODIUM SERPL-SCNC: 133 MMOL/L — LOW (ref 135–145)
WBC # BLD: 9.36 K/UL — SIGNIFICANT CHANGE UP (ref 3.8–10.5)
WBC # FLD AUTO: 9.36 K/UL — SIGNIFICANT CHANGE UP (ref 3.8–10.5)

## 2017-10-09 PROCEDURE — 99222 1ST HOSP IP/OBS MODERATE 55: CPT

## 2017-10-09 RX ORDER — QUETIAPINE FUMARATE 200 MG/1
25 TABLET, FILM COATED ORAL EVERY 6 HOURS
Qty: 0 | Refills: 0 | Status: DISCONTINUED | OUTPATIENT
Start: 2017-10-09 | End: 2017-10-18

## 2017-10-09 RX ORDER — DIVALPROEX SODIUM 500 MG/1
500 TABLET, DELAYED RELEASE ORAL EVERY 12 HOURS
Qty: 0 | Refills: 0 | Status: DISCONTINUED | OUTPATIENT
Start: 2017-10-09 | End: 2017-10-18

## 2017-10-09 RX ORDER — QUETIAPINE FUMARATE 200 MG/1
25 TABLET, FILM COATED ORAL DAILY
Qty: 0 | Refills: 0 | Status: DISCONTINUED | OUTPATIENT
Start: 2017-10-09 | End: 2017-10-12

## 2017-10-09 RX ORDER — OLANZAPINE 15 MG/1
1.25 TABLET, FILM COATED ORAL EVERY 8 HOURS
Qty: 0 | Refills: 0 | Status: DISCONTINUED | OUTPATIENT
Start: 2017-10-09 | End: 2017-10-18

## 2017-10-09 RX ORDER — HALOPERIDOL DECANOATE 100 MG/ML
1 INJECTION INTRAMUSCULAR ONCE
Qty: 0 | Refills: 0 | Status: COMPLETED | OUTPATIENT
Start: 2017-10-09 | End: 2017-10-09

## 2017-10-09 RX ADMIN — QUETIAPINE FUMARATE 25 MILLIGRAM(S): 200 TABLET, FILM COATED ORAL at 18:04

## 2017-10-09 RX ADMIN — HEPARIN SODIUM 5000 UNIT(S): 5000 INJECTION INTRAVENOUS; SUBCUTANEOUS at 14:11

## 2017-10-09 RX ADMIN — HALOPERIDOL DECANOATE 1 MILLIGRAM(S): 100 INJECTION INTRAMUSCULAR at 10:59

## 2017-10-09 RX ADMIN — HEPARIN SODIUM 5000 UNIT(S): 5000 INJECTION INTRAVENOUS; SUBCUTANEOUS at 21:59

## 2017-10-09 RX ADMIN — Medication 650 MILLIGRAM(S): at 17:51

## 2017-10-09 RX ADMIN — DIVALPROEX SODIUM 500 MILLIGRAM(S): 500 TABLET, DELAYED RELEASE ORAL at 21:59

## 2017-10-09 RX ADMIN — Medication 650 MILLIGRAM(S): at 17:12

## 2017-10-09 RX ADMIN — CEFTRIAXONE 100 GRAM(S): 500 INJECTION, POWDER, FOR SOLUTION INTRAMUSCULAR; INTRAVENOUS at 14:11

## 2017-10-09 RX ADMIN — HEPARIN SODIUM 5000 UNIT(S): 5000 INJECTION INTRAVENOUS; SUBCUTANEOUS at 05:58

## 2017-10-09 NOTE — BEHAVIORAL HEALTH ASSESSMENT NOTE - NSBHCONSULTMEDS_PSY_A_CORE FT
consider starting standing Seroquel 25mg at 6pm standing  use Seroquel 25mg po q6hrrs PRN agitation  Zyprexa 1.25mg IM q8hrs PRN for severe agitation  avoid prodelirious agents   monitor QTc for prolongation. consider starting standing Seroquel 25mg at 6pm standing  use Seroquel 25mg po q6hrrs PRN agitation  Zyprexa 1.25mg IM q8hrs PRN for severe agitation  as per H+P, pt is on Xanax PRN, would monitor on CIWA and monitor for sxs of Benzo withdrawal   would confirm with SNF, if pt was on standing Depakote and restart pt's home dose.   avoid prodelirious agents   monitor QTc for prolongation.

## 2017-10-09 NOTE — BEHAVIORAL HEALTH ASSESSMENT NOTE - SUMMARY
Pt is a 89 y/o female, with dementia 2ary to parkinson's dz,  unclear details of ppxh, pmhx, presented with s/p unwitnessed fall, seen by psychiatry for agitation. pt doesn't recall details of the fall, is confused, disoriented, agitated last night, today a little restless, with a notable tremor. Pt. denies any psychiatric sxs, no si/hi/i/p, no a/v/h, no paranoia. Pt is a 89 y/o female, with dementia 2ary to parkinson's dz,  unclear details of ppxh, pmhx, presented with s/p unwitnessed fall, seen by psychiatry for agitation. pt doesn't recall details of the fall, is confused, disoriented, agitated last night, today a little restless, with a notable tremor. Pt. denies any psychiatric sxs, no si/hi/i/p, no a/v/h, no paranoia. pt also has a UTI.

## 2017-10-09 NOTE — PROGRESS NOTE ADULT - SUBJECTIVE AND OBJECTIVE BOX
Subjective: Patient seen and examined. No new events except as noted.     SUBJECTIVE/ROS:  No chest pain, or sob.        MEDICATIONS:  MEDICATIONS  (STANDING):  cefTRIAXone   IVPB 1 Gram(s) IV Intermittent every 24 hours  haloperidol    Injectable 1 milliGRAM(s) IV Push once  heparin  Injectable 5000 Unit(s) SubCutaneous every 8 hours      PHYSICAL EXAM:  T(C): 36.6 (10-09-17 @ 05:53), Max: 37.4 (10-08-17 @ 16:17)  HR: 66 (10-09-17 @ 05:53) (66 - 99)  BP: 139/69 (10-09-17 @ 05:53) (110/64 - 139/69)  RR: 18 (10-09-17 @ 05:53) (16 - 20)  SpO2: 94% (10-09-17 @ 05:53) (94% - 98%)  Wt(kg): --  I&O's Summary    07 Oct 2017 07:01  -  08 Oct 2017 07:00  --------------------------------------------------------  IN: 1190 mL / OUT: 550 mL / NET: 640 mL    08 Oct 2017 07:01  -  09 Oct 2017 06:57  --------------------------------------------------------  IN: 600 mL / OUT: 0 mL / NET: 600 mL          Appearance: Normal	  HEENT:   Normal oral mucosa, PERRL, EOMI	  Cardiovascular: Normal S1 S2,    Murmur:   Neck: JVP normal  Respiratory: Lungs clear to auscultation  Gastrointestinal:  Soft, Non-tender, + BS	  Skin: normal   Neuro: No gross deficits.   Psychiatry:  Mood & affect appropriate  Ext: No edema        LABS:    CARDIAC MARKERS:  CARDIAC MARKERS ( 06 Oct 2017 13:09 )  x     / 0.01 ng/mL / 76 U/L / x     / 3.0 ng/mL                                12.7   7.53  )-----------( 193      ( 07 Oct 2017 08:24 )             37.6     10-08    129<L>  |  91<L>  |  17  ----------------------------<  103<H>  3.8   |  24  |  0.69    Ca    9.5      08 Oct 2017 08:58      proBNP:   Lipid Profile:   HgA1c:   TSH:           TELEMETRY: 	    ECG:  	  RADIOLOGY:   DIAGNOSTIC TESTING:  Echocardiogram:  Catheterization:  Stress Test:    OTHER:

## 2017-10-09 NOTE — PROGRESS NOTE ADULT - PROBLEM SELECTOR PLAN 1
urine culture positive for E coli sensitive to IV ceftriaxone  continue for now  will monitor  day 4/5 today

## 2017-10-09 NOTE — BEHAVIORAL HEALTH ASSESSMENT NOTE - NSBHCHARTREVIEWVS_PSY_A_CORE FT
T(C): 36.6 (10-09-17 @ 11:12), Max: 36.7 (10-09-17 @ 00:24)  HR: 78 (10-09-17 @ 11:12) (66 - 99)  BP: 118/56 (10-09-17 @ 11:12) (118/56 - 139/69)  RR: 18 (10-09-17 @ 11:12) (16 - 18)  SpO2: 97% (10-09-17 @ 11:12) (94% - 97%)  Wt(kg): --

## 2017-10-09 NOTE — BEHAVIORAL HEALTH ASSESSMENT NOTE - NSBHCHARTREVIEWINVESTIGATE_PSY_A_CORE FT
< from: 12 Lead ECG (10.07.17 @ 18:22) >      Ventricular Rate 118 BPM    Atrial Rate 118 BPM    P-R Interval 132 ms    QRS Duration 102 ms     ms    QTc 470 ms    P Axis 66 degrees    R Axis 57 degrees    T Axis 56 degrees    Diagnosis Line BASELINE ARTIFACT  LEFT VENTRICULAR HYPERTROPHY WITH REPOLARIZATION ABNORMALITY  POSSIBLE ANTERIOR INFARCT , AGE UNDETERMINED    < end of copied text >

## 2017-10-09 NOTE — BEHAVIORAL HEALTH ASSESSMENT NOTE - NSBHCHARTREVIEWIMAGING_PSY_A_CORE FT
No interval change from 9/19/17, redemonstration of multilevel   degenerative change with neural foraminal narrowing, MR is more sensitive   for soft tissue injury, andmay be obtained if there is persistent pain   in the cervical spine as clinically warranted.

## 2017-10-09 NOTE — BEHAVIORAL HEALTH ASSESSMENT NOTE - HPI (INCLUDE ILLNESS QUALITY, SEVERITY, DURATION, TIMING, CONTEXT, MODIFYING FACTORS, ASSOCIATED SIGNS AND SYMPTOMS)
Pt is a 89 y/o female, with h/o Parkinson's dementia, presented from SNF s/p unwitnessed fall. This fall circumstances unclear. As per report she was found on the ground. Pt is unable to explain recent events, poor historian, confused, disoriented to self, place and time. Pt denies mood symptoms, no si/hi, no anxiety, no psychosis, uvaldo. As per staff, pt agitated last night. as per staff, pt has been restless and at night gets more confused and more agitated.  as per H+P, pt gets Xanax PRN and Depakote Pt is a 89 y/o female, with h/o Parkinson's dementia, presented from SNF s/p unwitnessed fall. This fall circumstances unclear. As per report she was found on the ground. Pt is unable to explain recent events, poor historian, confused, disoriented to self, place and time. Pt denies mood symptoms, no si/hi, no anxiety, no psychosis, uvaldo. As per staff, pt agitated last night. as per staff, pt has been restless and at night gets more confused and more agitated.  as per H+P, pt gets Xanax PRN and Depakote. Pt was discovered to get UTI.

## 2017-10-09 NOTE — BEHAVIORAL HEALTH ASSESSMENT NOTE - RELATEDNESS
pt alert and awake to voice, BIBA for fever and lethargy, was adminsitered 650mg tylenol pta by [facility]

## 2017-10-09 NOTE — BEHAVIORAL HEALTH ASSESSMENT NOTE - NSBHCHARTREVIEWLAB_PSY_A_CORE FT
13.7   9.36  )-----------( 295      ( 09 Oct 2017 10:08 )             39.9       10-09    133<L>  |  93<L>  |  19  ----------------------------<  94  4.8   |  20<L>  |  0.87    Ca    10.0      09 Oct 2017 10:11

## 2017-10-09 NOTE — PROGRESS NOTE ADULT - SUBJECTIVE AND OBJECTIVE BOX
Patient is a 89y old  Female who presents with a chief complaint of frequent falls (06 Oct 2017 16:45)      SUBJECTIVE / OVERNIGHT EVENTS: No nausea, vomiting or diarrhea, no fever or chills, no dizziness or chest pain, no dysuria or hematuria, no joint pain or swelling    no overnight events    MEDICATIONS  (STANDING):  cefTRIAXone   IVPB 1 Gram(s) IV Intermittent every 24 hours  heparin  Injectable 5000 Unit(s) SubCutaneous every 8 hours    MEDICATIONS  (PRN):  acetaminophen   Tablet. 650 milliGRAM(s) Oral every 6 hours PRN Mild Pain (1 - 3)          I&O's Summary    08 Oct 2017 07:01  -  09 Oct 2017 07:00  --------------------------------------------------------  IN: 600 mL / OUT: 0 mL / NET: 600 mL    09 Oct 2017 07:01  -  09 Oct 2017 17:19  --------------------------------------------------------  IN: 1050 mL / OUT: 2 mL / NET: 1048 mL    PHYSICAL EXAM:  vital signs as above  in no apparent distress  HEENT: GEM EOMI  Neck: Supple, no JVD, no thyromegaly  Lungs: clear, no rhonchi, no wheeze, no crackles poorly cooperative  CVS: S1 S2 no M/R/G  Abdomen: no tenderness, no organomegaly, BS present  Neuro: Alert, talkative no focal weakness, moving all 4 extremities  Psych: confused at baseline  Skin: warm, dry  Ext: no edema, no clubbing  Msk: no joint swelling or deformities    LABS:                        13.7   9.36  )-----------( 295      ( 09 Oct 2017 10:08 )             39.9     10-09    133<L>  |  93<L>  |  19  ----------------------------<  94  4.8   |  20<L>  |  0.87    Ca    10.0      09 Oct 2017 10:11                  Consultant(s) Notes Reviewed:      Care Discussed with Consultants/Other Providers:    Contact Number, Dr Dexter [phone]

## 2017-10-10 LAB
24R-OH-CALCIDIOL SERPL-MCNC: 17.2 NG/ML — LOW (ref 30–80)
ANION GAP SERPL CALC-SCNC: 17 MMOL/L — SIGNIFICANT CHANGE UP (ref 5–17)
BUN SERPL-MCNC: 15 MG/DL — SIGNIFICANT CHANGE UP (ref 7–23)
CALCIUM SERPL-MCNC: 9.8 MG/DL — SIGNIFICANT CHANGE UP (ref 8.4–10.5)
CHLORIDE SERPL-SCNC: 90 MMOL/L — LOW (ref 96–108)
CO2 SERPL-SCNC: 25 MMOL/L — SIGNIFICANT CHANGE UP (ref 22–31)
CREAT SERPL-MCNC: 0.78 MG/DL — SIGNIFICANT CHANGE UP (ref 0.5–1.3)
FOLATE SERPL-MCNC: 13.5 NG/ML — SIGNIFICANT CHANGE UP (ref 4.8–24.2)
GLUCOSE SERPL-MCNC: 61 MG/DL — LOW (ref 70–99)
HCT VFR BLD CALC: 40.6 % — SIGNIFICANT CHANGE UP (ref 34.5–45)
HGB BLD-MCNC: 13.8 G/DL — SIGNIFICANT CHANGE UP (ref 11.5–15.5)
MCHC RBC-ENTMCNC: 31.4 PG — SIGNIFICANT CHANGE UP (ref 27–34)
MCHC RBC-ENTMCNC: 34 GM/DL — SIGNIFICANT CHANGE UP (ref 32–36)
MCV RBC AUTO: 92.3 FL — SIGNIFICANT CHANGE UP (ref 80–100)
PLATELET # BLD AUTO: 277 K/UL — SIGNIFICANT CHANGE UP (ref 150–400)
POTASSIUM SERPL-MCNC: 4.4 MMOL/L — SIGNIFICANT CHANGE UP (ref 3.5–5.3)
POTASSIUM SERPL-SCNC: 4.4 MMOL/L — SIGNIFICANT CHANGE UP (ref 3.5–5.3)
RBC # BLD: 4.4 M/UL — SIGNIFICANT CHANGE UP (ref 3.8–5.2)
RBC # FLD: 12.5 % — SIGNIFICANT CHANGE UP (ref 10.3–14.5)
SODIUM SERPL-SCNC: 132 MMOL/L — LOW (ref 135–145)
TSH SERPL-MCNC: 2.2 UIU/ML — SIGNIFICANT CHANGE UP (ref 0.27–4.2)
VIT B12 SERPL-MCNC: 389 PG/ML — SIGNIFICANT CHANGE UP (ref 243–894)
WBC # BLD: 9.7 K/UL — SIGNIFICANT CHANGE UP (ref 3.8–10.5)
WBC # FLD AUTO: 9.7 K/UL — SIGNIFICANT CHANGE UP (ref 3.8–10.5)

## 2017-10-10 PROCEDURE — 99232 SBSQ HOSP IP/OBS MODERATE 35: CPT

## 2017-10-10 RX ADMIN — HEPARIN SODIUM 5000 UNIT(S): 5000 INJECTION INTRAVENOUS; SUBCUTANEOUS at 13:51

## 2017-10-10 RX ADMIN — QUETIAPINE FUMARATE 25 MILLIGRAM(S): 200 TABLET, FILM COATED ORAL at 13:52

## 2017-10-10 RX ADMIN — HEPARIN SODIUM 5000 UNIT(S): 5000 INJECTION INTRAVENOUS; SUBCUTANEOUS at 05:22

## 2017-10-10 RX ADMIN — HEPARIN SODIUM 5000 UNIT(S): 5000 INJECTION INTRAVENOUS; SUBCUTANEOUS at 20:23

## 2017-10-10 RX ADMIN — QUETIAPINE FUMARATE 25 MILLIGRAM(S): 200 TABLET, FILM COATED ORAL at 10:05

## 2017-10-10 RX ADMIN — DIVALPROEX SODIUM 500 MILLIGRAM(S): 500 TABLET, DELAYED RELEASE ORAL at 10:08

## 2017-10-10 RX ADMIN — QUETIAPINE FUMARATE 25 MILLIGRAM(S): 200 TABLET, FILM COATED ORAL at 20:24

## 2017-10-10 RX ADMIN — CEFTRIAXONE 100 GRAM(S): 500 INJECTION, POWDER, FOR SOLUTION INTRAMUSCULAR; INTRAVENOUS at 13:51

## 2017-10-10 RX ADMIN — DIVALPROEX SODIUM 500 MILLIGRAM(S): 500 TABLET, DELAYED RELEASE ORAL at 17:14

## 2017-10-10 NOTE — PROGRESS NOTE ADULT - SUBJECTIVE AND OBJECTIVE BOX
Patient is a 89y old  Female who presents with a chief complaint of frequent falls (06 Oct 2017 16:45)      SUBJECTIVE / OVERNIGHT EVENTS: No nausea, vomiting or diarrhea, no fever or chills, no dizziness or chest pain, no dysuria or hematuria, no joint pain or swelling per RN    MEDICATIONS  (STANDING):  cefTRIAXone   IVPB 1 Gram(s) IV Intermittent every 24 hours  diVALproex  milliGRAM(s) Oral every 12 hours  heparin  Injectable 5000 Unit(s) SubCutaneous every 8 hours  QUEtiapine 25 milliGRAM(s) Oral daily    MEDICATIONS  (PRN):  acetaminophen   Tablet. 650 milliGRAM(s) Oral every 6 hours PRN Mild Pain (1 - 3)  OLANZapine Injectable 1.25 milliGRAM(s) IntraMuscular every 8 hours PRN Severe agitation  QUEtiapine 25 milliGRAM(s) Oral every 6 hours PRN agitation        CAPILLARY BLOOD GLUCOSE        I&O's Summary    09 Oct 2017 07:01  -  10 Oct 2017 07:00  --------------------------------------------------------  IN: 2010 mL / OUT: 2 mL / NET: 2008 mL    10 Oct 2017 07:01  -  10 Oct 2017 20:08  --------------------------------------------------------  IN: 350 mL / OUT: 0 mL / NET: 350 mL    PHYSICAL EXAM:  vital signs as above  in no apparent distress  HEENT: GEM EOMI  Neck: Supple, no JVD, no thyromegaly  Lungs: clear, no rhonchi, no wheeze, no crackles poorly cooperative  CVS: S1 S2 no M/R/G  Abdomen: no tenderness, no organomegaly, BS present  Neuro: Alert, talkative no focal weakness, moving all 4 extremities  Psych: confused at baseline  Skin: warm, dry  Ext: no edema, no clubbing  Msk: no joint swelling or deformities    LABS:                        13.8   9.70  )-----------( 277      ( 10 Oct 2017 10:10 )             40.6     10-10    132<L>  |  90<L>  |  15  ----------------------------<  61<L>  4.4   |  25  |  0.78    Ca    9.8      10 Oct 2017 10:07                  Consultant(s) Notes Reviewed:      Care Discussed with Consultants/Other Providers:    Contact Number, Dr Dexter [phone]

## 2017-10-10 NOTE — PROGRESS NOTE ADULT - SUBJECTIVE AND OBJECTIVE BOX
Subjective: Patient seen and examined. No new events except as noted.     SUBJECTIVE/ROS:  Due to altered mental status, subjective information were not able to be obtained from the patient. History was obtained, to the extent possible, from review of the chart and collateral sources of information.       MEDICATIONS:  MEDICATIONS  (STANDING):  cefTRIAXone   IVPB 1 Gram(s) IV Intermittent every 24 hours  diVALproex  milliGRAM(s) Oral every 12 hours  heparin  Injectable 5000 Unit(s) SubCutaneous every 8 hours  QUEtiapine 25 milliGRAM(s) Oral daily      PHYSICAL EXAM:  T(C): 36.7 (10-09-17 @ 21:30), Max: 36.7 (10-09-17 @ 21:30)  HR: 70 (10-09-17 @ 21:30) (70 - 78)  BP: 148/66 (10-09-17 @ 21:30) (118/56 - 148/66)  RR: 18 (10-09-17 @ 21:30) (18 - 18)  SpO2: 98% (10-09-17 @ 21:30) (97% - 98%)  Wt(kg): --  I&O's Summary    09 Oct 2017 07:01  -  10 Oct 2017 07:00  --------------------------------------------------------  IN: 2010 mL / OUT: 2 mL / NET: 2008 mL          Appearance: Normal	  HEENT:   Normal oral mucosa, PERRL, EOMI	  Cardiovascular: Normal S1 S2,    Murmur:   Neck: JVP normal  Respiratory: Lungs clear to auscultation  Gastrointestinal:  Soft, Non-tender, + BS	  Skin: normal   Neuro: No gross deficits.   Psychiatry:  Mood & affect appropriate  Ext: No edema        LABS:    CARDIAC MARKERS:                                13.7   9.36  )-----------( 295      ( 09 Oct 2017 10:08 )             39.9     10-09    133<L>  |  93<L>  |  19  ----------------------------<  94  4.8   |  20<L>  |  0.87    Ca    10.0      09 Oct 2017 10:11      proBNP:   Lipid Profile:   HgA1c:   TSH:           TELEMETRY: 	    ECG:  	  RADIOLOGY:   DIAGNOSTIC TESTING:  Echocardiogram:  Catheterization:  Stress Test:    OTHER:

## 2017-10-11 DIAGNOSIS — E55.9 VITAMIN D DEFICIENCY, UNSPECIFIED: ICD-10-CM

## 2017-10-11 DIAGNOSIS — N17.9 ACUTE KIDNEY FAILURE, UNSPECIFIED: ICD-10-CM

## 2017-10-11 LAB
ANION GAP SERPL CALC-SCNC: 13 MMOL/L — SIGNIFICANT CHANGE UP (ref 5–17)
BUN SERPL-MCNC: 22 MG/DL — SIGNIFICANT CHANGE UP (ref 7–23)
CALCIUM SERPL-MCNC: 9.5 MG/DL — SIGNIFICANT CHANGE UP (ref 8.4–10.5)
CHLORIDE SERPL-SCNC: 91 MMOL/L — LOW (ref 96–108)
CO2 SERPL-SCNC: 28 MMOL/L — SIGNIFICANT CHANGE UP (ref 22–31)
CREAT SERPL-MCNC: 1.18 MG/DL — SIGNIFICANT CHANGE UP (ref 0.5–1.3)
GLUCOSE SERPL-MCNC: 95 MG/DL — SIGNIFICANT CHANGE UP (ref 70–99)
POTASSIUM SERPL-MCNC: 5 MMOL/L — SIGNIFICANT CHANGE UP (ref 3.5–5.3)
POTASSIUM SERPL-SCNC: 5 MMOL/L — SIGNIFICANT CHANGE UP (ref 3.5–5.3)
SODIUM SERPL-SCNC: 132 MMOL/L — LOW (ref 135–145)

## 2017-10-11 PROCEDURE — 99233 SBSQ HOSP IP/OBS HIGH 50: CPT

## 2017-10-11 RX ORDER — CHOLECALCIFEROL (VITAMIN D3) 125 MCG
1000 CAPSULE ORAL DAILY
Qty: 0 | Refills: 0 | Status: DISCONTINUED | OUTPATIENT
Start: 2017-10-11 | End: 2017-10-18

## 2017-10-11 RX ORDER — SODIUM CHLORIDE 9 MG/ML
1000 INJECTION INTRAMUSCULAR; INTRAVENOUS; SUBCUTANEOUS
Qty: 0 | Refills: 0 | Status: DISCONTINUED | OUTPATIENT
Start: 2017-10-11 | End: 2017-10-18

## 2017-10-11 RX ADMIN — QUETIAPINE FUMARATE 25 MILLIGRAM(S): 200 TABLET, FILM COATED ORAL at 13:39

## 2017-10-11 RX ADMIN — QUETIAPINE FUMARATE 25 MILLIGRAM(S): 200 TABLET, FILM COATED ORAL at 16:20

## 2017-10-11 RX ADMIN — HEPARIN SODIUM 5000 UNIT(S): 5000 INJECTION INTRAVENOUS; SUBCUTANEOUS at 13:39

## 2017-10-11 RX ADMIN — DIVALPROEX SODIUM 500 MILLIGRAM(S): 500 TABLET, DELAYED RELEASE ORAL at 05:03

## 2017-10-11 RX ADMIN — QUETIAPINE FUMARATE 25 MILLIGRAM(S): 200 TABLET, FILM COATED ORAL at 22:23

## 2017-10-11 RX ADMIN — HEPARIN SODIUM 5000 UNIT(S): 5000 INJECTION INTRAVENOUS; SUBCUTANEOUS at 05:03

## 2017-10-11 RX ADMIN — Medication 1000 UNIT(S): at 22:22

## 2017-10-11 RX ADMIN — SODIUM CHLORIDE 75 MILLILITER(S): 9 INJECTION INTRAMUSCULAR; INTRAVENOUS; SUBCUTANEOUS at 22:23

## 2017-10-11 RX ADMIN — DIVALPROEX SODIUM 500 MILLIGRAM(S): 500 TABLET, DELAYED RELEASE ORAL at 18:06

## 2017-10-11 RX ADMIN — HEPARIN SODIUM 5000 UNIT(S): 5000 INJECTION INTRAVENOUS; SUBCUTANEOUS at 22:22

## 2017-10-11 RX ADMIN — QUETIAPINE FUMARATE 25 MILLIGRAM(S): 200 TABLET, FILM COATED ORAL at 08:20

## 2017-10-11 NOTE — PROGRESS NOTE ADULT - SUBJECTIVE AND OBJECTIVE BOX
Patient is a 89y old  Female who presents with a chief complaint of frequent falls (06 Oct 2017 16:45)      SUBJECTIVE / OVERNIGHT EVENTS: No nausea, vomiting or diarrhea, no fever or chills, no dizziness or chest pain, no dysuria or hematuria, no joint pain or swelling        MEDICATIONS  (STANDING):  diVALproex  milliGRAM(s) Oral every 12 hours  heparin  Injectable 5000 Unit(s) SubCutaneous every 8 hours  QUEtiapine 25 milliGRAM(s) Oral daily  sodium chloride 0.9%. 1000 milliLiter(s) (75 mL/Hr) IV Continuous <Continuous>    MEDICATIONS  (PRN):  acetaminophen   Tablet. 650 milliGRAM(s) Oral every 6 hours PRN Mild Pain (1 - 3)  OLANZapine Injectable 1.25 milliGRAM(s) IntraMuscular every 8 hours PRN Severe agitation  QUEtiapine 25 milliGRAM(s) Oral every 6 hours PRN agitation      I&O's Summary    10 Oct 2017 07:01  -  11 Oct 2017 07:00  --------------------------------------------------------  IN: 450 mL / OUT: 0 mL / NET: 450 mL    11 Oct 2017 07:01  -  11 Oct 2017 18:57  --------------------------------------------------------  IN: 1580 mL / OUT: 200 mL / NET: 1380 mL      PHYSICAL EXAM:  vital signs as above  in no apparent distress  HEENT: GEM EOMI  Neck: Supple, no JVD, no thyromegaly  Lungs: clear, no rhonchi, no wheeze, no crackles poorly cooperative  CVS: S1 S2 no M/R/G  Abdomen: no tenderness, no organomegaly, BS present  Neuro: Alert, talkative no focal weakness, moving all 4 extremities  Psych: confused at baseline  Skin: warm, dry  Ext: no edema, no clubbing  Msk: no joint swelling or deformities    LABS:                        13.8   9.70  )-----------( 277      ( 10 Oct 2017 10:10 )             40.6     10-11    132<L>  |  91<L>  |  22  ----------------------------<  95  5.0   |  28  |  1.18    Ca    9.5      11 Oct 2017 08:34                  Consultant(s) Notes Reviewed:      Care Discussed with Consultants/Other Providers:    Contact Number, Dr Dexter [phone]

## 2017-10-11 NOTE — PROGRESS NOTE ADULT - SUBJECTIVE AND OBJECTIVE BOX
Subjective: Patient seen and examined. No new events except as noted.     SUBJECTIVE/ROS:  Due to altered mental status, subjective information were not able to be obtained from the patient. History was obtained, to the extent possible, from review of the chart and collateral sources of information.       MEDICATIONS:  MEDICATIONS  (STANDING):  diVALproex  milliGRAM(s) Oral every 12 hours  heparin  Injectable 5000 Unit(s) SubCutaneous every 8 hours  QUEtiapine 25 milliGRAM(s) Oral daily      PHYSICAL EXAM:  T(C): 36.3 (10-11-17 @ 07:31), Max: 36.4 (10-10-17 @ 15:44)  HR: 86 (10-11-17 @ 07:31) (68 - 86)  BP: 154/65 (10-11-17 @ 07:31) (120/69 - 154/65)  RR: 18 (10-11-17 @ 07:31) (18 - 18)  SpO2: 98% (10-11-17 @ 07:31) (96% - 98%)  Wt(kg): --  I&O's Summary    10 Oct 2017 07:01  -  11 Oct 2017 07:00  --------------------------------------------------------  IN: 450 mL / OUT: 0 mL / NET: 450 mL          Appearance: Normal	  HEENT:   Normal oral mucosa, PERRL, EOMI	  Cardiovascular: Normal S1 S2,    Murmur:   Neck: JVP normal  Respiratory: Lungs clear to auscultation  Gastrointestinal:  Soft, Non-tender, + BS	  Skin: normal   Neuro: No gross deficits.   Psychiatry:  Mood & affect appropriate  Ext: No edema        LABS:    CARDIAC MARKERS:                                13.8   9.70  )-----------( 277      ( 10 Oct 2017 10:10 )             40.6     10-10    132<L>  |  90<L>  |  15  ----------------------------<  61<L>  4.4   |  25  |  0.78    Ca    9.8      10 Oct 2017 10:07      proBNP:   Lipid Profile:   HgA1c:   TSH: Thyroid Stimulating Hormone, Serum: 2.20 uIU/mL (10-10 @ 10:07)            TELEMETRY: 	    ECG:  	  RADIOLOGY:   DIAGNOSTIC TESTING:  Echocardiogram:  Catheterization:  Stress Test:    OTHER:

## 2017-10-12 LAB
ANION GAP SERPL CALC-SCNC: 16 MMOL/L — SIGNIFICANT CHANGE UP (ref 5–17)
BUN SERPL-MCNC: 21 MG/DL — SIGNIFICANT CHANGE UP (ref 7–23)
CALCIUM SERPL-MCNC: 10.4 MG/DL — SIGNIFICANT CHANGE UP (ref 8.4–10.5)
CHLORIDE SERPL-SCNC: 98 MMOL/L — SIGNIFICANT CHANGE UP (ref 96–108)
CO2 SERPL-SCNC: 22 MMOL/L — SIGNIFICANT CHANGE UP (ref 22–31)
CREAT SERPL-MCNC: 0.79 MG/DL — SIGNIFICANT CHANGE UP (ref 0.5–1.3)
GLUCOSE SERPL-MCNC: 95 MG/DL — SIGNIFICANT CHANGE UP (ref 70–99)
POTASSIUM SERPL-MCNC: 5.1 MMOL/L — SIGNIFICANT CHANGE UP (ref 3.5–5.3)
POTASSIUM SERPL-SCNC: 5.1 MMOL/L — SIGNIFICANT CHANGE UP (ref 3.5–5.3)
SODIUM SERPL-SCNC: 136 MMOL/L — SIGNIFICANT CHANGE UP (ref 135–145)

## 2017-10-12 PROCEDURE — 99232 SBSQ HOSP IP/OBS MODERATE 35: CPT

## 2017-10-12 RX ORDER — QUETIAPINE FUMARATE 200 MG/1
50 TABLET, FILM COATED ORAL
Qty: 0 | Refills: 0 | Status: DISCONTINUED | OUTPATIENT
Start: 2017-10-12 | End: 2017-10-18

## 2017-10-12 RX ADMIN — QUETIAPINE FUMARATE 50 MILLIGRAM(S): 200 TABLET, FILM COATED ORAL at 18:05

## 2017-10-12 RX ADMIN — Medication 1000 UNIT(S): at 11:05

## 2017-10-12 RX ADMIN — SODIUM CHLORIDE 75 MILLILITER(S): 9 INJECTION INTRAMUSCULAR; INTRAVENOUS; SUBCUTANEOUS at 09:08

## 2017-10-12 RX ADMIN — QUETIAPINE FUMARATE 25 MILLIGRAM(S): 200 TABLET, FILM COATED ORAL at 09:08

## 2017-10-12 RX ADMIN — HEPARIN SODIUM 5000 UNIT(S): 5000 INJECTION INTRAVENOUS; SUBCUTANEOUS at 21:16

## 2017-10-12 RX ADMIN — QUETIAPINE FUMARATE 25 MILLIGRAM(S): 200 TABLET, FILM COATED ORAL at 11:05

## 2017-10-12 RX ADMIN — HEPARIN SODIUM 5000 UNIT(S): 5000 INJECTION INTRAVENOUS; SUBCUTANEOUS at 05:23

## 2017-10-12 RX ADMIN — DIVALPROEX SODIUM 500 MILLIGRAM(S): 500 TABLET, DELAYED RELEASE ORAL at 05:23

## 2017-10-12 RX ADMIN — HEPARIN SODIUM 5000 UNIT(S): 5000 INJECTION INTRAVENOUS; SUBCUTANEOUS at 11:05

## 2017-10-12 RX ADMIN — DIVALPROEX SODIUM 500 MILLIGRAM(S): 500 TABLET, DELAYED RELEASE ORAL at 18:04

## 2017-10-12 RX ADMIN — OLANZAPINE 1.25 MILLIGRAM(S): 15 TABLET, FILM COATED ORAL at 08:01

## 2017-10-12 NOTE — DIETITIAN INITIAL EVALUATION ADULT. - FACTORS AFF FOOD INTAKE
no chewing/swallowing issues noted at this time; noted last recorded BM 10/8 no chewing/swallowing issues noted at this time; noted last recorded BM 10/8- as per RN and PCA, pt had 2 large BMs this AM- the first was of normal consistency and the second movement was looser

## 2017-10-12 NOTE — DIETITIAN INITIAL EVALUATION ADULT. - OTHER INFO
pt seen for LOS. Noted admission wt of 139.3 pounds and most recent wt of 135.8 pounds, per previous RD note from September 2017, dosing wt of 136 pounds and wt from H&P from February 2017 of about 130 pounds- indicating wt has been stable. No food allergies noted per chart. No micronutrient coverage noted per H&P. pt seen for LOS. Noted admission wt of 139.3 pounds and most recent wt of 135.8 pounds, per previous RD note from September 2017, dosing wt of 136 pounds and wt from H&P from February 2017 of about 130 pounds- indicating wt has been stable. No food allergies noted per chart. No micronutrient coverage noted per H&P. As per PCA, pt ate 100 % of breakfast this AM- ate cereal, 2 bananas, 12 ounces of milk, bread this AM. As per RN, pt has been consistently eating well during admission.

## 2017-10-12 NOTE — PROGRESS NOTE ADULT - SUBJECTIVE AND OBJECTIVE BOX
Subjective: Patient seen and examined. No new events except as noted.     SUBJECTIVE/ROS:  No chest pain, or sob.       MEDICATIONS:  MEDICATIONS  (STANDING):  cholecalciferol 1000 Unit(s) Oral daily  diVALproex  milliGRAM(s) Oral every 12 hours  heparin  Injectable 5000 Unit(s) SubCutaneous every 8 hours  QUEtiapine 50 milliGRAM(s) Oral two times a day  sodium chloride 0.9%. 1000 milliLiter(s) (75 mL/Hr) IV Continuous <Continuous>      PHYSICAL EXAM:  T(C): 36.6 (10-12-17 @ 15:51), Max: 36.7 (10-11-17 @ 21:52)  HR: 74 (10-12-17 @ 15:51) (73 - 77)  BP: 108/66 (10-12-17 @ 15:51) (108/66 - 190/79)  RR: 18 (10-12-17 @ 15:51) (18 - 18)  SpO2: 97% (10-12-17 @ 15:51) (96% - 98%)  Wt(kg): --  I&O's Summary    11 Oct 2017 07:01  -  12 Oct 2017 07:00  --------------------------------------------------------  IN: 2180 mL / OUT: 200 mL / NET: 1980 mL    12 Oct 2017 07:01  -  12 Oct 2017 18:06  --------------------------------------------------------  IN: 920 mL / OUT: 1 mL / NET: 919 mL          Appearance: Normal	  HEENT:   Normal oral mucosa, PERRL, EOMI	  Cardiovascular: Normal S1 S2,    Murmur:   Neck: JVP normal  Respiratory: Lungs clear to auscultation  Gastrointestinal:  Soft, Non-tender, + BS	  Skin: normal   Neuro: No gross deficits.   Psychiatry:  Mood & affect appropriate  Ext: No edema        LABS:    CARDIAC MARKERS:            10-12    136  |  98  |  21  ----------------------------<  95  5.1   |  22  |  0.79    Ca    10.4      12 Oct 2017 08:48      proBNP:   Lipid Profile:   HgA1c:   TSH:           TELEMETRY: 	    ECG:  	  RADIOLOGY:   DIAGNOSTIC TESTING:  Echocardiogram:  Catheterization:  Stress Test:    OTHER:

## 2017-10-12 NOTE — DIETITIAN INITIAL EVALUATION ADULT. - PT NOT SOURCE
confused/no family at bedside at this time no family at bedside at this time; pt unable to answer any questions appropriately at this time/confused

## 2017-10-12 NOTE — DIETITIAN INITIAL EVALUATION ADULT. - PHYSICAL APPEARANCE
well nourished well nourished/pt not agreeable to nutrition focused physical exam at this time; no overt muscle or fat wasting noted at this time

## 2017-10-12 NOTE — PROGRESS NOTE ADULT - SUBJECTIVE AND OBJECTIVE BOX
Patient is a 89y old  Female who presents with a chief complaint of frequent falls (06 Oct 2017 16:45)      SUBJECTIVE / OVERNIGHT EVENTS: No nausea, vomiting or diarrhea, no fever or chills, no dizziness or chest pain, no dysuria or hematuria, no joint pain or swelling    intermittent agitation overnight  now calm    MEDICATIONS  (STANDING):  cholecalciferol 1000 Unit(s) Oral daily  diVALproex  milliGRAM(s) Oral every 12 hours  heparin  Injectable 5000 Unit(s) SubCutaneous every 8 hours  QUEtiapine 25 milliGRAM(s) Oral daily  sodium chloride 0.9%. 1000 milliLiter(s) (75 mL/Hr) IV Continuous <Continuous>    MEDICATIONS  (PRN):  acetaminophen   Tablet. 650 milliGRAM(s) Oral every 6 hours PRN Mild Pain (1 - 3)  OLANZapine Injectable 1.25 milliGRAM(s) IntraMuscular every 8 hours PRN Severe agitation  QUEtiapine 25 milliGRAM(s) Oral every 6 hours PRN agitation        CAPILLARY BLOOD GLUCOSE        I&O's Summary    11 Oct 2017 07:01  -  12 Oct 2017 07:00  --------------------------------------------------------  IN: 2180 mL / OUT: 200 mL / NET: 1980 mL    12 Oct 2017 07:01  -  12 Oct 2017 15:19  --------------------------------------------------------  IN: 920 mL / OUT: 1 mL / NET: 919 mL    PHYSICAL EXAM:  vital signs as above  in no apparent distress  HEENT: GEM EOMI  Neck: Supple, no JVD, no thyromegaly  Lungs: clear, no rhonchi, no wheeze, no crackles poorly cooperative  CVS: S1 S2 no M/R/G  Abdomen: no tenderness, no organomegaly, BS present  Neuro: Alert, talkative no focal weakness, moving all 4 extremities  Psych: confused at baseline  Skin: warm, dry  Ext: no edema, no clubbing  Msk: no joint swelling or deformities    LABS:    10-12    136  |  98  |  21  ----------------------------<  95  5.1   |  22  |  0.79    Ca    10.4      12 Oct 2017 08:48                  Consultant(s) Notes Reviewed:      Care Discussed with Consultants/Other Providers:    Contact Number, Dr Dexter [phone]

## 2017-10-13 PROCEDURE — 99232 SBSQ HOSP IP/OBS MODERATE 35: CPT

## 2017-10-13 RX ADMIN — HEPARIN SODIUM 5000 UNIT(S): 5000 INJECTION INTRAVENOUS; SUBCUTANEOUS at 22:15

## 2017-10-13 RX ADMIN — Medication 650 MILLIGRAM(S): at 05:00

## 2017-10-13 RX ADMIN — QUETIAPINE FUMARATE 50 MILLIGRAM(S): 200 TABLET, FILM COATED ORAL at 05:24

## 2017-10-13 RX ADMIN — QUETIAPINE FUMARATE 50 MILLIGRAM(S): 200 TABLET, FILM COATED ORAL at 18:00

## 2017-10-13 RX ADMIN — DIVALPROEX SODIUM 500 MILLIGRAM(S): 500 TABLET, DELAYED RELEASE ORAL at 05:24

## 2017-10-13 RX ADMIN — Medication 1000 UNIT(S): at 17:58

## 2017-10-13 RX ADMIN — Medication 650 MILLIGRAM(S): at 04:07

## 2017-10-13 RX ADMIN — HEPARIN SODIUM 5000 UNIT(S): 5000 INJECTION INTRAVENOUS; SUBCUTANEOUS at 05:24

## 2017-10-13 RX ADMIN — DIVALPROEX SODIUM 500 MILLIGRAM(S): 500 TABLET, DELAYED RELEASE ORAL at 18:00

## 2017-10-13 RX ADMIN — QUETIAPINE FUMARATE 25 MILLIGRAM(S): 200 TABLET, FILM COATED ORAL at 19:00

## 2017-10-13 RX ADMIN — HEPARIN SODIUM 5000 UNIT(S): 5000 INJECTION INTRAVENOUS; SUBCUTANEOUS at 14:59

## 2017-10-13 NOTE — PROGRESS NOTE ADULT - SUBJECTIVE AND OBJECTIVE BOX
Subjective: Patient seen and examined. No new events except as noted.     SUBJECTIVE/ROS:  No chest pain, or sob.       MEDICATIONS:  MEDICATIONS  (STANDING):  cholecalciferol 1000 Unit(s) Oral daily  diVALproex  milliGRAM(s) Oral every 12 hours  heparin  Injectable 5000 Unit(s) SubCutaneous every 8 hours  QUEtiapine 50 milliGRAM(s) Oral two times a day  sodium chloride 0.9%. 1000 milliLiter(s) (75 mL/Hr) IV Continuous <Continuous>      PHYSICAL EXAM:  T(C): 36.5 (10-13-17 @ 10:00), Max: 36.6 (10-12-17 @ 15:51)  HR: 79 (10-13-17 @ 10:00) (71 - 79)  BP: 125/69 (10-13-17 @ 10:00) (108/66 - 125/69)  RR: 18 (10-13-17 @ 10:00) (18 - 18)  SpO2: 97% (10-13-17 @ 10:00) (96% - 97%)  Wt(kg): --  I&O's Summary    12 Oct 2017 07:01  -  13 Oct 2017 07:00  --------------------------------------------------------  IN: 1400 mL / OUT: 3 mL / NET: 1397 mL    13 Oct 2017 07:01  -  13 Oct 2017 14:25  --------------------------------------------------------  IN: 480 mL / OUT: 0 mL / NET: 480 mL          Appearance: Normal	  HEENT:   Normal oral mucosa, PERRL, EOMI	  Cardiovascular: Normal S1 S2,    Murmur:   Neck: JVP normal  Respiratory: Lungs clear to auscultation  Gastrointestinal:  Soft, Non-tender, + BS	  Skin: normal   Neuro: No gross deficits.   Psychiatry:  Mood & affect appropriate  Ext: No edema        LABS:    CARDIAC MARKERS:            10-12    136  |  98  |  21  ----------------------------<  95  5.1   |  22  |  0.79    Ca    10.4      12 Oct 2017 08:48      proBNP:   Lipid Profile:   HgA1c:   TSH:           TELEMETRY: 	    ECG:  	  RADIOLOGY:   DIAGNOSTIC TESTING:  Echocardiogram:  Catheterization:  Stress Test:    OTHER:

## 2017-10-13 NOTE — PROGRESS NOTE BEHAVIORAL HEALTH - NSBHCONSULTOBSREASON_PSY_A_CORE FT
cont 1:1 for agitation
as per 1ary team for safety
as per 1ary team for safety
cont 1:1 for agitation

## 2017-10-13 NOTE — PROGRESS NOTE ADULT - SUBJECTIVE AND OBJECTIVE BOX
Patient is a 89y old  Female who presents with a chief complaint of frequent falls (06 Oct 2017 16:45)      SUBJECTIVE / OVERNIGHT EVENTS: No nausea, vomiting or diarrhea, no fever or chills, no dizziness or chest pain, no dysuria or hematuria, no joint pain or swelling    no agitation overnight      MEDICATIONS  (STANDING):  cholecalciferol 1000 Unit(s) Oral daily  diVALproex  milliGRAM(s) Oral every 12 hours  heparin  Injectable 5000 Unit(s) SubCutaneous every 8 hours  QUEtiapine 50 milliGRAM(s) Oral two times a day  sodium chloride 0.9%. 1000 milliLiter(s) (75 mL/Hr) IV Continuous <Continuous>    MEDICATIONS  (PRN):  acetaminophen   Tablet. 650 milliGRAM(s) Oral every 6 hours PRN Mild Pain (1 - 3)  OLANZapine Injectable 1.25 milliGRAM(s) IntraMuscular every 8 hours PRN Severe agitation  QUEtiapine 25 milliGRAM(s) Oral every 6 hours PRN agitation        CAPILLARY BLOOD GLUCOSE        I&O's Summary    12 Oct 2017 07:01  -  13 Oct 2017 07:00  --------------------------------------------------------  IN: 1400 mL / OUT: 3 mL / NET: 1397 mL    PHYSICAL EXAM:  vital signs as above  in no apparent distress  HEENT: GEM EOMI  Neck: Supple, no JVD, no thyromegaly  Lungs: clear, no rhonchi, no wheeze, no crackles poorly cooperative  CVS: S1 S2 no M/R/G  Abdomen: no tenderness, no organomegaly, BS present  Neuro: Alert, talkative no focal weakness, moving all 4 extremities  Psych: confused at baseline  Skin: warm, dry  Ext: no edema, no clubbing  Msk: no joint swelling or deformities    LABS:    10-12    136  |  98  |  21  ----------------------------<  95  5.1   |  22  |  0.79    Ca    10.4      12 Oct 2017 08:48                  Consultant(s) Notes Reviewed:      Care Discussed with Consultants/Other Providers:    Contact Number, Dr Dexter [phone]

## 2017-10-13 NOTE — PROGRESS NOTE BEHAVIORAL HEALTH - NSBHCHARTREVIEWLAB_PSY_A_CORE FT
10-12    136  |  98  |  21  ----------------------------<  95  5.1   |  22  |  0.79    Ca    10.4      12 Oct 2017 08:48
13.8   9.70  )-----------( 277      ( 10 Oct 2017 10:10 )             40.6       10-09    133<L>  |  93<L>  |  19  ----------------------------<  94  4.8   |  20<L>  |  0.87    Ca    10.0      09 Oct 2017 10:11
13.8   9.70  )-----------( 277      ( 10 Oct 2017 10:10 )             40.6     10-11    132<L>  |  91<L>  |  22  ----------------------------<  95  5.0   |  28  |  1.18    Ca    9.5      11 Oct 2017 08:34
10-12    136  |  98  |  21  ----------------------------<  95  5.1   |  22  |  0.79    Ca    10.4      12 Oct 2017 08:48

## 2017-10-14 RX ADMIN — Medication 1000 UNIT(S): at 12:04

## 2017-10-14 RX ADMIN — Medication 650 MILLIGRAM(S): at 13:50

## 2017-10-14 RX ADMIN — DIVALPROEX SODIUM 500 MILLIGRAM(S): 500 TABLET, DELAYED RELEASE ORAL at 18:18

## 2017-10-14 RX ADMIN — QUETIAPINE FUMARATE 50 MILLIGRAM(S): 200 TABLET, FILM COATED ORAL at 05:04

## 2017-10-14 RX ADMIN — Medication 650 MILLIGRAM(S): at 13:10

## 2017-10-14 RX ADMIN — HEPARIN SODIUM 5000 UNIT(S): 5000 INJECTION INTRAVENOUS; SUBCUTANEOUS at 21:06

## 2017-10-14 RX ADMIN — HEPARIN SODIUM 5000 UNIT(S): 5000 INJECTION INTRAVENOUS; SUBCUTANEOUS at 05:04

## 2017-10-14 RX ADMIN — QUETIAPINE FUMARATE 25 MILLIGRAM(S): 200 TABLET, FILM COATED ORAL at 12:51

## 2017-10-14 RX ADMIN — QUETIAPINE FUMARATE 50 MILLIGRAM(S): 200 TABLET, FILM COATED ORAL at 18:17

## 2017-10-14 RX ADMIN — DIVALPROEX SODIUM 500 MILLIGRAM(S): 500 TABLET, DELAYED RELEASE ORAL at 05:04

## 2017-10-14 RX ADMIN — HEPARIN SODIUM 5000 UNIT(S): 5000 INJECTION INTRAVENOUS; SUBCUTANEOUS at 14:06

## 2017-10-14 RX ADMIN — Medication 1 APPLICATION(S): at 18:14

## 2017-10-14 NOTE — PROGRESS NOTE ADULT - SUBJECTIVE AND OBJECTIVE BOX
Patient is a 89y old  Female who presents with a chief complaint of frequent falls (06 Oct 2017 16:45)      SUBJECTIVE / OVERNIGHT EVENTS: No nausea, vomiting or diarrhea, no fever or chills, no dizziness or chest pain, no dysuria or hematuria, no joint pain or swelling    MEDICATIONS  (STANDING):  cholecalciferol 1000 Unit(s) Oral daily  diVALproex  milliGRAM(s) Oral every 12 hours  heparin  Injectable 5000 Unit(s) SubCutaneous every 8 hours  QUEtiapine 50 milliGRAM(s) Oral two times a day  sodium chloride 0.9%. 1000 milliLiter(s) (75 mL/Hr) IV Continuous <Continuous>    MEDICATIONS  (PRN):  acetaminophen   Tablet. 650 milliGRAM(s) Oral every 6 hours PRN Mild Pain (1 - 3)  OLANZapine Injectable 1.25 milliGRAM(s) IntraMuscular every 8 hours PRN Severe agitation  QUEtiapine 25 milliGRAM(s) Oral every 6 hours PRN agitation          I&O's Summary    13 Oct 2017 07:01  -  14 Oct 2017 07:00  --------------------------------------------------------  IN: 960 mL / OUT: 0 mL / NET: 960 mL    PHYSICAL EXAM:  vital signs as above  in no apparent distress  HEENT: GEM EOMI  Neck: Supple, no JVD, no thyromegaly  Lungs: clear, no rhonchi, no wheeze, no crackles poorly cooperative  CVS: S1 S2 no M/R/G  Abdomen: no tenderness, no organomegaly, BS present  Neuro: Alert, talkative no focal weakness, moving all 4 extremities  Psych: confused at baseline  Skin: warm, dry  Ext: no edema, no clubbing  Msk: no joint swelling or deformities    LABS:        Consultant(s) Notes Reviewed:      Care Discussed with Consultants/Other Providers:    Contact Number, Dr Dexter [phone]

## 2017-10-14 NOTE — PROGRESS NOTE ADULT - SUBJECTIVE AND OBJECTIVE BOX
Subjective: Patient seen and examined. No new events except as noted.     SUBJECTIVE/ROS:    no new events     MEDICATIONS:  MEDICATIONS  (STANDING):  cholecalciferol 1000 Unit(s) Oral daily  diVALproex  milliGRAM(s) Oral every 12 hours  heparin  Injectable 5000 Unit(s) SubCutaneous every 8 hours  QUEtiapine 50 milliGRAM(s) Oral two times a day  sodium chloride 0.9%. 1000 milliLiter(s) (75 mL/Hr) IV Continuous <Continuous>      PHYSICAL EXAM:  T(C): 36.8 (10-13-17 @ 22:15), Max: 36.8 (10-13-17 @ 22:15)  HR: 70 (10-13-17 @ 22:15) (70 - 74)  BP: 118/68 (10-13-17 @ 22:15) (112/64 - 118/68)  RR: 20 (10-13-17 @ 22:15) (18 - 20)  SpO2: 98% (10-13-17 @ 22:15) (98% - 99%)  Wt(kg): --  I&O's Summary    13 Oct 2017 07:01  -  14 Oct 2017 07:00  --------------------------------------------------------  IN: 960 mL / OUT: 0 mL / NET: 960 mL          Appearance: Normal	  HEENT:   Normal oral mucosa, PERRL, EOMI	  Cardiovascular: Normal S1 S2,    Murmur:   Neck: JVP normal  Respiratory: Lungs clear to auscultation  Gastrointestinal:  Soft, Non-tender, + BS	  Skin: normal   Neuro: No gross deficits.   Psychiatry:  Mood & affect appropriate  Ext: No edema        LABS:    CARDIAC MARKERS:                  proBNP:   Lipid Profile:   HgA1c:   TSH:           TELEMETRY: 	    ECG:  	  RADIOLOGY:   DIAGNOSTIC TESTING:  Echocardiogram:  Catheterization:  Stress Test:    OTHER:

## 2017-10-15 LAB
CHOLEST SERPL-MCNC: 172 MG/DL — SIGNIFICANT CHANGE UP (ref 10–199)
HDLC SERPL-MCNC: 66 MG/DL — SIGNIFICANT CHANGE UP (ref 40–125)
LIPID PNL WITH DIRECT LDL SERPL: 90 MG/DL — SIGNIFICANT CHANGE UP
TOTAL CHOLESTEROL/HDL RATIO MEASUREMENT: 2.6 RATIO — LOW (ref 3.3–7.1)
TRIGL SERPL-MCNC: 82 MG/DL — SIGNIFICANT CHANGE UP (ref 10–149)

## 2017-10-15 RX ADMIN — QUETIAPINE FUMARATE 50 MILLIGRAM(S): 200 TABLET, FILM COATED ORAL at 17:00

## 2017-10-15 RX ADMIN — DIVALPROEX SODIUM 500 MILLIGRAM(S): 500 TABLET, DELAYED RELEASE ORAL at 17:00

## 2017-10-15 RX ADMIN — HEPARIN SODIUM 5000 UNIT(S): 5000 INJECTION INTRAVENOUS; SUBCUTANEOUS at 11:49

## 2017-10-15 RX ADMIN — DIVALPROEX SODIUM 500 MILLIGRAM(S): 500 TABLET, DELAYED RELEASE ORAL at 05:52

## 2017-10-15 RX ADMIN — Medication 1 APPLICATION(S): at 17:00

## 2017-10-15 RX ADMIN — HEPARIN SODIUM 5000 UNIT(S): 5000 INJECTION INTRAVENOUS; SUBCUTANEOUS at 20:39

## 2017-10-15 RX ADMIN — HEPARIN SODIUM 5000 UNIT(S): 5000 INJECTION INTRAVENOUS; SUBCUTANEOUS at 05:52

## 2017-10-15 RX ADMIN — QUETIAPINE FUMARATE 25 MILLIGRAM(S): 200 TABLET, FILM COATED ORAL at 02:52

## 2017-10-15 RX ADMIN — Medication 1000 UNIT(S): at 11:49

## 2017-10-15 RX ADMIN — QUETIAPINE FUMARATE 50 MILLIGRAM(S): 200 TABLET, FILM COATED ORAL at 05:52

## 2017-10-15 RX ADMIN — Medication 1 APPLICATION(S): at 05:52

## 2017-10-15 NOTE — PROGRESS NOTE ADULT - SUBJECTIVE AND OBJECTIVE BOX
Patient is a 89y old  Female who presents with a chief complaint of frequent falls (06 Oct 2017 16:45)      SUBJECTIVE / OVERNIGHT EVENTS: No nausea, vomiting or diarrhea, no fever or chills, no dizziness or chest pain, no dysuria or hematuria, no joint pain or swelling    MEDICATIONS  (STANDING):  cholecalciferol 1000 Unit(s) Oral daily  clobetasol 0.05% Ointment 1 Application(s) Topical two times a day  diVALproex  milliGRAM(s) Oral every 12 hours  heparin  Injectable 5000 Unit(s) SubCutaneous every 8 hours  QUEtiapine 50 milliGRAM(s) Oral two times a day  sodium chloride 0.9%. 1000 milliLiter(s) (75 mL/Hr) IV Continuous <Continuous>    MEDICATIONS  (PRN):  acetaminophen   Tablet. 650 milliGRAM(s) Oral every 6 hours PRN Mild Pain (1 - 3)  OLANZapine Injectable 1.25 milliGRAM(s) IntraMuscular every 8 hours PRN Severe agitation  QUEtiapine 25 milliGRAM(s) Oral every 6 hours PRN agitation      I&O's Summary    14 Oct 2017 07:01  -  15 Oct 2017 07:00  --------------------------------------------------------  IN: 0 mL / OUT: 500 mL / NET: -500 mL    15 Oct 2017 07:01  -  15 Oct 2017 14:04  --------------------------------------------------------  IN: 300 mL / OUT: 0 mL / NET: 300 mL      PHYSICAL EXAM:  vital signs as above  in no apparent distress  HEENT: GEM EOMI  Neck: Supple, no JVD, no thyromegaly  Lungs: clear, no rhonchi, no wheeze, no crackles poorly cooperative  CVS: S1 S2 no M/R/G  Abdomen: no tenderness, no organomegaly, BS present  Neuro: Alert, talkative no focal weakness, moving all 4 extremities  Psych: confused at baseline  Skin: warm, dry  Ext: no edema, no clubbing  Msk: no joint swelling or deformities    LABS:                      Consultant(s) Notes Reviewed:      Care Discussed with Consultants/Other Providers:    Contact Number, Dr Dexter [phone]

## 2017-10-16 PROCEDURE — 70450 CT HEAD/BRAIN W/O DYE: CPT | Mod: 26

## 2017-10-16 RX ADMIN — QUETIAPINE FUMARATE 50 MILLIGRAM(S): 200 TABLET, FILM COATED ORAL at 06:10

## 2017-10-16 RX ADMIN — Medication 1 APPLICATION(S): at 06:09

## 2017-10-16 RX ADMIN — QUETIAPINE FUMARATE 50 MILLIGRAM(S): 200 TABLET, FILM COATED ORAL at 17:12

## 2017-10-16 RX ADMIN — HEPARIN SODIUM 5000 UNIT(S): 5000 INJECTION INTRAVENOUS; SUBCUTANEOUS at 21:09

## 2017-10-16 RX ADMIN — DIVALPROEX SODIUM 500 MILLIGRAM(S): 500 TABLET, DELAYED RELEASE ORAL at 06:10

## 2017-10-16 RX ADMIN — DIVALPROEX SODIUM 500 MILLIGRAM(S): 500 TABLET, DELAYED RELEASE ORAL at 17:11

## 2017-10-16 RX ADMIN — Medication 1000 UNIT(S): at 13:36

## 2017-10-16 RX ADMIN — HEPARIN SODIUM 5000 UNIT(S): 5000 INJECTION INTRAVENOUS; SUBCUTANEOUS at 13:36

## 2017-10-16 RX ADMIN — HEPARIN SODIUM 5000 UNIT(S): 5000 INJECTION INTRAVENOUS; SUBCUTANEOUS at 06:10

## 2017-10-16 RX ADMIN — Medication 1 APPLICATION(S): at 17:13

## 2017-10-16 NOTE — PROGRESS NOTE ADULT - SUBJECTIVE AND OBJECTIVE BOX
Subjective: Patient seen and examined. No new events except as noted.     SUBJECTIVE/ROS:  No chest pain, or sob.       MEDICATIONS:  MEDICATIONS  (STANDING):  cholecalciferol 1000 Unit(s) Oral daily  clobetasol 0.05% Ointment 1 Application(s) Topical two times a day  diVALproex  milliGRAM(s) Oral every 12 hours  heparin  Injectable 5000 Unit(s) SubCutaneous every 8 hours  QUEtiapine 50 milliGRAM(s) Oral two times a day  sodium chloride 0.9%. 1000 milliLiter(s) (75 mL/Hr) IV Continuous <Continuous>      PHYSICAL EXAM:  T(C): 36.5 (10-15-17 @ 23:53), Max: 36.6 (10-15-17 @ 15:56)  HR: 64 (10-15-17 @ 23:53) (64 - 89)  BP: 105/59 (10-15-17 @ 23:53) (105/59 - 150/80)  RR: 18 (10-15-17 @ 23:53) (18 - 18)  SpO2: 95% (10-15-17 @ 23:53) (95% - 96%)  Wt(kg): --  I&O's Summary    15 Oct 2017 07:01  -  16 Oct 2017 07:00  --------------------------------------------------------  IN: 420 mL / OUT: 0 mL / NET: 420 mL          Appearance: Normal	  HEENT:   Normal oral mucosa, PERRL, EOMI	  Cardiovascular: Normal S1 S2,    Murmur:   Neck: JVP normal  Respiratory: Lungs clear to auscultation  Gastrointestinal:  Soft, Non-tender, + BS	  Skin: normal   Neuro: No gross deficits.   Psychiatry:  Mood & affect appropriate  Ext: No edema      LABS/DATA:    CARDIAC MARKERS:                  proBNP:   Lipid Profile:   HgA1c:   TSH:     TELE:  EKG:

## 2017-10-16 NOTE — CHART NOTE - NSCHARTNOTEFT_GEN_A_CORE
Called by RN for pt with unwitnessed fall this evening. Seen pt in bed, fidgeting with her gown  with continued confusion noted and not making sense of what she is saying.  Pt offers no complaints at this time. Assessment benign, pt without any physical injury. D/w Dr. Dexter, will place pt on 1:1 and order CT head. Attempted to call pt son (Zac), but no response.

## 2017-10-16 NOTE — PROGRESS NOTE ADULT - SUBJECTIVE AND OBJECTIVE BOX
Patient is a 89y old  Female who presents with a chief complaint of frequent falls (06 Oct 2017 16:45)      SUBJECTIVE / OVERNIGHT EVENTS: No nausea, vomiting or diarrhea, no fever or chills, no dizziness or chest pain, no dysuria or hematuria, no joint pain or swelling  calm    MEDICATIONS  (STANDING):  cholecalciferol 1000 Unit(s) Oral daily  clobetasol 0.05% Ointment 1 Application(s) Topical two times a day  diVALproex  milliGRAM(s) Oral every 12 hours  heparin  Injectable 5000 Unit(s) SubCutaneous every 8 hours  QUEtiapine 50 milliGRAM(s) Oral two times a day  sodium chloride 0.9%. 1000 milliLiter(s) (75 mL/Hr) IV Continuous <Continuous>    MEDICATIONS  (PRN):  acetaminophen   Tablet. 650 milliGRAM(s) Oral every 6 hours PRN Mild Pain (1 - 3)  OLANZapine Injectable 1.25 milliGRAM(s) IntraMuscular every 8 hours PRN Severe agitation  QUEtiapine 25 milliGRAM(s) Oral every 6 hours PRN agitation        CAPILLARY BLOOD GLUCOSE        I&O's Summary    15 Oct 2017 07:01  -  16 Oct 2017 07:00  --------------------------------------------------------  IN: 420 mL / OUT: 0 mL / NET: 420 mL    16 Oct 2017 07:01  -  16 Oct 2017 14:39  --------------------------------------------------------  IN: 800 mL / OUT: 0 mL / NET: 800 mL    PHYSICAL EXAM:  vital signs as above  in no apparent distress  HEENT: GEM EOMI  Neck: Supple, no JVD, no thyromegaly  Lungs: clear, no rhonchi, no wheeze, no crackles poorly cooperative  CVS: S1 S2 no M/R/G  Abdomen: no tenderness, no organomegaly, BS present  Neuro: Alert, talkative no focal weakness, moving all 4 extremities  Psych: confused at baseline  Skin: warm, dry  Ext: no edema, no clubbing  Msk: no joint swelling or deformities    LABS: none                      Consultant(s) Notes Reviewed:      Care Discussed with Consultants/Other Providers:    Contact Number, Dr Dexter [phone]

## 2017-10-17 PROCEDURE — 99232 SBSQ HOSP IP/OBS MODERATE 35: CPT

## 2017-10-17 RX ORDER — OXYCODONE AND ACETAMINOPHEN 5; 325 MG/1; MG/1
1 TABLET ORAL
Qty: 0 | Refills: 0 | Status: DISCONTINUED | OUTPATIENT
Start: 2017-10-17 | End: 2017-10-17

## 2017-10-17 RX ORDER — PREGABALIN 225 MG/1
1000 CAPSULE ORAL DAILY
Qty: 0 | Refills: 0 | Status: COMPLETED | OUTPATIENT
Start: 2017-10-17 | End: 2017-10-18

## 2017-10-17 RX ORDER — OXYCODONE AND ACETAMINOPHEN 5; 325 MG/1; MG/1
1 TABLET ORAL EVERY 6 HOURS
Qty: 0 | Refills: 0 | Status: DISCONTINUED | OUTPATIENT
Start: 2017-10-17 | End: 2017-10-18

## 2017-10-17 RX ORDER — OXYCODONE AND ACETAMINOPHEN 5; 325 MG/1; MG/1
1 TABLET ORAL
Qty: 0 | Refills: 0 | Status: DISCONTINUED | OUTPATIENT
Start: 2017-10-17 | End: 2017-10-18

## 2017-10-17 RX ADMIN — OXYCODONE AND ACETAMINOPHEN 1 TABLET(S): 5; 325 TABLET ORAL at 22:00

## 2017-10-17 RX ADMIN — OXYCODONE AND ACETAMINOPHEN 1 TABLET(S): 5; 325 TABLET ORAL at 19:00

## 2017-10-17 RX ADMIN — Medication 650 MILLIGRAM(S): at 08:40

## 2017-10-17 RX ADMIN — HEPARIN SODIUM 5000 UNIT(S): 5000 INJECTION INTRAVENOUS; SUBCUTANEOUS at 21:26

## 2017-10-17 RX ADMIN — QUETIAPINE FUMARATE 50 MILLIGRAM(S): 200 TABLET, FILM COATED ORAL at 17:39

## 2017-10-17 RX ADMIN — DIVALPROEX SODIUM 500 MILLIGRAM(S): 500 TABLET, DELAYED RELEASE ORAL at 17:39

## 2017-10-17 RX ADMIN — Medication 650 MILLIGRAM(S): at 16:25

## 2017-10-17 RX ADMIN — OXYCODONE AND ACETAMINOPHEN 1 TABLET(S): 5; 325 TABLET ORAL at 19:45

## 2017-10-17 RX ADMIN — Medication 1 APPLICATION(S): at 17:39

## 2017-10-17 RX ADMIN — Medication 1 APPLICATION(S): at 05:27

## 2017-10-17 RX ADMIN — Medication 650 MILLIGRAM(S): at 15:40

## 2017-10-17 RX ADMIN — HEPARIN SODIUM 5000 UNIT(S): 5000 INJECTION INTRAVENOUS; SUBCUTANEOUS at 15:23

## 2017-10-17 RX ADMIN — Medication 1000 UNIT(S): at 15:23

## 2017-10-17 RX ADMIN — OXYCODONE AND ACETAMINOPHEN 1 TABLET(S): 5; 325 TABLET ORAL at 21:28

## 2017-10-17 RX ADMIN — QUETIAPINE FUMARATE 50 MILLIGRAM(S): 200 TABLET, FILM COATED ORAL at 05:27

## 2017-10-17 RX ADMIN — DIVALPROEX SODIUM 500 MILLIGRAM(S): 500 TABLET, DELAYED RELEASE ORAL at 05:27

## 2017-10-17 RX ADMIN — Medication 650 MILLIGRAM(S): at 07:55

## 2017-10-17 RX ADMIN — HEPARIN SODIUM 5000 UNIT(S): 5000 INJECTION INTRAVENOUS; SUBCUTANEOUS at 05:27

## 2017-10-17 NOTE — PROGRESS NOTE ADULT - SUBJECTIVE AND OBJECTIVE BOX
Patient is a 89y old  Female who presents with a chief complaint of frequent falls (06 Oct 2017 16:45)      SUBJECTIVE / OVERNIGHT EVENTS: No nausea, vomiting or diarrhea, no fever or chills, no dizziness or chest pain, no dysuria or hematuria, no joint pain or swelling    MEDICATIONS  (STANDING):  cholecalciferol 1000 Unit(s) Oral daily  clobetasol 0.05% Ointment 1 Application(s) Topical two times a day  diVALproex  milliGRAM(s) Oral every 12 hours  heparin  Injectable 5000 Unit(s) SubCutaneous every 8 hours  QUEtiapine 50 milliGRAM(s) Oral two times a day  sodium chloride 0.9%. 1000 milliLiter(s) (75 mL/Hr) IV Continuous <Continuous>    MEDICATIONS  (PRN):  acetaminophen   Tablet. 650 milliGRAM(s) Oral every 6 hours PRN Mild Pain (1 - 3)  OLANZapine Injectable 1.25 milliGRAM(s) IntraMuscular every 8 hours PRN Severe agitation  QUEtiapine 25 milliGRAM(s) Oral every 6 hours PRN agitation        CAPILLARY BLOOD GLUCOSE        I&O's Summary    16 Oct 2017 07:01  -  17 Oct 2017 07:00  --------------------------------------------------------  IN: 800 mL / OUT: 0 mL / NET: 800 mL    17 Oct 2017 07:01  -  17 Oct 2017 12:28  --------------------------------------------------------  IN: 760 mL / OUT: 250 mL / NET: 510 mL    PHYSICAL EXAM:  vital signs as above  in no apparent distress  HEENT: GEM EOMI  Neck: Supple, no JVD, no thyromegaly  Lungs: clear, no rhonchi, no wheeze, no crackles poorly cooperative  CVS: S1 S2 no M/R/G  Abdomen: no tenderness, no organomegaly, BS present  Neuro: Alert, talkative no focal weakness, moving all 4 extremities  Psych: confused at baseline  Skin: warm, dry  Ext: no edema, no clubbing  Msk: no joint swelling or deformities    LABS:                      Consultant(s) Notes Reviewed:      Care Discussed with Consultants/Other Providers:    Contact Number, Dr Dexter [phone]

## 2017-10-18 ENCOUNTER — TRANSCRIPTION ENCOUNTER (OUTPATIENT)
Age: 82
End: 2017-10-18

## 2017-10-18 VITALS
DIASTOLIC BLOOD PRESSURE: 65 MMHG | OXYGEN SATURATION: 94 % | SYSTOLIC BLOOD PRESSURE: 107 MMHG | RESPIRATION RATE: 18 BRPM | HEART RATE: 86 BPM | TEMPERATURE: 98 F

## 2017-10-18 PROCEDURE — 99232 SBSQ HOSP IP/OBS MODERATE 35: CPT

## 2017-10-18 RX ORDER — QUETIAPINE FUMARATE 200 MG/1
1 TABLET, FILM COATED ORAL
Qty: 0 | Refills: 0 | COMMUNITY
Start: 2017-10-18

## 2017-10-18 RX ORDER — CHOLECALCIFEROL (VITAMIN D3) 125 MCG
1000 CAPSULE ORAL
Qty: 0 | Refills: 0 | COMMUNITY
Start: 2017-10-18

## 2017-10-18 RX ORDER — ALPRAZOLAM 0.25 MG
1 TABLET ORAL
Qty: 0 | Refills: 0 | COMMUNITY

## 2017-10-18 RX ORDER — NIFEDIPINE 30 MG
1 TABLET, EXTENDED RELEASE 24 HR ORAL
Qty: 0 | Refills: 0 | COMMUNITY

## 2017-10-18 RX ORDER — SERTRALINE 25 MG/1
1 TABLET, FILM COATED ORAL
Qty: 0 | Refills: 0 | COMMUNITY

## 2017-10-18 RX ADMIN — DIVALPROEX SODIUM 500 MILLIGRAM(S): 500 TABLET, DELAYED RELEASE ORAL at 05:14

## 2017-10-18 RX ADMIN — PREGABALIN 1000 MICROGRAM(S): 225 CAPSULE ORAL at 12:19

## 2017-10-18 RX ADMIN — Medication 1 APPLICATION(S): at 05:14

## 2017-10-18 RX ADMIN — PREGABALIN 1000 MICROGRAM(S): 225 CAPSULE ORAL at 00:02

## 2017-10-18 RX ADMIN — HEPARIN SODIUM 5000 UNIT(S): 5000 INJECTION INTRAVENOUS; SUBCUTANEOUS at 05:15

## 2017-10-18 RX ADMIN — OXYCODONE AND ACETAMINOPHEN 1 TABLET(S): 5; 325 TABLET ORAL at 14:02

## 2017-10-18 RX ADMIN — OXYCODONE AND ACETAMINOPHEN 1 TABLET(S): 5; 325 TABLET ORAL at 13:03

## 2017-10-18 RX ADMIN — Medication 1000 UNIT(S): at 12:19

## 2017-10-18 RX ADMIN — HEPARIN SODIUM 5000 UNIT(S): 5000 INJECTION INTRAVENOUS; SUBCUTANEOUS at 13:06

## 2017-10-18 NOTE — DISCHARGE NOTE ADULT - PROVIDER TOKENS
FREE:[LAST:[Primary physician at],FIRST:[[facility]],PHONE:[(   )    -],FAX:[(   )    -],ADDRESS:[[facility]]]

## 2017-10-18 NOTE — PROGRESS NOTE BEHAVIORAL HEALTH - NSBHCHARTREVIEWVS_PSY_A_CORE FT
T(C): 36.4 (10-10-17 @ 08:03), Max: 36.7 (10-09-17 @ 21:30)  HR: 90 (10-10-17 @ 08:03) (70 - 90)  BP: 92/51 (10-10-17 @ 08:03) (92/51 - 148/66)  RR: 18 (10-10-17 @ 08:03) (18 - 18)  SpO2: 94% (10-10-17 @ 08:03) (94% - 98%)  Wt(kg): --
Vital Signs Last 24 Hrs  T(C): 36.3 (11 Oct 2017 07:31), Max: 36.4 (10 Oct 2017 15:44)  T(F): 97.3 (11 Oct 2017 07:31), Max: 97.6 (10 Oct 2017 15:44)  HR: 86 (11 Oct 2017 07:31) (68 - 86)  BP: 154/65 (11 Oct 2017 07:31) (120/69 - 154/65)  BP(mean): --  RR: 18 (11 Oct 2017 07:31) (18 - 18)  SpO2: 98% (11 Oct 2017 07:31) (96% - 98%)
Vital Signs Last 24 Hrs  T(C): 36.4 (12 Oct 2017 07:38), Max: 36.7 (11 Oct 2017 21:52)  T(F): 97.6 (12 Oct 2017 07:38), Max: 98 (11 Oct 2017 21:52)  HR: 73 (12 Oct 2017 07:38) (73 - 77)  BP: 190/79 (12 Oct 2017 07:38) (157/77 - 190/79)  BP(mean): --  RR: 18 (12 Oct 2017 07:38) (18 - 18)  SpO2: 98% (12 Oct 2017 07:38) (96% - 98%)
Vital Signs Last 24 Hrs  T(C): 36.4 (18 Oct 2017 10:51), Max: 36.5 (17 Oct 2017 15:07)  T(F): 97.5 (18 Oct 2017 10:51), Max: 97.7 (17 Oct 2017 15:07)  HR: 64 (18 Oct 2017 10:51) (64 - 68)  BP: 129/73 (18 Oct 2017 10:51) (129/73 - 150/73)  BP(mean): --  RR: 18 (18 Oct 2017 10:51) (18 - 18)  SpO2: 100% (18 Oct 2017 10:51) (97% - 100%)
Vital Signs Last 24 Hrs  T(C): 36.8 (17 Oct 2017 10:00), Max: 36.8 (17 Oct 2017 10:00)  T(F): 98.2 (17 Oct 2017 10:00), Max: 98.2 (17 Oct 2017 10:00)  HR: 86 (17 Oct 2017 10:00) (64 - 127)  BP: 145/76 (17 Oct 2017 10:00) (109/65 - 192/99)  BP(mean): --  RR: 18 (17 Oct 2017 10:00) (18 - 22)  SpO2: 96% (17 Oct 2017 10:00) (95% - 98%)
T(C): 36.6 (10-12-17 @ 15:51), Max: 36.6 (10-12-17 @ 15:51)  HR: 71 (10-13-17 @ 00:19) (71 - 74)  BP: 109/69 (10-13-17 @ 00:19) (108/66 - 109/69)  RR: 18 (10-13-17 @ 00:19) (18 - 18)  SpO2: 96% (10-13-17 @ 00:19) (96% - 97%)  Wt(kg): --

## 2017-10-18 NOTE — DISCHARGE NOTE ADULT - HOSPITAL COURSE
89 y/o female h/o bipolar and dementia, reportedly nonambulatory presenting after unwitnessed fall. unclear how it occurred. per report found on ground. pt currently has no complaints, denies musculoskeletal pain, does not remember fall.   Pt was admitted for urinary tract infection and acute kidney injury. Pt received intravenous antibiotics Rocephin and intravenous hydration. Her labs revealed vitamin d deficiency and was started on vitamin d. Her hospital course was complicated with psychomotor agitation with delirium. Pt was initially placed on 1:1 for severe agitation and was evaluated by psychiatry. Her Seroquel was increased to 50 mg 2x a day  because of extreme agitation. Her son had also expressed concern of resuming her percocet around the clock for pain. Pt had complained of lower back pain  and received Tylenol however due to son's request percocet was added. Pt is hemodynamically stable and will need psychiatry follow up .

## 2017-10-18 NOTE — PROGRESS NOTE ADULT - PROBLEM SELECTOR PLAN 3
s/p fall yesterday  back on 1:1 observation  psych to help with behaviour control  ct head negative
fall precautions  1:1 observation  psych evaluation for behavior management
calmer overnight  fall precautions  replete B12 (low normal)
fall precautions  1:1 observation  psych evaluation for behavior management
fall precautions  1:1 observation  psych evaluation for behavior management
will attempt to discontinue 1:1 observation  psych evaluation appreciated  follow recommendations

## 2017-10-18 NOTE — PROGRESS NOTE BEHAVIORAL HEALTH - NSBHFUPREASONCONS_PSY_A_CORE
agitation
agitation
delerium/agitation/dementia
delerium/dementia/agitation
delerium/dementia/agitation
dementia/delerium/agitation

## 2017-10-18 NOTE — DISCHARGE NOTE ADULT - OTHER SIGNIFICANT FINDINGS
Patient ID: [id number] Patient Name: DONNA BARBRE   YOB: 1927 Sex: F        EXAM: CT BRAIN      PROCEDURE DATE: 10/16/2017          INTERPRETATION: Noncontrast CT of the brain.    CLINICAL INDICATION: syncope, uti, dementia    TECHNIQUE : Axial CT scanning of the brain was obtained from the skull base  to the vertex without the administration of intravenous contrast.    COMPARISON: CT brain 10/6/2017    FINDINGS: There is no hydrocephalus, mass effect, midline shift, or  intracranial hemorrhage. There is no CT evidence of acute territorial  infarct. There is moderate white matter microvascular ischemic disease.    There is no displaced calvarial fracture. The visualized paranasal sinuses  and mastoid air cells are clear.    IMPRESSION: No acute intracranial hemorrhage, mass effect, or evidence of  acute territorial infarct.                        JOSETTE CRAVEN M.D., ATTENDING RADIOLOGIST  This document has been electronically signed. Oct 17 2017 8:49AM

## 2017-10-18 NOTE — PROGRESS NOTE ADULT - PROBLEM SELECTOR PROBLEM 3
Dementia

## 2017-10-18 NOTE — PROGRESS NOTE ADULT - PROBLEM SELECTOR PROBLEM 2
Multiple falls

## 2017-10-18 NOTE — PROGRESS NOTE BEHAVIORAL HEALTH - NSBHCHARTREVIEWIMAGING_PSY_A_CORE FT
13.8   9.70  )-----------( 277      ( 10 Oct 2017 10:10 )             40.6     10-11    132<L>  |  91<L>  |  22  ----------------------------<  95  5.0   |  28  |  1.18    Ca    9.5      11 Oct 2017 08:34

## 2017-10-18 NOTE — DISCHARGE NOTE ADULT - MEDICATION SUMMARY - MEDICATIONS TO STOP TAKING
I will STOP taking the medications listed below when I get home from the hospital:    QUEtiapine 25 mg oral tablet  -- 1 tab(s) by mouth every 6 hours, As needed, agitation

## 2017-10-18 NOTE — PROGRESS NOTE ADULT - ATTENDING COMMENTS
awaiting discharge to long term facility
discharge back to long term facility tomorrow
discharge planning 1 - 2 days
discussed with patient in detail, all questions answered
detailed d/w son Zac over the phone all questions answered
awaiting acceptance into long term facility
awaiting long term facility
awaiting transfer to long term facility

## 2017-10-18 NOTE — PROGRESS NOTE BEHAVIORAL HEALTH - NSBHCONSULTWRKUPYES_PSY_A_CORE
labs/b12, folate, TSH, vit D, RPR, CT head, UA, Ucx, CXR if not done already
b12, folate, TSH, vit D, RPR, CT head, UA, Ucx, CXR if not done already/labs
labs/b12, folate, TSH, vit D, RPR, CT head, UA, Ucx, CXR if not done already
labs/b12, folate, TSH, vit D, RPR, CT head, UA, Ucx, CXR if not done already

## 2017-10-18 NOTE — DISCHARGE NOTE ADULT - CARE PROVIDER_API CALL
[facility]  Phone: (   )    -  Fax: (   )    -

## 2017-10-18 NOTE — PROGRESS NOTE ADULT - PROBLEM SELECTOR PROBLEM 1
UTI (urinary tract infection)

## 2017-10-18 NOTE — PROGRESS NOTE BEHAVIORAL HEALTH - NSBHFUPINTERVALHXFT_PSY_A_CORE
Pt confused, unable to explain details of admission, pt disoriented, no si/hi. Pt thinks she is at home, stating she needs to go to [employer]. Pt denies paranoia. As per staff, pt agitated at times, trying to get out of bed, received seroquel prn this am, and last night, as well as zyprexa im prn last night.
Pt confused, unable to explain details of admission, pt disoriented, no si/hi. Pt was speaking about 2 females in her room, unclear details. As per staff, pt restless at times, received prns last night and this am.
Pt reports feeling confused, couldn't explain recent events. Pt denies pain. Pt denies hallucinations, delusions. As per staff, pt intermittently agitated, no prns overnight, not sleeping much.
Pt reports feeling confused, couldn't explain recent events. Pt denies pain. Pt denies hallucinations, delusions. As per staff, pt less agitated today, no prns given overnight, confused.
Pt seen, d/w PCA at bedside. Pt. is asleep, unable to engage in interview. Pt. was restless earlier today as per PCA, got 1PRN of Seroquel in AM , ate her breakfast. As per PCA, pt was picking at her bedsheets, no combativeness, pt has not been screaming, no yelling or kicking staff.
Pt is awake, alert, not oriented to place, time, or situation, unable to explain details of admission,  no si/hi. Pt denies paranoia. As per staff, pt has been in good behavioral control. no agitation today.

## 2017-10-18 NOTE — PROGRESS NOTE ADULT - PROBLEM SELECTOR PLAN 6
start Vit D3 1000 units po qd
start Vit D3 1000 units po qd
will monitor   creatinine 1.1 up from 0.6 essentially double  unclear etiology   will gently hydrate  trend creatinine

## 2017-10-18 NOTE — PROGRESS NOTE BEHAVIORAL HEALTH - SECONDARY DX1
Delirium due to another medical condition

## 2017-10-18 NOTE — PROGRESS NOTE ADULT - SUBJECTIVE AND OBJECTIVE BOX
Patient is a 89y old  Female who presents with a chief complaint of altered mental status (18 Oct 2017 07:12)      SUBJECTIVE / OVERNIGHT EVENTS: No nausea, vomiting or diarrhea, no fever or chills, no dizziness or chest pain, no dysuria or hematuria, no joint pain or swelling    no falls overnight      MEDICATIONS  (STANDING):  cholecalciferol 1000 Unit(s) Oral daily  clobetasol 0.05% Ointment 1 Application(s) Topical two times a day  diVALproex  milliGRAM(s) Oral every 12 hours  heparin  Injectable 5000 Unit(s) SubCutaneous every 8 hours  oxyCODONE    5 mG/acetaminophen 325 mG 1 Tablet(s) Oral <User Schedule>  QUEtiapine 50 milliGRAM(s) Oral two times a day  sodium chloride 0.9%. 1000 milliLiter(s) (75 mL/Hr) IV Continuous <Continuous>    MEDICATIONS  (PRN):  acetaminophen   Tablet. 650 milliGRAM(s) Oral every 6 hours PRN Mild Pain (1 - 3)  OLANZapine Injectable 1.25 milliGRAM(s) IntraMuscular every 8 hours PRN Severe agitation  oxyCODONE    5 mG/acetaminophen 325 mG 1 Tablet(s) Oral every 6 hours PRN Moderate Pain (4 - 6)  QUEtiapine 25 milliGRAM(s) Oral every 6 hours PRN agitation        CAPILLARY BLOOD GLUCOSE        I&O's Summary    17 Oct 2017 07:01  -  18 Oct 2017 07:00  --------------------------------------------------------  IN: 760 mL / OUT: 250 mL / NET: 510 mL      PHYSICAL EXAM:  vital signs as above  in no apparent distress  HEENT: GEM EOMI  Neck: Supple, no JVD, no thyromegaly  Lungs: clear, no rhonchi, no wheeze, no crackles poorly cooperative  CVS: S1 S2 no M/R/G  Abdomen: no tenderness, no organomegaly, BS present  Neuro: Alert, talkative no focal weakness, moving all 4 extremities  Psych: confused at baseline  Skin: warm, dry  Ext: no edema, no clubbing  Msk: no joint swelling or deformities    LABS: none                      Consultant(s) Notes Reviewed:      Care Discussed with Consultants/Other Providers:    Contact Number, Dr Dexter [phone]

## 2017-10-18 NOTE — PROGRESS NOTE BEHAVIORAL HEALTH - LANGUAGE
No abnormalities noted
Other
No abnormalities noted
No abnormalities noted

## 2017-10-18 NOTE — PROGRESS NOTE ADULT - PROBLEM SELECTOR PLAN 4
continue meds will monitor  acceptable for now
continue meds will monitor
continue meds will monitor  acceptable for now

## 2017-10-18 NOTE — PROGRESS NOTE BEHAVIORAL HEALTH - NSBHCHARTREVIEWINVESTIGATE_PSY_A_CORE FT
< from: 12 Lead ECG (10.08.17 @ 15:58) >      Ventricular Rate 87 BPM    Atrial Rate 87 BPM    P-R Interval 162 ms    QRS Duration 88 ms     ms    QTc 478 ms    P Axis 65 degrees    R Axis 31 degrees    T Axis 73 degrees    Diagnosis Line POOR QUALITY TRACING, MAY ADVERSLY AFFECT INTERPRETATION  SINUS RHYTHM WITH  PREMATURE SUPRAVENTRICULAR COMPLEXES  POOR R WAVE PROGRESSION  NON-SPECIFIC ST- T WAVE ABNORMALITY    < end of copied text >

## 2017-10-18 NOTE — PROGRESS NOTE BEHAVIORAL HEALTH - PRIMARY DX
Dementia associated with other underlying disease without behavioral disturbance

## 2017-10-18 NOTE — PROGRESS NOTE BEHAVIORAL HEALTH - NSBHLOC_PSY_A_CORE
Alert
Alert
Lethargic, arousable to verbal stimulus
Lethargic, arousable to verbal stimulus
Alert
Lethargic, arousable to tactile stimulus

## 2017-10-18 NOTE — PROGRESS NOTE ADULT - PROBLEM SELECTOR PLAN 5
resolved  will monitor  check creatinine tomorrow
check fasting lipid panel
resolved  will monitor as outpatient

## 2017-10-18 NOTE — PROGRESS NOTE BEHAVIORAL HEALTH - PERCEPTIONS
Other
Visual hallucinations

## 2017-10-18 NOTE — PROGRESS NOTE ADULT - PROBLEM SELECTOR PROBLEM 5
SHOAIB (acute kidney injury)
Hyperlipidemia
SHOAIB (acute kidney injury)

## 2017-10-18 NOTE — PROGRESS NOTE BEHAVIORAL HEALTH - GAIT / STATION
Abnormal gait / station

## 2017-10-18 NOTE — DISCHARGE NOTE ADULT - PLAN OF CARE
monitor for signs and symptoms of infection and dehydration pt was treated with antibiotics for uti and intravenous fluids for dehydration fall precautions. Pt had one episode of fall in the hospital and placed on 1:1 for safety and agitation. Pt's agitation improved with adding percocet at bedtime and Around the clock. Seroquel dose increased to 50 mgs 2x a day. pt needs to follow-up with psychiatry at rehab . You had a bladder &/or kidney infection  Call your doctor with pain or burning with urination, frequent urination, feeling to urinate suddenly, blood in urine, fever, back pain, nausea or vomiting  You need to take and finish your antibiotic as prescribed so that the infection does not reoccur  Drink adequate fluids - there is no harm to try cranberry juice  Females need to urinate right after sex supportive care

## 2017-10-18 NOTE — PROGRESS NOTE BEHAVIORAL HEALTH - SUMMARY
Pt is a 89 y/o female, with dementia 2ary to parkinson's dz,  unclear details of ppxh, pmhx, presented with s/p unwitnessed fall, seen by psychiatry for agitation. pt doesn't recall details of the fall, is confused, disoriented, hasn't been agitated today and hasn't received any PRNs.
Pt is a 89 y/o female, with dementia 2ary to parkinson's dz,  unclear details of ppxh, pmhx, presented with s/p unwitnessed fall, seen by psychiatry for agitation. pt doesn't recall details of the fall, is confused, disoriented, agitated last night, today a little restless, with a notable tremor. Pt. denies any psychiatric sxs, no si/hi/i/p, no a/v/h, no paranoia. pt also has a UTI.
Pt is a 89 y/o female, with dementia 2ary to parkinson's dz,  unclear details of ppxh, pmhx, presented with s/p unwitnessed fall, seen by psychiatry for agitation. pt doesn't recall details of the fall, is confused, disoriented, hasn't been agitated today, as per staff pt was intermittently agitated, received prns few days ago.
Pt is a 89 y/o female, with dementia 2ary to parkinson's dz,  unclear details of ppxh, pmhx, presented with s/p unwitnessed fall, seen by psychiatry for agitation. pt doesn't recall details of the fall, is confused, disoriented, hasn't been agitated today, as per staff pt was intermittently agitated, overall improving.
Pt is a 89 y/o female, with dementia 2ary to parkinson's dz,  unclear details of ppxh, pmhx, presented with s/p unwitnessed fall, seen by psychiatry for agitation. pt doesn't recall details of the fall, is confused, disoriented, agitated last night, received prns, on seroquel, on 1;1 now for agitation.
Pt is a 89 y/o female, with dementia 2ary to parkinson's dz,  unclear details of ppxh, pmhx, presented with s/p unwitnessed fall, seen by psychiatry for agitation. pt doesn't recall details of the fall, is confused, disoriented, agitated last night, today a little restless, with a notable tremor. Pt. denies any psychiatric sxs, no si/hi/i/p, no a/v/h, no paranoia. pt also has a UTI.

## 2017-10-18 NOTE — PROGRESS NOTE BEHAVIORAL HEALTH - THOUGHT PROCESS
Other
Impaired reasoning/Disorganized
Impaired reasoning/Disorganized
Disorganized/Impaired reasoning/Perseverative
Disorganized/Impaired reasoning
Perseverative/Disorganized

## 2017-10-18 NOTE — DISCHARGE NOTE ADULT - CARE PLAN
Principal Discharge DX:	Syncope and collapse  Goal:	monitor for signs and symptoms of infection and dehydration  Instructions for follow-up, activity and diet:	pt was treated with antibiotics for uti and intravenous fluids for dehydration  Secondary Diagnosis:	Multiple falls  Instructions for follow-up, activity and diet:	fall precautions. Pt had one episode of fall in the hospital and placed on 1:1 for safety and agitation. Pt's agitation improved with adding percocet at bedtime and Around the clock. Seroquel dose increased to 50 mgs 2x a day. pt needs to follow-up with psychiatry at rehab .  Secondary Diagnosis:	UTI (urinary tract infection)  Instructions for follow-up, activity and diet:	You had a bladder &/or kidney infection  Call your doctor with pain or burning with urination, frequent urination, feeling to urinate suddenly, blood in urine, fever, back pain, nausea or vomiting  You need to take and finish your antibiotic as prescribed so that the infection does not reoccur  Drink adequate fluids - there is no harm to try cranberry juice  Females need to urinate right after sex  Secondary Diagnosis:	Dementia  Instructions for follow-up, activity and diet:	supportive care  Secondary Diagnosis:	Back pain  Secondary Diagnosis:	Vitamin D deficiency

## 2017-10-18 NOTE — DISCHARGE NOTE ADULT - MEDICATION SUMMARY - MEDICATIONS TO TAKE
I will START or STAY ON the medications listed below when I get home from the hospital:    acetaminophen 325 mg oral tablet  -- 2 tab(s) by mouth every 6 hours, As Needed  -- Indication: For Mild pain    Percocet 5/325 oral tablet  -- 1 tab(s) by mouth every 6 hours, As Needed  -- Indication: For Moderate pain    Depakote 500 mg oral delayed release tablet  -- 1 tab(s) by mouth 2 times a day  -- Indication: For Dementia    QUEtiapine 50 mg oral tablet  -- 1 tab(s) by mouth 2 times a day  -- Indication: For Dementia    clobetasol 0.05% topical ointment  -- 1 application on skin 2 times a day  -- Indication: For rash    MiraLax oral powder for reconstitution  -- 17 gram(s) by mouth 2 times a day  -- Indication: For constipation    PriLOSEC OTC 20 mg oral delayed release tablet  -- 1 tab(s) by mouth once a day  -- Indication: For gerd    cholecalciferol oral tablet  -- 1000 unit(s) by mouth once a day  -- Indication: For Vitamin D deficiency

## 2017-10-18 NOTE — PROGRESS NOTE BEHAVIORAL HEALTH - ORIENTED TO PERSON
We have not received these results at this time. Patient notified we will contact her as soon as they are received. She verbalized understanding.   
Yes
Other
Yes

## 2017-10-18 NOTE — PROGRESS NOTE BEHAVIORAL HEALTH - NSBHCONSULTOBS_PSY_A_CORE
Constant observation
Constant observation
Enhanced supervision
Enhanced supervision
Constant observation
Constant observation

## 2017-10-18 NOTE — PROGRESS NOTE BEHAVIORAL HEALTH - NSBHCONSULTFOLLOWAFTERCARE_PSY_A_CORE FT
pt can f/u at [facility] [phone]
pt can f/u at [facility] [phone]
pt can f/u at [facility] [phone]
pt can f/u at [facility] [phone]
pt can f/u at [facility] [phone]
pt can f/u at [facility] [phone]

## 2017-10-18 NOTE — PROGRESS NOTE BEHAVIORAL HEALTH - THOUGHT CONTENT
Other
Preoccupations
Unremarkable/Preoccupations
Preoccupations/Delusions
Unremarkable/Preoccupations
Delusions

## 2017-10-18 NOTE — PROGRESS NOTE BEHAVIORAL HEALTH - NSBHCONSULTMEDS_PSY_A_CORE FT
c/w  seroquel to 50mg po bid, cont prns  avoid prodelirious agents  frequent reorientation
continue standing Seroquel 25mg at 6pm standing, if pt keeps getting multiple PRNs, will increase dose   agree with restarting depakote 500mg bid (pt's home dose), please monitor VPA level and LFTs, CBC   use Seroquel 25mg po q6hrrs PRN agitation  Zyprexa 1.25mg IM q8hrs PRN for severe agitation  as per H+P, pt is on Xanax PRN, would monitor on CIWA and monitor for sxs of Benzo withdrawal   would confirm with SNF, if pt was on standing Depakote and restart pt's home dose.   avoid prodelirious agents   monitor QTc for prolongation.
continue standing Seroquel 25mg at 6pm standing, can increase to 50mg po qhs if keeps getting prns
can increase seroquel to 75mg po qhs, cont am dose, cont prns  avoid prodelirious agents  frequent reorientation
can increase seroquel to 75mg po qhs, cont am dose, cont prns  avoid prodelirious agents  frequent reorientation
increase seroquel to 50mg po bid, cont prns

## 2017-10-18 NOTE — PROGRESS NOTE ADULT - PROVIDER SPECIALTY LIST ADULT
Cardiology
Internal Medicine

## 2017-10-18 NOTE — PROGRESS NOTE ADULT - PROBLEM SELECTOR PROBLEM 6
Vitamin D deficiency
SHOAIB (acute kidney injury)
Vitamin D deficiency

## 2017-10-18 NOTE — PROGRESS NOTE ADULT - ASSESSMENT
89 y/o female h/o bipolar and dementia, reportedly nonambulatory presenting after unwitnessed fall.As per MD at SNF patient has fallen frequently in the SNF, incidental discovery of likely UTI
89 y/o female h/o  dementia, reportedly nonambulatory presenting after unwitnessed fall.As per MD at SNF patient has fallen frequently in the SNF, incidental discovery of likely UTI
89 y/o female h/o bipolar and dementia, reportedly nonambulatory presenting after unwitnessed fall.As per MD at SNF patient has fallen frequently in the SNF, incidental discovery of likely UTI
89 y/o female h/o  dementia, reportedly nonambulatory presenting after unwitnessed fall.As per MD at SNF patient has fallen frequently in the SNF, incidental discovery of likely UTI
89 y/o female h/o  dementia, reportedly nonambulatory presenting after unwitnessed fall.As per MD at SNF patient has fallen frequently in the SNF, incidental discovery of likely UTI
89 y/o female h/o bipolar and dementia, reportedly nonambulatory presenting after unwitnessed fall.As per MD at SNF patient has fallen frequently in the SNF, incidental discovery of likely UTI
89 y/o female h/o bipolar and dementia, reportedly nonambulatory presenting after unwitnessed fall.As per MD at SNF patient has fallen frequently in the SNF, incidental discovery of likely UTI
HTN  stable     CV stable     will sign off  pls call with any questions
s/p Fall  ? orthostatic hypotension  check ortho vitals if feasible     HTN  BP stable  off med for now    UTI  on anbx
s/p Fall  ? orthostatic hypotension  check ortho vitals if feasible     HTN  BP stable  off med for now    UTI  on anbx
s/p Fall  likely mechanical  normal orthostatic vitals     HTN  BP stable  off med for now    UTI  on anbx
s/p Fall  likely mechanical  normal orthostatic vitals  recent echo with normal LV function     HTN  BP stable    UTI  on anbx
s/p Fall  likely mechanical  normal orthostatic vitals  recent echo with normal LV function     HTN  BP stable  off med for now    UTI  on anbx
89 y/o female h/o  dementia, reportedly nonambulatory presenting after unwitnessed fall.As per MD at SNF patient has fallen frequently in the SNF, incidental discovery of likely UTI

## 2017-10-18 NOTE — PROGRESS NOTE BEHAVIORAL HEALTH - NSBHPTASSESSDT_PSY_A_CORE
10-Oct-2017 10:40
11-Oct-2017 14:24
12-Oct-2017 10:38
17-Oct-2017 15:51
18-Oct-2017 12:58
13-Oct-2017 10:30

## 2017-10-19 PROCEDURE — 96375 TX/PRO/DX INJ NEW DRUG ADDON: CPT

## 2017-10-19 PROCEDURE — 72125 CT NECK SPINE W/O DYE: CPT

## 2017-10-19 PROCEDURE — 85610 PROTHROMBIN TIME: CPT

## 2017-10-19 PROCEDURE — 84484 ASSAY OF TROPONIN QUANT: CPT

## 2017-10-19 PROCEDURE — 74176 CT ABD & PELVIS W/O CONTRAST: CPT

## 2017-10-19 PROCEDURE — 97116 GAIT TRAINING THERAPY: CPT

## 2017-10-19 PROCEDURE — 82550 ASSAY OF CK (CPK): CPT

## 2017-10-19 PROCEDURE — 36600 WITHDRAWAL OF ARTERIAL BLOOD: CPT

## 2017-10-19 PROCEDURE — 80048 BASIC METABOLIC PNL TOTAL CA: CPT

## 2017-10-19 PROCEDURE — 96372 THER/PROPH/DIAG INJ SC/IM: CPT | Mod: XU

## 2017-10-19 PROCEDURE — 83735 ASSAY OF MAGNESIUM: CPT

## 2017-10-19 PROCEDURE — 82553 CREATINE MB FRACTION: CPT

## 2017-10-19 PROCEDURE — 97530 THERAPEUTIC ACTIVITIES: CPT

## 2017-10-19 PROCEDURE — 71045 X-RAY EXAM CHEST 1 VIEW: CPT

## 2017-10-19 PROCEDURE — 96374 THER/PROPH/DIAG INJ IV PUSH: CPT

## 2017-10-19 PROCEDURE — 70450 CT HEAD/BRAIN W/O DYE: CPT

## 2017-10-19 PROCEDURE — 71250 CT THORAX DX C-: CPT

## 2017-10-19 PROCEDURE — 80053 COMPREHEN METABOLIC PANEL: CPT

## 2017-10-19 PROCEDURE — 81001 URINALYSIS AUTO W/SCOPE: CPT

## 2017-10-19 PROCEDURE — 82803 BLOOD GASES ANY COMBINATION: CPT

## 2017-10-19 PROCEDURE — 85730 THROMBOPLASTIN TIME PARTIAL: CPT

## 2017-10-19 PROCEDURE — 97162 PT EVAL MOD COMPLEX 30 MIN: CPT

## 2017-10-19 PROCEDURE — 85027 COMPLETE CBC AUTOMATED: CPT

## 2017-10-19 PROCEDURE — 99285 EMERGENCY DEPT VISIT HI MDM: CPT | Mod: 25

## 2017-12-10 ENCOUNTER — EMERGENCY (EMERGENCY)
Facility: HOSPITAL | Age: 82
LOS: 1 days | End: 2017-12-10
Attending: EMERGENCY MEDICINE | Admitting: EMERGENCY MEDICINE
Payer: MEDICARE

## 2017-12-10 VITALS
SYSTOLIC BLOOD PRESSURE: 150 MMHG | OXYGEN SATURATION: 96 % | RESPIRATION RATE: 16 BRPM | DIASTOLIC BLOOD PRESSURE: 80 MMHG | HEART RATE: 68 BPM

## 2017-12-10 VITALS
HEART RATE: 72 BPM | TEMPERATURE: 98 F | SYSTOLIC BLOOD PRESSURE: 157 MMHG | RESPIRATION RATE: 18 BRPM | OXYGEN SATURATION: 98 % | DIASTOLIC BLOOD PRESSURE: 74 MMHG

## 2017-12-10 LAB
ALBUMIN SERPL ELPH-MCNC: 4.1 G/DL — SIGNIFICANT CHANGE UP (ref 3.3–5)
ALP SERPL-CCNC: 69 U/L — SIGNIFICANT CHANGE UP (ref 40–120)
ALT FLD-CCNC: 9 U/L RC — LOW (ref 10–45)
ANION GAP SERPL CALC-SCNC: 12 MMOL/L — SIGNIFICANT CHANGE UP (ref 5–17)
APPEARANCE UR: CLEAR — SIGNIFICANT CHANGE UP
AST SERPL-CCNC: 18 U/L — SIGNIFICANT CHANGE UP (ref 10–40)
BASOPHILS # BLD AUTO: 0.1 K/UL — SIGNIFICANT CHANGE UP (ref 0–0.2)
BASOPHILS NFR BLD AUTO: 1.3 % — SIGNIFICANT CHANGE UP (ref 0–2)
BILIRUB SERPL-MCNC: 0.5 MG/DL — SIGNIFICANT CHANGE UP (ref 0.2–1.2)
BILIRUB UR-MCNC: NEGATIVE — SIGNIFICANT CHANGE UP
BUN SERPL-MCNC: 13 MG/DL — SIGNIFICANT CHANGE UP (ref 7–23)
CALCIUM SERPL-MCNC: 9.5 MG/DL — SIGNIFICANT CHANGE UP (ref 8.4–10.5)
CHLORIDE SERPL-SCNC: 99 MMOL/L — SIGNIFICANT CHANGE UP (ref 96–108)
CO2 SERPL-SCNC: 29 MMOL/L — SIGNIFICANT CHANGE UP (ref 22–31)
COLOR SPEC: SIGNIFICANT CHANGE UP
CREAT SERPL-MCNC: 0.8 MG/DL — SIGNIFICANT CHANGE UP (ref 0.5–1.3)
DIFF PNL FLD: NEGATIVE — SIGNIFICANT CHANGE UP
EOSINOPHIL # BLD AUTO: 0.2 K/UL — SIGNIFICANT CHANGE UP (ref 0–0.5)
EOSINOPHIL NFR BLD AUTO: 2.7 % — SIGNIFICANT CHANGE UP (ref 0–6)
GAS PNL BLDV: SIGNIFICANT CHANGE UP
GLUCOSE SERPL-MCNC: 98 MG/DL — SIGNIFICANT CHANGE UP (ref 70–99)
GLUCOSE UR QL: NEGATIVE — SIGNIFICANT CHANGE UP
HCT VFR BLD CALC: 41.3 % — SIGNIFICANT CHANGE UP (ref 34.5–45)
HGB BLD-MCNC: 14.1 G/DL — SIGNIFICANT CHANGE UP (ref 11.5–15.5)
KETONES UR-MCNC: NEGATIVE — SIGNIFICANT CHANGE UP
LEUKOCYTE ESTERASE UR-ACNC: NEGATIVE — SIGNIFICANT CHANGE UP
LYMPHOCYTES # BLD AUTO: 1.9 K/UL — SIGNIFICANT CHANGE UP (ref 1–3.3)
LYMPHOCYTES # BLD AUTO: 31 % — SIGNIFICANT CHANGE UP (ref 13–44)
MCHC RBC-ENTMCNC: 33.3 PG — SIGNIFICANT CHANGE UP (ref 27–34)
MCHC RBC-ENTMCNC: 34 GM/DL — SIGNIFICANT CHANGE UP (ref 32–36)
MCV RBC AUTO: 97.7 FL — SIGNIFICANT CHANGE UP (ref 80–100)
MONOCYTES # BLD AUTO: 0.7 K/UL — SIGNIFICANT CHANGE UP (ref 0–0.9)
MONOCYTES NFR BLD AUTO: 10.9 % — SIGNIFICANT CHANGE UP (ref 2–14)
NEUTROPHILS # BLD AUTO: 3.3 K/UL — SIGNIFICANT CHANGE UP (ref 1.8–7.4)
NEUTROPHILS NFR BLD AUTO: 54.1 % — SIGNIFICANT CHANGE UP (ref 43–77)
NITRITE UR-MCNC: NEGATIVE — SIGNIFICANT CHANGE UP
PH UR: 7.5 — SIGNIFICANT CHANGE UP (ref 5–8)
PLATELET # BLD AUTO: 190 K/UL — SIGNIFICANT CHANGE UP (ref 150–400)
POTASSIUM SERPL-MCNC: 4.5 MMOL/L — SIGNIFICANT CHANGE UP (ref 3.5–5.3)
POTASSIUM SERPL-SCNC: 4.5 MMOL/L — SIGNIFICANT CHANGE UP (ref 3.5–5.3)
PROT SERPL-MCNC: 6.6 G/DL — SIGNIFICANT CHANGE UP (ref 6–8.3)
PROT UR-MCNC: NEGATIVE — SIGNIFICANT CHANGE UP
RBC # BLD: 4.23 M/UL — SIGNIFICANT CHANGE UP (ref 3.8–5.2)
RBC # FLD: 11.7 % — SIGNIFICANT CHANGE UP (ref 10.3–14.5)
SODIUM SERPL-SCNC: 140 MMOL/L — SIGNIFICANT CHANGE UP (ref 135–145)
SP GR SPEC: 1.01 — LOW (ref 1.01–1.02)
UROBILINOGEN FLD QL: NEGATIVE — SIGNIFICANT CHANGE UP
WBC # BLD: 6 K/UL — SIGNIFICANT CHANGE UP (ref 3.8–10.5)
WBC # FLD AUTO: 6 K/UL — SIGNIFICANT CHANGE UP (ref 3.8–10.5)

## 2017-12-10 PROCEDURE — 81003 URINALYSIS AUTO W/O SCOPE: CPT

## 2017-12-10 PROCEDURE — 84295 ASSAY OF SERUM SODIUM: CPT

## 2017-12-10 PROCEDURE — 70450 CT HEAD/BRAIN W/O DYE: CPT | Mod: 26

## 2017-12-10 PROCEDURE — 87086 URINE CULTURE/COLONY COUNT: CPT

## 2017-12-10 PROCEDURE — 83605 ASSAY OF LACTIC ACID: CPT

## 2017-12-10 PROCEDURE — 51701 INSERT BLADDER CATHETER: CPT

## 2017-12-10 PROCEDURE — 72125 CT NECK SPINE W/O DYE: CPT | Mod: 26

## 2017-12-10 PROCEDURE — 82435 ASSAY OF BLOOD CHLORIDE: CPT

## 2017-12-10 PROCEDURE — 85027 COMPLETE CBC AUTOMATED: CPT

## 2017-12-10 PROCEDURE — 72170 X-RAY EXAM OF PELVIS: CPT

## 2017-12-10 PROCEDURE — 99284 EMERGENCY DEPT VISIT MOD MDM: CPT | Mod: GC

## 2017-12-10 PROCEDURE — 82947 ASSAY GLUCOSE BLOOD QUANT: CPT

## 2017-12-10 PROCEDURE — 72125 CT NECK SPINE W/O DYE: CPT

## 2017-12-10 PROCEDURE — 84132 ASSAY OF SERUM POTASSIUM: CPT

## 2017-12-10 PROCEDURE — 85014 HEMATOCRIT: CPT

## 2017-12-10 PROCEDURE — 82803 BLOOD GASES ANY COMBINATION: CPT

## 2017-12-10 PROCEDURE — 87040 BLOOD CULTURE FOR BACTERIA: CPT

## 2017-12-10 PROCEDURE — 70450 CT HEAD/BRAIN W/O DYE: CPT

## 2017-12-10 PROCEDURE — 82962 GLUCOSE BLOOD TEST: CPT

## 2017-12-10 PROCEDURE — 71010: CPT | Mod: 26

## 2017-12-10 PROCEDURE — 72170 X-RAY EXAM OF PELVIS: CPT | Mod: 26

## 2017-12-10 PROCEDURE — 99284 EMERGENCY DEPT VISIT MOD MDM: CPT | Mod: 25

## 2017-12-10 PROCEDURE — 82330 ASSAY OF CALCIUM: CPT

## 2017-12-10 PROCEDURE — 80053 COMPREHEN METABOLIC PANEL: CPT

## 2017-12-10 PROCEDURE — 71045 X-RAY EXAM CHEST 1 VIEW: CPT

## 2017-12-10 NOTE — ED PROVIDER NOTE - CHIEF COMPLAINT
The patient is a 89y Female complaining of The patient is a 89y Female presenting s/p fall with altered mental status

## 2017-12-10 NOTE — ED PROVIDER NOTE - ATTENDING CONTRIBUTION TO CARE
Patient from home, history of dementia, unable to give history.  Patient BIBEMS, son called for fall overnight.  Son not present in ED.  EMS not able to give further history.    On exam patient awake, alert, confused.  Old laceration to L eyebrow healing, small cut to L earlobe, no other head trauma appreciated, no noted point tenderness elsewhere on body, moving all extremities spontaneously, normal ROM of extremities.    Unclear if patients confusion is baseline, but prior notes seem to suggest so, plan for screening labs, imaging for screening for traumatic injury, will attempt to reach son by phone for further history.

## 2017-12-10 NOTE — ED PROVIDER NOTE - OBJECTIVE STATEMENT
PT is a 89yoF with PMH of dementia, bipolar disorder, who presents s/p fall PT is a 89yoF with PMH of dementia, bipolar disorder, who presents s/p fall. Pt was seen in October 2017 for AMS s/p fall, was treated at that time for E coli UTI, and had required assistance from behavioral health for management of agitation. Unclear what patient's baseline mental status is. Pt is currently A&O x1, moving extremities voluntarily and resists blood draws. Home phone number called- [facility] states pt is not a current resident. Pt's son called earlier and stated that pt had fall at home at 3AM. Multiple attempts made to reach son at listed phone number, without response. Will continue to try to reach son.

## 2017-12-10 NOTE — ED ADULT NURSE NOTE - OBJECTIVE STATEMENT
89 year old female presented to ED via EMS with c/o of AMS x2 days according to son. Pt has hx of dementia, not alert to name, place, or time. Pt son states he heard pt fall at 3AM, unwitnessed fall, 5mm lac to left brow, 3mm lac to left earlobe. Pt denies CP, SOB, nausea/vomiting, numbness/tingling, fever, cough, chills, dizziness, headache. Lung sounds clear, heart rate regular, abdomen soft nontender nondistended to palp. Skin intact. Bruising on left inner thigh noted. IV in left wrist 20G and patent. Pt currently resting in bed with side rails up for safety. Will continue to monitor and assess while offering support and reassurance.

## 2017-12-10 NOTE — ED PROVIDER NOTE - NS ED ROS FT
ROS  Gen:  no fevers, no chills, no weight loss  HEENT: no vision changes, no hearing loss, no pharygnitis, no oral lesions  Pulm: no cough, no SOB, no sputum production, no wheezing  Cardiac: no chest pain, no SOB, no orthopnea  GI: no abdominal pain, no N, no V, no diarrhea, no constipation  : no dysuria, no hematuria, no decreased urine output  Skin: no skin lesions  Neuro:  no headache, no focal weakness, no numbness, no syncope

## 2017-12-10 NOTE — ED PROVIDER NOTE - PHYSICAL EXAMINATION
Gen: NAD, A&O x 3, lying in bed conversant  HEENT: PERRL, EOMI, mucous membranes moist, no oropharyngeal exudates  Neck:    Pulmonary: CTAB, no rhonci, wheezes or rales  Cardiac: regular rate and rhythm, normal S1S2, no r/m/g  GI:  soft, nontender, nondistende  Extremities: warm, well perfused, no peripheral edema  Skin: skin intact, no skin tears or rashes or other lesions  Neuro: A&O x3, CN II-XII intact, 5/5 BUE and BLE strength, full sensation to light touch Gen: NAD, A&O x 1, lying in bed, mumbles   HEENT: PERRL, EOMI, mucous membranes moist, no oropharyngeal exudates, laceration above L eyebrow without surrounding tenderness, laceration at L earlobe  Neck:  no cervical lymphadenopathy  Pulmonary: CTAB, no rhonci, wheezes or rales  Cardiac: regular rate and rhythm, normal S1S2, no r/m/g  GI:  soft, nontender, nondistended  Extremities: warm, well perfused, no peripheral edema  Skin: laceration at L eyebrow and L earlobe as noted above, bruise at R inner thigh  Neuro: A&O x1, moves extremities voluntarily, follows commands inconsistently  MSK: full passive ROM of BLE, and limited forwards flexion with BUE

## 2017-12-10 NOTE — ED PROVIDER NOTE - CARE PLAN
Principal Discharge DX:	Fall at home  Instructions for follow-up, activity and diet:	You were evaluated in the emergency department after you had an unwitnessed fall at home and displayed atypical behavior. You had bloodwork, CT scans and xrays performed in the emergency department.  Please return to the emergency department if you experience worsening weakness, lightheadedness, numbness, nausea, vomiting, new changes in your gait and balance, worsening in your confusion or other symptoms.

## 2017-12-10 NOTE — ED PROVIDER NOTE - MEDICAL DECISION MAKING DETAILS
89yoF with PMH of bipolar disorder, parkinson's dementia p/w fall and AMS 89yoF with PMH of bipolar disorder, parkinson's dementia p/w fall and AMS.   - labs, CT head and neck, xray chest and pelvis

## 2017-12-10 NOTE — ED ADULT NURSE REASSESSMENT NOTE - NS ED NURSE REASSESS COMMENT FT1
Straight cath performed with 2 RN's present while using sterile technique as per MD order. Urine drained 300cc clear yellow urine. Will continue to monitor and assess while offering support and reassurance.

## 2017-12-10 NOTE — ED PROVIDER NOTE - PLAN OF CARE
You were evaluated in the emergency department after you had an unwitnessed fall at home and displayed atypical behavior. You had bloodwork, CT scans and xrays performed in the emergency department.  Please return to the emergency department if you experience worsening weakness, lightheadedness, numbness, nausea, vomiting, new changes in your gait and balance, worsening in your confusion or other symptoms.

## 2017-12-11 LAB
CULTURE RESULTS: NO GROWTH — SIGNIFICANT CHANGE UP
SPECIMEN SOURCE: SIGNIFICANT CHANGE UP

## 2017-12-15 LAB
CULTURE RESULTS: SIGNIFICANT CHANGE UP
CULTURE RESULTS: SIGNIFICANT CHANGE UP
SPECIMEN SOURCE: SIGNIFICANT CHANGE UP
SPECIMEN SOURCE: SIGNIFICANT CHANGE UP

## 2017-12-19 PROCEDURE — 87086 URINE CULTURE/COLONY COUNT: CPT

## 2017-12-19 PROCEDURE — 80061 LIPID PANEL: CPT

## 2017-12-19 PROCEDURE — 97162 PT EVAL MOD COMPLEX 30 MIN: CPT

## 2017-12-19 PROCEDURE — 81001 URINALYSIS AUTO W/SCOPE: CPT

## 2017-12-19 PROCEDURE — 84295 ASSAY OF SERUM SODIUM: CPT

## 2017-12-19 PROCEDURE — 83605 ASSAY OF LACTIC ACID: CPT

## 2017-12-19 PROCEDURE — 99285 EMERGENCY DEPT VISIT HI MDM: CPT | Mod: 25

## 2017-12-19 PROCEDURE — 84484 ASSAY OF TROPONIN QUANT: CPT

## 2017-12-19 PROCEDURE — 72170 X-RAY EXAM OF PELVIS: CPT

## 2017-12-19 PROCEDURE — 93005 ELECTROCARDIOGRAM TRACING: CPT

## 2017-12-19 PROCEDURE — 87186 SC STD MICRODIL/AGAR DIL: CPT

## 2017-12-19 PROCEDURE — 85730 THROMBOPLASTIN TIME PARTIAL: CPT

## 2017-12-19 PROCEDURE — 85014 HEMATOCRIT: CPT

## 2017-12-19 PROCEDURE — 85027 COMPLETE CBC AUTOMATED: CPT

## 2017-12-19 PROCEDURE — 85610 PROTHROMBIN TIME: CPT

## 2017-12-19 PROCEDURE — 82550 ASSAY OF CK (CPK): CPT

## 2017-12-19 PROCEDURE — 82330 ASSAY OF CALCIUM: CPT

## 2017-12-19 PROCEDURE — 80048 BASIC METABOLIC PNL TOTAL CA: CPT

## 2017-12-19 PROCEDURE — 97530 THERAPEUTIC ACTIVITIES: CPT

## 2017-12-19 PROCEDURE — 82553 CREATINE MB FRACTION: CPT

## 2017-12-19 PROCEDURE — 82607 VITAMIN B-12: CPT

## 2017-12-19 PROCEDURE — 71045 X-RAY EXAM CHEST 1 VIEW: CPT

## 2017-12-19 PROCEDURE — 82306 VITAMIN D 25 HYDROXY: CPT

## 2017-12-19 PROCEDURE — 84443 ASSAY THYROID STIM HORMONE: CPT

## 2017-12-19 PROCEDURE — 82947 ASSAY GLUCOSE BLOOD QUANT: CPT

## 2017-12-19 PROCEDURE — 70450 CT HEAD/BRAIN W/O DYE: CPT

## 2017-12-19 PROCEDURE — 82746 ASSAY OF FOLIC ACID SERUM: CPT

## 2017-12-19 PROCEDURE — 97110 THERAPEUTIC EXERCISES: CPT

## 2017-12-19 PROCEDURE — 73030 X-RAY EXAM OF SHOULDER: CPT

## 2017-12-19 PROCEDURE — 84132 ASSAY OF SERUM POTASSIUM: CPT

## 2017-12-19 PROCEDURE — 80053 COMPREHEN METABOLIC PANEL: CPT

## 2017-12-19 PROCEDURE — 82435 ASSAY OF BLOOD CHLORIDE: CPT

## 2017-12-19 PROCEDURE — 82803 BLOOD GASES ANY COMBINATION: CPT

## 2017-12-19 PROCEDURE — 72125 CT NECK SPINE W/O DYE: CPT

## 2017-12-31 ENCOUNTER — EMERGENCY (EMERGENCY)
Facility: HOSPITAL | Age: 82
LOS: 1 days | Discharge: HOME | End: 2017-12-31
Attending: EMERGENCY MEDICINE
Payer: MEDICARE

## 2017-12-31 VITALS
OXYGEN SATURATION: 95 % | SYSTOLIC BLOOD PRESSURE: 166 MMHG | HEART RATE: 80 BPM | RESPIRATION RATE: 18 BRPM | DIASTOLIC BLOOD PRESSURE: 81 MMHG

## 2017-12-31 VITALS
RESPIRATION RATE: 18 BRPM | OXYGEN SATURATION: 99 % | SYSTOLIC BLOOD PRESSURE: 163 MMHG | DIASTOLIC BLOOD PRESSURE: 92 MMHG | HEART RATE: 87 BPM | TEMPERATURE: 97 F

## 2017-12-31 PROCEDURE — 96372 THER/PROPH/DIAG INJ SC/IM: CPT | Mod: XU

## 2017-12-31 PROCEDURE — 99284 EMERGENCY DEPT VISIT MOD MDM: CPT | Mod: 25

## 2017-12-31 PROCEDURE — 12013 RPR F/E/E/N/L/M 2.6-5.0 CM: CPT | Mod: GC

## 2017-12-31 PROCEDURE — 71010: CPT | Mod: 26

## 2017-12-31 PROCEDURE — 71045 X-RAY EXAM CHEST 1 VIEW: CPT

## 2017-12-31 PROCEDURE — 12013 RPR F/E/E/N/L/M 2.6-5.0 CM: CPT

## 2017-12-31 PROCEDURE — 72170 X-RAY EXAM OF PELVIS: CPT

## 2017-12-31 PROCEDURE — 72170 X-RAY EXAM OF PELVIS: CPT | Mod: 26

## 2017-12-31 PROCEDURE — 99284 EMERGENCY DEPT VISIT MOD MDM: CPT | Mod: 25,GC

## 2017-12-31 RX ORDER — DIPHENHYDRAMINE HCL 50 MG
25 CAPSULE ORAL ONCE
Qty: 0 | Refills: 0 | Status: COMPLETED | OUTPATIENT
Start: 2017-12-31 | End: 2017-12-31

## 2017-12-31 RX ORDER — HALOPERIDOL DECANOATE 100 MG/ML
5 INJECTION INTRAMUSCULAR ONCE
Qty: 0 | Refills: 0 | Status: COMPLETED | OUTPATIENT
Start: 2017-12-31 | End: 2017-12-31

## 2017-12-31 RX ORDER — MIDAZOLAM HYDROCHLORIDE 1 MG/ML
4 INJECTION, SOLUTION INTRAMUSCULAR; INTRAVENOUS ONCE
Qty: 0 | Refills: 0 | Status: DISCONTINUED | OUTPATIENT
Start: 2017-12-31 | End: 2017-12-31

## 2017-12-31 RX ADMIN — Medication 25 MILLIGRAM(S): at 19:08

## 2017-12-31 RX ADMIN — HALOPERIDOL DECANOATE 5 MILLIGRAM(S): 100 INJECTION INTRAMUSCULAR at 18:44

## 2017-12-31 RX ADMIN — HALOPERIDOL DECANOATE 5 MILLIGRAM(S): 100 INJECTION INTRAMUSCULAR at 17:25

## 2017-12-31 RX ADMIN — MIDAZOLAM HYDROCHLORIDE 4 MILLIGRAM(S): 1 INJECTION, SOLUTION INTRAMUSCULAR; INTRAVENOUS at 19:08

## 2017-12-31 NOTE — ED PROCEDURE NOTE - CPROC ED INFORMED CONSENT1
Benefits, risks, and possible complications of procedure explained to patient/caregiver who verbalized understanding and gave verbal consent./Verbal consent obtained from son.

## 2017-12-31 NOTE — ED PROVIDER NOTE - ATTENDING CONTRIBUTION TO CARE
Pt with fall, caught by son, hit side of face against object, bleeding forehead, no other injuries.  No ab pain.  Exam: nontender all 4 extremities, ambulatory with assistance, demented and unable to cooperate at times.

## 2017-12-31 NOTE — ED PROVIDER NOTE - PMH
Patient identified by name and  with verbal feedback. Depo-Testosterone 100 mg administered IM to left upper outer quadrant using aseptic technique. Patient tolerated well, no adverse reactions noted/reported. Next injection due 11/3/17 and has been scheduled.    Back Pain    Carotid Artery Disease    Dementia    Hepatitis C  possible during transfusion in 1970's  History of Spinal Stenosis    Hyperlipidemia    Hypertension    Lung Nodules    Tendonitis  left hand: s/p steroid injection 3/10

## 2017-12-31 NOTE — ED PROVIDER NOTE - ENMT NEGATIVE STATEMENT, MLM
Head: + head trauma, laceration. Ears: no ear pain and no hearing problems. Nose: no nasal congestion and no nasal drainage. Mouth/Throat: no dysphagia, no hoarseness and no throat pain. Neck: no lumps, no pain, no stiffness and no swollen glands.

## 2017-12-31 NOTE — ED PROVIDER NOTE - ENMT, MLM
3 cm laceration over R eye with 2 butterflies in place. Erythema and ecchymosis of R temporal area. No TTP.

## 2017-12-31 NOTE — ED PROVIDER NOTE - CARE PLAN
Principal Discharge DX:	Laceration of eyelid of right eye without foreign body, initial encounter  Instructions for follow-up, activity and diet:	- The laceration above your eyelid was repaired with sutures. Please see your primary care doctor in 1 week for suture removal.   - If you experience worsening or severe headache, confusion, pain, bleeding, fever, chills, please return to the ED for urgent medical re-evaluation.

## 2017-12-31 NOTE — ED PROVIDER NOTE - PLAN OF CARE
- The laceration above your eyelid was repaired with sutures. Please see your primary care doctor in 1 week for suture removal.   - If you experience worsening or severe headache, confusion, pain, bleeding, fever, chills, please return to the ED for urgent medical re-evaluation.

## 2017-12-31 NOTE — ED PROVIDER NOTE - OBJECTIVE STATEMENT
89 year old F w/ dementia, bipolar disorder, Hepatitis C, carotid disease, HTN, HLD BIBEMS s/p fall. Patient is a poor historian, alert only to person and could not provide any history. As per EMS, patient had a witnessed mechanical fall at home, hit head, no LOC. Patient not on anticoagulation. Patient sustained laceration of R eyelid. Patient's son is her PMD Dr. Dexter [phone], butterflied wound and called EMS. Patient denies headache, chest pain, abdominal pain, back pain. No arm, leg, or shoulder pain. Patient has no complaints at this time.

## 2017-12-31 NOTE — ED PROVIDER NOTE - PROGRESS NOTE DETAILS
Patient unable to sit for CT scan due to agitation. Spoke to patient's son obtained more history about fall. Patient's fall was witnessed by son, she hit her eyebrow on corner of sharp. Did not hit any other part of head. Will forego CT scan and discharge patient.

## 2017-12-31 NOTE — ED ADULT NURSE REASSESSMENT NOTE - NS ED NURSE REASSESS COMMENT FT1
2040 pm Patient will be discharged home in a non emergent ambulance, Son aware (at home) Patient transported to bathroom to void in a wheel chair, after voiding patient calmer, resting on stretcher.

## 2018-01-09 NOTE — PATIENT PROFILE ADULT. - PRO INTERPRETER NEED 2
DISPLAY PLAN FREE TEXT DISPLAY PLAN FREE TEXT DISPLAY PLAN FREE TEXT DISPLAY PLAN FREE TEXT DISPLAY PLAN FREE TEXT English

## 2018-04-20 ENCOUNTER — INPATIENT (INPATIENT)
Facility: HOSPITAL | Age: 83
LOS: 4 days | Discharge: HOME | DRG: 871 | End: 2018-04-25
Attending: INTERNAL MEDICINE | Admitting: INTERNAL MEDICINE
Payer: MEDICARE

## 2018-04-20 VITALS
OXYGEN SATURATION: 97 % | SYSTOLIC BLOOD PRESSURE: 154 MMHG | HEART RATE: 90 BPM | DIASTOLIC BLOOD PRESSURE: 78 MMHG | RESPIRATION RATE: 18 BRPM

## 2018-04-20 DIAGNOSIS — A41.9 SEPSIS, UNSPECIFIED ORGANISM: ICD-10-CM

## 2018-04-20 DIAGNOSIS — J18.9 PNEUMONIA, UNSPECIFIED ORGANISM: ICD-10-CM

## 2018-04-20 DIAGNOSIS — Z29.9 ENCOUNTER FOR PROPHYLACTIC MEASURES, UNSPECIFIED: ICD-10-CM

## 2018-04-20 DIAGNOSIS — J18.1 LOBAR PNEUMONIA, UNSPECIFIED ORGANISM: ICD-10-CM

## 2018-04-20 DIAGNOSIS — F03.90 UNSPECIFIED DEMENTIA WITHOUT BEHAVIORAL DISTURBANCE: ICD-10-CM

## 2018-04-20 DIAGNOSIS — R63.8 OTHER SYMPTOMS AND SIGNS CONCERNING FOOD AND FLUID INTAKE: ICD-10-CM

## 2018-04-20 DIAGNOSIS — I10 ESSENTIAL (PRIMARY) HYPERTENSION: ICD-10-CM

## 2018-04-20 DIAGNOSIS — N17.9 ACUTE KIDNEY FAILURE, UNSPECIFIED: ICD-10-CM

## 2018-04-20 LAB
ALBUMIN SERPL ELPH-MCNC: 4.1 G/DL — SIGNIFICANT CHANGE UP (ref 3.3–5)
ALP SERPL-CCNC: 73 U/L — SIGNIFICANT CHANGE UP (ref 40–120)
ALT FLD-CCNC: 8 U/L — LOW (ref 10–45)
ANION GAP SERPL CALC-SCNC: 14 MMOL/L — SIGNIFICANT CHANGE UP (ref 5–17)
APPEARANCE UR: CLEAR — SIGNIFICANT CHANGE UP
APTT BLD: 33.5 SEC — SIGNIFICANT CHANGE UP (ref 27.5–37.4)
AST SERPL-CCNC: 15 U/L — SIGNIFICANT CHANGE UP (ref 10–40)
BASE EXCESS BLDV CALC-SCNC: 4 MMOL/L — HIGH (ref -2–2)
BASOPHILS # BLD AUTO: 0 K/UL — SIGNIFICANT CHANGE UP (ref 0–0.2)
BASOPHILS NFR BLD AUTO: 1 % — SIGNIFICANT CHANGE UP (ref 0–2)
BILIRUB SERPL-MCNC: 0.3 MG/DL — SIGNIFICANT CHANGE UP (ref 0.2–1.2)
BILIRUB UR-MCNC: NEGATIVE — SIGNIFICANT CHANGE UP
BUN SERPL-MCNC: 25 MG/DL — HIGH (ref 7–23)
CA-I SERPL-SCNC: 1.21 MMOL/L — SIGNIFICANT CHANGE UP (ref 1.12–1.3)
CALCIUM SERPL-MCNC: 9.6 MG/DL — SIGNIFICANT CHANGE UP (ref 8.4–10.5)
CHLORIDE BLDV-SCNC: 101 MMOL/L — SIGNIFICANT CHANGE UP (ref 96–108)
CHLORIDE SERPL-SCNC: 97 MMOL/L — SIGNIFICANT CHANGE UP (ref 96–108)
CO2 BLDV-SCNC: 31 MMOL/L — HIGH (ref 22–30)
CO2 SERPL-SCNC: 26 MMOL/L — SIGNIFICANT CHANGE UP (ref 22–31)
COLOR SPEC: SIGNIFICANT CHANGE UP
CREAT SERPL-MCNC: 1.02 MG/DL — SIGNIFICANT CHANGE UP (ref 0.5–1.3)
DIFF PNL FLD: NEGATIVE — SIGNIFICANT CHANGE UP
EOSINOPHIL # BLD AUTO: 0 K/UL — SIGNIFICANT CHANGE UP (ref 0–0.5)
GAS PNL BLDV: 136 MMOL/L — SIGNIFICANT CHANGE UP (ref 136–145)
GAS PNL BLDV: SIGNIFICANT CHANGE UP
GAS PNL BLDV: SIGNIFICANT CHANGE UP
GLUCOSE BLDV-MCNC: 125 MG/DL — HIGH (ref 70–99)
GLUCOSE SERPL-MCNC: 126 MG/DL — HIGH (ref 70–99)
GLUCOSE UR QL: NEGATIVE — SIGNIFICANT CHANGE UP
HCO3 BLDV-SCNC: 30 MMOL/L — HIGH (ref 21–29)
HCT VFR BLD CALC: 38.7 % — SIGNIFICANT CHANGE UP (ref 34.5–45)
HCT VFR BLDA CALC: 39 % — SIGNIFICANT CHANGE UP (ref 39–50)
HGB BLD CALC-MCNC: 12.7 G/DL — SIGNIFICANT CHANGE UP (ref 11.5–15.5)
HGB BLD-MCNC: 13 G/DL — SIGNIFICANT CHANGE UP (ref 11.5–15.5)
INR BLD: 0.96 RATIO — SIGNIFICANT CHANGE UP (ref 0.88–1.16)
KETONES UR-MCNC: NEGATIVE — SIGNIFICANT CHANGE UP
LACTATE BLDV-MCNC: 3.6 MMOL/L — HIGH (ref 0.7–2)
LEUKOCYTE ESTERASE UR-ACNC: NEGATIVE — SIGNIFICANT CHANGE UP
LYMPHOCYTES # BLD AUTO: 1 K/UL — SIGNIFICANT CHANGE UP (ref 1–3.3)
LYMPHOCYTES # BLD AUTO: 8 % — LOW (ref 13–44)
MCHC RBC-ENTMCNC: 32.6 PG — SIGNIFICANT CHANGE UP (ref 27–34)
MCHC RBC-ENTMCNC: 33.7 GM/DL — SIGNIFICANT CHANGE UP (ref 32–36)
MCV RBC AUTO: 96.6 FL — SIGNIFICANT CHANGE UP (ref 80–100)
MONOCYTES # BLD AUTO: 1.5 K/UL — HIGH (ref 0–0.9)
MONOCYTES NFR BLD AUTO: 10 % — SIGNIFICANT CHANGE UP (ref 2–14)
NEUTROPHILS # BLD AUTO: 13.3 K/UL — HIGH (ref 1.8–7.4)
NEUTROPHILS NFR BLD AUTO: 74 % — SIGNIFICANT CHANGE UP (ref 43–77)
NEUTS BAND # BLD: 7 % — SIGNIFICANT CHANGE UP (ref 0–8)
NITRITE UR-MCNC: NEGATIVE — SIGNIFICANT CHANGE UP
PCO2 BLDV: 50 MMHG — SIGNIFICANT CHANGE UP (ref 35–50)
PH BLDV: 7.38 — SIGNIFICANT CHANGE UP (ref 7.35–7.45)
PH UR: 6.5 — SIGNIFICANT CHANGE UP (ref 5–8)
PLAT MORPH BLD: NORMAL — SIGNIFICANT CHANGE UP
PLATELET # BLD AUTO: 200 K/UL — SIGNIFICANT CHANGE UP (ref 150–400)
PO2 BLDV: 17 MMHG — LOW (ref 25–45)
POTASSIUM BLDV-SCNC: 4.7 MMOL/L — SIGNIFICANT CHANGE UP (ref 3.5–5)
POTASSIUM SERPL-MCNC: 5 MMOL/L — SIGNIFICANT CHANGE UP (ref 3.5–5.3)
POTASSIUM SERPL-SCNC: 5 MMOL/L — SIGNIFICANT CHANGE UP (ref 3.5–5.3)
PROT SERPL-MCNC: 6.8 G/DL — SIGNIFICANT CHANGE UP (ref 6–8.3)
PROT UR-MCNC: NEGATIVE — SIGNIFICANT CHANGE UP
PROTHROM AB SERPL-ACNC: 10.4 SEC — SIGNIFICANT CHANGE UP (ref 9.8–12.7)
RBC # BLD: 4 M/UL — SIGNIFICANT CHANGE UP (ref 3.8–5.2)
RBC # FLD: 12.1 % — SIGNIFICANT CHANGE UP (ref 10.3–14.5)
RBC BLD AUTO: NORMAL — SIGNIFICANT CHANGE UP
RBC CASTS # UR COMP ASSIST: SIGNIFICANT CHANGE UP /HPF (ref 0–2)
SAO2 % BLDV: 24 % — LOW (ref 67–88)
SODIUM SERPL-SCNC: 137 MMOL/L — SIGNIFICANT CHANGE UP (ref 135–145)
SP GR SPEC: 1.01 — SIGNIFICANT CHANGE UP (ref 1.01–1.02)
TROPONIN T SERPL-MCNC: 0.01 NG/ML — SIGNIFICANT CHANGE UP (ref 0–0.06)
UROBILINOGEN FLD QL: NEGATIVE — SIGNIFICANT CHANGE UP
WBC # BLD: 15.9 K/UL — HIGH (ref 3.8–10.5)
WBC # FLD AUTO: 15.9 K/UL — HIGH (ref 3.8–10.5)
WBC UR QL: SIGNIFICANT CHANGE UP /HPF (ref 0–5)

## 2018-04-20 PROCEDURE — 93010 ELECTROCARDIOGRAM REPORT: CPT

## 2018-04-20 PROCEDURE — 71045 X-RAY EXAM CHEST 1 VIEW: CPT | Mod: 26

## 2018-04-20 PROCEDURE — 99285 EMERGENCY DEPT VISIT HI MDM: CPT | Mod: 25,GC

## 2018-04-20 PROCEDURE — 93971 EXTREMITY STUDY: CPT | Mod: 26

## 2018-04-20 RX ORDER — ACETAMINOPHEN 500 MG
1000 TABLET ORAL ONCE
Qty: 0 | Refills: 0 | Status: COMPLETED | OUTPATIENT
Start: 2018-04-20 | End: 2018-04-20

## 2018-04-20 RX ORDER — SODIUM CHLORIDE 9 MG/ML
2000 INJECTION, SOLUTION INTRAVENOUS ONCE
Qty: 0 | Refills: 0 | Status: COMPLETED | OUTPATIENT
Start: 2018-04-20 | End: 2018-04-20

## 2018-04-20 RX ORDER — HEPARIN SODIUM 5000 [USP'U]/ML
5000 INJECTION INTRAVENOUS; SUBCUTANEOUS EVERY 8 HOURS
Qty: 0 | Refills: 0 | Status: DISCONTINUED | OUTPATIENT
Start: 2018-04-20 | End: 2018-04-25

## 2018-04-20 RX ORDER — AZITHROMYCIN 500 MG/1
500 TABLET, FILM COATED ORAL ONCE
Qty: 0 | Refills: 0 | Status: COMPLETED | OUTPATIENT
Start: 2018-04-20 | End: 2018-04-20

## 2018-04-20 RX ORDER — CEFTRIAXONE 500 MG/1
1 INJECTION, POWDER, FOR SOLUTION INTRAMUSCULAR; INTRAVENOUS ONCE
Qty: 0 | Refills: 0 | Status: COMPLETED | OUTPATIENT
Start: 2018-04-20 | End: 2018-04-20

## 2018-04-20 RX ORDER — AZITHROMYCIN 500 MG/1
500 TABLET, FILM COATED ORAL EVERY 24 HOURS
Qty: 0 | Refills: 0 | Status: DISCONTINUED | OUTPATIENT
Start: 2018-04-21 | End: 2018-04-21

## 2018-04-20 RX ORDER — CEFTRIAXONE 500 MG/1
1 INJECTION, POWDER, FOR SOLUTION INTRAMUSCULAR; INTRAVENOUS ONCE
Qty: 0 | Refills: 0 | Status: DISCONTINUED | OUTPATIENT
Start: 2018-04-20 | End: 2018-04-20

## 2018-04-20 RX ORDER — CEFTRIAXONE 500 MG/1
1 INJECTION, POWDER, FOR SOLUTION INTRAMUSCULAR; INTRAVENOUS EVERY 24 HOURS
Qty: 0 | Refills: 0 | Status: DISCONTINUED | OUTPATIENT
Start: 2018-04-21 | End: 2018-04-21

## 2018-04-20 RX ADMIN — Medication 1 MILLIGRAM(S): at 22:30

## 2018-04-20 RX ADMIN — CEFTRIAXONE 100 GRAM(S): 500 INJECTION, POWDER, FOR SOLUTION INTRAMUSCULAR; INTRAVENOUS at 21:20

## 2018-04-20 RX ADMIN — Medication 400 MILLIGRAM(S): at 20:50

## 2018-04-20 RX ADMIN — SODIUM CHLORIDE 1333.33 MILLILITER(S): 9 INJECTION, SOLUTION INTRAVENOUS at 21:06

## 2018-04-20 RX ADMIN — AZITHROMYCIN 250 MILLIGRAM(S): 500 TABLET, FILM COATED ORAL at 22:25

## 2018-04-20 NOTE — H&P ADULT - NSHPPHYSICALEXAM_GEN_ALL_CORE
Vital Signs Last 24 Hrs  T(C): 38.8 (20 Apr 2018 20:05), Max: 38.8 (20 Apr 2018 20:05)  T(F): 101.8 (20 Apr 2018 20:05), Max: 101.8 (20 Apr 2018 20:05)  HR: 100 (20 Apr 2018 19:55) (90 - 100)  BP: 161/77 (20 Apr 2018 19:55) (154/78 - 161/77)  BP(mean): --  RR: 20 (20 Apr 2018 19:55) (18 - 20)  SpO2: 97% (20 Apr 2018 19:55) (97% - 97%)  PHYSICAL EXAM:  GENERAL: NAD, well-groomed, well-developed  HEAD:  Atraumatic, Normocephalic  EYES: EOMI, PERRLA, conjunctiva and sclera clear  ENMT: No tonsillar erythema, exudates, or enlargement; Moist mucous membranes, Good dentition, No lesions  NECK: Supple, No JVD, Normal thyroid  CHEST/LUNG: Clear to percussion bilaterally; No rales, rhonchi, wheezing, or rubs  HEART: Regular rate and rhythm; No murmurs, rubs, or gallops  ABDOMEN: Soft, Nontender, Nondistended; Bowel sounds present  EXTREMITIES:  2+ Peripheral Pulses, No clubbing, cyanosis, or edema  LYMPH: No lymphadenopathy noted  SKIN: No rashes or lesions  NERVOUS SYSTEM:  Alert & Oriented X3, Good concentration; Motor Strength 5/5 B/L upper and lower extremities; DTRs 2+ intact and symmetric Vital Signs Last 24 Hrs  T(C): 38.8 (20 Apr 2018 20:05), Max: 38.8 (20 Apr 2018 20:05)  T(F): 101.8 (20 Apr 2018 20:05), Max: 101.8 (20 Apr 2018 20:05)  HR: 100 (20 Apr 2018 19:55) (90 - 100)  BP: 161/77 (20 Apr 2018 19:55) (154/78 - 161/77)  BP(mean): --  RR: 20 (20 Apr 2018 19:55) (18 - 20)  SpO2: 97% (20 Apr 2018 19:55) (97% - 97%)  PHYSICAL EXAM:  *minimal exam as patient agitated and will not let me examine her  GENERAL: agitated, pulling at her clothes  HEAD:  Atraumatic, Normocephalic  EYES: conjunctiva and sclera clear  NECK: Supple  CHEST/LUNG: Clear to percussion anteriorly, unable to auscultate posteriorly  HEART: Regular rate and rhythm; No murmurs, rubs, or gallops  ABDOMEN: Soft, Nontender, Nondistended  EXTREMITIES:  2+ Peripheral Pulses, No clubbing, cyanosis, or edema  LYMPH: No lymphadenopathy noted  SKIN: No rashes or lesions  NERVOUS SYSTEM:  Alert & Oriented X 0

## 2018-04-20 NOTE — H&P ADULT - PROBLEM SELECTOR PLAN 3
CKD II (baseline Cr. 0.6-0.7), currently Cr 1.02, most likely in setting of sepsis  -s/p 2L LR, will give additional bolus and place on IVF @ 75cc's/hr  -continue to monitor Cr., BMP daily -acute encephalopathy most likely in setting of infection, as patient is febrile, tachycardic, w/ leukocytosis w/ RLL opacity on CXR, most likely source, pneumonia. No electrolyte abnormalities noted.  -will continue sepsis management as noted above  -CTH: -acute encephalopathy most likely in setting of infection, as patient is febrile, tachycardic, w/ leukocytosis w/ RLL opacity on CXR, most likely source, pneumonia. No electrolyte abnormalities noted.  -will continue sepsis management as noted above  -CTH: pending -acute encephalopathy most likely in setting of infection, as patient is febrile, tachycardic, w/ leukocytosis w/ RLL opacity on CXR, most likely source, aspiration pneumonia. No electrolyte abnormalities noted.  -will continue sepsis management as noted above  -CTH: pending

## 2018-04-20 NOTE — ED ADULT NURSE REASSESSMENT NOTE - NS ED NURSE REASSESS COMMENT FT1
Patient is back from U/S and pending transport back to CT.  Per CT transport, patient unable to lie still for exam.  ED MD VIJAY Mark aware of this.  IV medication to be given prior to transport to CT.  IV fluids still in progress as ordered.  Repeat VBG to be drawn s/p fluids.  1:1 still in progress.

## 2018-04-20 NOTE — H&P ADULT - NSHPSOCIALHISTORY_GEN_ALL_CORE
Social History:    Marital Status:  (   )    (   ) Single    (   )    (  )   Occupation:   Lives with: (  ) alone  (  ) children   (  ) spouse   (  ) parents  (  ) other    Substance Use (street drugs): (  ) never used  (  ) other:  Tobacco Usage:  (   ) never smoked   (   ) former smoker   (   ) current smoker  (     ) pack year  (        ) last cigarette date  Alcohol Usage:  Sexual History: Social History:    unknown

## 2018-04-20 NOTE — ED PROVIDER NOTE - OBJECTIVE STATEMENT
PMH dementia, bipolar disorder, Hepatitis C, carotid disease, HTN, HLD.  Alert to person at baseline.  Coming from home by EMS.  Per family seemed more confused than usual. Satting 90% en route on room air, tachycardic 110.  No known falls or recent illness.  Family not available. Patient speaks nonsense words unable to offer history.

## 2018-04-20 NOTE — ED PROVIDER NOTE - CHIEF COMPLAINT
The patient is a 89y Female complaining of The patient is a 89y Female complaining of altered mental status.

## 2018-04-20 NOTE — H&P ADULT - PROBLEM SELECTOR PLAN 5
BP stable at 150/70s, will hold anti-htn in setting of sepsis w/ behavioral disturbances  -s/p ativan 1mg IV  -will continue w/ w/ behavioral disturbances  -s/p ativan 1mg IV x 1  -patient required MED REC by pharmacy in the AM, will give seroquel 25mg if patient agitated w/ behavioral disturbances  -s/p ativan 1mg IV x 1  -patient required MED REC by pharmacy in the AM, will hold seroquel for now as it can worsen delirium

## 2018-04-20 NOTE — ED ADULT NURSE NOTE - OBJECTIVE STATEMENT
Patient brought in by EMS with c/o AMS per family (stated by EMS).  No family present at the bedside.  Patient is Patient brought in by EMS with c/o AMS per family (stated by EMS).  No family present at the bedside.  Patient is babbling and not following commands. Patient brought in by EMS with c/o AMS per family (stated by EMS).  No family present at the bedside.  Patient is babbling and not following commands.  According to hospital record, patient has history of dementia, hypertension and high cholesterol.  Unable to obtain history from patient.

## 2018-04-20 NOTE — H&P ADULT - ATTENDING COMMENTS
NIGHT HOSPITALIST:  Patient UNKNOWN to me previously, assigned to me at this point via the ER to admit this 89 y/o F--family (son) not currently in attendance and presently could not be reached by phone--ER physicians contacted son earlier in the evening--patient with limited ability to convey history--patient with a history of reported bipolar disorder, moderate to severe cognitive impairment, reported hepatitis C, with patient brought in by son and son's female significant other following note by family of increased confusion at home.  Unable to obtain review of systems from patient.  Patient is alert to self, interacts with examiner with minimal prompting.  Unable to review social or family history from patient nor family. NIGHT HOSPITALIST:  Patient UNKNOWN to me previously, assigned to me at this point via the ER to admit this 89 y/o F--family (son) not currently in attendance and presently could not be reached by phone--ER physicians contacted son earlier in the evening--patient with limited ability to convey history--patient with a history of reported bipolar disorder, moderate to severe cognitive impairment, reported hepatitis C, with patient brought in by son and son's female significant other following note by family of increased confusion at home.  Unable to obtain review of systems from patient.  Patient is alert to self, interacts with examiner with minimal prompting.  Unable to review social or family history from patient nor family.  Physical exam with an elderly, chronically ill appearing F with diffuse sarcopenia.  .8F.  HR  98.  BP  128/67 (earlier hypertensive).  RR 14.  94% on RA.  HEENT< PERRL< EOMI, bitemporal wasting.  Neck supple, chest with poor effort, decreased breath sounds at the RIGHT base.  Cor s1 s2, uncooperative with breast exam.  Abdomen soft nontender, scaphoid, normal bowel sounds, no rebound.  Ext with no oedema, diffuse sarcopenia, poor nail hygiene.  Neurologic exam as above.  Alert to self.  Generally calm, with occasional verbal outbursts earlier in the ER.  Poor short term recall and cognition.  speech fluent.  UE/LE 5/5.  WBC 15.9 with 7% BANDS.  Hgb 13.0.  Platelets of 200K.  U/A negative.  INR 0.9.  Random glucose of 126.  CR 1.0.  troponin nil.  K+ 5.0.  Patient uncooperative with head CTT in the ER --last head CTT in 12/2017 nondiagnostic.  EKG tracing reviewed with NSR at 95 with poor anterior R wave progression.  LEFT leg Duplex negative for DVT>  Chest radiograph reviewed and poor rotated study but with increased RIGHT basilar markings.  Admitted to medicine with suspected aspiration pneumonia.  ER physician obtained DNR status (apparently per proxy patient may be intubated).  Would upgrade IV antibiotics to IV Zosyn for aspiration coverage.  Would assume aspiration risk.  Social work.  Would favor formal evaluation by neurology and psychiatry given history of reported bipolar but patient with clear clinical evidence of dementia.  Confusional state suspected due to sepsis from aspiration pneumonia.  Son earlier updated by the ER and aware and agree with plan/care as above.  Prognosis is guarded.  Emotional support provided to patient.  Care reviewed with Dr. Shook.  Care assumed by the DAY HOSPITALIST.    Hunter Wright MD  [phone]

## 2018-04-20 NOTE — H&P ADULT - PROBLEM SELECTOR PLAN 1
-patient febrile (101.8), tachycardic, elevated lactate 3.6, w/ leukocytosis, meeting sepsis criteria, w/ potential source-RLL opacity seen on CXR, likely pna. urinalysis-negative for LE/nitrites  -s/p ceftriaxone 1g x 1, azithromycin 500mg x 1  -will continue w/ CAP coverage w/ ceftriaxone and azithromycin, will f/u BCx, ordered legionella urine antigen.    -trend fever curve, CBCw/ diff qd -patient febrile (101.8), tachycardic, elevated lactate 3.6, w/ leukocytosis, meeting sepsis criteria, w/ potential source-RLL opacity seen on CXR, likely pna. urinalysis-negative for LE/nitrites  -s/p ceftriaxone 1g x 1, azithromycin 500mg x 1  -will switch to zosyn for better coverage as most likely aspiration pneumonia, will f/u BCx, ordered legionella urine antigen, will hold off on vanc for now  -trend fever curve, CBCw/ diff qd  -will place on dysphagia III diet, aspiration precautions, speech and swallow

## 2018-04-20 NOTE — H&P ADULT - NSHPREVIEWOFSYSTEMS_GEN_ALL_CORE
REVIEW OF SYSTEMS:  CONSTITUTIONAL: No fever, weight loss, or fatigue  EYES: No eye pain, visual disturbances, or discharge  ENMT:  No difficulty hearing, tinnitus, vertigo; No sinus or throat pain  NECK: No pain or stiffness  BREASTS: No pain, masses, or nipple discharge  RESPIRATORY: No cough, wheezing, chills or hemoptysis; No shortness of breath  CARDIOVASCULAR: No chest pain, palpitations, dizziness, or leg swelling  GASTROINTESTINAL: No abdominal or epigastric pain. No nausea, vomiting, or hematemesis; No diarrhea or constipation. No melena or hematochezia.  GENITOURINARY: No dysuria, frequency, hematuria, or incontinence  NEUROLOGICAL: No headaches, memory loss, loss of strength, numbness, or tremors  SKIN: No itching, burning, rashes, or lesions   LYMPH NODES: No enlarged glands  ENDOCRINE: No heat or cold intolerance; No hair loss  MUSCULOSKELETAL: No joint pain or swelling; No muscle, back, or extremity pain  PSYCHIATRIC: No depression, anxiety, mood swings, or difficulty sleeping  HEME/LYMPH: No easy bruising, or bleeding gums  ALLERY AND IMMUNOLOGIC: No hives or eczema unable to obtain as patient has advanced dementia and appears agitated

## 2018-04-20 NOTE — ED PROVIDER NOTE - ATTENDING CONTRIBUTION TO CARE
89 y/o female with multiple co-morbidities including but not limited to dementia and who presents as has been described above with AMS described as seemingly more confused than usual a/w shaking. On exam, at time I saw her, she appeared well and comfortable. She was awake, alert, interactive, happy and even offered a kiss. VSs noted, neck supple, lungs c/ R basilar crackles, cardiac sounds s/ audible m/r/g, abdomen soft, NT/ND, extremities c/ asymmetry (distal LLE > distal RLE in circumference), skin s/ rash and neurologically s/ focal deficit. EKG s/ acute ischemic change. CXR c/ RLL PNA. Labs significant for leukocytosis. A head CT was ordered strictly for screening purposes but does not need to be done emergently. Provided IVF and antibiotic, she has since been admitted to Medicine for pneumonia and in light of her PSI Class IV score.

## 2018-04-20 NOTE — H&P ADULT - PROBLEM SELECTOR PLAN 4
w/ behavioral disturbances  -s/p ativan 1mg IV  -will continue w/ CKD II (baseline Cr. 0.6-0.7), currently Cr 1.02, most likely in setting of sepsis  -s/p 2L LR, will give additional bolus and place on IVF @ 75cc's/hr  -continue to monitor Cr., BMP daily

## 2018-04-20 NOTE — ED ADULT NURSE REASSESSMENT NOTE - NS ED NURSE REASSESS COMMENT FT1
Per , pt/ptt was received with no order, however at this time of the phone call to lab, order was still in "pending collection" status.   states that the ED MD needed to be called for the order, which is why it was never run. Per , pt/ptt was received with no order, however at this time of the phone call to lab, order was still in "pending collection" status.   states that the ED MD needed to be called for the order, which is why it was never run.  Entry date in EMR is 2023.  RN Charge aware of this. Per , pt/ptt was received with no order, however at this time of the phone call to lab, order was still in "pending collection" status.   states that the ED MD needed to be called for the order, which is why it was never run.  Entry date in EMR is 2023.  RN Charge aware of this.  ED MD aware of this as well.

## 2018-04-20 NOTE — ED PROVIDER NOTE - MEDICAL DECISION MAKING DETAILS
Patient with AMS and new hypoxia.  Crackles in R lung suggest possible infectious cause.  xray, vbg, cbc, troponin, for hypoxia, dvt study for leg swelling and hypoxia, check for electrolyte abnormalities and ct head for altered mental status, call family.

## 2018-04-20 NOTE — H&P ADULT - PROBLEM SELECTOR PLAN 2
-patient febrile (101.8 -patient febrile (101.8), tachycardic, elevated lactate 3.6, w/ leukocytosis, meeting sepsis criteria, w/ potential source-RLL opacity seen on CXR, likely pna. urinalysis-negative for LE/nitrites  -s/p ceftriaxone 1g x 1, azithromycin 500mg x 1  -will continue w/ CAP coverage w/ ceftriaxone and azithromycin, will f/u BCx, ordered legionella urine antigen.    -trend fever curve, CBCw/ diff qd -patient febrile (101.8), tachycardic, elevated lactate 3.6, w/ leukocytosis, meeting sepsis criteria, w/ potential source-RLL opacity seen on CXR, likely pna. urinalysis-negative for LE/nitrites  -s/p ceftriaxone 1g x 1, azithromycin 500mg x 1  -will switch to zosyn for better coverage as most likely aspiration pneumonia, will f/u BCx, ordered legionella urine antigen, will hold off on vanc for now  -trend fever curve, CBCw/ diff qd  -will place on dysphagia III diet, aspiration precautions, speech and swallow

## 2018-04-20 NOTE — ED ADULT NURSE REASSESSMENT NOTE - NS ED NURSE REASSESS COMMENT FT1
Straight catheterization performed using sterile technique with 2 RNs at the bedside.  U/A and urine culture obtained and sent to the lab as ordered.  Patient tolerated procedure well, safety maintained.  Comfort care measures performed.  1:1 still in progress. Straight catheterization performed using sterile technique with 2 RNs at the bedside.  U/A and urine culture obtained and sent to the lab as ordered.  Patient tolerated procedure well, safety maintained.  Comfort care measures performed.  1:1 still in progress.  IV Ceftriaxone infusing as ordered.

## 2018-04-20 NOTE — H&P ADULT - HISTORY OF PRESENT ILLNESS
89 y.o Male w/ pmhx of Bipolar Dz, Dementia, HTN, HLD presenting w/ c/o    ED vitals: temp. 101.8, , /78, RR 18, 97% 3L NC  In the ED: given tylenol 1g, ativan 1mg x IV, LR x 2L History unable to be obtained from patient due to confused and baseline dementia. Attempted all numbers listed in chart to obtain collateral info, no response. History as per chart review and ED documentation.    89 y.o Male w/ pmhx of Bipolar Dz, Dementia, HTN, HepC,  HLD presenting w/ c/o confusion. As per ED documentation: Patient arrived via EMS, w/ family concerns that patient is more confused than usual. No recent falls or noted illness. Per family, patient alert at baseline.     ED vitals: temp. 101.8, , /78, RR 18, 97% 3L NC  In the ED: given tylenol 1g, ativan 1mg x IV, LR x 2L History unable to be obtained from patient due to confused and baseline dementia. Attempted all numbers listed in chart to obtain collateral info from son, no response. History as per chart review and ED documentation and limited information from son's girlfriend.     89 y.o Male w/ pmhx of Bipolar Dz, Dementia, HTN, HepC,  HLD presenting w/ c/o confusion. As per ED documentation: Patient arrived via EMS, w/ family concerns that patient is more confused than usual. No recent falls or noted illness.  As per Alba (patient's son's gf), patient lives at home with them, and is normally very agitated, a x o x 0. Alba does not know why patient brought here to ED, does not know patient's medications, states Zac knows medical history, but can be difficult to reach.     ED vitals: temp. 101.8, , /78, RR 18, 97% 3L NC  In the ED: given tylenol 1g, ativan 1mg x IV, LR x 2L History unable to be obtained from patient due to confused and baseline dementia. Attempted all numbers listed in chart to obtain collateral info from son, no response. History as per chart review and ED documentation and limited information from son's girlfriend.     89 y.o female w/ pmhx of Bipolar Dz, Dementia, HTN, HepC,  HLD presenting w/ c/o confusion. As per ED documentation: Patient arrived via EMS, w/ family concerns that patient is more confused than usual. No recent falls or noted illness.  As per Alba (patient's son's gf), patient lives at home with them, and is normally very agitated, a x o x 0. Alba does not know why patient brought here to ED, does not know patient's medications, states Zac knows medical history, but can be difficult to reach.     ED vitals: temp. 101.8, , /78, RR 18, 97% 3L NC  In the ED: given tylenol 1g, ativan 1mg x IV, LR x 2L

## 2018-04-20 NOTE — H&P ADULT - ASSESSMENT
89 y.o Male w/ pmhx of Bipolar Dz, Dementia, HTN, HLD presenting w/ c/o 89 y.o Male w/ pmhx of Bipolar Dz, Dementia, HTN, HLD presenting w/ c/o acute on chronic encephalopathy, found to be in sepsis most likely 2/2 CAP. 89 y.o Male w/ pmhx of Bipolar Dz, Dementia, HTN, HLD presenting w/ c/o acute on chronic encephalopathy, found to be in sepsis most likely 2/2 aspiration pneumonia

## 2018-04-20 NOTE — ED PROVIDER NOTE - PROGRESS NOTE DETAILS
Spoke with son.  Patient woke up from afternoon nap, was shaking uncontrollably, took afternoon meds, (xanax, depakote, seroquel).  Shaking improved as son was changing her, took automatic BP which took measurement 180/105.  Denied pain and dyspnea at the time, and had poor  strength.  Also noted left leg swelling.  Also started grabbing her chest as EMS was en route.  Son is concerned about CHF or stroke.  Gladys Mark, PGY3 Discussed patient with son Zac Herbert again who is aware of PNA and plan for admission, after discussion with family would like to make patient DNR but would like intubation for reversible process.  PMD Wade Mark, PGY3

## 2018-04-21 DIAGNOSIS — G93.40 ENCEPHALOPATHY, UNSPECIFIED: ICD-10-CM

## 2018-04-21 LAB
ALBUMIN SERPL ELPH-MCNC: 3.7 G/DL — SIGNIFICANT CHANGE UP (ref 3.3–5)
ALP SERPL-CCNC: 58 U/L — SIGNIFICANT CHANGE UP (ref 40–120)
ALT FLD-CCNC: 11 U/L — SIGNIFICANT CHANGE UP (ref 10–45)
ANION GAP SERPL CALC-SCNC: 12 MMOL/L — SIGNIFICANT CHANGE UP (ref 5–17)
APTT BLD: 29.1 SEC — SIGNIFICANT CHANGE UP (ref 27.5–37.4)
AST SERPL-CCNC: 15 U/L — SIGNIFICANT CHANGE UP (ref 10–40)
BASOPHILS # BLD AUTO: 0.02 K/UL — SIGNIFICANT CHANGE UP (ref 0–0.2)
BASOPHILS NFR BLD AUTO: 0.1 % — SIGNIFICANT CHANGE UP (ref 0–2)
BILIRUB SERPL-MCNC: 0.6 MG/DL — SIGNIFICANT CHANGE UP (ref 0.2–1.2)
BUN SERPL-MCNC: 20 MG/DL — SIGNIFICANT CHANGE UP (ref 7–23)
CALCIUM SERPL-MCNC: 9.6 MG/DL — SIGNIFICANT CHANGE UP (ref 8.4–10.5)
CHLORIDE SERPL-SCNC: 101 MMOL/L — SIGNIFICANT CHANGE UP (ref 96–108)
CO2 SERPL-SCNC: 28 MMOL/L — SIGNIFICANT CHANGE UP (ref 22–31)
CREAT SERPL-MCNC: 0.89 MG/DL — SIGNIFICANT CHANGE UP (ref 0.5–1.3)
CULTURE RESULTS: NO GROWTH — SIGNIFICANT CHANGE UP
EOSINOPHIL # BLD AUTO: 0.02 K/UL — SIGNIFICANT CHANGE UP (ref 0–0.5)
EOSINOPHIL NFR BLD AUTO: 0.1 % — SIGNIFICANT CHANGE UP (ref 0–6)
GLUCOSE SERPL-MCNC: 93 MG/DL — SIGNIFICANT CHANGE UP (ref 70–99)
HCT VFR BLD CALC: 34.5 % — SIGNIFICANT CHANGE UP (ref 34.5–45)
HGB BLD-MCNC: 11.5 G/DL — SIGNIFICANT CHANGE UP (ref 11.5–15.5)
IMM GRANULOCYTES NFR BLD AUTO: 0.2 % — SIGNIFICANT CHANGE UP (ref 0–1.5)
INR BLD: 0.94 RATIO — SIGNIFICANT CHANGE UP (ref 0.88–1.16)
LEGIONELLA AG UR QL: NEGATIVE — SIGNIFICANT CHANGE UP
LYMPHOCYTES # BLD AUTO: 1.88 K/UL — SIGNIFICANT CHANGE UP (ref 1–3.3)
LYMPHOCYTES # BLD AUTO: 10.8 % — LOW (ref 13–44)
MAGNESIUM SERPL-MCNC: 2 MG/DL — SIGNIFICANT CHANGE UP (ref 1.6–2.6)
MCHC RBC-ENTMCNC: 31.5 PG — SIGNIFICANT CHANGE UP (ref 27–34)
MCHC RBC-ENTMCNC: 33.3 GM/DL — SIGNIFICANT CHANGE UP (ref 32–36)
MCV RBC AUTO: 94.5 FL — SIGNIFICANT CHANGE UP (ref 80–100)
MONOCYTES # BLD AUTO: 1.46 K/UL — HIGH (ref 0–0.9)
MONOCYTES NFR BLD AUTO: 8.4 % — SIGNIFICANT CHANGE UP (ref 2–14)
NEUTROPHILS # BLD AUTO: 14.07 K/UL — HIGH (ref 1.8–7.4)
NEUTROPHILS NFR BLD AUTO: 80.4 % — HIGH (ref 43–77)
PHOSPHATE SERPL-MCNC: 3 MG/DL — SIGNIFICANT CHANGE UP (ref 2.5–4.5)
PLATELET # BLD AUTO: 184 K/UL — SIGNIFICANT CHANGE UP (ref 150–400)
POTASSIUM SERPL-MCNC: 4.2 MMOL/L — SIGNIFICANT CHANGE UP (ref 3.5–5.3)
POTASSIUM SERPL-SCNC: 4.2 MMOL/L — SIGNIFICANT CHANGE UP (ref 3.5–5.3)
PROT SERPL-MCNC: 6.5 G/DL — SIGNIFICANT CHANGE UP (ref 6–8.3)
PROTHROM AB SERPL-ACNC: 10.6 SEC — SIGNIFICANT CHANGE UP (ref 10–13.1)
RBC # BLD: 3.65 M/UL — LOW (ref 3.8–5.2)
RBC # FLD: 14.1 % — SIGNIFICANT CHANGE UP (ref 10.3–14.5)
SODIUM SERPL-SCNC: 141 MMOL/L — SIGNIFICANT CHANGE UP (ref 135–145)
SPECIMEN SOURCE: SIGNIFICANT CHANGE UP
TSH SERPL-MCNC: 1.4 UIU/ML — SIGNIFICANT CHANGE UP (ref 0.27–4.2)
WBC # BLD: 17.48 K/UL — HIGH (ref 3.8–10.5)
WBC # FLD AUTO: 17.48 K/UL — HIGH (ref 3.8–10.5)

## 2018-04-21 PROCEDURE — 99233 SBSQ HOSP IP/OBS HIGH 50: CPT

## 2018-04-21 PROCEDURE — 99223 1ST HOSP IP/OBS HIGH 75: CPT | Mod: GC

## 2018-04-21 PROCEDURE — 70450 CT HEAD/BRAIN W/O DYE: CPT | Mod: 26

## 2018-04-21 RX ORDER — DIVALPROEX SODIUM 500 MG/1
250 TABLET, DELAYED RELEASE ORAL
Qty: 0 | Refills: 0 | Status: DISCONTINUED | OUTPATIENT
Start: 2018-04-21 | End: 2018-04-21

## 2018-04-21 RX ORDER — DIVALPROEX SODIUM 500 MG/1
250 TABLET, DELAYED RELEASE ORAL ONCE
Qty: 0 | Refills: 0 | Status: CANCELLED | OUTPATIENT
Start: 2019-03-21 | End: 2018-04-25

## 2018-04-21 RX ORDER — PIPERACILLIN AND TAZOBACTAM 4; .5 G/20ML; G/20ML
3.38 INJECTION, POWDER, LYOPHILIZED, FOR SOLUTION INTRAVENOUS EVERY 8 HOURS
Qty: 0 | Refills: 0 | Status: DISCONTINUED | OUTPATIENT
Start: 2018-04-21 | End: 2018-04-21

## 2018-04-21 RX ORDER — ALPRAZOLAM 0.25 MG
0.5 TABLET ORAL
Qty: 0 | Refills: 0 | Status: DISCONTINUED | OUTPATIENT
Start: 2018-04-21 | End: 2018-04-25

## 2018-04-21 RX ORDER — SODIUM CHLORIDE 9 MG/ML
1000 INJECTION INTRAMUSCULAR; INTRAVENOUS; SUBCUTANEOUS
Qty: 0 | Refills: 0 | Status: DISCONTINUED | OUTPATIENT
Start: 2018-04-21 | End: 2018-04-21

## 2018-04-21 RX ORDER — SENNA PLUS 8.6 MG/1
2 TABLET ORAL AT BEDTIME
Qty: 0 | Refills: 0 | Status: DISCONTINUED | OUTPATIENT
Start: 2018-04-21 | End: 2018-04-25

## 2018-04-21 RX ORDER — SODIUM CHLORIDE 9 MG/ML
1000 INJECTION INTRAMUSCULAR; INTRAVENOUS; SUBCUTANEOUS
Qty: 0 | Refills: 0 | Status: DISCONTINUED | OUTPATIENT
Start: 2018-04-21 | End: 2018-04-25

## 2018-04-21 RX ORDER — PIPERACILLIN AND TAZOBACTAM 4; .5 G/20ML; G/20ML
3.38 INJECTION, POWDER, LYOPHILIZED, FOR SOLUTION INTRAVENOUS EVERY 8 HOURS
Qty: 0 | Refills: 0 | Status: DISCONTINUED | OUTPATIENT
Start: 2018-04-21 | End: 2018-04-25

## 2018-04-21 RX ORDER — DIVALPROEX SODIUM 500 MG/1
1 TABLET, DELAYED RELEASE ORAL
Qty: 0 | Refills: 0 | COMMUNITY

## 2018-04-21 RX ORDER — DIVALPROEX SODIUM 500 MG/1
500 TABLET, DELAYED RELEASE ORAL
Qty: 0 | Refills: 0 | Status: DISCONTINUED | OUTPATIENT
Start: 2018-04-21 | End: 2018-04-21

## 2018-04-21 RX ORDER — PANTOPRAZOLE SODIUM 20 MG/1
40 TABLET, DELAYED RELEASE ORAL
Qty: 0 | Refills: 0 | Status: DISCONTINUED | OUTPATIENT
Start: 2018-04-21 | End: 2018-04-25

## 2018-04-21 RX ORDER — DOCUSATE SODIUM 100 MG
100 CAPSULE ORAL THREE TIMES A DAY
Qty: 0 | Refills: 0 | Status: DISCONTINUED | OUTPATIENT
Start: 2018-04-21 | End: 2018-04-25

## 2018-04-21 RX ORDER — QUETIAPINE FUMARATE 200 MG/1
50 TABLET, FILM COATED ORAL
Qty: 0 | Refills: 0 | Status: DISCONTINUED | OUTPATIENT
Start: 2018-04-21 | End: 2018-04-25

## 2018-04-21 RX ORDER — DIVALPROEX SODIUM 500 MG/1
500 TABLET, DELAYED RELEASE ORAL
Qty: 0 | Refills: 0 | Status: DISCONTINUED | OUTPATIENT
Start: 2018-04-21 | End: 2018-04-25

## 2018-04-21 RX ORDER — SODIUM CHLORIDE 9 MG/ML
1000 INJECTION INTRAMUSCULAR; INTRAVENOUS; SUBCUTANEOUS ONCE
Qty: 0 | Refills: 0 | Status: COMPLETED | OUTPATIENT
Start: 2018-04-21 | End: 2018-04-21

## 2018-04-21 RX ORDER — ACETAMINOPHEN 500 MG
1000 TABLET ORAL ONCE
Qty: 0 | Refills: 0 | Status: DISCONTINUED | OUTPATIENT
Start: 2018-04-21 | End: 2018-04-21

## 2018-04-21 RX ORDER — OXYCODONE AND ACETAMINOPHEN 5; 325 MG/1; MG/1
1 TABLET ORAL EVERY 6 HOURS
Qty: 0 | Refills: 0 | Status: DISCONTINUED | OUTPATIENT
Start: 2018-04-21 | End: 2018-04-25

## 2018-04-21 RX ADMIN — DIVALPROEX SODIUM 250 MILLIGRAM(S): 500 TABLET, DELAYED RELEASE ORAL at 18:20

## 2018-04-21 RX ADMIN — HEPARIN SODIUM 5000 UNIT(S): 5000 INJECTION INTRAVENOUS; SUBCUTANEOUS at 21:46

## 2018-04-21 RX ADMIN — HEPARIN SODIUM 5000 UNIT(S): 5000 INJECTION INTRAVENOUS; SUBCUTANEOUS at 13:30

## 2018-04-21 RX ADMIN — PIPERACILLIN AND TAZOBACTAM 25 GRAM(S): 4; .5 INJECTION, POWDER, LYOPHILIZED, FOR SOLUTION INTRAVENOUS at 18:20

## 2018-04-21 RX ADMIN — PIPERACILLIN AND TAZOBACTAM 25 GRAM(S): 4; .5 INJECTION, POWDER, LYOPHILIZED, FOR SOLUTION INTRAVENOUS at 10:28

## 2018-04-21 RX ADMIN — SODIUM CHLORIDE 75 MILLILITER(S): 9 INJECTION INTRAMUSCULAR; INTRAVENOUS; SUBCUTANEOUS at 10:28

## 2018-04-21 RX ADMIN — Medication 0.5 MILLIGRAM(S): at 13:55

## 2018-04-21 RX ADMIN — Medication 100 MILLIGRAM(S): at 13:30

## 2018-04-21 RX ADMIN — QUETIAPINE FUMARATE 50 MILLIGRAM(S): 200 TABLET, FILM COATED ORAL at 18:20

## 2018-04-21 RX ADMIN — PIPERACILLIN AND TAZOBACTAM 25 GRAM(S): 4; .5 INJECTION, POWDER, LYOPHILIZED, FOR SOLUTION INTRAVENOUS at 03:14

## 2018-04-21 RX ADMIN — SODIUM CHLORIDE 1333.33 MILLILITER(S): 9 INJECTION INTRAMUSCULAR; INTRAVENOUS; SUBCUTANEOUS at 02:18

## 2018-04-21 NOTE — DIETITIAN INITIAL EVALUATION ADULT. - PROBLEM SELECTOR PLAN 3
-acute encephalopathy most likely in setting of infection, as patient is febrile, tachycardic, w/ leukocytosis w/ RLL opacity on CXR, most likely source, aspiration pneumonia. No electrolyte abnormalities noted.  -will continue sepsis management as noted above  -CTH: pending

## 2018-04-21 NOTE — PROGRESS NOTE ADULT - PROBLEM SELECTOR PLAN 5
w/ behavioral disturbances  -s/p ativan 1mg IV x 1  -patient required MED REC by pharmacy in the AM, will hold seroquel for now as it can worsen delirium.

## 2018-04-21 NOTE — DIETITIAN INITIAL EVALUATION ADULT. - PROBLEM SELECTOR PLAN 4
CKD II (baseline Cr. 0.6-0.7), currently Cr 1.02, most likely in setting of sepsis  -s/p 2L LR, will give additional bolus and place on IVF @ 75cc's/hr  -continue to monitor Cr., BMP daily

## 2018-04-21 NOTE — DIETITIAN INITIAL EVALUATION ADULT. - OTHER INFO
Patient referred for nutrition assessment.  Upon visit to room, no family available and 1:1 who cared for patient during the day shift had left.  Patient noted to be very agitated and combative and pulled out IV and was bleeding,  1:1 was looking for help.  No documentation of po intake noted.  Patient presented with possible aspiration PNA.  Swallow eval is pending and patient is on Dysphagia 3 with nectar liquids.  In view of agitation, unsure if patient is eating.  In view of possible aspiration, patient may benefit from a swallow eval.  Weight has been mostly stable since October with 4 pound weight gain. Skin intact.  History of Vit D deficiency.

## 2018-04-21 NOTE — PATIENT PROFILE ADULT. - FALL HARM RISK
s/p fall in nursing home/bones(Osteoporosis,prev fx,steroid use,metastatic bone ca/age(85 years old or older)

## 2018-04-21 NOTE — DIETITIAN INITIAL EVALUATION ADULT. - NS AS NUTRI INTERV MEALS SNACK
Texture-modified diet/Consider downgrading diet to Dysphagia 1 with nectar thick liquids for safety and until swallow eval completed.

## 2018-04-21 NOTE — DIETITIAN INITIAL EVALUATION ADULT. - PROBLEM SELECTOR PLAN 5
w/ behavioral disturbances  -s/p ativan 1mg IV x 1  -patient required MED REC by pharmacy in the AM, will hold seroquel for now as it can worsen delirium

## 2018-04-21 NOTE — DIETITIAN INITIAL EVALUATION ADULT. - PROBLEM SELECTOR PLAN 1
-patient febrile (101.8), tachycardic, elevated lactate 3.6, w/ leukocytosis, meeting sepsis criteria, w/ potential source-RLL opacity seen on CXR, likely pna. urinalysis-negative for LE/nitrites  -s/p ceftriaxone 1g x 1, azithromycin 500mg x 1  -will switch to zosyn for better coverage as most likely aspiration pneumonia, will f/u BCx, ordered legionella urine antigen, will hold off on vanc for now  -trend fever curve, CBCw/ diff qd  -will place on dysphagia III diet, aspiration precautions, speech and swallow

## 2018-04-22 PROCEDURE — 99233 SBSQ HOSP IP/OBS HIGH 50: CPT

## 2018-04-22 RX ADMIN — PIPERACILLIN AND TAZOBACTAM 25 GRAM(S): 4; .5 INJECTION, POWDER, LYOPHILIZED, FOR SOLUTION INTRAVENOUS at 08:24

## 2018-04-22 RX ADMIN — QUETIAPINE FUMARATE 50 MILLIGRAM(S): 200 TABLET, FILM COATED ORAL at 05:49

## 2018-04-22 RX ADMIN — PIPERACILLIN AND TAZOBACTAM 25 GRAM(S): 4; .5 INJECTION, POWDER, LYOPHILIZED, FOR SOLUTION INTRAVENOUS at 00:03

## 2018-04-22 RX ADMIN — PIPERACILLIN AND TAZOBACTAM 25 GRAM(S): 4; .5 INJECTION, POWDER, LYOPHILIZED, FOR SOLUTION INTRAVENOUS at 15:49

## 2018-04-22 RX ADMIN — HEPARIN SODIUM 5000 UNIT(S): 5000 INJECTION INTRAVENOUS; SUBCUTANEOUS at 05:49

## 2018-04-22 RX ADMIN — Medication 100 MILLIGRAM(S): at 13:07

## 2018-04-22 RX ADMIN — DIVALPROEX SODIUM 500 MILLIGRAM(S): 500 TABLET, DELAYED RELEASE ORAL at 17:52

## 2018-04-22 RX ADMIN — PANTOPRAZOLE SODIUM 40 MILLIGRAM(S): 20 TABLET, DELAYED RELEASE ORAL at 05:50

## 2018-04-22 RX ADMIN — SENNA PLUS 2 TABLET(S): 8.6 TABLET ORAL at 21:10

## 2018-04-22 RX ADMIN — QUETIAPINE FUMARATE 50 MILLIGRAM(S): 200 TABLET, FILM COATED ORAL at 17:52

## 2018-04-22 RX ADMIN — Medication 100 MILLIGRAM(S): at 05:48

## 2018-04-22 RX ADMIN — HEPARIN SODIUM 5000 UNIT(S): 5000 INJECTION INTRAVENOUS; SUBCUTANEOUS at 21:10

## 2018-04-22 RX ADMIN — DIVALPROEX SODIUM 500 MILLIGRAM(S): 500 TABLET, DELAYED RELEASE ORAL at 05:49

## 2018-04-22 RX ADMIN — HEPARIN SODIUM 5000 UNIT(S): 5000 INJECTION INTRAVENOUS; SUBCUTANEOUS at 13:07

## 2018-04-22 RX ADMIN — Medication 100 MILLIGRAM(S): at 21:10

## 2018-04-22 NOTE — CHART NOTE - NSCHARTNOTEFT_GEN_A_CORE
4/22/18   :- Medicine NP notes :- Constant Observation for patient safety as pt gets agitated and restless    YESSI Galaviz-C  [phone]

## 2018-04-22 NOTE — PROGRESS NOTE ADULT - PROBLEM SELECTOR PLAN 5
w/ behavioral disturbances  - Continue with home regimen of Depakote, Xanax, Seroquel.   - For now, on 1:1. If continues to be less agitated, can move patient into a room that's closer to the nursing station, utilize bed alarm, and discontinue 1:1.

## 2018-04-23 LAB
ANION GAP SERPL CALC-SCNC: 16 MMOL/L — SIGNIFICANT CHANGE UP (ref 5–17)
BUN SERPL-MCNC: 22 MG/DL — SIGNIFICANT CHANGE UP (ref 7–23)
CALCIUM SERPL-MCNC: 8.8 MG/DL — SIGNIFICANT CHANGE UP (ref 8.4–10.5)
CHLORIDE SERPL-SCNC: 104 MMOL/L — SIGNIFICANT CHANGE UP (ref 96–108)
CO2 SERPL-SCNC: 22 MMOL/L — SIGNIFICANT CHANGE UP (ref 22–31)
CREAT SERPL-MCNC: 1.04 MG/DL — SIGNIFICANT CHANGE UP (ref 0.5–1.3)
GLUCOSE SERPL-MCNC: 155 MG/DL — HIGH (ref 70–99)
HCT VFR BLD CALC: 33.8 % — LOW (ref 34.5–45)
HGB BLD-MCNC: 11.2 G/DL — LOW (ref 11.5–15.5)
MCHC RBC-ENTMCNC: 32.4 PG — SIGNIFICANT CHANGE UP (ref 27–34)
MCHC RBC-ENTMCNC: 33.2 GM/DL — SIGNIFICANT CHANGE UP (ref 32–36)
MCV RBC AUTO: 97.7 FL — SIGNIFICANT CHANGE UP (ref 80–100)
PLATELET # BLD AUTO: 171 K/UL — SIGNIFICANT CHANGE UP (ref 150–400)
POTASSIUM SERPL-MCNC: 3.9 MMOL/L — SIGNIFICANT CHANGE UP (ref 3.5–5.3)
POTASSIUM SERPL-SCNC: 3.9 MMOL/L — SIGNIFICANT CHANGE UP (ref 3.5–5.3)
RBC # BLD: 3.46 M/UL — LOW (ref 3.8–5.2)
RBC # FLD: 12.3 % — SIGNIFICANT CHANGE UP (ref 10.3–14.5)
SODIUM SERPL-SCNC: 142 MMOL/L — SIGNIFICANT CHANGE UP (ref 135–145)
WBC # BLD: 8.5 K/UL — SIGNIFICANT CHANGE UP (ref 3.8–10.5)
WBC # FLD AUTO: 8.5 K/UL — SIGNIFICANT CHANGE UP (ref 3.8–10.5)

## 2018-04-23 PROCEDURE — 99232 SBSQ HOSP IP/OBS MODERATE 35: CPT

## 2018-04-23 RX ADMIN — PIPERACILLIN AND TAZOBACTAM 25 GRAM(S): 4; .5 INJECTION, POWDER, LYOPHILIZED, FOR SOLUTION INTRAVENOUS at 17:02

## 2018-04-23 RX ADMIN — PIPERACILLIN AND TAZOBACTAM 25 GRAM(S): 4; .5 INJECTION, POWDER, LYOPHILIZED, FOR SOLUTION INTRAVENOUS at 08:26

## 2018-04-23 RX ADMIN — QUETIAPINE FUMARATE 50 MILLIGRAM(S): 200 TABLET, FILM COATED ORAL at 06:13

## 2018-04-23 RX ADMIN — HEPARIN SODIUM 5000 UNIT(S): 5000 INJECTION INTRAVENOUS; SUBCUTANEOUS at 06:19

## 2018-04-23 RX ADMIN — DIVALPROEX SODIUM 500 MILLIGRAM(S): 500 TABLET, DELAYED RELEASE ORAL at 17:13

## 2018-04-23 RX ADMIN — HEPARIN SODIUM 5000 UNIT(S): 5000 INJECTION INTRAVENOUS; SUBCUTANEOUS at 21:58

## 2018-04-23 RX ADMIN — Medication 0.5 MILLIGRAM(S): at 18:36

## 2018-04-23 RX ADMIN — HEPARIN SODIUM 5000 UNIT(S): 5000 INJECTION INTRAVENOUS; SUBCUTANEOUS at 14:51

## 2018-04-23 RX ADMIN — PIPERACILLIN AND TAZOBACTAM 25 GRAM(S): 4; .5 INJECTION, POWDER, LYOPHILIZED, FOR SOLUTION INTRAVENOUS at 02:00

## 2018-04-23 RX ADMIN — QUETIAPINE FUMARATE 50 MILLIGRAM(S): 200 TABLET, FILM COATED ORAL at 17:13

## 2018-04-23 RX ADMIN — PIPERACILLIN AND TAZOBACTAM 25 GRAM(S): 4; .5 INJECTION, POWDER, LYOPHILIZED, FOR SOLUTION INTRAVENOUS at 23:53

## 2018-04-23 RX ADMIN — DIVALPROEX SODIUM 500 MILLIGRAM(S): 500 TABLET, DELAYED RELEASE ORAL at 06:13

## 2018-04-23 RX ADMIN — PANTOPRAZOLE SODIUM 40 MILLIGRAM(S): 20 TABLET, DELAYED RELEASE ORAL at 06:13

## 2018-04-23 RX ADMIN — Medication 100 MILLIGRAM(S): at 06:13

## 2018-04-23 NOTE — CHART NOTE - NSCHARTNOTEFT_GEN_A_CORE
MEDICINE NP    DONNA BARBER  89y Female    Patient is a 89y old  Female who presents with a chief complaint of confusion (20 Apr 2018 23:44)   - Constant Observation continued for patient safety.  Patient w/ history of agitation and restless      Kasey Bedoya, YESSI-BC  Medicine Department  #[id number]

## 2018-04-23 NOTE — SWALLOW BEDSIDE ASSESSMENT ADULT - ORAL PHASE
Within functional limits Lingual stasis/Decreased anterior-posterior movement of the bolus/cleared with second swallow and/or liquid wash Lingual stasis/Decreased anterior-posterior movement of the bolus/Delayed oral transit time

## 2018-04-23 NOTE — SWALLOW BEDSIDE ASSESSMENT ADULT - NS SPL SWALLOW CLINIC TRIAL FT
Pt with rapid intake and required verbal cues to take single cup sips.  Number of repeat swallows per bolus reduced with single cup sips in comparison to consecutive sips.

## 2018-04-23 NOTE — CHART NOTE - NSCHARTNOTEFT_GEN_A_CORE
MEDICINE NP    DONNA BARBER  89y Female    Patient is a 89y old  Female who presents with a chief complaint of confusion (20 Apr 2018 23:44)       > Event Summary:  Patient AAOX1 at baseline, confused, unable to redirect.  Noted with impulse behaviors, concern for fall  -1:1 Observation restarted   -        -Vital Signs Last 24 Hrs  T(C): 37.1 (23 Apr 2018 16:51), Max: 37.1 (23 Apr 2018 16:51)  T(F): 98.8 (23 Apr 2018 16:51), Max: 98.8 (23 Apr 2018 16:51)  HR: 90 (23 Apr 2018 16:51) (65 - 104)  BP: 147/69 (23 Apr 2018 16:51) (118/70 - 147/69)  RR: 18 (23 Apr 2018 16:51) (18 - 20)  SpO2: 96% (23 Apr 2018 16:51) (92% - 96%)            YESSI Valle-BC  Medicine Department MEDICINE NP    DONNA BARBER  89y Female    Patient is a 89y old  Female who presents with a chief complaint of confusion (20 Apr 2018 23:44)       > Event Summary: Patient seen near nursing station hallway in lounge chair w/ PCA at bedside.   Patient AAOX1 at baseline, noted confused, restless, unable to redirect.  Noted with impulse behaviors, attempting to get out of the chair.   Concern for fall / injury.  -1:1 Observation restarted   -Primary Care Team to follow in AM        -Vital Signs Last 24 Hrs  T(C): 37.1 (23 Apr 2018 16:51), Max: 37.1 (23 Apr 2018 16:51)  T(F): 98.8 (23 Apr 2018 16:51), Max: 98.8 (23 Apr 2018 16:51)  HR: 90 (23 Apr 2018 16:51) (65 - 104)  BP: 147/69 (23 Apr 2018 16:51) (118/70 - 147/69)  RR: 18 (23 Apr 2018 16:51) (18 - 20)  SpO2: 96% (23 Apr 2018 16:51) (92% - 96%)            YESSI Valle-BC  Medicine Department  #[id number]

## 2018-04-23 NOTE — SWALLOW BEDSIDE ASSESSMENT ADULT - PHARYNGEAL PHASE
Delayed pharyngeal swallow/Multiple swallows/3-4 swallows per bolus Multiple swallows/Delayed pharyngeal swallow/2 swallows per bolus 2 swallows per bolus/Delayed pharyngeal swallow/Multiple swallows Delayed pharyngeal swallow/3 swallows per bolus/Multiple swallows

## 2018-04-23 NOTE — PHYSICAL THERAPY INITIAL EVALUATION ADULT - DISCHARGE DISPOSITION, PT EVAL
return to [facility] with PT services to increase strength, balance and safety with functional mobility/home w/ assist

## 2018-04-23 NOTE — SWALLOW BEDSIDE ASSESSMENT ADULT - COMMENTS
Required extended period of time for mastication in comparison to ground solids.  Required cues to swallow bolus prior to adding more PO to oral cavity.

## 2018-04-23 NOTE — PHYSICAL THERAPY INITIAL EVALUATION ADULT - PERTINENT HX OF CURRENT PROBLEM, REHAB EVAL
89 y.o. F, PMH dementia, bipolar disorder,  Per family seemed more confused than usual. Satting 90% en route on room air, tachycardic 110. Pt found to have acute on chronic encephalopathy, found to be in sepsis most likely 2/2 aspiration PNA. CT HEad 4/21: No acute intracranial pathology. Prominent ventricle size is unchanged.VA Duplex LLE 4/20: Neg. for DVT. XRay Chest 4/20: Right lower lobe opacity consistent with atelectasis and/or PNA in the proper clinical context.

## 2018-04-23 NOTE — SWALLOW BEDSIDE ASSESSMENT ADULT - SWALLOW EVAL: DIAGNOSIS
Patient presents with 1) oral and suspected pharyngeal dysphagia as evidenced by decreased bolus control/manipulation/oral clearance with advanced solids and multiple swallows with less viscous fluids; 2) dysphonia; 3) impaired cognition 2/2 dementia dx

## 2018-04-23 NOTE — SWALLOW BEDSIDE ASSESSMENT ADULT - SWALLOW EVAL: RECOMMENDED FEEDING/EATING TECHNIQUES
maintain upright posture during/after eating for 30 mins/oral hygiene/alternate food with liquid/allow for swallow between intakes/check mouth frequently for oral residue/pocketing/crush medication (when feasible)/no straws/position upright (90 degrees)/small sips/bites

## 2018-04-23 NOTE — SWALLOW BEDSIDE ASSESSMENT ADULT - SLP GENERAL OBSERVATIONS
Upon entering pt OOB in chair and A&Ox2 (person, place).  Pt is a poor historian 2/2 cognitive deficits and dementia dx.  Required repetition of simple yes/no questions to respond appropriately.  Pt stated that she lives at home with her mother.  Short term memory impaired as evidenced by pt asking clinician why they were in the room after eval was already completed. Upon entering pt OOB in chair and A&Ox2 (person, place).  Required repetition of simple yes/no questions to respond appropriately.  Noted perseverating on previously provided instructions (e.g., asked to say “ah” once, but continued to say “ah” repetitively after task completed). Pt is a poor historian 2/2 cognitive deficits and dementia dx.  Stated that she lives at home with her mother.  RN stated pt consumed nectar thick juice and a banana with no overt s/s of aspiration/penetration noted.  Rapid rate of intake noted during evaluation, but decreased with verbal cues to reduce speed and finish current bolus before adding more to oral cavity.  Short term memory impaired as evidenced by pt asking clinician why they were in the room after eval was already completed.  Frequent vocalization during PO intake noted despite cues to terminate behavior. Upon entering pt OOB in chair and A&Ox2 (person, place).  Vocal quality judged to be dysphonic and hoarse.  Required repetition of simple yes/no questions to respond appropriately.  Noted perseverating on previously provided instructions (e.g., asked to say “ah” once, but continued to say “ah” repetitively after task completed). Pt is a poor historian 2/2 cognitive deficits and dementia dx.  Stated that she lives at home with her mother.  RN stated pt consumed nectar thick juice and a banana with no overt s/s of aspiration/penetration noted.  Rapid rate of intake noted during evaluation, but decreased with verbal cues to reduce speed and finish current bolus before adding more to oral cavity.  Short term memory impaired as evidenced by pt asking clinician why they were in the room after eval was already completed.  Frequent vocalization during PO intake noted despite cues to terminate behavior.

## 2018-04-23 NOTE — SWALLOW BEDSIDE ASSESSMENT ADULT - ADDITIONAL RECOMMENDATIONS
Patient requires verbal cues during intake to utilize safe swallow techniques during PO intake. MD/team Please enter the following as provider to RN orders : 1) Full assist with meals, 2) Crush meds or provide via alternate source, 3) NO STRAW.  4) Provide small single bites and sips at slow rate. 5) Encourage successive swallows for oral and pharyngeal clearance. 6) Alternate food and liquids to aid in oral and pharyngeal clearance. 7) Aspiration precautions. Monitor for s/s aspiration/laryngeal penetration. If noted:  D/C p.o. intake, provide non-oral nutrition/hydration/meds.    MD, PLEASE ENTER ORDER FOR MODIFIED BARIUM SWALLOW STUDY (ENTER UNDER RADIOLOGY EXAMS THE FOLLOWING WAY: GO TO THE BROWSER AND TYPE IN XRAY, THEN HIT THE SPACEBAR, AND TYPE IN THE LETTER M.)  WILL SCHEDULE EXAM UPON RECEIPT IN RADIOLOGY.

## 2018-04-23 NOTE — SWALLOW BEDSIDE ASSESSMENT ADULT - ASR SWALLOW ASPIRATION MONITOR
fever/pneumonia/throat clearing/upper respiratory infection/change of breathing pattern/gurgly voice/cough

## 2018-04-24 ENCOUNTER — TRANSCRIPTION ENCOUNTER (OUTPATIENT)
Age: 83
End: 2018-04-24

## 2018-04-24 DIAGNOSIS — R13.10 DYSPHAGIA, UNSPECIFIED: ICD-10-CM

## 2018-04-24 LAB
HCT VFR BLD CALC: 32.1 % — LOW (ref 34.5–45)
HGB BLD-MCNC: 10.7 G/DL — LOW (ref 11.5–15.5)
MCHC RBC-ENTMCNC: 32.1 PG — SIGNIFICANT CHANGE UP (ref 27–34)
MCHC RBC-ENTMCNC: 33.3 GM/DL — SIGNIFICANT CHANGE UP (ref 32–36)
MCV RBC AUTO: 96.4 FL — SIGNIFICANT CHANGE UP (ref 80–100)
PLATELET # BLD AUTO: 171 K/UL — SIGNIFICANT CHANGE UP (ref 150–400)
RBC # BLD: 3.33 M/UL — LOW (ref 3.8–5.2)
RBC # FLD: 14.1 % — SIGNIFICANT CHANGE UP (ref 10.3–14.5)
WBC # BLD: 7.4 K/UL — SIGNIFICANT CHANGE UP (ref 3.8–10.5)
WBC # FLD AUTO: 7.4 K/UL — SIGNIFICANT CHANGE UP (ref 3.8–10.5)

## 2018-04-24 PROCEDURE — 99233 SBSQ HOSP IP/OBS HIGH 50: CPT

## 2018-04-24 PROCEDURE — 74230 X-RAY XM SWLNG FUNCJ C+: CPT | Mod: 26

## 2018-04-24 RX ORDER — QUETIAPINE FUMARATE 200 MG/1
25 TABLET, FILM COATED ORAL EVERY 6 HOURS
Qty: 0 | Refills: 0 | Status: DISCONTINUED | OUTPATIENT
Start: 2018-04-24 | End: 2018-04-25

## 2018-04-24 RX ADMIN — PIPERACILLIN AND TAZOBACTAM 25 GRAM(S): 4; .5 INJECTION, POWDER, LYOPHILIZED, FOR SOLUTION INTRAVENOUS at 23:01

## 2018-04-24 RX ADMIN — DIVALPROEX SODIUM 500 MILLIGRAM(S): 500 TABLET, DELAYED RELEASE ORAL at 17:09

## 2018-04-24 RX ADMIN — QUETIAPINE FUMARATE 50 MILLIGRAM(S): 200 TABLET, FILM COATED ORAL at 17:09

## 2018-04-24 RX ADMIN — HEPARIN SODIUM 5000 UNIT(S): 5000 INJECTION INTRAVENOUS; SUBCUTANEOUS at 06:54

## 2018-04-24 RX ADMIN — HEPARIN SODIUM 5000 UNIT(S): 5000 INJECTION INTRAVENOUS; SUBCUTANEOUS at 21:00

## 2018-04-24 RX ADMIN — HEPARIN SODIUM 5000 UNIT(S): 5000 INJECTION INTRAVENOUS; SUBCUTANEOUS at 15:57

## 2018-04-24 RX ADMIN — PIPERACILLIN AND TAZOBACTAM 25 GRAM(S): 4; .5 INJECTION, POWDER, LYOPHILIZED, FOR SOLUTION INTRAVENOUS at 08:19

## 2018-04-24 RX ADMIN — PIPERACILLIN AND TAZOBACTAM 25 GRAM(S): 4; .5 INJECTION, POWDER, LYOPHILIZED, FOR SOLUTION INTRAVENOUS at 15:55

## 2018-04-24 RX ADMIN — QUETIAPINE FUMARATE 50 MILLIGRAM(S): 200 TABLET, FILM COATED ORAL at 06:48

## 2018-04-24 RX ADMIN — DIVALPROEX SODIUM 500 MILLIGRAM(S): 500 TABLET, DELAYED RELEASE ORAL at 06:48

## 2018-04-24 RX ADMIN — Medication 0.5 MILLIGRAM(S): at 15:59

## 2018-04-24 RX ADMIN — Medication 0.5 MILLIGRAM(S): at 19:25

## 2018-04-24 RX ADMIN — QUETIAPINE FUMARATE 25 MILLIGRAM(S): 200 TABLET, FILM COATED ORAL at 17:50

## 2018-04-24 RX ADMIN — PANTOPRAZOLE SODIUM 40 MILLIGRAM(S): 20 TABLET, DELAYED RELEASE ORAL at 06:54

## 2018-04-24 NOTE — SWALLOW VFSS/MBS ASSESSMENT ADULT - DIAGNOSTIC IMPRESSIONS
Patient presents with evidence of [id number]-[id number] dysphagia. Pt noted to be impulsive, with poor safety awareness, consuming large volumes of p.o at a rapid rate. Swallow study also demonstrated impaired oral management, delay in trigger of the swallow reflex, the presence of a cricopharyngeal bar, post swallow oral/pharyngeal/esophageal stasis post swallow, and impaired airway protection trace/silent laryngeal penetration of thick fluids when consumed at a rapid rate, penetration of solids, and aspiration of thin liquids.

## 2018-04-24 NOTE — SWALLOW VFSS/MBS ASSESSMENT ADULT - INTACT
No Due to excessive movement, unable to determine timing or etiology of aspiration, but residue was noted along the posterior tracheal wall. A cough reflex was elicited, but did not clear material from the a/w/No excessive movement and talking limited visualization of event; however, noted residue in the larynx when fluoro unit came in focus on patient. (+ minimal silent laryngeal penetration)/No

## 2018-04-24 NOTE — SWALLOW VFSS/MBS ASSESSMENT ADULT - ORAL PHASE COMMENTS
Oral transit time noted to be 2 seconds. Maximal spillover to the level of the valleculae. Trace lingual residue was appreciated. Oral transit time noted to be 5.4 seconds. Maximal spillover to the level of the valleculae. There was minimal diffuse oral residue Lingual tremor. Oral transit time noted to be  2.4 seconds. Inconsistent Maximal spillover to the level of the valleculae and hypopharynx after the onset of hyoid movement, but prior to onset of epiglottic retroflexion. Lingual tremor. Oral transit time noted to be  2.4 seconds. Inconsistent Maximal spillover to the level of the valleculae and hypopharynx after the onset of hyoid movement, but prior to onset of epiglottic retroflexion. Minimal lingual residue. There was mid to moderate diffuse oral residue.

## 2018-04-24 NOTE — DISCHARGE NOTE ADULT - HOME CARE AGENCY
[facility] at home care. Nurse and physical therapist to arrange visit within 24-48hr after discharge. [phone] [facility] Nurse and physical therapist to arrange visit within 24-48hr after discharge. [phone]

## 2018-04-24 NOTE — DISCHARGE NOTE ADULT - SECONDARY DIAGNOSIS.
Acute kidney injury superimposed on CKD Encephalopathy acute Sepsis, due to unspecified organism Dementia

## 2018-04-24 NOTE — SWALLOW VFSS/MBS ASSESSMENT ADULT - ORAL PREP COMMENTS
Pt continued to have poor safety awareness, taking large volumes, rapidly. Noted to have poor awareness of need to use utensil at times, attempting to drink puree. Mildly impaired bolus formation Mildly impaired

## 2018-04-24 NOTE — DISCHARGE NOTE ADULT - MEDICATION SUMMARY - MEDICATIONS TO TAKE
I will START or STAY ON the medications listed below when I get home from the hospital:    acetaminophen 325 mg oral tablet  -- 2 tab(s) by mouth every 6 hours, As Needed  -- Indication: For Pain     Percocet 5/325 oral tablet  -- 1 tab(s) by mouth every 6 hours, As Needed for moderate pain  -- Indication: For Pain     divalproex sodium 500 mg oral delayed release tablet  -- 1 tab(s) by mouth 2 times a day  -- Indication: For Anticonvulsant     QUEtiapine 50 mg oral tablet  -- 1 tab(s) by mouth 2 times a day  -- Indication: For Dementia    QUEtiapine 25 mg oral tablet  -- 1 tab(s) by mouth every 6 hours, As needed, agitation  -- Indication: For Dementia    ALPRAZolam 0.5 mg oral tablet  -- 1 tab(s) by mouth 2 times a day, As needed, agitation  -- Indication: For Anxiety    clobetasol 0.05% topical ointment  -- 1 application on skin 2 times a day  -- Indication: For Ointment     docusate sodium 100 mg oral capsule  -- 1 cap(s) by mouth 3 times a day  -- Indication: For Constipation     senna oral tablet  -- 2 tab(s) by mouth once a day (at bedtime)  -- Indication: For Constipation     MiraLax oral powder for reconstitution  -- 17 gram(s) by mouth 2 times a day  -- Indication: For Constipation     PriLOSEC OTC 20 mg oral delayed release tablet  -- 1 tab(s) by mouth once a day  -- Indication: For GERD    Levaquin 500 mg oral tablet  -- 1 tab(s) by mouth once a day   -- Avoid prolonged or excessive exposure to direct and/or artificial sunlight while taking this medication.  Do not take dairy products, antacids, or iron preparations within one hour of this medication.  Finish all this medication unless otherwise directed by prescriber.  May cause drowsiness or dizziness.  Medication should be taken with plenty of water.    -- Indication: For Pneumonia    cholecalciferol oral tablet  -- 1000 unit(s) by mouth once a day  -- Indication: For Supplement I will START or STAY ON the medications listed below when I get home from the hospital:    acetaminophen 325 mg oral tablet  -- 2 tab(s) by mouth every 6 hours, As Needed  -- Indication: For Pain     Percocet 5/325 oral tablet  -- 1 tab(s) by mouth every 6 hours, As Needed for moderate pain  -- Indication: For Pain     divalproex sodium 500 mg oral delayed release tablet  -- 1 tab(s) by mouth 2 times a day  -- Indication: For Anticonvulsant     QUEtiapine 50 mg oral tablet  -- 1 tab(s) by mouth 2 times a day  -- Indication: For Dementia    QUEtiapine 25 mg oral tablet  -- 1 tab(s) by mouth every 8 hours, As Needed  -- Indication: For Dementia    clobetasol 0.05% topical ointment  -- 1 application on skin 2 times a day  -- Indication: For Ointment     MiraLax oral powder for reconstitution  -- 17 gram(s) by mouth 2 times a day  -- Indication: For Constipation     PriLOSEC OTC 20 mg oral delayed release tablet  -- 1 tab(s) by mouth once a day  -- Indication: For GERD    Levaquin 500 mg oral tablet  -- 1 tab(s) by mouth once a day   -- Avoid prolonged or excessive exposure to direct and/or artificial sunlight while taking this medication.  Do not take dairy products, antacids, or iron preparations within one hour of this medication.  Finish all this medication unless otherwise directed by prescriber.  May cause drowsiness or dizziness.  Medication should be taken with plenty of water.    -- Indication: For Pneumonia    cholecalciferol oral tablet  -- 1000 unit(s) by mouth once a day  -- Indication: For Supplement

## 2018-04-24 NOTE — SWALLOW VFSS/MBS ASSESSMENT ADULT - ORAL PHASE
Delayed oral transit time/Incomplete tongue to palate contact/Reduced anterior - posterior transport/Residue in oral cavity/Uncontrolled bolus / spillover in dee-pharynx Residue in oral cavity/Reduced anterior - posterior transport/Uncontrolled bolus / spillover in dee-pharynx/Delayed oral transit time/Incomplete tongue to palate contact Reduced anterior - posterior transport/Incomplete tongue to palate contact/Delayed oral transit time/Residue in oral cavity/Uncontrolled bolus / spillover in dee-pharynx within functional limits Residue in oral cavity/Delayed oral transit time/Reduced anterior - posterior transport/Incomplete tongue to palate contact

## 2018-04-24 NOTE — DISCHARGE NOTE ADULT - PATIENT PORTAL LINK FT
You can access the [facility] Patient Portal, offered by [facility], by registering with the following website: [email or web address]

## 2018-04-24 NOTE — SWALLOW VFSS/MBS ASSESSMENT ADULT - RECOMMENDED CONSISTENCY
Change diet to: Dysphagia I with nectar thickened liquids. Crush meds of provide via alternate source. MD/team Please enter the following as provider to RN orders and place on d/c summary : 1) Full assist with meals, 2) Crush meds or provide via alternate source, 3) Provide small single bites and sips at slow rate. Do not allow pt to consume independently as she will take to large a volume at a rapid rate.  4) Aspiration precautions. Monitor for s/s aspiration/laryngeal penetration. If noted:  D/C p.o. intake, provide non-oral nutrition/hydration/meds

## 2018-04-24 NOTE — SWALLOW VFSS/MBS ASSESSMENT ADULT - NS SWALLOW VFSS REC ASPIR MON
Monitor for s/s aspiration/laryngeal penetration. If noted:  D/C p.o. intake, provide non-oral nutrition/hydration/meds, and contact this service @ [phone]/change of breathing pattern/fever/cough/gurgly voice/upper respiratory infection/pneumonia/throat clearing

## 2018-04-24 NOTE — PROGRESS NOTE ADULT - PROBLEM SELECTOR PROBLEM 1
Pneumonia of right lower lobe due to infectious organism

## 2018-04-24 NOTE — DISCHARGE NOTE ADULT - INSTRUCTIONS
as tolerated Dysphagia 1 with nectar thick liquids  1) Full assist with meals, 2) Crush meds or provide via alternate source, 3) Provide small single bites and sips at slow rate. Do not allow pt to consume independently as she will take to large a volume at a rapid rate.  4) Aspiration precautions. Monitor for s/s aspiration/laryngeal penetration. If noted:  D/C p.o. intake, provide non-oral nutrition/hydration/meds

## 2018-04-24 NOTE — PROGRESS NOTE ADULT - PROBLEM SELECTOR PLAN 1
- Day 2 of Zosyn.  - Afebrile. Awaiting CBC for eval of white count.   - Non toxic appearing.   - Blood and urine cultures are NGTD.
-patient febrile (101.8), tachycardic, elevated lactate 3.6, w/ leukocytosis, meeting sepsis criteria, w/ potential source-RLL opacity seen on CXR, likely pna on admission. urinalysis-negative for LE/nitrites  -s/p ceftriaxone 1g x 1, azithromycin 500mg x 1  -will switch to zosyn for better coverage as most likely aspiration pneumonia, will f/u BCx, ordered legionella urine antigen, will hold off on vanc for now  -trend fever curve, CBCw/ diff qd.  - Already clinically improved. Non toxic  -will place on dysphagia III diet, aspiration precautions, speech and swallow
- Day 3 of Zosyn.  - Afebrile, WBC count improving.  - Blood and urine cultures NGTD.
- Day 4 of Zosyn.  - Afebrile, WBC count improving.  - Blood and urine cultures NGTD.

## 2018-04-24 NOTE — DISCHARGE NOTE ADULT - PLAN OF CARE
Complete Antibiotic therapy Please continue Levaquin 500mg for 2 more days   Pneumonia is a lung infection that can cause a fever, cough, and trouble breathing.  Continue all antibiotics as ordered until complete.  Nutrition is important, eat small frequent meals.  Get lots of rest and drink fluids.  Call your health care provider upon arrival home from hospital and make a follow up appointment for one week.  If your cough worsens, you develop fever greater than 101', you have shaking chills, a fast heartbeat, trouble breathing and/or feel your are breathing much faster than usual, call your healthcare provider.  Make sure you wash your hands frequently. Resolved, Creatinine back to baseline  Encouraged to remain hydrated Secondary to PNA, now mental status is back to baseline Secondary to PNA, please complete antibiotic course. Maintain safety precautions.   Maintain fall precautions  Please continue medication regimen as instructed Please continue medication regimen as instructed  - Seroquel 50mg every 12hrs  - Seroquel 25mg every 8hrs as needed for extreme agitation  We are recommending patient remain off Xanax.   Maintain safety precautions.   Maintain fall precautions  Please continue medication regimen as instructed

## 2018-04-24 NOTE — DISCHARGE NOTE ADULT - HOSPITAL COURSE
89yr old  female with PMH of  Bipolar, Dementia, HTN, and HLD presented to Emergency department  with increased confusion as per family. Admitted for acute on chronic encephalopathy, found to be in sepsis secondary to aspiration pneumonia. sp MBS with recs appreciated. Zosyn given  for 5 days will complete antibiotic course with 2 more days of Levaquin PO. Patient now stable for D/C home with follow up with PMD within 1 week. 89yr old  female with dementia pw increased confusion. Fever and sepsis present on admission.    Admitted to medicine. CT head negative. CXR revealed RLL infiltrate suggestive of pneumonia. Started on iv Zosyn to treat presumed gram negative and anaerobic bacterial pneumonia.    MBS done- Dysphagia I with nectar thickened liquids recommended. Per son Zac, pt has been eating and drinking well at home. Test results likely affected by hospitalization. Advised diet as tolerated with aspiration precautions.    Transitioned to po Levaquin for 2 days, discharged with follow up. Pt lives with son and has a 9 hour aide at home.    Diagnoses: Bacterial pneumonia; Acute encephalopathy; Dementia; Uncontrolled htn; Prerenal azotemia; GERD

## 2018-04-24 NOTE — PROGRESS NOTE ADULT - ASSESSMENT
89 y.o Male w/ pmhx of Bipolar Dz, Dementia, HTN, HLD presenting w/ c/o acute on chronic encephalopathy, found to be in sepsis most likely 2/2 aspiration pneumonia
89 y.o Male w/ pmhx of Bipolar Dz, Dementia, HTN, HLD presenting w/ c/o acute on chronic encephalopathy, found to be in sepsis most likely 2/2 aspiration pneumonia
89 y.o f of Bipolar Dz, Dementia aw c/o acute on chronic encephalopathy, agitation. Started on Zosyn for RLL pneumonia.
89 y.o f of Bipolar Dz, Dementia aw c/o acute on chronic encephalopathy, agitation. On Zosyn for RLL pneumonia.

## 2018-04-24 NOTE — SWALLOW VFSS/MBS ASSESSMENT ADULT - LARYNGEAL PENETRATION DURING THE SWALLOW - SILENT
Upon intake of uncontrolled volumes, there was trace inconsistent trace penetration - appearance suggestive of possibly via the interarytenoid space and/or over the laryngeal surface of the arytenoids. Appears to clear during the swallow.

## 2018-04-24 NOTE — PROGRESS NOTE ADULT - PROBLEM SELECTOR PLAN 3
Mental status now baseline. Has sundowning at night.     Spoke with son Joe- pt has sundowning frequently at home too.
- Appears less agitated this morning.  - Finally slept.   - Likely in setting of acute infection.   - As per son, baseline AAOx1
-acute encephalopathy most likely in setting of infection, as patient is febrile, tachycardic, w/ leukocytosis w/ RLL opacity on CXR, most likely source, aspiration pneumonia. No electrolyte abnormalities noted.  -will continue sepsis management as noted above  -CTH no acute pathology
No agitation overnight. At baseline- O x 1.

## 2018-04-24 NOTE — DISCHARGE NOTE ADULT - MEDICATION SUMMARY - MEDICATIONS TO CHANGE
I will SWITCH the dose or number of times a day I take the medications listed below when I get home from the hospital:    Depakote 250 mg oral delayed release tablet  -- 2 tab(s) by mouth 2 times a day I will SWITCH the dose or number of times a day I take the medications listed below when I get home from the hospital:    Depakote 500 mg oral delayed release tablet  -- 1 tab(s) by mouth 2 times a day    Depakote 250 mg oral delayed release tablet  -- 2 tab(s) by mouth 2 times a day

## 2018-04-24 NOTE — DISCHARGE NOTE ADULT - CARE PROVIDER_API CALL
Juan Gonzalez), Emergency Medicine  [address] [place] [address]  Phone: [phone]  Fax: [phone]

## 2018-04-24 NOTE — SWALLOW VFSS/MBS ASSESSMENT ADULT - ADDITIONAL INFORMATION
Disorders: cognitive deficit,  reduced lingual strength/ROM/Rate of motion, reduced BOT to posterior pharyngeal wall contact with a narrow column of contrast between structures, delay in trigger of the swallow reflex with low trigger points, reduced anterior hyoid excursion, reduced laryngeal elevation, incomplete laryngeal vestibule closure, evidence of reduced velar to posterior pharyngeal wall contact, reduced supraglottic sensation, reduced subglottic sensation, CP bar, Due to the physical constraints of testing, unable to fully visualize or determine the etiology of other esophageal findings. Suggest GI consult to assess.     Strategies: due to marked cognitive deficits, unable to assess use; however, slower rate and decreased volume per intake appeared to result in less spillover/residue

## 2018-04-24 NOTE — SWALLOW VFSS/MBS ASSESSMENT ADULT - THE ABOVE FINDINGS WERE DISCUSSED WITH
d/w Pt - unable to comprehend due to dementia. d/w Pt - unable to comprehend due to dementia. D/w medicine NP Tracie [phone] Nursing/d/w Pt - unable to comprehend due to dementia. D/w medicine NP Tracie [phone]

## 2018-04-24 NOTE — SWALLOW VFSS/MBS ASSESSMENT ADULT - CONSISTENCIES ADMINISTERED
puree thin consumed 4 ounces total (off and on fluoro to assess for fatigue factor)/puree thick honey thick/consumed 8 ounces total (off and on fluoro to assess for fatigue factor)/nectar thick Consumed approximately 4 ounces/thin liquid Pt refused same, only took limited amounts/mech soft

## 2018-04-24 NOTE — DISCHARGE NOTE ADULT - MEDICATION SUMMARY - MEDICATIONS TO STOP TAKING
I will STOP taking the medications listed below when I get home from the hospital:  None I will STOP taking the medications listed below when I get home from the hospital:    ALPRAZolam 0.5 mg oral tablet  -- orally 2 times a day, As Needed

## 2018-04-24 NOTE — PROGRESS NOTE ADULT - PROBLEM SELECTOR PLAN 4
To get MBS today.    Per son, pt has been eating and drinking at home without any issues but does require assistance with feeding. He does not want her to get a feeding tube but wants the MBS done.
- CKDII at baseline.  - BMP from today pending.   - Avoid nephrotoxins
CKD II (baseline Cr. 0.6-0.7), currently Cr 1.02, most likely in setting of sepsis  -s/p 2L LR, will give additional bolus and place on IVF @ 75cc's/hr  -continue to monitor Cr., BMP daily

## 2018-04-24 NOTE — SWALLOW VFSS/MBS ASSESSMENT ADULT - ESOPHAGEAL STAGE
CP bar was identified. There was evidence of premature relaxation of the proximal esophagus, below the UES. Noted trace, inconsistent retention in the cervical esophagus. trace to minimal residue was evident at the approximate level of the CP bar

## 2018-04-24 NOTE — DISCHARGE NOTE ADULT - CARE PLAN
Principal Discharge DX:	Pneumonia  Secondary Diagnosis:	Acute kidney injury superimposed on CKD  Secondary Diagnosis:	Encephalopathy acute  Secondary Diagnosis:	Sepsis, due to unspecified organism Principal Discharge DX:	Pneumonia  Goal:	Complete Antibiotic therapy  Assessment and plan of treatment:	Please continue Levaquin 500mg for 2 more days   Pneumonia is a lung infection that can cause a fever, cough, and trouble breathing.  Continue all antibiotics as ordered until complete.  Nutrition is important, eat small frequent meals.  Get lots of rest and drink fluids.  Call your health care provider upon arrival home from hospital and make a follow up appointment for one week.  If your cough worsens, you develop fever greater than 101', you have shaking chills, a fast heartbeat, trouble breathing and/or feel your are breathing much faster than usual, call your healthcare provider.  Make sure you wash your hands frequently.  Secondary Diagnosis:	Acute kidney injury superimposed on CKD  Assessment and plan of treatment:	Resolved, Creatinine back to baseline  Encouraged to remain hydrated  Secondary Diagnosis:	Encephalopathy acute  Assessment and plan of treatment:	Secondary to PNA, now mental status is back to baseline  Secondary Diagnosis:	Sepsis, due to unspecified organism  Assessment and plan of treatment:	Secondary to PNA, please complete antibiotic course.  Secondary Diagnosis:	Dementia  Assessment and plan of treatment:	Maintain safety precautions.   Maintain fall precautions  Please continue medication regimen as instructed Principal Discharge DX:	Pneumonia  Goal:	Complete Antibiotic therapy  Assessment and plan of treatment:	Please continue Levaquin 500mg for 2 more days   Pneumonia is a lung infection that can cause a fever, cough, and trouble breathing.  Continue all antibiotics as ordered until complete.  Nutrition is important, eat small frequent meals.  Get lots of rest and drink fluids.  Call your health care provider upon arrival home from hospital and make a follow up appointment for one week.  If your cough worsens, you develop fever greater than 101', you have shaking chills, a fast heartbeat, trouble breathing and/or feel your are breathing much faster than usual, call your healthcare provider.  Make sure you wash your hands frequently.  Secondary Diagnosis:	Acute kidney injury superimposed on CKD  Assessment and plan of treatment:	Resolved, Creatinine back to baseline  Encouraged to remain hydrated  Secondary Diagnosis:	Encephalopathy acute  Assessment and plan of treatment:	Secondary to PNA, now mental status is back to baseline  Secondary Diagnosis:	Sepsis, due to unspecified organism  Assessment and plan of treatment:	Secondary to PNA, please complete antibiotic course.  Secondary Diagnosis:	Dementia  Assessment and plan of treatment:	Please continue medication regimen as instructed  - Seroquel 50mg every 12hrs  - Seroquel 25mg every 8hrs as needed for extreme agitation  We are recommending patient remain off Xanax.   Maintain safety precautions.   Maintain fall precautions  Please continue medication regimen as instructed

## 2018-04-24 NOTE — PROGRESS NOTE ADULT - SUBJECTIVE AND OBJECTIVE BOX
Patient is a 89y old  Female who presents with a chief complaint of confusion (20 Apr 2018 23:44)    HPI: Pt calm overnight. Currently up in chair. Denies complaints. Oriented to self, able to follow commmands.    Vital Signs Last 24 Hrs  T(C): 36.9 (23 Apr 2018 07:40), Max: 36.9 (23 Apr 2018 07:40)  T(F): 98.4 (23 Apr 2018 07:40), Max: 98.4 (23 Apr 2018 07:40)  HR: 65 (23 Apr 2018 10:50) (65 - 85)  BP: 123/66 (23 Apr 2018 10:50) (113/66 - 123/66)  BP(mean): --  RR: 18 (23 Apr 2018 07:40) (18 - 18)  SpO2: 96% (23 Apr 2018 10:50) (93% - 96%)                          11.2   8.5   )-----------( 171      ( 23 Apr 2018 11:04 )             33.8     04-23    142  |  104  |  22  ----------------------------<  155<H>  3.9   |  22  |  1.04    Ca    8.8      23 Apr 2018 11:04      MEDICATIONS  (STANDING):  diVALproex  milliGRAM(s) Oral two times a day  docusate sodium 100 milliGRAM(s) Oral three times a day  heparin  Injectable 5000 Unit(s) SubCutaneous every 8 hours  pantoprazole    Tablet 40 milliGRAM(s) Oral before breakfast  piperacillin/tazobactam IVPB. 3.375 Gram(s) IV Intermittent every 8 hours  QUEtiapine 50 milliGRAM(s) Oral two times a day  senna 2 Tablet(s) Oral at bedtime  sodium chloride 0.9%. 1000 milliLiter(s) (75 mL/Hr) IV Continuous <Continuous>    MEDICATIONS  (PRN):  ALPRAZolam 0.5 milliGRAM(s) Oral two times a day PRN agitation  oxyCODONE    5 mG/acetaminophen 325 mG 1 Tablet(s) Oral every 6 hours PRN Moderate Pain (4 - 6)
Patient is a 89y old  Female who presents with a chief complaint of confusion (2018 23:44)      SUBJECTIVE / OVERNIGHT EVENTS:  Encephalopathy improved.  Patient more alert.     MEDICATIONS  (STANDING):  heparin  Injectable 5000 Unit(s) SubCutaneous every 8 hours  piperacillin/tazobactam IVPB. 3.375 Gram(s) IV Intermittent every 8 hours  sodium chloride 0.9%. 1000 milliLiter(s) (75 mL/Hr) IV Continuous <Continuous>    MEDICATIONS  (PRN):      CAPILLARY BLOOD GLUCOSE        I&O's Summary    2018 07:01  -  2018 13:06  --------------------------------------------------------  IN: 480 mL / OUT: 0 mL / NET: 480 mL        PHYSICAL EXAM:  Vital Signs Last 24 Hrs  T(C): 37.2 (2018 12:01), Max: 38.8 (2018 20:05)  T(F): 98.9 (2018 12:01), Max: 101.8 (2018 20:05)  HR: 81 (2018 12:01) (77 - 100)  BP: 117/60 (2018 12:01) (106/67 - 161/77)  BP(mean): --  RR: 18 (2018 12:01) (18 - 20)  SpO2: 94% (2018 12:01) (92% - 97%)  GENERAL: NAD, well-developed  HEAD:  Atraumatic, Normocephalic  EYES: EOMI, PERRLA, conjunctiva and sclera clear  NECK: Supple, No JVD  CHEST/LUNG: Decreased breath sounds at bases  HEART: Regular rate and rhythm; No murmurs, rubs, or gallops  ABDOMEN: Soft, Nontender, Nondistended; Bowel sounds present  EXTREMITIES:  2+ Peripheral Pulses, No clubbing, cyanosis, or edema  PSYCH: AAOx1-2  NEUROLOGY: non-focal  SKIN: No rashes or lesions    LABS:                        11.5   17.48 )-----------( 184      ( 2018 09:11 )             34.5     04-21    141  |  101  |  20  ----------------------------<  93  4.2   |  28  |  0.89    Ca    9.6      2018 07:16  Phos  3.0     -  Mg     2.0         TPro  6.5  /  Alb  3.7  /  TBili  0.6  /  DBili  x   /  AST  15  /  ALT  11  /  AlkPhos  58  -    PT/INR - ( 2018 09:20 )   PT: 10.6 sec;   INR: 0.94 ratio         PTT - ( 2018 09:20 )  PTT:29.1 sec  CARDIAC MARKERS ( 2018 20:32 )  x     / 0.01 ng/mL / x     / x     / x          Urinalysis Basic - ( 2018 21:42 )    Color: PL Yellow / Appearance: Clear / S.011 / pH: x  Gluc: x / Ketone: Negative  / Bili: Negative / Urobili: Negative   Blood: x / Protein: Negative / Nitrite: Negative   Leuk Esterase: Negative / RBC: 0-2 /HPF / WBC 0-2 /HPF   Sq Epi: x / Non Sq Epi: x / Bacteria: x        RADIOLOGY & ADDITIONAL TESTS:    Imaging Personally Reviewed:    Consultant(s) Notes Reviewed:      Care Discussed with Consultants/Other Providers:
Patient is a 89y old  Female who presents with a chief complaint of confusion, being treated for PNA.      SUBJECTIVE / OVERNIGHT EVENTS:  Patient sleeping this morning.  Was agitated at night, trying to get out of bed.  Currently on 1 :1.     MEDICATIONS  (STANDING):  diVALproex  milliGRAM(s) Oral two times a day  docusate sodium 100 milliGRAM(s) Oral three times a day  heparin  Injectable 5000 Unit(s) SubCutaneous every 8 hours  pantoprazole    Tablet 40 milliGRAM(s) Oral before breakfast  piperacillin/tazobactam IVPB. 3.375 Gram(s) IV Intermittent every 8 hours  QUEtiapine 50 milliGRAM(s) Oral two times a day  senna 2 Tablet(s) Oral at bedtime  sodium chloride 0.9%. 1000 milliLiter(s) (75 mL/Hr) IV Continuous <Continuous>    MEDICATIONS  (PRN):  ALPRAZolam 0.5 milliGRAM(s) Oral two times a day PRN agitation  oxyCODONE    5 mG/acetaminophen 325 mG 1 Tablet(s) Oral every 6 hours PRN Moderate Pain (4 - 6)      CAPILLARY BLOOD GLUCOSE        I&O's Summary    2018 07:  -  2018 07:00  --------------------------------------------------------  IN: 880 mL / OUT: 0 mL / NET: 880 mL    2018 07:  -  2018 08:55  --------------------------------------------------------  IN: 100 mL / OUT: 0 mL / NET: 100 mL        PHYSICAL EXAM:  Vital Signs Last 24 Hrs  T(C): 36.8 (2018 00:36), Max: 37.2 (2018 12:01)  T(F): 98.3 (2018 00:36), Max: 98.9 (2018 12:01)  HR: 76 (2018 00:36) (76 - 81)  BP: 152/64 (2018 00:36) (117/60 - 152/64)  BP(mean): --  RR: 18 (2018 00:36) (18 - 18)  SpO2: 94% (2018 00:36) (94% - 94%)  GENERAL: NAD, frail appearing  HEAD:  Atraumatic, Normocephalic  EYES: EOMI, PERRLA, conjunctiva and sclera clear  NECK: Supple, No JVD  CHEST/LUNG: Decreased at bases. Difficult exam.   HEART: Regular rate and rhythm; No murmurs, rubs, or gallops  ABDOMEN: Soft, Nontender, Nondistended; Bowel sounds present  EXTREMITIES:  2+ Peripheral Pulses, No clubbing, cyanosis, or edema  PSYCH: AAOx1, confused  NEUROLOGY: non-focal  SKIN: No rashes or lesions    LABS:                        11.5   17.48 )-----------( 184      ( 2018 09:11 )             34.5     04-21    141  |  101  |  20  ----------------------------<  93  4.2   |  28  |  0.89    Ca    9.6      2018 07:16  Phos  3.0     04-21  Mg     2.0     04-21    TPro  6.5  /  Alb  3.7  /  TBili  0.6  /  DBili  x   /  AST  15  /  ALT  11  /  AlkPhos  58  04-21    PT/INR - ( 2018 09:20 )   PT: 10.6 sec;   INR: 0.94 ratio         PTT - ( 2018 09:20 )  PTT:29.1 sec  CARDIAC MARKERS ( 2018 20:32 )  x     / 0.01 ng/mL / x     / x     / x          Urinalysis Basic - ( 2018 21:42 )    Color: PL Yellow / Appearance: Clear / S.011 / pH: x  Gluc: x / Ketone: Negative  / Bili: Negative / Urobili: Negative   Blood: x / Protein: Negative / Nitrite: Negative   Leuk Esterase: Negative / RBC: 0-2 /HPF / WBC 0-2 /HPF   Sq Epi: x / Non Sq Epi: x / Bacteria: x        RADIOLOGY & ADDITIONAL TESTS:    Imaging Personally Reviewed:    Consultant(s) Notes Reviewed:      Care Discussed with Consultants/Other Providers:
Patient is a 89y old  Female who presents with a chief complaint of confusion (24 Apr 2018 12:41)    HPI: Pt reported to be agitated overnight. Now calm. Oriented to self. Denies complaints.     Vital Signs Last 24 Hrs  T(C): 36.5 (24 Apr 2018 10:19), Max: 37.1 (23 Apr 2018 16:51)  T(F): 97.7 (24 Apr 2018 10:19), Max: 98.8 (23 Apr 2018 16:51)  HR: 66 (24 Apr 2018 10:19) (66 - 104)  BP: 144/76 (24 Apr 2018 10:19) (127/82 - 147/69)  BP(mean): --  RR: 18 (24 Apr 2018 10:19) (18 - 20)  SpO2: 96% (24 Apr 2018 10:19) (92% - 96%)                          10.7   7.40  )-----------( 171      ( 24 Apr 2018 09:54 )             32.1     04-23    142  |  104  |  22  ----------------------------<  155<H>  3.9   |  22  |  1.04    Ca    8.8      23 Apr 2018 11:04      MEDICATIONS  (STANDING):  diVALproex  milliGRAM(s) Oral two times a day  docusate sodium 100 milliGRAM(s) Oral three times a day  heparin  Injectable 5000 Unit(s) SubCutaneous every 8 hours  pantoprazole    Tablet 40 milliGRAM(s) Oral before breakfast  piperacillin/tazobactam IVPB. 3.375 Gram(s) IV Intermittent every 8 hours  QUEtiapine 50 milliGRAM(s) Oral two times a day  senna 2 Tablet(s) Oral at bedtime  sodium chloride 0.9%. 1000 milliLiter(s) (75 mL/Hr) IV Continuous <Continuous>    MEDICATIONS  (PRN):  ALPRAZolam 0.5 milliGRAM(s) Oral two times a day PRN agitation  oxyCODONE    5 mG/acetaminophen 325 mG 1 Tablet(s) Oral every 6 hours PRN Moderate Pain (4 - 6)

## 2018-04-25 VITALS
OXYGEN SATURATION: 97 % | RESPIRATION RATE: 18 BRPM | SYSTOLIC BLOOD PRESSURE: 164 MMHG | HEART RATE: 70 BPM | DIASTOLIC BLOOD PRESSURE: 67 MMHG | TEMPERATURE: 98 F

## 2018-04-25 LAB
ANION GAP SERPL CALC-SCNC: 16 MMOL/L — SIGNIFICANT CHANGE UP (ref 5–17)
BUN SERPL-MCNC: 16 MG/DL — SIGNIFICANT CHANGE UP (ref 7–23)
CALCIUM SERPL-MCNC: 9.7 MG/DL — SIGNIFICANT CHANGE UP (ref 8.4–10.5)
CHLORIDE SERPL-SCNC: 105 MMOL/L — SIGNIFICANT CHANGE UP (ref 96–108)
CO2 SERPL-SCNC: 23 MMOL/L — SIGNIFICANT CHANGE UP (ref 22–31)
CREAT SERPL-MCNC: 0.92 MG/DL — SIGNIFICANT CHANGE UP (ref 0.5–1.3)
GLUCOSE SERPL-MCNC: 102 MG/DL — HIGH (ref 70–99)
HCT VFR BLD CALC: 37.1 % — SIGNIFICANT CHANGE UP (ref 34.5–45)
HGB BLD-MCNC: 12.5 G/DL — SIGNIFICANT CHANGE UP (ref 11.5–15.5)
MCHC RBC-ENTMCNC: 32.5 PG — SIGNIFICANT CHANGE UP (ref 27–34)
MCHC RBC-ENTMCNC: 33.7 GM/DL — SIGNIFICANT CHANGE UP (ref 32–36)
MCV RBC AUTO: 96.4 FL — SIGNIFICANT CHANGE UP (ref 80–100)
PLATELET # BLD AUTO: 231 K/UL — SIGNIFICANT CHANGE UP (ref 150–400)
POTASSIUM SERPL-MCNC: 3.6 MMOL/L — SIGNIFICANT CHANGE UP (ref 3.5–5.3)
POTASSIUM SERPL-SCNC: 3.6 MMOL/L — SIGNIFICANT CHANGE UP (ref 3.5–5.3)
RBC # BLD: 3.85 M/UL — SIGNIFICANT CHANGE UP (ref 3.8–5.2)
RBC # FLD: 12 % — SIGNIFICANT CHANGE UP (ref 10.3–14.5)
SODIUM SERPL-SCNC: 144 MMOL/L — SIGNIFICANT CHANGE UP (ref 135–145)
WBC # BLD: 8.6 K/UL — SIGNIFICANT CHANGE UP (ref 3.8–10.5)
WBC # FLD AUTO: 8.6 K/UL — SIGNIFICANT CHANGE UP (ref 3.8–10.5)

## 2018-04-25 PROCEDURE — 99239 HOSP IP/OBS DSCHRG MGMT >30: CPT

## 2018-04-25 RX ORDER — QUETIAPINE FUMARATE 200 MG/1
1 TABLET, FILM COATED ORAL
Qty: 0 | Refills: 0 | COMMUNITY
Start: 2018-04-25

## 2018-04-25 RX ORDER — ALPRAZOLAM 0.25 MG
1 TABLET ORAL
Qty: 0 | Refills: 0 | COMMUNITY
Start: 2018-04-25

## 2018-04-25 RX ORDER — DOCUSATE SODIUM 100 MG
1 CAPSULE ORAL
Qty: 0 | Refills: 0 | COMMUNITY
Start: 2018-04-25

## 2018-04-25 RX ORDER — CIPROFLOXACIN LACTATE 400MG/40ML
1 VIAL (ML) INTRAVENOUS
Qty: 2 | Refills: 0 | OUTPATIENT
Start: 2018-04-25 | End: 2018-04-26

## 2018-04-25 RX ORDER — ALPRAZOLAM 0.25 MG
0 TABLET ORAL
Qty: 0 | Refills: 0 | COMMUNITY

## 2018-04-25 RX ORDER — QUETIAPINE FUMARATE 200 MG/1
1 TABLET, FILM COATED ORAL
Qty: 90 | Refills: 0 | OUTPATIENT
Start: 2018-04-25 | End: 2018-05-24

## 2018-04-25 RX ORDER — DIVALPROEX SODIUM 500 MG/1
2 TABLET, DELAYED RELEASE ORAL
Qty: 0 | Refills: 0 | COMMUNITY

## 2018-04-25 RX ORDER — SENNA PLUS 8.6 MG/1
2 TABLET ORAL
Qty: 0 | Refills: 0 | COMMUNITY
Start: 2018-04-25

## 2018-04-25 RX ORDER — DIVALPROEX SODIUM 500 MG/1
1 TABLET, DELAYED RELEASE ORAL
Qty: 60 | Refills: 0 | OUTPATIENT
Start: 2018-04-25 | End: 2018-05-24

## 2018-04-25 RX ORDER — DIVALPROEX SODIUM 500 MG/1
1 TABLET, DELAYED RELEASE ORAL
Qty: 0 | Refills: 0 | COMMUNITY
Start: 2018-04-25

## 2018-04-25 RX ADMIN — QUETIAPINE FUMARATE 25 MILLIGRAM(S): 200 TABLET, FILM COATED ORAL at 03:35

## 2018-04-25 RX ADMIN — HEPARIN SODIUM 5000 UNIT(S): 5000 INJECTION INTRAVENOUS; SUBCUTANEOUS at 05:01

## 2018-04-25 RX ADMIN — PIPERACILLIN AND TAZOBACTAM 25 GRAM(S): 4; .5 INJECTION, POWDER, LYOPHILIZED, FOR SOLUTION INTRAVENOUS at 08:19

## 2018-04-25 RX ADMIN — Medication 0.5 MILLIGRAM(S): at 07:37

## 2018-04-25 RX ADMIN — QUETIAPINE FUMARATE 50 MILLIGRAM(S): 200 TABLET, FILM COATED ORAL at 05:01

## 2018-04-25 RX ADMIN — DIVALPROEX SODIUM 500 MILLIGRAM(S): 500 TABLET, DELAYED RELEASE ORAL at 05:01

## 2018-04-25 RX ADMIN — PANTOPRAZOLE SODIUM 40 MILLIGRAM(S): 20 TABLET, DELAYED RELEASE ORAL at 05:01

## 2018-04-25 NOTE — CHART NOTE - NSCHARTNOTEFT_GEN_A_CORE
Pt agitated overnight. Sleeping now. Given Xanax this am.    For discharge home today. Spoke with son Zac- advised prn Seroquel and not Xanax for agitation.     Transition to po Levaquin for 2 more days.     Dx    Bacterial pneumonia    Demential with agitation    D/c time 45 minutes.

## 2018-04-26 ENCOUNTER — INPATIENT (INPATIENT)
Facility: HOSPITAL | Age: 83
LOS: 4 days | Discharge: HOME | DRG: 391 | End: 2018-05-01
Attending: INTERNAL MEDICINE | Admitting: HOSPITALIST
Payer: MEDICARE

## 2018-04-26 VITALS
RESPIRATION RATE: 17 BRPM | TEMPERATURE: 98 F | DIASTOLIC BLOOD PRESSURE: 83 MMHG | SYSTOLIC BLOOD PRESSURE: 179 MMHG | HEART RATE: 76 BPM | OXYGEN SATURATION: 96 %

## 2018-04-26 DIAGNOSIS — J15.9 UNSPECIFIED BACTERIAL PNEUMONIA: ICD-10-CM

## 2018-04-26 DIAGNOSIS — I77.9 DISORDER OF ARTERIES AND ARTERIOLES, UNSPECIFIED: ICD-10-CM

## 2018-04-26 DIAGNOSIS — I10 ESSENTIAL (PRIMARY) HYPERTENSION: ICD-10-CM

## 2018-04-26 DIAGNOSIS — R19.7 DIARRHEA, UNSPECIFIED: ICD-10-CM

## 2018-04-26 DIAGNOSIS — R62.7 ADULT FAILURE TO THRIVE: ICD-10-CM

## 2018-04-26 LAB
ALBUMIN SERPL ELPH-MCNC: 4 G/DL — SIGNIFICANT CHANGE UP (ref 3.3–5)
ALP SERPL-CCNC: 57 U/L — SIGNIFICANT CHANGE UP (ref 40–120)
ALT FLD-CCNC: 12 U/L — SIGNIFICANT CHANGE UP (ref 10–45)
ANION GAP SERPL CALC-SCNC: 15 MMOL/L — SIGNIFICANT CHANGE UP (ref 5–17)
APPEARANCE UR: CLEAR — SIGNIFICANT CHANGE UP
AST SERPL-CCNC: 20 U/L — SIGNIFICANT CHANGE UP (ref 10–40)
BASOPHILS # BLD AUTO: 0.1 K/UL — SIGNIFICANT CHANGE UP (ref 0–0.2)
BASOPHILS NFR BLD AUTO: 1 % — SIGNIFICANT CHANGE UP (ref 0–2)
BILIRUB SERPL-MCNC: 0.6 MG/DL — SIGNIFICANT CHANGE UP (ref 0.2–1.2)
BILIRUB UR-MCNC: NEGATIVE — SIGNIFICANT CHANGE UP
BUN SERPL-MCNC: 15 MG/DL — SIGNIFICANT CHANGE UP (ref 7–23)
CALCIUM SERPL-MCNC: 10 MG/DL — SIGNIFICANT CHANGE UP (ref 8.4–10.5)
CHLORIDE SERPL-SCNC: 103 MMOL/L — SIGNIFICANT CHANGE UP (ref 96–108)
CO2 SERPL-SCNC: 26 MMOL/L — SIGNIFICANT CHANGE UP (ref 22–31)
COLOR SPEC: YELLOW — SIGNIFICANT CHANGE UP
COMMENT - URINE: SIGNIFICANT CHANGE UP
CREAT SERPL-MCNC: 0.83 MG/DL — SIGNIFICANT CHANGE UP (ref 0.5–1.3)
CULTURE RESULTS: SIGNIFICANT CHANGE UP
CULTURE RESULTS: SIGNIFICANT CHANGE UP
DIFF PNL FLD: NEGATIVE — SIGNIFICANT CHANGE UP
EOSINOPHIL # BLD AUTO: 0.4 K/UL — SIGNIFICANT CHANGE UP (ref 0–0.5)
EOSINOPHIL NFR BLD AUTO: 4.5 % — SIGNIFICANT CHANGE UP (ref 0–6)
GLUCOSE SERPL-MCNC: 92 MG/DL — SIGNIFICANT CHANGE UP (ref 70–99)
GLUCOSE UR QL: NEGATIVE — SIGNIFICANT CHANGE UP
HCT VFR BLD CALC: 38.6 % — SIGNIFICANT CHANGE UP (ref 34.5–45)
HGB BLD-MCNC: 12.9 G/DL — SIGNIFICANT CHANGE UP (ref 11.5–15.5)
KETONES UR-MCNC: ABNORMAL
LEUKOCYTE ESTERASE UR-ACNC: NEGATIVE — SIGNIFICANT CHANGE UP
LYMPHOCYTES # BLD AUTO: 2.2 K/UL — SIGNIFICANT CHANGE UP (ref 1–3.3)
LYMPHOCYTES # BLD AUTO: 24.6 % — SIGNIFICANT CHANGE UP (ref 13–44)
MCHC RBC-ENTMCNC: 32.5 PG — SIGNIFICANT CHANGE UP (ref 27–34)
MCHC RBC-ENTMCNC: 33.4 GM/DL — SIGNIFICANT CHANGE UP (ref 32–36)
MCV RBC AUTO: 97.1 FL — SIGNIFICANT CHANGE UP (ref 80–100)
MONOCYTES # BLD AUTO: 1.1 K/UL — HIGH (ref 0–0.9)
MONOCYTES NFR BLD AUTO: 12.8 % — SIGNIFICANT CHANGE UP (ref 2–14)
NEUTROPHILS # BLD AUTO: 5 K/UL — SIGNIFICANT CHANGE UP (ref 1.8–7.4)
NEUTROPHILS NFR BLD AUTO: 57.1 % — SIGNIFICANT CHANGE UP (ref 43–77)
NITRITE UR-MCNC: NEGATIVE — SIGNIFICANT CHANGE UP
PH UR: 6.5 — SIGNIFICANT CHANGE UP (ref 5–8)
PLATELET # BLD AUTO: 272 K/UL — SIGNIFICANT CHANGE UP (ref 150–400)
POTASSIUM SERPL-MCNC: 3.5 MMOL/L — SIGNIFICANT CHANGE UP (ref 3.5–5.3)
POTASSIUM SERPL-SCNC: 3.5 MMOL/L — SIGNIFICANT CHANGE UP (ref 3.5–5.3)
PROT SERPL-MCNC: 7.1 G/DL — SIGNIFICANT CHANGE UP (ref 6–8.3)
PROT UR-MCNC: SIGNIFICANT CHANGE UP
RBC # BLD: 3.97 M/UL — SIGNIFICANT CHANGE UP (ref 3.8–5.2)
RBC # FLD: 12.2 % — SIGNIFICANT CHANGE UP (ref 10.3–14.5)
RBC CASTS # UR COMP ASSIST: SIGNIFICANT CHANGE UP /HPF (ref 0–2)
SODIUM SERPL-SCNC: 144 MMOL/L — SIGNIFICANT CHANGE UP (ref 135–145)
SP GR SPEC: 1.02 — SIGNIFICANT CHANGE UP (ref 1.01–1.02)
SPECIMEN SOURCE: SIGNIFICANT CHANGE UP
SPECIMEN SOURCE: SIGNIFICANT CHANGE UP
UROBILINOGEN FLD QL: NEGATIVE — SIGNIFICANT CHANGE UP
VALPROATE SERPL-MCNC: 42 UG/ML — LOW (ref 50–100)
WBC # BLD: 8.8 K/UL — SIGNIFICANT CHANGE UP (ref 3.8–10.5)
WBC # FLD AUTO: 8.8 K/UL — SIGNIFICANT CHANGE UP (ref 3.8–10.5)
WBC UR QL: SIGNIFICANT CHANGE UP /HPF (ref 0–5)

## 2018-04-26 PROCEDURE — 99222 1ST HOSP IP/OBS MODERATE 55: CPT

## 2018-04-26 PROCEDURE — 99284 EMERGENCY DEPT VISIT MOD MDM: CPT | Mod: 25

## 2018-04-26 PROCEDURE — 99223 1ST HOSP IP/OBS HIGH 75: CPT

## 2018-04-26 PROCEDURE — 71045 X-RAY EXAM CHEST 1 VIEW: CPT | Mod: 26

## 2018-04-26 PROCEDURE — 93010 ELECTROCARDIOGRAM REPORT: CPT

## 2018-04-26 RX ORDER — DIVALPROEX SODIUM 500 MG/1
500 TABLET, DELAYED RELEASE ORAL
Qty: 0 | Refills: 0 | Status: DISCONTINUED | OUTPATIENT
Start: 2018-04-26 | End: 2018-04-27

## 2018-04-26 RX ORDER — LANOLIN ALCOHOL/MO/W.PET/CERES
3 CREAM (GRAM) TOPICAL AT BEDTIME
Qty: 0 | Refills: 0 | Status: DISCONTINUED | OUTPATIENT
Start: 2018-04-26 | End: 2018-05-01

## 2018-04-26 RX ORDER — PIPERACILLIN AND TAZOBACTAM 4; .5 G/20ML; G/20ML
3.38 INJECTION, POWDER, LYOPHILIZED, FOR SOLUTION INTRAVENOUS EVERY 8 HOURS
Qty: 0 | Refills: 0 | Status: DISCONTINUED | OUTPATIENT
Start: 2018-04-26 | End: 2018-04-27

## 2018-04-26 RX ORDER — QUETIAPINE FUMARATE 200 MG/1
25 TABLET, FILM COATED ORAL EVERY 8 HOURS
Qty: 0 | Refills: 0 | Status: DISCONTINUED | OUTPATIENT
Start: 2018-04-26 | End: 2018-05-01

## 2018-04-26 RX ORDER — HEPARIN SODIUM 5000 [USP'U]/ML
5000 INJECTION INTRAVENOUS; SUBCUTANEOUS EVERY 8 HOURS
Qty: 0 | Refills: 0 | Status: DISCONTINUED | OUTPATIENT
Start: 2018-04-26 | End: 2018-05-01

## 2018-04-26 RX ORDER — HALOPERIDOL DECANOATE 100 MG/ML
2.5 INJECTION INTRAMUSCULAR ONCE
Qty: 0 | Refills: 0 | Status: COMPLETED | OUTPATIENT
Start: 2018-04-26 | End: 2018-04-26

## 2018-04-26 RX ORDER — QUETIAPINE FUMARATE 200 MG/1
50 TABLET, FILM COATED ORAL
Qty: 0 | Refills: 0 | Status: DISCONTINUED | OUTPATIENT
Start: 2018-04-26 | End: 2018-05-01

## 2018-04-26 RX ORDER — PANTOPRAZOLE SODIUM 20 MG/1
40 TABLET, DELAYED RELEASE ORAL
Qty: 0 | Refills: 0 | Status: DISCONTINUED | OUTPATIENT
Start: 2018-04-26 | End: 2018-05-01

## 2018-04-26 RX ORDER — CHOLECALCIFEROL (VITAMIN D3) 125 MCG
1000 CAPSULE ORAL DAILY
Qty: 0 | Refills: 0 | Status: DISCONTINUED | OUTPATIENT
Start: 2018-04-26 | End: 2018-05-01

## 2018-04-26 RX ORDER — HALOPERIDOL DECANOATE 100 MG/ML
0.5 INJECTION INTRAMUSCULAR ONCE
Qty: 0 | Refills: 0 | Status: COMPLETED | OUTPATIENT
Start: 2018-04-26 | End: 2018-04-26

## 2018-04-26 RX ORDER — ACETAMINOPHEN 500 MG
650 TABLET ORAL EVERY 6 HOURS
Qty: 0 | Refills: 0 | Status: DISCONTINUED | OUTPATIENT
Start: 2018-04-26 | End: 2018-05-01

## 2018-04-26 RX ADMIN — Medication 3 MILLIGRAM(S): at 21:18

## 2018-04-26 RX ADMIN — QUETIAPINE FUMARATE 25 MILLIGRAM(S): 200 TABLET, FILM COATED ORAL at 23:28

## 2018-04-26 RX ADMIN — QUETIAPINE FUMARATE 25 MILLIGRAM(S): 200 TABLET, FILM COATED ORAL at 15:36

## 2018-04-26 RX ADMIN — HEPARIN SODIUM 5000 UNIT(S): 5000 INJECTION INTRAVENOUS; SUBCUTANEOUS at 21:18

## 2018-04-26 RX ADMIN — Medication 1000 UNIT(S): at 10:55

## 2018-04-26 RX ADMIN — HALOPERIDOL DECANOATE 2.5 MILLIGRAM(S): 100 INJECTION INTRAMUSCULAR at 05:19

## 2018-04-26 RX ADMIN — DIVALPROEX SODIUM 500 MILLIGRAM(S): 500 TABLET, DELAYED RELEASE ORAL at 10:54

## 2018-04-26 RX ADMIN — QUETIAPINE FUMARATE 50 MILLIGRAM(S): 200 TABLET, FILM COATED ORAL at 10:54

## 2018-04-26 RX ADMIN — PIPERACILLIN AND TAZOBACTAM 25 GRAM(S): 4; .5 INJECTION, POWDER, LYOPHILIZED, FOR SOLUTION INTRAVENOUS at 11:33

## 2018-04-26 RX ADMIN — HALOPERIDOL DECANOATE 0.5 MILLIGRAM(S): 100 INJECTION INTRAMUSCULAR at 18:02

## 2018-04-26 RX ADMIN — QUETIAPINE FUMARATE 50 MILLIGRAM(S): 200 TABLET, FILM COATED ORAL at 17:54

## 2018-04-26 RX ADMIN — PIPERACILLIN AND TAZOBACTAM 25 GRAM(S): 4; .5 INJECTION, POWDER, LYOPHILIZED, FOR SOLUTION INTRAVENOUS at 21:17

## 2018-04-26 RX ADMIN — Medication 650 MILLIGRAM(S): at 21:17

## 2018-04-26 NOTE — ED PROVIDER NOTE - MEDICAL DECISION MAKING DETAILS
89yo F dementia, recent admission for pneumonia, BIBEMS, family unable to care for her at home, unsafe discharge given mental status; afebrile, in NAD, MMM/ not clinically dehydrated, not hypotensive, nrl heart/lung sounds, no localizing neurologic deficits other than memory issues/confusion.  Plan: check basic labs, ua, ecg, cxr; re-admit for placement.

## 2018-04-26 NOTE — BEHAVIORAL HEALTH ASSESSMENT NOTE - SUMMARY
Pt is a 89 y/o female, domiciled, retired, with no formal PPH and a PMH h/o Parkinson's dementia, s/p unwitnessed fall in Oct. 2017 and a recent hospital admission (discharged 4/25) who presents for diarrhea. Psychiatry consult called for confusion and agitation in the context of known dementia. Pt pleasantly confused this afternoon, alert and oriented x1 (oriented to name only). Labs WNL. CXR shows clear lungs. Urinalysis significant for small ketones.

## 2018-04-26 NOTE — ED PROVIDER NOTE - OBJECTIVE STATEMENT
89y f with h/o dementia cad, hep c, recent admission for worsening mental status / confusion 2/2 pneumonia, presenting after discharge this morning, son called EMS after patient was dropped off, unable to care for her, stating she looked still dehydrated and was concerned she was still too sick to be cared for at home.  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

## 2018-04-26 NOTE — BEHAVIORAL HEALTH ASSESSMENT NOTE - NSBHCHARTREVIEWVS_PSY_A_CORE FT
Vital Signs Last 24 Hrs  T(C): 36.4 (26 Apr 2018 16:23), Max: 37.3 (26 Apr 2018 03:54)  T(F): 97.5 (26 Apr 2018 16:23), Max: 99.1 (26 Apr 2018 03:54)  HR: 81 (26 Apr 2018 16:23) (76 - 113)  BP: 154/74 (26 Apr 2018 16:23) (111/91 - 200/100)  BP(mean): 113 (26 Apr 2018 09:37) (113 - 113)  RR: 18 (26 Apr 2018 16:23) (16 - 22)  SpO2: 96% (26 Apr 2018 16:23) (96% - 97%)

## 2018-04-26 NOTE — ED ADULT NURSE NOTE - OBJECTIVE STATEMENT
89 year old female came into the ER via EMS with c/o diarrhea. Pt was discharge 1 day ago and sent home, according to EMS, son stated that when the pt arrived home, she "looked dehydrated and still had diarrhea, so she should come back to the hospital." Pt has baseline dementia, A&Ox1, aware of name, but not aware of birthday, location or year. Pt agitated and talking in repetitive confused sentences. 1x diarrhea noted, yellow in color, no odor. Straight cathed for urine sample with 2 RN at bedside. No c/o CP, SOB, nausea or vomiting, abd pain, or back pain at this time.

## 2018-04-26 NOTE — H&P ADULT - MUSCULOSKELETAL
detailed exam no joint warmth/no joint swelling/no joint erythema/no calf tenderness/ROM intact negative

## 2018-04-26 NOTE — H&P ADULT - ASSESSMENT
89 F c hx of dementia, hep C 89 F c hx of dementia, hep C, PAD c recent sepsis sec to PNA sent back to hospital with diarrhea, found with uncontrolled HTN

## 2018-04-26 NOTE — BEHAVIORAL HEALTH ASSESSMENT NOTE - HPI (INCLUDE ILLNESS QUALITY, SEVERITY, DURATION, TIMING, CONTEXT, MODIFYING FACTORS, ASSOCIATED SIGNS AND SYMPTOMS)
Pt is a 89 y/o female, domiciled, retired, with no formal PPH and a PMH h/o Parkinson's dementia, s/p unwitnessed fall in Oct. 2017 and a recent hospital admission (discharged 4/25) who presents for diarrhea. Psychiatry consult called for confusion and agitation in the context of known dementia.     This afternoon the patient is pleasantly confused and interactive with the examiner. Pt alert and oriented x1 (oriented to name but not location or date; states she is in the home of the interviewer and it is the year [birth year]). When reoriented, the patient says “yes” and “I know” in agreement with the examiner. Pt states mood as “happy”. Pt denies physical pain or any discomfort. Pt references her children multiple times but does not expand upon further questioning. Pt denies SIIP/HIIP, hallucinations, delusions, or depressed mood. Pt currently on 1-to1 for agitation.     As per H+P, pt gets Xanax PRN and Depakote.

## 2018-04-26 NOTE — H&P ADULT - PROBLEM SELECTOR PLAN 3
possibly sec to agitation. restart seroquel and depakote and if BP remains high during calm periods, can start amlodipine 2.5 mg daily.

## 2018-04-26 NOTE — H&P ADULT - HISTORY OF PRESENT ILLNESS
89 yo F c hx as below sent in by son because he cant care for her 89 yo F c hx as below sent in by son because for recurrent diarrhea with concern for dehydration. Son gave pt Imodium to relieve diarrhea. No new issues with pt. Currently pt is trying to get out of stretcher but is temporarily redirectable. Denies any complaints

## 2018-04-26 NOTE — ED ADULT NURSE REASSESSMENT NOTE - NS ED NURSE REASSESS COMMENT FT1
Received report from KENNETH Bhat. Pt is A&OX1 (person) and becomes agitated quickly. Pt is constantly moving around in her bed and was unable to sit still for a BP which is why it was so elevated. Pt is was flailing saying "this needs to come off." 1:1 maintained, pt is not trying to get out of the bed currently but her safety is a concern. Will continue to monitor pt, awaiting a bed.

## 2018-04-26 NOTE — BEHAVIORAL HEALTH ASSESSMENT NOTE - NSBHCONSULTMEDS_PSY_A_CORE FT
Consider melatonin 3mg p.o. at bedtime  Continue Depakote ER 500mg p.o. BID (obtain valproate level ASAP, monitor LRTs, lipase, amylase)  Continue Seroquel 50mg p.o. BID (monitor QTc on EKG)

## 2018-04-26 NOTE — BEHAVIORAL HEALTH ASSESSMENT NOTE - NSBHCHARTREVIEWIMAGING_PSY_A_CORE FT
< from: CT Head No Cont (04.21.18 @ 11:37) >  EXAM:  CT BRAIN                        PROCEDURE DATE:  04/21/2018    INTERPRETATION:  CLINICAL INFORMATION: Altered mental status    COMPARISON: CT brain 12/10/2017.    TECHNIQUE:   Axial CT images of the head were obtained without intravenous contrast.   Coronal and sagittal reformats were obtained.     FINDINGS:    There is no hydrocephalus, mass effect, midline shift, or acute   intracranial hemorrhage.  There is no CT evidence of acute territorial   infarct.  There is prominence of the ventricles and sulci which is   unchanged as compared to prior imaging. There are patchy periventricular   white matter low attenuations likely representing mild chronic   microvascular ischemic changes.    Right maxillary sinus opacification. Right ethmoid air cell partial   opacification. The remaining visualized paranasal sinuses and mastoid air   cells are clear.     IMPRESSION:   No acute intracranial pathology. Prominent ventricle size is unchanged.    Right maxillary sinus and ethmoid air cell opacification. Poorly   clinically for sinusitis    CRISTIANE HOLLAND M.D., RADIOLOGY RESIDENT  This document has been electronically signed.  FANNY NELSON M.D., ATTENDING RADIOLOGIST  This document has been electronically signed. Apr 21 2018 12:05PM    < end of copied text >

## 2018-04-26 NOTE — H&P ADULT - CONSTITUTIONAL COMMENTS
Pt is trying to get out of stretcher and can't answer questions appropriately but can be temporarily calmed down/redirected

## 2018-04-26 NOTE — BEHAVIORAL HEALTH ASSESSMENT NOTE - NSBHCHARTREVIEWLAB_PSY_A_CORE FT
12.9   8.8   )-----------( 272      ( 2018 03:45 )             38.6         144  |  103  |  15  ----------------------------<  92  3.5   |  26  |  0.83    Ca    10.0      2018 03:45    TPro  7.1  /  Alb  4.0  /  TBili  0.6  /  DBili  x   /  AST  20  /  ALT  12  /  AlkPhos  57      Urinalysis Basic - ( 2018 04:04 )    Color: Yellow / Appearance: Clear / S.016 / pH: x  Gluc: x / Ketone: Small  / Bili: Negative / Urobili: Negative   Blood: x / Protein: Trace / Nitrite: Negative   Leuk Esterase: Negative / RBC: 3-5 /HPF / WBC 0-2 /HPF   Sq Epi: x / Non Sq Epi: x / Bacteria: x

## 2018-04-26 NOTE — H&P ADULT - RS GEN PE MLT RESP DETAILS PC
respirations non-labored/breath sounds equal/no wheezes/no rhonchi/good air movement/clear to auscultation bilaterally/no rales

## 2018-04-27 DIAGNOSIS — R13.10 DYSPHAGIA, UNSPECIFIED: ICD-10-CM

## 2018-04-27 DIAGNOSIS — R41.0 DISORIENTATION, UNSPECIFIED: ICD-10-CM

## 2018-04-27 LAB
ALBUMIN SERPL ELPH-MCNC: 3.3 G/DL — SIGNIFICANT CHANGE UP (ref 3.3–5)
ALP SERPL-CCNC: 47 U/L — SIGNIFICANT CHANGE UP (ref 40–120)
ALT FLD-CCNC: 10 U/L — SIGNIFICANT CHANGE UP (ref 10–45)
AMYLASE P1 CFR SERPL: 23 U/L — LOW (ref 25–125)
ANION GAP SERPL CALC-SCNC: 12 MMOL/L — SIGNIFICANT CHANGE UP (ref 5–17)
AST SERPL-CCNC: 23 U/L — SIGNIFICANT CHANGE UP (ref 10–40)
BASOPHILS # BLD AUTO: 0.04 K/UL — SIGNIFICANT CHANGE UP (ref 0–0.2)
BASOPHILS NFR BLD AUTO: 0.5 % — SIGNIFICANT CHANGE UP (ref 0–2)
BILIRUB DIRECT SERPL-MCNC: 0.1 MG/DL — SIGNIFICANT CHANGE UP (ref 0–0.2)
BILIRUB INDIRECT FLD-MCNC: 0.3 MG/DL — SIGNIFICANT CHANGE UP (ref 0.2–1)
BILIRUB SERPL-MCNC: 0.4 MG/DL — SIGNIFICANT CHANGE UP (ref 0.2–1.2)
BUN SERPL-MCNC: 20 MG/DL — SIGNIFICANT CHANGE UP (ref 7–23)
C DIFF GDH STL QL: NEGATIVE — SIGNIFICANT CHANGE UP
C DIFF GDH STL QL: SIGNIFICANT CHANGE UP
CALCIUM SERPL-MCNC: 8.9 MG/DL — SIGNIFICANT CHANGE UP (ref 8.4–10.5)
CHLORIDE SERPL-SCNC: 102 MMOL/L — SIGNIFICANT CHANGE UP (ref 96–108)
CO2 SERPL-SCNC: 28 MMOL/L — SIGNIFICANT CHANGE UP (ref 22–31)
CREAT SERPL-MCNC: 1.12 MG/DL — SIGNIFICANT CHANGE UP (ref 0.5–1.3)
EOSINOPHIL # BLD AUTO: 0.39 K/UL — SIGNIFICANT CHANGE UP (ref 0–0.5)
EOSINOPHIL NFR BLD AUTO: 4.7 % — SIGNIFICANT CHANGE UP (ref 0–6)
GLUCOSE SERPL-MCNC: 75 MG/DL — SIGNIFICANT CHANGE UP (ref 70–99)
HCT VFR BLD CALC: 31.5 % — LOW (ref 34.5–45)
HGB BLD-MCNC: 10.7 G/DL — LOW (ref 11.5–15.5)
IMM GRANULOCYTES NFR BLD AUTO: 0.5 % — SIGNIFICANT CHANGE UP (ref 0–1.5)
LIDOCAIN IGE QN: 15 U/L — SIGNIFICANT CHANGE UP (ref 7–60)
LYMPHOCYTES # BLD AUTO: 2.47 K/UL — SIGNIFICANT CHANGE UP (ref 1–3.3)
LYMPHOCYTES # BLD AUTO: 29.8 % — SIGNIFICANT CHANGE UP (ref 13–44)
MCHC RBC-ENTMCNC: 31.8 PG — SIGNIFICANT CHANGE UP (ref 27–34)
MCHC RBC-ENTMCNC: 34 GM/DL — SIGNIFICANT CHANGE UP (ref 32–36)
MCV RBC AUTO: 93.8 FL — SIGNIFICANT CHANGE UP (ref 80–100)
MONOCYTES # BLD AUTO: 1.14 K/UL — HIGH (ref 0–0.9)
MONOCYTES NFR BLD AUTO: 13.7 % — SIGNIFICANT CHANGE UP (ref 2–14)
NEUTROPHILS # BLD AUTO: 4.22 K/UL — SIGNIFICANT CHANGE UP (ref 1.8–7.4)
NEUTROPHILS NFR BLD AUTO: 50.8 % — SIGNIFICANT CHANGE UP (ref 43–77)
PLATELET # BLD AUTO: 250 K/UL — SIGNIFICANT CHANGE UP (ref 150–400)
POTASSIUM SERPL-MCNC: 3.5 MMOL/L — SIGNIFICANT CHANGE UP (ref 3.5–5.3)
POTASSIUM SERPL-SCNC: 3.5 MMOL/L — SIGNIFICANT CHANGE UP (ref 3.5–5.3)
PROT SERPL-MCNC: 6.2 G/DL — SIGNIFICANT CHANGE UP (ref 6–8.3)
RBC # BLD: 3.36 M/UL — LOW (ref 3.8–5.2)
RBC # FLD: 14 % — SIGNIFICANT CHANGE UP (ref 10.3–14.5)
SODIUM SERPL-SCNC: 142 MMOL/L — SIGNIFICANT CHANGE UP (ref 135–145)
WBC # BLD: 8.3 K/UL — SIGNIFICANT CHANGE UP (ref 3.8–10.5)
WBC # FLD AUTO: 8.3 K/UL — SIGNIFICANT CHANGE UP (ref 3.8–10.5)

## 2018-04-27 RX ORDER — VALPROIC ACID (AS SODIUM SALT) 250 MG/5ML
500 SOLUTION, ORAL ORAL
Qty: 0 | Refills: 0 | Status: DISCONTINUED | OUTPATIENT
Start: 2018-04-27 | End: 2018-05-01

## 2018-04-27 RX ADMIN — HEPARIN SODIUM 5000 UNIT(S): 5000 INJECTION INTRAVENOUS; SUBCUTANEOUS at 21:07

## 2018-04-27 RX ADMIN — Medication 500 MILLIGRAM(S): at 17:01

## 2018-04-27 RX ADMIN — Medication 1000 UNIT(S): at 16:58

## 2018-04-27 RX ADMIN — QUETIAPINE FUMARATE 25 MILLIGRAM(S): 200 TABLET, FILM COATED ORAL at 08:22

## 2018-04-27 RX ADMIN — Medication 650 MILLIGRAM(S): at 15:40

## 2018-04-27 RX ADMIN — HEPARIN SODIUM 5000 UNIT(S): 5000 INJECTION INTRAVENOUS; SUBCUTANEOUS at 16:58

## 2018-04-27 RX ADMIN — QUETIAPINE FUMARATE 25 MILLIGRAM(S): 200 TABLET, FILM COATED ORAL at 15:39

## 2018-04-27 RX ADMIN — Medication 1 APPLICATION(S): at 06:17

## 2018-04-27 RX ADMIN — QUETIAPINE FUMARATE 50 MILLIGRAM(S): 200 TABLET, FILM COATED ORAL at 06:17

## 2018-04-27 RX ADMIN — QUETIAPINE FUMARATE 50 MILLIGRAM(S): 200 TABLET, FILM COATED ORAL at 17:01

## 2018-04-27 RX ADMIN — Medication 1 APPLICATION(S): at 16:58

## 2018-04-27 RX ADMIN — PIPERACILLIN AND TAZOBACTAM 25 GRAM(S): 4; .5 INJECTION, POWDER, LYOPHILIZED, FOR SOLUTION INTRAVENOUS at 06:17

## 2018-04-27 RX ADMIN — Medication 3 MILLIGRAM(S): at 21:07

## 2018-04-27 RX ADMIN — HEPARIN SODIUM 5000 UNIT(S): 5000 INJECTION INTRAVENOUS; SUBCUTANEOUS at 06:17

## 2018-04-27 NOTE — PROGRESS NOTE ADULT - ASSESSMENT
89 F c  recent admission for pneumonia readmitted with diarrhea, uncontrolled HTN 89 F w recent admission for pneumonia readmitted with diarrhea, uncontrolled HTN

## 2018-04-28 DIAGNOSIS — Z29.9 ENCOUNTER FOR PROPHYLACTIC MEASURES, UNSPECIFIED: ICD-10-CM

## 2018-04-28 DIAGNOSIS — I10 ESSENTIAL (PRIMARY) HYPERTENSION: ICD-10-CM

## 2018-04-28 LAB
ANION GAP SERPL CALC-SCNC: 12 MMOL/L — SIGNIFICANT CHANGE UP (ref 5–17)
BUN SERPL-MCNC: 18 MG/DL — SIGNIFICANT CHANGE UP (ref 7–23)
CALCIUM SERPL-MCNC: 9.7 MG/DL — SIGNIFICANT CHANGE UP (ref 8.4–10.5)
CHLORIDE SERPL-SCNC: 104 MMOL/L — SIGNIFICANT CHANGE UP (ref 96–108)
CO2 SERPL-SCNC: 24 MMOL/L — SIGNIFICANT CHANGE UP (ref 22–31)
CREAT SERPL-MCNC: 0.89 MG/DL — SIGNIFICANT CHANGE UP (ref 0.5–1.3)
GLUCOSE SERPL-MCNC: 93 MG/DL — SIGNIFICANT CHANGE UP (ref 70–99)
HCT VFR BLD CALC: 42.3 % — SIGNIFICANT CHANGE UP (ref 34.5–45)
HGB BLD-MCNC: 14.3 G/DL — SIGNIFICANT CHANGE UP (ref 11.5–15.5)
MCHC RBC-ENTMCNC: 32.9 PG — SIGNIFICANT CHANGE UP (ref 27–34)
MCHC RBC-ENTMCNC: 33.7 GM/DL — SIGNIFICANT CHANGE UP (ref 32–36)
MCV RBC AUTO: 97.6 FL — SIGNIFICANT CHANGE UP (ref 80–100)
PLATELET # BLD AUTO: 305 K/UL — SIGNIFICANT CHANGE UP (ref 150–400)
POTASSIUM SERPL-MCNC: 4 MMOL/L — SIGNIFICANT CHANGE UP (ref 3.5–5.3)
POTASSIUM SERPL-SCNC: 4 MMOL/L — SIGNIFICANT CHANGE UP (ref 3.5–5.3)
RBC # BLD: 4.33 M/UL — SIGNIFICANT CHANGE UP (ref 3.8–5.2)
RBC # FLD: 12.5 % — SIGNIFICANT CHANGE UP (ref 10.3–14.5)
SODIUM SERPL-SCNC: 140 MMOL/L — SIGNIFICANT CHANGE UP (ref 135–145)
WBC # BLD: 7.2 K/UL — SIGNIFICANT CHANGE UP (ref 3.8–10.5)
WBC # FLD AUTO: 7.2 K/UL — SIGNIFICANT CHANGE UP (ref 3.8–10.5)

## 2018-04-28 PROCEDURE — 99232 SBSQ HOSP IP/OBS MODERATE 35: CPT

## 2018-04-28 RX ADMIN — QUETIAPINE FUMARATE 25 MILLIGRAM(S): 200 TABLET, FILM COATED ORAL at 13:10

## 2018-04-28 RX ADMIN — Medication 500 MILLIGRAM(S): at 17:09

## 2018-04-28 RX ADMIN — HEPARIN SODIUM 5000 UNIT(S): 5000 INJECTION INTRAVENOUS; SUBCUTANEOUS at 21:30

## 2018-04-28 RX ADMIN — QUETIAPINE FUMARATE 50 MILLIGRAM(S): 200 TABLET, FILM COATED ORAL at 05:49

## 2018-04-28 RX ADMIN — Medication 3 MILLIGRAM(S): at 21:30

## 2018-04-28 RX ADMIN — Medication 1 APPLICATION(S): at 17:08

## 2018-04-28 RX ADMIN — Medication 500 MILLIGRAM(S): at 05:51

## 2018-04-28 RX ADMIN — HEPARIN SODIUM 5000 UNIT(S): 5000 INJECTION INTRAVENOUS; SUBCUTANEOUS at 13:10

## 2018-04-28 RX ADMIN — PANTOPRAZOLE SODIUM 40 MILLIGRAM(S): 20 TABLET, DELAYED RELEASE ORAL at 05:49

## 2018-04-28 RX ADMIN — QUETIAPINE FUMARATE 50 MILLIGRAM(S): 200 TABLET, FILM COATED ORAL at 17:08

## 2018-04-28 RX ADMIN — Medication 1 APPLICATION(S): at 05:49

## 2018-04-28 RX ADMIN — HEPARIN SODIUM 5000 UNIT(S): 5000 INJECTION INTRAVENOUS; SUBCUTANEOUS at 05:49

## 2018-04-28 RX ADMIN — Medication 1000 UNIT(S): at 13:10

## 2018-04-29 PROCEDURE — 99232 SBSQ HOSP IP/OBS MODERATE 35: CPT

## 2018-04-29 RX ADMIN — Medication 1 APPLICATION(S): at 05:52

## 2018-04-29 RX ADMIN — HEPARIN SODIUM 5000 UNIT(S): 5000 INJECTION INTRAVENOUS; SUBCUTANEOUS at 21:14

## 2018-04-29 RX ADMIN — HEPARIN SODIUM 5000 UNIT(S): 5000 INJECTION INTRAVENOUS; SUBCUTANEOUS at 05:52

## 2018-04-29 RX ADMIN — Medication 500 MILLIGRAM(S): at 17:37

## 2018-04-29 RX ADMIN — Medication 3 MILLIGRAM(S): at 21:14

## 2018-04-29 RX ADMIN — Medication 1000 UNIT(S): at 13:02

## 2018-04-29 RX ADMIN — Medication 500 MILLIGRAM(S): at 05:52

## 2018-04-29 RX ADMIN — PANTOPRAZOLE SODIUM 40 MILLIGRAM(S): 20 TABLET, DELAYED RELEASE ORAL at 05:51

## 2018-04-29 RX ADMIN — QUETIAPINE FUMARATE 50 MILLIGRAM(S): 200 TABLET, FILM COATED ORAL at 17:37

## 2018-04-29 RX ADMIN — Medication 1 APPLICATION(S): at 17:36

## 2018-04-29 RX ADMIN — QUETIAPINE FUMARATE 50 MILLIGRAM(S): 200 TABLET, FILM COATED ORAL at 06:01

## 2018-04-29 RX ADMIN — HEPARIN SODIUM 5000 UNIT(S): 5000 INJECTION INTRAVENOUS; SUBCUTANEOUS at 13:02

## 2018-04-29 NOTE — PHYSICAL THERAPY INITIAL EVALUATION ADULT - STRENGTHENING, PT EVAL
Pt will improve bilateral LE strength to 3+/5, for increased limb stability, to improve gait and facilitate stair negotiation in 4 weeks.

## 2018-04-29 NOTE — PHYSICAL THERAPY INITIAL EVALUATION ADULT - ADDITIONAL COMMENTS
Pt lives with son in a private house with one flight of steps to enter. As per pt's chart, pt required assist with all ADLs and has a HHA 5day/week x 9hrs/day and is assisted into w/c.

## 2018-04-29 NOTE — PHYSICAL THERAPY INITIAL EVALUATION ADULT - PERTINENT HX OF CURRENT PROBLEM, REHAB EVAL
Pt is a 89 y/o female admitted to [facility] on 4/26/18  sent in by son because for recurrent diarrhea with concern for dehydration. Son gave pt Imodium to relieve diarrhea. No new issues with pt. Currently pt is trying to get out of stretcher but is temporarily redirectable.

## 2018-04-29 NOTE — PHYSICAL THERAPY INITIAL EVALUATION ADULT - GAIT DEVIATIONS NOTED, PT EVAL
decreased step length/decreased brooke/decreased velocity of limb motion/decreased stride length/decreased weight-shifting ability

## 2018-04-30 ENCOUNTER — TRANSCRIPTION ENCOUNTER (OUTPATIENT)
Age: 83
End: 2018-04-30

## 2018-04-30 LAB
ANION GAP SERPL CALC-SCNC: 12 MMOL/L — SIGNIFICANT CHANGE UP (ref 5–17)
BUN SERPL-MCNC: 26 MG/DL — HIGH (ref 7–23)
CALCIUM SERPL-MCNC: 9.4 MG/DL — SIGNIFICANT CHANGE UP (ref 8.4–10.5)
CHLORIDE SERPL-SCNC: 101 MMOL/L — SIGNIFICANT CHANGE UP (ref 96–108)
CO2 SERPL-SCNC: 27 MMOL/L — SIGNIFICANT CHANGE UP (ref 22–31)
CREAT SERPL-MCNC: 1.15 MG/DL — SIGNIFICANT CHANGE UP (ref 0.5–1.3)
GLUCOSE SERPL-MCNC: 102 MG/DL — HIGH (ref 70–99)
HCT VFR BLD CALC: 37.2 % — SIGNIFICANT CHANGE UP (ref 34.5–45)
HGB BLD-MCNC: 12.7 G/DL — SIGNIFICANT CHANGE UP (ref 11.5–15.5)
MCHC RBC-ENTMCNC: 33.4 PG — SIGNIFICANT CHANGE UP (ref 27–34)
MCHC RBC-ENTMCNC: 34 GM/DL — SIGNIFICANT CHANGE UP (ref 32–36)
MCV RBC AUTO: 98.2 FL — SIGNIFICANT CHANGE UP (ref 80–100)
PLATELET # BLD AUTO: 305 K/UL — SIGNIFICANT CHANGE UP (ref 150–400)
POTASSIUM SERPL-MCNC: 5.1 MMOL/L — SIGNIFICANT CHANGE UP (ref 3.5–5.3)
POTASSIUM SERPL-SCNC: 5.1 MMOL/L — SIGNIFICANT CHANGE UP (ref 3.5–5.3)
RBC # BLD: 3.79 M/UL — LOW (ref 3.8–5.2)
RBC # FLD: 12.7 % — SIGNIFICANT CHANGE UP (ref 10.3–14.5)
SODIUM SERPL-SCNC: 140 MMOL/L — SIGNIFICANT CHANGE UP (ref 135–145)
WBC # BLD: 8.1 K/UL — SIGNIFICANT CHANGE UP (ref 3.8–10.5)
WBC # FLD AUTO: 8.1 K/UL — SIGNIFICANT CHANGE UP (ref 3.8–10.5)

## 2018-04-30 PROCEDURE — 99232 SBSQ HOSP IP/OBS MODERATE 35: CPT

## 2018-04-30 RX ORDER — VALPROIC ACID (AS SODIUM SALT) 250 MG/5ML
10 SOLUTION, ORAL ORAL
Qty: 0 | Refills: 0 | COMMUNITY
Start: 2018-04-30

## 2018-04-30 RX ORDER — LANOLIN ALCOHOL/MO/W.PET/CERES
1 CREAM (GRAM) TOPICAL
Qty: 30 | Refills: 0 | OUTPATIENT
Start: 2018-04-30 | End: 2018-05-29

## 2018-04-30 RX ORDER — POLYETHYLENE GLYCOL 3350 17 G/17G
17 POWDER, FOR SOLUTION ORAL
Qty: 0 | Refills: 0 | COMMUNITY

## 2018-04-30 RX ORDER — VALPROIC ACID (AS SODIUM SALT) 250 MG/5ML
10 SOLUTION, ORAL ORAL
Qty: 300 | Refills: 0 | OUTPATIENT
Start: 2018-04-30 | End: 2018-05-29

## 2018-04-30 RX ADMIN — HEPARIN SODIUM 5000 UNIT(S): 5000 INJECTION INTRAVENOUS; SUBCUTANEOUS at 05:46

## 2018-04-30 RX ADMIN — QUETIAPINE FUMARATE 50 MILLIGRAM(S): 200 TABLET, FILM COATED ORAL at 17:30

## 2018-04-30 RX ADMIN — QUETIAPINE FUMARATE 25 MILLIGRAM(S): 200 TABLET, FILM COATED ORAL at 12:51

## 2018-04-30 RX ADMIN — Medication 500 MILLIGRAM(S): at 17:30

## 2018-04-30 RX ADMIN — PANTOPRAZOLE SODIUM 40 MILLIGRAM(S): 20 TABLET, DELAYED RELEASE ORAL at 05:46

## 2018-04-30 RX ADMIN — Medication 500 MILLIGRAM(S): at 05:47

## 2018-04-30 RX ADMIN — HEPARIN SODIUM 5000 UNIT(S): 5000 INJECTION INTRAVENOUS; SUBCUTANEOUS at 16:13

## 2018-04-30 RX ADMIN — Medication 1 APPLICATION(S): at 17:31

## 2018-04-30 RX ADMIN — HEPARIN SODIUM 5000 UNIT(S): 5000 INJECTION INTRAVENOUS; SUBCUTANEOUS at 21:13

## 2018-04-30 RX ADMIN — Medication 1000 UNIT(S): at 12:50

## 2018-04-30 RX ADMIN — QUETIAPINE FUMARATE 50 MILLIGRAM(S): 200 TABLET, FILM COATED ORAL at 05:46

## 2018-04-30 RX ADMIN — Medication 3 MILLIGRAM(S): at 21:13

## 2018-04-30 NOTE — DISCHARGE NOTE ADULT - CARE PROVIDER_API CALL
Juan Gonzalez), Emergency Medicine  [address] [place] [address]  Phone: [phone]  Fax: [phone]

## 2018-04-30 NOTE — DISCHARGE NOTE ADULT - MEDICATION SUMMARY - MEDICATIONS TO STOP TAKING
I will STOP taking the medications listed below when I get home from the hospital:    MiraLax oral powder for reconstitution  -- 17 gram(s) by mouth 2 times a day    divalproex sodium 500 mg oral delayed release tablet  -- 1 tab(s) by mouth 2 times a day

## 2018-04-30 NOTE — PROGRESS NOTE ADULT - PROBLEM SELECTOR PROBLEM 4
Hypertension, unspecified type
Hypertension, unspecified type
Dysphagia
Hypertension, unspecified type

## 2018-04-30 NOTE — DISCHARGE NOTE ADULT - HOSPITAL COURSE
89 F w recent admission for pneumonia readmitted with diarrhea, HTN, FTT/Delirium     Problem/Plan - 1:  ·  Problem: Diarrhea.  Plan: -Pt with no more diarrhea, C. diff negative  -Continue to monitor.      Problem/Plan - 2:  ·  Problem: Delirium.  Plan: -Pt much more awake and alert today  -Continues to have daily sundowning. On prn and scheduled Seroquel. Psych on board, will continue to appreciate their recs.      Problem/Plan - 3:  ·  Problem: Dysphagia.  Plan: -Son Zac spoking to by primary team during last admission. He does not want pt to get a PEG tube and wants to continue diet as tolerated.   -Aspiration precautions  -C/w dysphagia 1 diet    Doubt aspiration as pt has been eating and drinking well at home.      Problem/Plan - 4:  ·  Problem: Hypertension, unspecified type.  Plan: -BP well-controlled off of medication, continue to monitor per routine. 89 F w recent admission for pneumonia readmitted with diarrhea, HTN, FTT/Delirium     Problem/Plan - 1:  ·  Problem: Diarrhea.  Plan: -Pt with no more diarrhea, C. diff negative  -Continue to monitor.      Problem/Plan - 2:  ·  Problem: Delirium.  Plan: -Pt much more awake and alert today  -Continues to have daily sundowning. On prn and scheduled Seroquel. Psych on board, will continue to appreciate their recs.      Problem/Plan - 3:  ·  Problem: Dysphagia.  Plan: -Son Zac spoking to by primary team during last admission. He does not want pt to get a PEG tube and wants to continue diet as tolerated.   -Aspiration precautions  -C/w dysphagia 1 diet    Doubt aspiration as pt has been eating and drinking well at home.      Problem/Plan - 4:  ·  Problem: Hypertension, unspecified type.  Plan: -BP well-controlled off of medication, continue to monitor per routine.   pt stable for discharge with follow  up with her PMD 89 F w recent admission for pneumonia readmitted with diarrhea, HTN, FTT/Delirium     Problem/Plan - 1:  ·  Problem: Diarrhea.  Plan: -Possibly due to recent abx use, resolved during admission, C. diff negative     Problem/Plan - 2:  ·  Problem: Delirium.  Plan: -  -Continued to have daily sundowning. On prn and scheduled Seroquel. Psych followed.     Problem/Plan - 3:  ·  Problem: Dysphagia.  Plan: -Discussion held with son Zac during last admission- he did not want pt to get a PEG tube and wanted to continue diet as tolerated.   -Aspiration precautions  -C/w dysphagia 1 diet    Doubt aspiration as pt had been eating and drinking well at home.      Problem/Plan - 4:  ·  Problem: Hypertension, unspecified type- high on admission, subsequently improved.     Discharged home on above meds with follow up.

## 2018-04-30 NOTE — PROGRESS NOTE ADULT - PROBLEM SELECTOR PLAN 3
Son Zac spoking to by primary team during last admission. He does not want pt to get a PEG tube and wants to continue diet as tolerated.   -Aspiration precautions  -C/w dysphagia 1 diet    Doubt aspiration as pt has been eating and drinking well at home.
-Son Zac spoking to by primary team during last admission. He does not want pt to get a PEG tube and wants to continue diet as tolerated.   -Aspiration precautions  -C/w dysphagia 1 diet    Doubt aspiration as pt has been eating and drinking well at home.
Continues to have daily sundowning. On prn and scheduled Seroquel. Psych on board.
-Son does not want pt to get a PEG tube and wants to continue diet as tolerated.   -Aspiration precautions  -C/w dysphagia 1 diet    Doubt aspiration as pt has been eating and drinking well at home.

## 2018-04-30 NOTE — DISCHARGE NOTE ADULT - CARE PLAN
Principal Discharge DX:	Diarrhea, unspecified type  Secondary Diagnosis:	Failure to thrive in adult  Secondary Diagnosis:	Dementia with behavioral disturbance, unspecified dementia type  Secondary Diagnosis:	Carotid artery disease, unspecified laterality  Secondary Diagnosis:	Hypertension, unspecified type Principal Discharge DX:	Diarrhea, unspecified type  Assessment and plan of treatment:	likely related to recent  antibiotic use or miralax, c-diff negative  diarrhea now resolved  follow up with your PMD  Secondary Diagnosis:	Dementia with behavioral disturbance, unspecified dementia type  Secondary Diagnosis:	Carotid artery disease, unspecified laterality  Assessment and plan of treatment:	Coronary artery disease is a condition where the arteries the supply the heart muscle get clogges with fatty deposits & puts you at risk for a heart attack  Call your doctor if you have any new pain, pressure, or discomfort in the center of your chest, pain, tingling or discomfort in arms, back, neck, jaw, or stomach, shortness of breath, nausea, vomiting, burping or heartburn, sweating, cold and clammy skin, racing or abnormal heartbeat for more than 10 minutes or if they keep coming & going.  Call 911 and do not tr to get to hospital by care  You can help yourself with lefestyle changes (quitting smoking if you smoke), eat lots of fruits & vegetables & low fat dairy products, not a lot of meat & fatty foods, walk or some form of physical activity most days of the week, lose weight if you are overweight  Take your cardiac medication as prescribed to lower cholesterol, to lower blood pressure, aspirin to prevent blood clots, and diabetes control  Make sure to keep appointments with doctor for cardiac follow up care  Secondary Diagnosis:	Hypertension, unspecified type  Assessment and plan of treatment:	Low salt diet  Activity as tolerated.  Take all medication as Principal Discharge DX:	Diarrhea, unspecified type  Goal:	improved  Assessment and plan of treatment:	likely related to recent  antibiotic use or miralax, c-diff negative  diarrhea now resolved  follow up with your PMD  Secondary Diagnosis:	Dementia with behavioral disturbance, unspecified dementia type  Assessment and plan of treatment:	follow up with your PMD   continue to re-orient pt and monitor  Secondary Diagnosis:	Carotid artery disease, unspecified laterality  Assessment and plan of treatment:	Coronary artery disease is a condition where the arteries the supply the heart muscle get clogges with fatty deposits & puts you at risk for a heart attack  Call your doctor if you have any new pain, pressure, or discomfort in the center of your chest, pain, tingling or discomfort in arms, back, neck, jaw, or stomach, shortness of breath, nausea, vomiting, burping or heartburn, sweating, cold and clammy skin, racing or abnormal heartbeat for more than 10 minutes or if they keep coming & going.  Call 911 and do not tr to get to hospital by care  You can help yourself with lefestyle changes (quitting smoking if you smoke), eat lots of fruits & vegetables & low fat dairy products, not a lot of meat & fatty foods, walk or some form of physical activity most days of the week, lose weight if you are overweight  Take your cardiac medication as prescribed to lower cholesterol, to lower blood pressure, aspirin to prevent blood clots, and diabetes control  Make sure to keep appointments with doctor for cardiac follow up care  Secondary Diagnosis:	Hypertension, unspecified type  Assessment and plan of treatment:	Low salt diet  Activity as tolerated.  Take all medication as Principal Discharge DX:	Diarrhea, unspecified type  Goal:	improved  Assessment and plan of treatment:	likely related to recent  antibiotic use or miralax, c-diff negative  diarrhea now resolved  follow up with your PMD  Secondary Diagnosis:	Dementia with behavioral disturbance, unspecified dementia type  Assessment and plan of treatment:	follow up with your PMD   continue to re-orient pt and monitor  Secondary Diagnosis:	Carotid artery disease, unspecified laterality  Assessment and plan of treatment:	Coronary artery disease is a condition where the arteries the supply the heart muscle get clogges with fatty deposits & puts you at risk for a heart attack  Call your doctor if you have any new pain, pressure, or discomfort in the center of your chest, pain, tingling or discomfort in arms, back, neck, jaw, or stomach, shortness of breath, nausea, vomiting, burping or heartburn, sweating, cold and clammy skin, racing or abnormal heartbeat for more than 10 minutes or if they keep coming & going.  Call 911 and do not tr to get to hospital by care  You can help yourself with lefestyle changes (quitting smoking if you smoke), eat lots of fruits & vegetables & low fat dairy products, not a lot of meat & fatty foods, walk or some form of physical activity most days of the week, lose weight if you are overweight  Take your cardiac medication as prescribed to lower cholesterol, to lower blood pressure, aspirin to prevent blood clots, and diabetes control  Make sure to keep appointments with doctor for cardiac follow up care  Secondary Diagnosis:	Hypertension, unspecified type  Assessment and plan of treatment:	Low salt diet  Activity as tolerated.  Take all medication as  Secondary Diagnosis:	Hyperlipidemia, unspecified hyperlipidemia type  Assessment and plan of treatment:	Continue with your cholesterol medications. Eat a heart healthy diet that is low in saturated fats and salt, and includes whole grains, fruits, vegetables and lean protein; exercise regularly (consult with your physician or cardiologist first); maintain a heart healthy weight; if you smoke - quit (A resource to help you stop smoking is the [facility] – phone number [phone].). Continue to follow with your primary physician or cardiologist.  Seek medical help for dizziness, Lightheadedness, Blurry vision, Headache, Chest pain, Shortness of breath

## 2018-04-30 NOTE — DISCHARGE NOTE ADULT - MEDICATION SUMMARY - MEDICATIONS TO TAKE
I will START or STAY ON the medications listed below when I get home from the hospital:    acetaminophen 325 mg oral tablet  -- 2 tab(s) by mouth every 6 hours, As Needed  -- Indication: For Pain    Percocet 5/325 oral tablet  -- 1 tab(s) by mouth every 6 hours, As Needed for moderate pain  -- Indication: For Pain    valproic acid 250 mg/5 mL oral syrup  -- 10 milliliter(s) by mouth 2 times a day  -- Indication: For Dementia with behavioral disturbance, unspecified dementia type    QUEtiapine 50 mg oral tablet  -- 1 tab(s) by mouth 2 times a day  -- Indication: For Dementia with behavioral disturbance, unspecified dementia type    QUEtiapine 25 mg oral tablet  -- 1 tab(s) by mouth every 8 hours, As Needed  -- Indication: For Dementia with behavioral disturbance, unspecified dementia type    clobetasol 0.05% topical ointment  -- 1 application on skin 2 times a day  -- Indication: For skin    melatonin 3 mg oral tablet  -- 1 tab(s) by mouth once a day (at bedtime), As Needed -for insomnia   -- Indication: For insomnia     PriLOSEC OTC 20 mg oral delayed release tablet  -- 1 tab(s) by mouth once a day  -- Indication: For reflux    cholecalciferol oral tablet  -- 1000 unit(s) by mouth once a day  -- Indication: For supplement

## 2018-04-30 NOTE — PROGRESS NOTE ADULT - ATTENDING COMMENTS
Awaiting PT recs for dispo planning, pt will likely require [facility]
Patient is stable from a medicine standpoint.  Physical Therapy saw patient today and recommends home with home PT.  Will clarify with son whether he is comfortable with this and if not will have PT reevaluate patient for possible placement to [facility]
Spoke with son Zac. He does not want rehab and wants to take pt home. D/c planning for tomorrow morning. Case management aware.

## 2018-04-30 NOTE — PROGRESS NOTE ADULT - PROBLEM SELECTOR PLAN 1
-Pt with no more diarrhea, C. diff negative  -Continue to monitor
Continues to have loose stools. C diff ordered.
-Pt with no more diarrhea, C. diff negative  -Continue to monitor
-Pt with no more diarrhea, C. diff negative

## 2018-04-30 NOTE — DISCHARGE NOTE ADULT - HOME CARE AGENCY
[facility]. Nurse and physical therapist to arrange visit within 24-48 hours after dc. [phone]

## 2018-04-30 NOTE — PROGRESS NOTE ADULT - SUBJECTIVE AND OBJECTIVE BOX
Patient is a 89y old  Female who presents with a chief complaint of diarrhea    HPI: Loose stools overnight. Agitated at night.     Vital Signs Last 24 Hrs  T(C): 36.7 (27 Apr 2018 12:48), Max: 36.7 (27 Apr 2018 12:48)  T(F): 98 (27 Apr 2018 12:48), Max: 98 (27 Apr 2018 12:48)  HR: 77 (27 Apr 2018 12:48) (77 - 93)  BP: 126/78 (27 Apr 2018 12:48) (116/68 - 173/77)  BP(mean): --  RR: 20 (27 Apr 2018 12:48) (16 - 20)  SpO2: 96% (27 Apr 2018 12:48) (93% - 96%)                          10.7   8.30  )-----------( 250      ( 27 Apr 2018 10:07 )             31.5     04-27    142  |  102  |  20  ----------------------------<  75  3.5   |  28  |  1.12    Ca    8.9      27 Apr 2018 07:07    TPro  6.2  /  Alb  3.3  /  TBili  0.4  /  DBili  0.1  /  AST  23  /  ALT  10  /  AlkPhos  47  04-27    MEDICATIONS  (STANDING):  cholecalciferol 1000 Unit(s) Oral daily  clobetasol 0.05% Ointment 1 Application(s) Topical two times a day  heparin  Injectable 5000 Unit(s) SubCutaneous every 8 hours  melatonin 3 milliGRAM(s) Oral at bedtime  pantoprazole    Tablet 40 milliGRAM(s) Oral before breakfast  QUEtiapine 50 milliGRAM(s) Oral two times a day  valproic  acid Syrup 500 milliGRAM(s) Oral two times a day    MEDICATIONS  (PRN):  acetaminophen   Tablet 650 milliGRAM(s) Oral every 6 hours PRN pain  QUEtiapine 25 milliGRAM(s) Oral every 8 hours PRN agitation
Patient is a 89y old  Female who presents with a chief complaint of diarrhea    INTERVAL HPI/OVERNIGHT EVENTS: No acute overnight events.  No diarrhea overnight.  Patient is difficult to arouse this morning but does respond to loud noises and painful stimuli.  Patient also received seroquel overnight for agitation.  ROS unobtainable as pt not answering questions.    acetaminophen   Tablet 650 milliGRAM(s) Oral every 6 hours PRN  cholecalciferol 1000 Unit(s) Oral daily  clobetasol 0.05% Ointment 1 Application(s) Topical two times a day  heparin  Injectable 5000 Unit(s) SubCutaneous every 8 hours  melatonin 3 milliGRAM(s) Oral at bedtime  pantoprazole    Tablet 40 milliGRAM(s) Oral before breakfast  QUEtiapine 50 milliGRAM(s) Oral two times a day  QUEtiapine 25 milliGRAM(s) Oral every 8 hours PRN  valproic  acid Syrup 500 milliGRAM(s) Oral two times a day      REVIEW OF SYSTEMS: Unobtainable    T(C): 36.7 (04-28-18 @ 17:32), Max: 36.7 (04-27-18 @ 21:54)  HR: 68 (04-28-18 @ 17:32) (64 - 73)  BP: 128/69 (04-28-18 @ 17:32) (128/69 - 162/62)  RR: 18 (04-28-18 @ 17:32) (18 - 18)  SpO2: 96% (04-28-18 @ 17:32) (95% - 96%)  Wt(kg): --Vital Signs Last 24 Hrs  T(C): 36.7 (28 Apr 2018 17:32), Max: 36.7 (27 Apr 2018 21:54)  T(F): 98 (28 Apr 2018 17:32), Max: 98 (27 Apr 2018 21:54)  HR: 68 (28 Apr 2018 17:32) (64 - 73)  BP: 128/69 (28 Apr 2018 17:32) (128/69 - 162/62)  BP(mean): --  RR: 18 (28 Apr 2018 17:32) (18 - 18)  SpO2: 96% (28 Apr 2018 17:32) (95% - 96%)    PHYSICAL EXAM:  GENERAL: NAD, well-groomed, well-developed  HEAD:  Atraumatic, Normocephalic  EYES: EOMI, PERRLA, conjunctiva and sclera clear  ENMT: No tonsillar erythema, exudates, or enlargement; Moist mucous membranes  NECK: Supple, No JVD, Normal thyroid  CHEST/LUNG: Clear to auscultation bilaterally; No rales, rhonchi, wheezing, or rubs  HEART: Regular rate and rhythm; No murmurs, rubs, or gallops  ABDOMEN: Soft, Nontender, Nondistended; Bowel sounds present  NEURO: Alert & Oriented X 0-1  EXTREMITIES: No LE edema, no calf tenderness  LYMPH: No lymphadenopathy noted  SKIN: No rashes or lesions    Consultant(s) Notes Reviewed:  [ ] YES  [ ] NO NA [X]  Care Discussed with Consultants/Other Providers [ x] YES-KIERA Corral  [ ] NO    LABS:                        14.3   7.2   )-----------( 305      ( 28 Apr 2018 13:06 )             42.3     04-28    140  |  104  |  18  ----------------------------<  93  4.0   |  24  |  0.89    Ca    9.7      28 Apr 2018 06:24    TPro  6.2  /  Alb  3.3  /  TBili  0.4  /  DBili  0.1  /  AST  23  /  ALT  10  /  AlkPhos  47  04-27
Patient is a 89y old  Female who presents with a chief complaint of     INTERVAL HPI/OVERNIGHT EVENTS: No acute overnight events.  Patient is much more alert today, sitting in the chair and very talkative.  She denies any HA, chest pain, fevers, chills, N/V, cough, or SOB.      acetaminophen   Tablet 650 milliGRAM(s) Oral every 6 hours PRN  cholecalciferol 1000 Unit(s) Oral daily  clobetasol 0.05% Ointment 1 Application(s) Topical two times a day  heparin  Injectable 5000 Unit(s) SubCutaneous every 8 hours  melatonin 3 milliGRAM(s) Oral at bedtime  pantoprazole    Tablet 40 milliGRAM(s) Oral before breakfast  QUEtiapine 50 milliGRAM(s) Oral two times a day  QUEtiapine 25 milliGRAM(s) Oral every 8 hours PRN  valproic  acid Syrup 500 milliGRAM(s) Oral two times a day      REVIEW OF SYSTEMS:  CONSTITUTIONAL: No fever, chills  EYES: No eye pain, visual disturbances, or discharge  ENMT:  No difficulty hearing, tinnitus, vertigo; No sinus or throat pain  RESPIRATORY: No cough, wheezing, chills or hemoptysis; No shortness of breath  CARDIOVASCULAR: No chest pain, palpitations  GASTROINTESTINAL: No abdominal pain. No nausea, vomiting, or diarrhea  GENITOURINARY: No dysuria  NEUROLOGICAL: No HA  SKIN: No itching, burning, rashes, or lesions   ENDOCRINE: No heat or cold intolerance; No hair loss  PSYCHIATRIC: No depression, anxiety, mood swings, or difficulty sleeping      T(C): 36.7 (04-29-18 @ 07:12), Max: 36.8 (04-28-18 @ 21:54)  HR: 74 (04-29-18 @ 08:51) (68 - 74)  BP: 159/79 (04-29-18 @ 08:51) (125/67 - 159/79)  RR: 20 (04-29-18 @ 07:12) (18 - 20)  SpO2: 96% (04-29-18 @ 07:12) (95% - 96%)  Wt(kg): --Vital Signs Last 24 Hrs  T(C): 36.7 (29 Apr 2018 07:12), Max: 36.8 (28 Apr 2018 21:54)  T(F): 98.1 (29 Apr 2018 07:12), Max: 98.3 (28 Apr 2018 21:54)  HR: 74 (29 Apr 2018 08:51) (68 - 74)  BP: 159/79 (29 Apr 2018 08:51) (125/67 - 159/79)  BP(mean): --  RR: 20 (29 Apr 2018 07:12) (18 - 20)  SpO2: 96% (29 Apr 2018 07:12) (95% - 96%)    PHYSICAL EXAM:  GENERAL: NAD, well-groomed, well-developed  HEAD:  Atraumatic, Normocephalic  EYES: EOMI, PERRLA, conjunctiva and sclera clear  ENMT: No tonsillar erythema, exudates, or enlargement; Moist mucous membranes  NECK: Supple, No JVD, Normal thyroid  CHEST/LUNG: Clear to auscultation bilaterally; No rales, rhonchi, wheezing, or rubs  HEART: Regular rate and rhythm; No murmurs, rubs, or gallops  ABDOMEN: Soft, Nontender, Nondistended; Bowel sounds present  NEURO: Alert & Oriented X3  EXTREMITIES: No LE edema, no calf tenderness  LYMPH: No lymphadenopathy noted  SKIN: No rashes or lesions    Consultant(s) Notes Reviewed:  [ ] YES  [ ] NO [X] N/A  Care Discussed with Consultants/Other Providers [ x] YES-KIERA Corral  [ ] NO    LABS:                        14.3   7.2   )-----------( 305      ( 28 Apr 2018 13:06 )             42.3     04-28    140  |  104  |  18  ----------------------------<  93  4.0   |  24  |  0.89    Ca    9.7      28 Apr 2018 06:24
Patient is a 89y old  Female who presents with a chief complaint of diarrhea    HPI: 1 Large, soft BM today. Pt pleasantly confused. Ate a good lunch.     Vital Signs Last 24 Hrs  T(C): 36.9 (30 Apr 2018 10:50), Max: 36.9 (30 Apr 2018 10:50)  T(F): 98.4 (30 Apr 2018 10:50), Max: 98.4 (30 Apr 2018 10:50)  HR: 72 (30 Apr 2018 10:50) (72 - 100)  BP: 93/56 (30 Apr 2018 10:50) (93/56 - 168/95)  BP(mean): --  RR: 18 (30 Apr 2018 10:50) (18 - 20)  SpO2: 95% (30 Apr 2018 10:50) (94% - 95%)      MEDICATIONS  (STANDING):  cholecalciferol 1000 Unit(s) Oral daily  clobetasol 0.05% Ointment 1 Application(s) Topical two times a day  heparin  Injectable 5000 Unit(s) SubCutaneous every 8 hours  melatonin 3 milliGRAM(s) Oral at bedtime  pantoprazole    Tablet 40 milliGRAM(s) Oral before breakfast  QUEtiapine 50 milliGRAM(s) Oral two times a day  valproic  acid Syrup 500 milliGRAM(s) Oral two times a day    MEDICATIONS  (PRN):  acetaminophen   Tablet 650 milliGRAM(s) Oral every 6 hours PRN pain  QUEtiapine 25 milliGRAM(s) Oral every 8 hours PRN agitation

## 2018-04-30 NOTE — DISCHARGE NOTE ADULT - SECONDARY DIAGNOSIS.
Failure to thrive in adult Dementia with behavioral disturbance, unspecified dementia type Carotid artery disease, unspecified laterality Hypertension, unspecified type Hyperlipidemia, unspecified hyperlipidemia type

## 2018-04-30 NOTE — DISCHARGE NOTE ADULT - PLAN OF CARE
Coronary artery disease is a condition where the arteries the supply the heart muscle get clogges with fatty deposits & puts you at risk for a heart attack  Call your doctor if you have any new pain, pressure, or discomfort in the center of your chest, pain, tingling or discomfort in arms, back, neck, jaw, or stomach, shortness of breath, nausea, vomiting, burping or heartburn, sweating, cold and clammy skin, racing or abnormal heartbeat for more than 10 minutes or if they keep coming & going.  Call 911 and do not tr to get to hospital by care  You can help yourself with lefestyle changes (quitting smoking if you smoke), eat lots of fruits & vegetables & low fat dairy products, not a lot of meat & fatty foods, walk or some form of physical activity most days of the week, lose weight if you are overweight  Take your cardiac medication as prescribed to lower cholesterol, to lower blood pressure, aspirin to prevent blood clots, and diabetes control  Make sure to keep appointments with doctor for cardiac follow up care Low salt diet  Activity as tolerated.  Take all medication as likely related to recent  antibiotic use or miralax, c-diff negative  diarrhea now resolved  follow up with your PMD improved follow up with your PMD   continue to re-orient pt and monitor Continue with your cholesterol medications. Eat a heart healthy diet that is low in saturated fats and salt, and includes whole grains, fruits, vegetables and lean protein; exercise regularly (consult with your physician or cardiologist first); maintain a heart healthy weight; if you smoke - quit (A resource to help you stop smoking is the [facility] – phone number [phone].). Continue to follow with your primary physician or cardiologist.  Seek medical help for dizziness, Lightheadedness, Blurry vision, Headache, Chest pain, Shortness of breath

## 2018-04-30 NOTE — DISCHARGE NOTE ADULT - PATIENT PORTAL LINK FT
You can access the [facility] Patient Portal, offered by [facility], by registering with the following website: [email or web address]

## 2018-04-30 NOTE — PROGRESS NOTE ADULT - PROBLEM SELECTOR PLAN 4
-BP well-controlled off of medication, continue to monitor per routine
-BP well-controlled off of medication, continue to monitor per routine
Spoke with son Zac during last admission. He does not want pt to get a PEG tube and wants to continue diet as tolerated.     Doubt aspiration as pt has been eating and drinking well at home.
-BP well-controlled off of medication, continue to monitor per routine

## 2018-04-30 NOTE — PROGRESS NOTE ADULT - PROBLEM SELECTOR PLAN 2
-Continues to have daily sundowning. On prn and scheduled Seroquel. Psych on board, will continue to appreciate their recs
-Pt much more awake and alert today  -Continues to have daily sundowning. On prn and scheduled Seroquel. Psych on board, will continue to appreciate their recs
Completed treatment.
-Continues to have daily sundowning. On prn and scheduled Seroquel.

## 2018-05-01 ENCOUNTER — INPATIENT (INPATIENT)
Facility: HOSPITAL | Age: 83
LOS: 1 days | Discharge: HOME | DRG: 392 | End: 2018-05-03
Attending: INTERNAL MEDICINE | Admitting: HOSPITALIST
Payer: MEDICARE

## 2018-05-01 VITALS
RESPIRATION RATE: 18 BRPM | HEART RATE: 80 BPM | OXYGEN SATURATION: 98 % | TEMPERATURE: 98 F | DIASTOLIC BLOOD PRESSURE: 69 MMHG | SYSTOLIC BLOOD PRESSURE: 141 MMHG

## 2018-05-01 VITALS
HEART RATE: 70 BPM | OXYGEN SATURATION: 95 % | DIASTOLIC BLOOD PRESSURE: 61 MMHG | SYSTOLIC BLOOD PRESSURE: 141 MMHG | RESPIRATION RATE: 18 BRPM | TEMPERATURE: 99 F

## 2018-05-01 DIAGNOSIS — R19.7 DIARRHEA, UNSPECIFIED: ICD-10-CM

## 2018-05-01 DIAGNOSIS — G20 PARKINSON'S DISEASE: ICD-10-CM

## 2018-05-01 PROCEDURE — 87324 CLOSTRIDIUM AG IA: CPT

## 2018-05-01 PROCEDURE — 80164 ASSAY DIPROPYLACETIC ACD TOT: CPT

## 2018-05-01 PROCEDURE — 99285 EMERGENCY DEPT VISIT HI MDM: CPT | Mod: 25

## 2018-05-01 PROCEDURE — 71045 X-RAY EXAM CHEST 1 VIEW: CPT

## 2018-05-01 PROCEDURE — 83690 ASSAY OF LIPASE: CPT

## 2018-05-01 PROCEDURE — 80076 HEPATIC FUNCTION PANEL: CPT

## 2018-05-01 PROCEDURE — 93005 ELECTROCARDIOGRAM TRACING: CPT | Mod: XU

## 2018-05-01 PROCEDURE — 82150 ASSAY OF AMYLASE: CPT

## 2018-05-01 PROCEDURE — 99239 HOSP IP/OBS DSCHRG MGMT >30: CPT

## 2018-05-01 PROCEDURE — 80048 BASIC METABOLIC PNL TOTAL CA: CPT

## 2018-05-01 PROCEDURE — 97162 PT EVAL MOD COMPLEX 30 MIN: CPT

## 2018-05-01 PROCEDURE — 80053 COMPREHEN METABOLIC PANEL: CPT

## 2018-05-01 PROCEDURE — 81001 URINALYSIS AUTO W/SCOPE: CPT

## 2018-05-01 PROCEDURE — 87449 NOS EACH ORGANISM AG IA: CPT

## 2018-05-01 PROCEDURE — 51701 INSERT BLADDER CATHETER: CPT

## 2018-05-01 PROCEDURE — 85027 COMPLETE CBC AUTOMATED: CPT

## 2018-05-01 PROCEDURE — 99223 1ST HOSP IP/OBS HIGH 75: CPT

## 2018-05-01 PROCEDURE — 99285 EMERGENCY DEPT VISIT HI MDM: CPT

## 2018-05-01 RX ORDER — LANOLIN ALCOHOL/MO/W.PET/CERES
3 CREAM (GRAM) TOPICAL AT BEDTIME
Qty: 0 | Refills: 0 | Status: DISCONTINUED | OUTPATIENT
Start: 2018-05-01 | End: 2018-05-03

## 2018-05-01 RX ORDER — PANTOPRAZOLE SODIUM 20 MG/1
40 TABLET, DELAYED RELEASE ORAL
Qty: 0 | Refills: 0 | Status: DISCONTINUED | OUTPATIENT
Start: 2018-05-01 | End: 2018-05-03

## 2018-05-01 RX ORDER — VALPROIC ACID (AS SODIUM SALT) 250 MG/5ML
500 SOLUTION, ORAL ORAL
Qty: 0 | Refills: 0 | Status: DISCONTINUED | OUTPATIENT
Start: 2018-05-01 | End: 2018-05-03

## 2018-05-01 RX ORDER — QUETIAPINE FUMARATE 200 MG/1
50 TABLET, FILM COATED ORAL
Qty: 0 | Refills: 0 | Status: DISCONTINUED | OUTPATIENT
Start: 2018-05-01 | End: 2018-05-03

## 2018-05-01 RX ORDER — SODIUM CHLORIDE 9 MG/ML
1000 INJECTION INTRAMUSCULAR; INTRAVENOUS; SUBCUTANEOUS ONCE
Qty: 0 | Refills: 0 | Status: COMPLETED | OUTPATIENT
Start: 2018-05-01 | End: 2018-05-01

## 2018-05-01 RX ORDER — QUETIAPINE FUMARATE 200 MG/1
25 TABLET, FILM COATED ORAL EVERY 8 HOURS
Qty: 0 | Refills: 0 | Status: DISCONTINUED | OUTPATIENT
Start: 2018-05-01 | End: 2018-05-03

## 2018-05-01 RX ORDER — HEPARIN SODIUM 5000 [USP'U]/ML
5000 INJECTION INTRAVENOUS; SUBCUTANEOUS EVERY 12 HOURS
Qty: 0 | Refills: 0 | Status: DISCONTINUED | OUTPATIENT
Start: 2018-05-01 | End: 2018-05-03

## 2018-05-01 RX ORDER — OXYCODONE AND ACETAMINOPHEN 5; 325 MG/1; MG/1
1 TABLET ORAL EVERY 6 HOURS
Qty: 0 | Refills: 0 | Status: DISCONTINUED | OUTPATIENT
Start: 2018-05-01 | End: 2018-05-03

## 2018-05-01 RX ADMIN — Medication 3 MILLIGRAM(S): at 23:04

## 2018-05-01 RX ADMIN — Medication 1 APPLICATION(S): at 23:09

## 2018-05-01 RX ADMIN — QUETIAPINE FUMARATE 50 MILLIGRAM(S): 200 TABLET, FILM COATED ORAL at 23:04

## 2018-05-01 RX ADMIN — QUETIAPINE FUMARATE 50 MILLIGRAM(S): 200 TABLET, FILM COATED ORAL at 05:28

## 2018-05-01 RX ADMIN — Medication 500 MILLIGRAM(S): at 05:27

## 2018-05-01 RX ADMIN — PANTOPRAZOLE SODIUM 40 MILLIGRAM(S): 20 TABLET, DELAYED RELEASE ORAL at 05:28

## 2018-05-01 RX ADMIN — SODIUM CHLORIDE 1000 MILLILITER(S): 9 INJECTION INTRAMUSCULAR; INTRAVENOUS; SUBCUTANEOUS at 21:07

## 2018-05-01 RX ADMIN — HEPARIN SODIUM 5000 UNIT(S): 5000 INJECTION INTRAVENOUS; SUBCUTANEOUS at 05:27

## 2018-05-01 RX ADMIN — Medication 500 MILLIGRAM(S): at 23:03

## 2018-05-01 NOTE — H&P ADULT - HISTORY OF PRESENT ILLNESS
89 y/o F w/ PMHx of HTN, Dementia, CAD, PAD, HCV and presents to the ED for diarrhea. Pt was admitted in the hospital 4/20-25 for PNA, 4/26-5/1 for diarrhea c dif negative, discharged today, had diarrhea upon arrival at home and was sent back by son. 89 y/o F w/ PMHx of HTN, Dementia, CAD, PAD, HCV and presents to the ED for diarrhea. Pt was admitted in the hospital 4/20-25 for PNA, 4/26-5/1 for diarrhea c dif negative, discharged today, had diarrhea upon arrival at home and was sent back by son. Reportedly no diarrhea noted by EMS or ED Patient feels well without complaint. 89 y/o F w/ PMHx of HTN, Dementia, CAD, PAD, HCV and presents to the ED for diarrhea. Pt was admitted in the hospital 4/20-25 for PNA, 4/26-5/1 for diarrhea c dif negative, discharged today, had diarrhea upon arrival at home and was sent back by son. Reportedly no diarrhea noted by EMS or ED Patient feels well without complaint.

## 2018-05-01 NOTE — ED PROVIDER NOTE - PROGRESS NOTE DETAILS
discussed case with Dr. Sutton (hospitalist) who provides most of the history as pt was inpatient earlier today. pt signed out to me pending ss which is unable to place patient, and son will not accept patient at home after discussion with me and ss to admit to hospitalist, discussed with them as well.

## 2018-05-01 NOTE — CHART NOTE - NSCHARTNOTEFT_GEN_A_CORE
No new events.     Dc home today. D/c time 40 mins.    Dx    Diarrhea- resolved    Dementia w delirium- continue current meds

## 2018-05-01 NOTE — ED ADULT NURSE NOTE - OBJECTIVE STATEMENT
89 yo female A&OX1 presents to the ED with the c/o diarrhea. As per EMS, pt was d/c'ed from ED, taken home and returned back to the hospital s/p having one loose bowel movement. Pt denies any abd pain. Pt arrived to ED by herself, EMS states that she lives with her son. Lungs clear, equal b/l, no cough and no sob, pt does not appear to be in resp distress. Abd, round, soft non tender and non tender.  MAEX4

## 2018-05-01 NOTE — ED PROVIDER NOTE - MEDICAL DECISION MAKING DETAILS
pt without any acute medical issues, comes back to ER because of likely social issues with son and home.  adult protective service involved as per Dr. Sutton as well as case management

## 2018-05-01 NOTE — H&P ADULT - ASSESSMENT
89 y/o F w/ PMHx of HTN, Dementia, CAD, PAD, HCV and presents to the ED for diarrhea. Pt was admitted in the hospital 4/20-25 for PNA, 4/26-5/1 for diarrhea c dif negative, discharged today, had diarrhea upon arrival at home and was sent back by son. Reportedly no diarrhea noted by EMS or ED Patient feels well without complaint.

## 2018-05-01 NOTE — H&P ADULT - PROBLEM SELECTOR PLAN 2
-appears to be at baseline AOx1  -c/w seroquel 50 BID, 25mg q8h PRN for agitation, valproate 500mg BID  -daily EKG for qtc monitoring  -questionable history of dysphagia, obatin SLP, continue dysphagia 1 diet as on last hospital stay -appears to be at baseline AOx1  -c/w seroquel 50 BID, 25mg q8h PRN for agitation, valproate 500mg BID  -daily EKG for qtc monitoring  -questionable history of dysphagia. SLP eval, continue dysphagia 1 diet as on last hospital stay, aspiration precautions.

## 2018-05-01 NOTE — H&P ADULT - NSHPREVIEWOFSYSTEMS_GEN_ALL_CORE
REVIEW OF SYSTEMS:  PATIENT WITH SEVERE DEMENTIA< UNABLE TO PROVIDE FULL ROS  CONSTITUTIONAL: No weakness  RESPIRATORY: No cough, wheezing, hemoptysis; No shortness of breath  CARDIOVASCULAR: No chest pain   GASTROINTESTINAL: No abdominal or epigastric pain.  MSK: No joint pains or swelling  PSYCH: No depression

## 2018-05-01 NOTE — H&P ADULT - PROBLEM SELECTOR PLAN 1
-no reported diarrhea since arrival   -continue to monitor  -c dif negative last hospital stay  -s/w eval as patient has been repeatedly sent back by son for diarrhea though reportedly does not have diarrhea.

## 2018-05-01 NOTE — H&P ADULT - NSHPSOCIALHISTORY_GEN_ALL_CORE
Tobacco use: unable to obtain  EtOH use:  unable to obtain  Illicit drug use: unable to obtain    Living situation: lives with son

## 2018-05-01 NOTE — H&P ADULT - NSHPPHYSICALEXAM_GEN_ALL_CORE
PHYSICAL EXAM:  Vital Signs Last 24 Hrs  T(C): 37.3 (05-01-18 @ 21:22)  T(F): 99.2 (05-01-18 @ 21:22), Max: 99.2 (05-01-18 @ 21:22)  HR: 76 (05-01-18 @ 21:22) (70 - 86)  BP: 148/71 (05-01-18 @ 21:22)  BP(mean): --  RR: 16 (05-01-18 @ 21:22) (16 - 18)  SpO2: 99% (05-01-18 @ 21:22) (95% - 99%)  Wt(kg): --    Constitutional: NAD, awake and alert  HEENT: Moist mucus membranes, anicteric, PERRL, no oral ulcers    Respiratory: Breath sounds are clear bilaterally, No wheezing, rales or rhonchi  Cardiovascular: S1 and S2, regular rate and rhythm, no ,urmurs, gallops or rubs.  No LE edema  Gastrointestinal:  Soft, nontender, nondistended, no guarding, no rebound  Extremities: Warm, well perfused, no cyanosis or clubbing  Neurological: A/O x name, thinks shes at a wedding, does not know year. +echolalia , no focal deficits, moving all extremities,  CN 3-12 intact  Musculoskeletal: Joints without erythema, swelling, tendernss  Skin: No rashes  Psych: mildly agitated, restless

## 2018-05-02 DIAGNOSIS — F03.90 UNSPECIFIED DEMENTIA WITHOUT BEHAVIORAL DISTURBANCE: ICD-10-CM

## 2018-05-02 LAB
ANION GAP SERPL CALC-SCNC: 14 MMOL/L — SIGNIFICANT CHANGE UP (ref 5–17)
BUN SERPL-MCNC: 20 MG/DL — SIGNIFICANT CHANGE UP (ref 7–23)
CALCIUM SERPL-MCNC: 9.3 MG/DL — SIGNIFICANT CHANGE UP (ref 8.4–10.5)
CHLORIDE SERPL-SCNC: 98 MMOL/L — SIGNIFICANT CHANGE UP (ref 96–108)
CO2 SERPL-SCNC: 26 MMOL/L — SIGNIFICANT CHANGE UP (ref 22–31)
CREAT SERPL-MCNC: 1.1 MG/DL — SIGNIFICANT CHANGE UP (ref 0.5–1.3)
GLUCOSE SERPL-MCNC: 104 MG/DL — HIGH (ref 70–99)
HCT VFR BLD CALC: 37.8 % — SIGNIFICANT CHANGE UP (ref 34.5–45)
HGB BLD-MCNC: 12.8 G/DL — SIGNIFICANT CHANGE UP (ref 11.5–15.5)
MCHC RBC-ENTMCNC: 33.2 PG — SIGNIFICANT CHANGE UP (ref 27–34)
MCHC RBC-ENTMCNC: 33.9 GM/DL — SIGNIFICANT CHANGE UP (ref 32–36)
MCV RBC AUTO: 97.7 FL — SIGNIFICANT CHANGE UP (ref 80–100)
PLATELET # BLD AUTO: 290 K/UL — SIGNIFICANT CHANGE UP (ref 150–400)
POTASSIUM SERPL-MCNC: 4.7 MMOL/L — SIGNIFICANT CHANGE UP (ref 3.5–5.3)
POTASSIUM SERPL-SCNC: 4.7 MMOL/L — SIGNIFICANT CHANGE UP (ref 3.5–5.3)
RBC # BLD: 3.87 M/UL — SIGNIFICANT CHANGE UP (ref 3.8–5.2)
RBC # FLD: 12.5 % — SIGNIFICANT CHANGE UP (ref 10.3–14.5)
SODIUM SERPL-SCNC: 138 MMOL/L — SIGNIFICANT CHANGE UP (ref 135–145)
WBC # BLD: 5.9 K/UL — SIGNIFICANT CHANGE UP (ref 3.8–10.5)
WBC # FLD AUTO: 5.9 K/UL — SIGNIFICANT CHANGE UP (ref 3.8–10.5)

## 2018-05-02 PROCEDURE — 93010 ELECTROCARDIOGRAM REPORT: CPT

## 2018-05-02 PROCEDURE — 99233 SBSQ HOSP IP/OBS HIGH 50: CPT

## 2018-05-02 RX ADMIN — QUETIAPINE FUMARATE 50 MILLIGRAM(S): 200 TABLET, FILM COATED ORAL at 07:00

## 2018-05-02 RX ADMIN — QUETIAPINE FUMARATE 25 MILLIGRAM(S): 200 TABLET, FILM COATED ORAL at 21:17

## 2018-05-02 RX ADMIN — Medication 500 MILLIGRAM(S): at 18:25

## 2018-05-02 RX ADMIN — QUETIAPINE FUMARATE 50 MILLIGRAM(S): 200 TABLET, FILM COATED ORAL at 18:25

## 2018-05-02 RX ADMIN — Medication 1 APPLICATION(S): at 18:23

## 2018-05-02 RX ADMIN — PANTOPRAZOLE SODIUM 40 MILLIGRAM(S): 20 TABLET, DELAYED RELEASE ORAL at 07:01

## 2018-05-02 RX ADMIN — Medication 1 APPLICATION(S): at 07:11

## 2018-05-02 RX ADMIN — HEPARIN SODIUM 5000 UNIT(S): 5000 INJECTION INTRAVENOUS; SUBCUTANEOUS at 18:23

## 2018-05-02 RX ADMIN — HEPARIN SODIUM 5000 UNIT(S): 5000 INJECTION INTRAVENOUS; SUBCUTANEOUS at 07:01

## 2018-05-02 RX ADMIN — Medication 500 MILLIGRAM(S): at 06:59

## 2018-05-02 NOTE — PROGRESS NOTE ADULT - PROBLEM SELECTOR PLAN 2
-appears to be at baseline AOx1  -c/w seroquel 50 BID, 25mg q8h PRN for agitation, valproate 500mg BID  -daily EKG for qtc monitoring  -questionable history of dysphagia. SLP eval, continue dysphagia 1 diet as on last hospital stay, aspiration precautions. Occasional sundowning. Continue current meds.

## 2018-05-03 ENCOUNTER — TRANSCRIPTION ENCOUNTER (OUTPATIENT)
Age: 83
End: 2018-05-03

## 2018-05-03 ENCOUNTER — INPATIENT (INPATIENT)
Facility: HOSPITAL | Age: 83
LOS: 6 days | Discharge: HOME HEALTH | End: 2018-05-10
Attending: HOSPITALIST | Admitting: HOSPITALIST
Payer: MEDICARE

## 2018-05-03 VITALS
TEMPERATURE: 98 F | SYSTOLIC BLOOD PRESSURE: 117 MMHG | DIASTOLIC BLOOD PRESSURE: 71 MMHG | OXYGEN SATURATION: 95 % | RESPIRATION RATE: 18 BRPM | HEART RATE: 78 BPM

## 2018-05-03 VITALS
RESPIRATION RATE: 20 BRPM | HEART RATE: 85 BPM | SYSTOLIC BLOOD PRESSURE: 133 MMHG | OXYGEN SATURATION: 95 % | DIASTOLIC BLOOD PRESSURE: 68 MMHG

## 2018-05-03 LAB
BASE EXCESS BLDV CALC-SCNC: 4.4 MMOL/L — SIGNIFICANT CHANGE UP
BASOPHILS # BLD AUTO: 0.06 K/UL — SIGNIFICANT CHANGE UP (ref 0–0.2)
BASOPHILS NFR BLD AUTO: 0.6 % — SIGNIFICANT CHANGE UP (ref 0–2)
BLOOD GAS VENOUS - CREATININE: 1.05 MG/DL — SIGNIFICANT CHANGE UP (ref 0.5–1.3)
CHLORIDE BLDV-SCNC: 98 MMOL/L — SIGNIFICANT CHANGE UP (ref 96–108)
EOSINOPHIL # BLD AUTO: 0.14 K/UL — SIGNIFICANT CHANGE UP (ref 0–0.5)
EOSINOPHIL NFR BLD AUTO: 1.5 % — SIGNIFICANT CHANGE UP (ref 0–6)
GAS PNL BLDV: 133 MMOL/L — LOW (ref 136–146)
GLUCOSE BLDV-MCNC: 82 — SIGNIFICANT CHANGE UP (ref 70–99)
HCO3 BLDV-SCNC: 28 MMOL/L — HIGH (ref 20–27)
HCT VFR BLD CALC: 36 % — SIGNIFICANT CHANGE UP (ref 34.5–45)
HCT VFR BLDV CALC: 39 % — SIGNIFICANT CHANGE UP (ref 34.5–45)
HGB BLD-MCNC: 11.8 G/DL — SIGNIFICANT CHANGE UP (ref 11.5–15.5)
HGB BLDV-MCNC: 12.7 G/DL — SIGNIFICANT CHANGE UP (ref 11.5–15.5)
IMM GRANULOCYTES # BLD AUTO: 0.04 # — SIGNIFICANT CHANGE UP
IMM GRANULOCYTES NFR BLD AUTO: 0.4 % — SIGNIFICANT CHANGE UP (ref 0–1.5)
LACTATE BLDV-MCNC: 1.4 MMOL/L — SIGNIFICANT CHANGE UP (ref 0.5–2)
LYMPHOCYTES # BLD AUTO: 2.28 K/UL — SIGNIFICANT CHANGE UP (ref 1–3.3)
LYMPHOCYTES # BLD AUTO: 24.4 % — SIGNIFICANT CHANGE UP (ref 13–44)
MCHC RBC-ENTMCNC: 31 PG — SIGNIFICANT CHANGE UP (ref 27–34)
MCHC RBC-ENTMCNC: 32.8 % — SIGNIFICANT CHANGE UP (ref 32–36)
MCV RBC AUTO: 94.5 FL — SIGNIFICANT CHANGE UP (ref 80–100)
MONOCYTES # BLD AUTO: 1.04 K/UL — HIGH (ref 0–0.9)
MONOCYTES NFR BLD AUTO: 11.1 % — SIGNIFICANT CHANGE UP (ref 2–14)
NEUTROPHILS # BLD AUTO: 5.8 K/UL — SIGNIFICANT CHANGE UP (ref 1.8–7.4)
NEUTROPHILS NFR BLD AUTO: 62 % — SIGNIFICANT CHANGE UP (ref 43–77)
NRBC # FLD: 0 — SIGNIFICANT CHANGE UP
PCO2 BLDV: 42 MMHG — SIGNIFICANT CHANGE UP (ref 41–51)
PH BLDV: 7.44 PH — HIGH (ref 7.32–7.43)
PLATELET # BLD AUTO: 276 K/UL — SIGNIFICANT CHANGE UP (ref 150–400)
PMV BLD: 10.8 FL — SIGNIFICANT CHANGE UP (ref 7–13)
PO2 BLDV: 46 MMHG — HIGH (ref 35–40)
POTASSIUM BLDV-SCNC: 4.9 MMOL/L — HIGH (ref 3.4–4.5)
RBC # BLD: 3.81 M/UL — SIGNIFICANT CHANGE UP (ref 3.8–5.2)
RBC # FLD: 13.5 % — SIGNIFICANT CHANGE UP (ref 10.3–14.5)
SAO2 % BLDV: 81.9 % — SIGNIFICANT CHANGE UP (ref 60–85)
WBC # BLD: 9.36 K/UL — SIGNIFICANT CHANGE UP (ref 3.8–10.5)
WBC # FLD AUTO: 9.36 K/UL — SIGNIFICANT CHANGE UP (ref 3.8–10.5)

## 2018-05-03 PROCEDURE — 99239 HOSP IP/OBS DSCHRG MGMT >30: CPT

## 2018-05-03 RX ORDER — VALPROIC ACID (AS SODIUM SALT) 250 MG/5ML
10 SOLUTION, ORAL ORAL
Qty: 0 | Refills: 0 | COMMUNITY
Start: 2018-05-03

## 2018-05-03 RX ORDER — QUETIAPINE FUMARATE 200 MG/1
1 TABLET, FILM COATED ORAL
Qty: 0 | Refills: 0 | COMMUNITY
Start: 2018-05-03

## 2018-05-03 RX ORDER — ACETAMINOPHEN 500 MG
2 TABLET ORAL
Qty: 0 | Refills: 0 | COMMUNITY

## 2018-05-03 RX ORDER — LANOLIN ALCOHOL/MO/W.PET/CERES
1 CREAM (GRAM) TOPICAL
Qty: 0 | Refills: 0 | COMMUNITY
Start: 2018-05-03

## 2018-05-03 RX ORDER — HALOPERIDOL DECANOATE 100 MG/ML
2 INJECTION INTRAMUSCULAR ONCE
Qty: 0 | Refills: 0 | Status: DISCONTINUED | OUTPATIENT
Start: 2018-05-03 | End: 2018-05-03

## 2018-05-03 RX ADMIN — HEPARIN SODIUM 5000 UNIT(S): 5000 INJECTION INTRAVENOUS; SUBCUTANEOUS at 05:54

## 2018-05-03 RX ADMIN — Medication 1 MILLIGRAM(S): at 23:19

## 2018-05-03 RX ADMIN — PANTOPRAZOLE SODIUM 40 MILLIGRAM(S): 20 TABLET, DELAYED RELEASE ORAL at 05:54

## 2018-05-03 RX ADMIN — Medication 1 APPLICATION(S): at 17:34

## 2018-05-03 RX ADMIN — QUETIAPINE FUMARATE 25 MILLIGRAM(S): 200 TABLET, FILM COATED ORAL at 14:59

## 2018-05-03 RX ADMIN — Medication 500 MILLIGRAM(S): at 05:54

## 2018-05-03 RX ADMIN — QUETIAPINE FUMARATE 50 MILLIGRAM(S): 200 TABLET, FILM COATED ORAL at 05:54

## 2018-05-03 RX ADMIN — QUETIAPINE FUMARATE 50 MILLIGRAM(S): 200 TABLET, FILM COATED ORAL at 17:36

## 2018-05-03 RX ADMIN — Medication 500 MILLIGRAM(S): at 17:35

## 2018-05-03 RX ADMIN — HEPARIN SODIUM 5000 UNIT(S): 5000 INJECTION INTRAVENOUS; SUBCUTANEOUS at 17:34

## 2018-05-03 RX ADMIN — Medication 1 APPLICATION(S): at 05:55

## 2018-05-03 NOTE — PROGRESS NOTE ADULT - ATTENDING COMMENTS
D/c time 40 mins.
30 minute conference call held with son Zac along with  Silvia,  Jaun, KIERA Bazan and Nurse Constantin. Explained that pt has not had a BM since yesterday. Son combative during conversation and kept insisting that pt cannot be discharged until she has a formed BM.     Suspect possible underlying psychosocial issues. Son to be called again tomorrow to discuss disposition. States he is unable to come in during the day to meet with the staff.

## 2018-05-03 NOTE — DISCHARGE NOTE ADULT - CARE PLAN
Principal Discharge DX:	Diarrhea  Goal:	No episode of diarrhea  Assessment and plan of treatment:	Follow-up with your primary care provider next week ----call for appointment  Secondary Diagnosis:	Dementia  Assessment and plan of treatment:	supportive care   Fall precautions  Secondary Diagnosis:	Hypertension  Goal:	stable --continue home medication  Assessment and plan of treatment:	Low salt diet  Activity as tolerated.  Take all medication as prescribed.  Follow up with your medical doctor for routine blood pressure monitoring at your next visit.  Notify your doctor if you have any of the following symptoms:   Dizziness, Lightheadedness, Blurry vision, Headache, Chest pain, Shortness of breath

## 2018-05-03 NOTE — DISCHARGE NOTE ADULT - MEDICATION SUMMARY - MEDICATIONS TO TAKE
I will START or STAY ON the medications listed below when I get home from the hospital:    Percocet 5/325 oral tablet  -- 1 tab(s) by mouth every 6 hours, As Needed for moderate pain  -- Indication: For pain    valproic acid 250 mg/5 mL oral syrup  -- 10 milliliter(s) by mouth 2 times a day  -- Indication: For Dementia due to Parkinson's disease with behavioral disturbance    QUEtiapine 50 mg oral tablet  -- 1 tab(s) by mouth 2 times a day  -- Indication: For Dementia due to Parkinson's disease with behavioral disturbance    QUEtiapine 25 mg oral tablet  -- 1 tab(s) by mouth every 8 hours, As needed, agitation  -- Indication: For Dementia due to Parkinson's disease with behavioral disturbance    clobetasol 0.05% topical ointment  -- 1 application on skin 2 times a day  -- Indication: For skin care    melatonin 3 mg oral tablet  -- 1 tab(s) by mouth once a day (at bedtime), As needed, Insomnia  -- Indication: For sleep aid     PriLOSEC OTC 20 mg oral delayed release tablet  -- 1 tab(s) by mouth once a day  -- Indication: For GI    cholecalciferol oral tablet  -- 1000 unit(s) by mouth once a day  -- Indication: For supplement

## 2018-05-03 NOTE — ED ADULT TRIAGE NOTE - CHIEF COMPLAINT QUOTE
Low BP and Fever at home. Recently dx with PNA. Pt is bed bound and alert to self at baseline. Hx of dementia. Unable to obtain temperature. Pt is agitated.

## 2018-05-03 NOTE — ED ADULT NURSE NOTE - OBJECTIVE STATEMENT
Received pt in room 15, pt A&Ox1 to name, respirations even and unlabored b/l. Pt agitated, scratching. Abdomen soft, nondistended, nontender. Skin dry and intact. IVL 20g Angiocath placed on right forearm. Labs sent. Awaiting MD cheung. Medicated as per MD order. Will continue to monitor.

## 2018-05-03 NOTE — ED PROVIDER NOTE - ATTENDING CONTRIBUTION TO CARE
Attending Attestation: Dr. Bradford  I have personally performed a history and physical examination of the patient and discussed management with the resident as well as the patient.  I reviewed the resident's note and agree with the documented findings and plan of care.  I have authored and modified critical sections of the Provider Note, including but not limited to HPI, Physical Exam and MDM. 89 y/o, 3rd visit to ED for infectious symptoms, febrile, low BP Here - rectal temp 99.9 here, patient combative  but able to be redirected. Per documentation, no diarrhea while admitted overnight to [facility], and none since being home, but must consider C. diff given recent hospitalizetion and abd - also diminished breath sounds LLL, concern for HAP - will cehck labs, cultures, hydrate, CXR, EKG given poss CP but unsure how reliable patient is, and trops and admit

## 2018-05-03 NOTE — CHART NOTE - NSCHARTNOTEFT_GEN_A_CORE
89 y/o F w  HTN, Dementia, admitted recently for pna, reported diarrhea, sent in by son again for reported diarrhea. No reported episodes of Diarrhea. Patient had one episode on formed stool last night.... GI assessment WNL. Plan discussed with patients son at length who agreed for his mother to be discharge home today. Patient's son Zac  reported if his mother develop diarrhea he will be sending her to [facility] instead.  The above discussed with Dr. Ghotra.

## 2018-05-03 NOTE — DISCHARGE NOTE ADULT - PLAN OF CARE
No episode of diarrhea Follow-up with your primary care provider next week ----call for appointment supportive care   Fall precautions stable --continue home medication Low salt diet  Activity as tolerated.  Take all medication as prescribed.  Follow up with your medical doctor for routine blood pressure monitoring at your next visit.  Notify your doctor if you have any of the following symptoms:   Dizziness, Lightheadedness, Blurry vision, Headache, Chest pain, Shortness of breath

## 2018-05-03 NOTE — DISCHARGE NOTE ADULT - HOSPITAL COURSE
89 y/o F w  HTN, Dementia, admitted recently for pna, reported diarrhea, sent in by son again for reported diarrhea. No reported episodes of Diarrhea. Patient had one episode on formed stool last night.... GI assessment WNL. Plan discussed with patients son at length who agreed for his mother to be discharge home today. 89 y/o F w  HTN, Dementia, admitted recently for pna, diarrhea- c diff ruled out last admission sent in by son again for reported diarrhea.     Admitted to medicine. Remained hemodynamically stable, calm with no observed diarrhea. Had on soft BM during admission.     Extensive discussions held with son about repeated admissions. Rehab/NH placement discussed but son refused.     Discharged home with follow up.

## 2018-05-03 NOTE — DISCHARGE NOTE ADULT - COMMUNITY RESOURCES
[place] medicaid [id number]  Neil [id number] informed of dC today to resume HHA tomorrow as discussed. personal Touch

## 2018-05-03 NOTE — DISCHARGE NOTE ADULT - PATIENT PORTAL LINK FT
You can access the [facility] Patient Portal, offered by [facility], by registering with the following website: [email or web address]

## 2018-05-03 NOTE — ED PROVIDER NOTE - OBJECTIVE STATEMENT
89 y/o female with PMH HTN, Dementia, admitted recently for pna and diarrhea presaents today for fever and hypotension. Patient combative, scratching at staff in the ED. 89 y/o female with PMH HTN, Dementia, admitted recently for pneumonia over 5 days ago, completed abx course, readmitted for poss diarrhea, then D/c today presents with fever and hypotension. History obtained via son at bedside, who is medically trained through . Per son, was admitted to [facility] for pneumonia, completed course of abx, but was having diarrhea. Went home despite the diarrhea, and when was getting dropped off to house by EMS, had diarrhea in the chair lift, so back to [facility] for admission, did not have diarrhea there, C. diff never checked. D/c back home today, and complained of some R sided CP, son checked BP which was 100/50 and febrile to 101, so came to ED. Unsure if any diarrhea since being home today, but refused to go back to [facility] so came here. States patient not acting appropriately, more aggressive. Normally A+OX1 at baseline. 89 y/o female with PMH HTN, Dementia, admitted recently for pneumonia at OSH over 5 days ago, completed abx course, readmitted for poss diarrhea, then D/c today presents with fever and hypotension. History obtained via son at bedside, who is medically trained through . Per son, was admitted to [facility] for pneumonia, completed course of abx, but was having diarrhea. Went home despite the diarrhea, and when was getting dropped off to house by EMS, had diarrhea in the chair lift, so back to [facility] for admission, did not have diarrhea there, C. diff never checked. D/c back home today, and complained of some R sided CP, son checked BP which was 100/50 and febrile to 101, so came to ED. Unsure if any diarrhea since being home today, but refused to go back to [facility] so came here. States patient not acting appropriately, more aggressive. Normally A+OX1 at baseline. Ill appearing, but NAD.

## 2018-05-03 NOTE — ED ADULT NURSE NOTE - CHIEF COMPLAINT
The patient is a 89y Female brought in by EMS called by son for fever, low BP. Reports was recently discharged from [facility] with watery diarrhea and had a roommate with possible flu.

## 2018-05-03 NOTE — PROGRESS NOTE ADULT - SUBJECTIVE AND OBJECTIVE BOX
Patient is a 89y old  Female who presents with a chief complaint of diarrhea (03 May 2018 12:15)    HPI: 1 large, soft BM overnight. No diarrhea. Eating well- asking for extra food. Pleasant, oriented to self.    Vital Signs Last 24 Hrs  T(C): 36.8 (03 May 2018 15:33), Max: 36.8 (03 May 2018 15:33)  T(F): 98.2 (03 May 2018 15:33), Max: 98.2 (03 May 2018 15:33)  HR: 78 (03 May 2018 15:33) (63 - 78)  BP: 117/71 (03 May 2018 15:33) (117/71 - 134/73)  BP(mean): --  RR: 18 (03 May 2018 15:33) (18 - 18)  SpO2: 95% (03 May 2018 15:33) (95% - 97%)                          12.8   5.9   )-----------( 290      ( 02 May 2018 15:45 )             37.8     05-02    138  |  98  |  20  ----------------------------<  104<H>  4.7   |  26  |  1.10    Ca    9.3      02 May 2018 15:45    MEDICATIONS  (STANDING):  clobetasol 0.05% Ointment 1 Application(s) Topical two times a day  heparin  Injectable 5000 Unit(s) SubCutaneous every 12 hours  pantoprazole    Tablet 40 milliGRAM(s) Oral before breakfast  QUEtiapine 50 milliGRAM(s) Oral two times a day  valproic  acid Syrup 500 milliGRAM(s) Oral two times a day    MEDICATIONS  (PRN):  melatonin 3 milliGRAM(s) Oral at bedtime PRN Insomnia  oxyCODONE    5 mG/acetaminophen 325 mG 1 Tablet(s) Oral every 6 hours PRN Moderate Pain (4 - 6)  QUEtiapine 25 milliGRAM(s) Oral every 8 hours PRN agitation
Patient is a 89y old  Female who presents with a chief complaint of diarrhea (01 May 2018 22:29)    HPI: No BM overnight or this morning. Pt ate well. Currently up in chair.     Vital Signs Last 24 Hrs  T(C): 36.4 (02 May 2018 08:17), Max: 37.3 (01 May 2018 21:22)  T(F): 97.6 (02 May 2018 08:17), Max: 99.2 (01 May 2018 21:22)  HR: 75 (02 May 2018 08:17) (75 - 97)  BP: 168/77 (02 May 2018 08:17) (148/71 - 168/77)  BP(mean): --  RR: 18 (02 May 2018 08:17) (16 - 18)  SpO2: 99% (02 May 2018 08:17) (95% - 99%)                          12.8   5.9   )-----------( 290      ( 02 May 2018 15:45 )             37.8     04-30    140  |  101  |  26<H>  ----------------------------<  102<H>  5.1   |  27  |  1.15    Ca    9.4      30 Apr 2018 16:59      MEDICATIONS  (STANDING):  clobetasol 0.05% Ointment 1 Application(s) Topical two times a day  heparin  Injectable 5000 Unit(s) SubCutaneous every 12 hours  pantoprazole    Tablet 40 milliGRAM(s) Oral before breakfast  QUEtiapine 50 milliGRAM(s) Oral two times a day  valproic  acid Syrup 500 milliGRAM(s) Oral two times a day    MEDICATIONS  (PRN):  melatonin 3 milliGRAM(s) Oral at bedtime PRN Insomnia  oxyCODONE    5 mG/acetaminophen 325 mG 1 Tablet(s) Oral every 6 hours PRN Moderate Pain (4 - 6)  QUEtiapine 25 milliGRAM(s) Oral every 8 hours PRN agitation

## 2018-05-03 NOTE — ED PROVIDER NOTE - MEDICAL DECISION MAKING DETAILS
89 y/o, 3rd visit to ED for infectious symptoms, febrile, low BP Here - rectal temp 99.9 here, patient combative  but able to be redirected. Per documentation, no diarrhea while admitted overnight to [facility], and none since being home, but must consider C. diff given recent hospitalizetion and abd - also diminished breath sounds LLL, concern for HAP - will cehck labs, cultures, hydrate, CXR, EKG given poss CP but unsure how reliable patient is, and trops and admit for fever 89 y/o, 3rd visit to ED for infectious symptoms, febrile, low BP Here - rectal temp 99.9 here, patient combative  but able to be redirected. Per documentation, no diarrhea while admitted overnight to [facility], and none since being home, but must consider C. diff given recent hospitalizetion and abd - also diminished breath sounds LLL, concern for HAP - will cehck labs, cultures, hydrate, CXR, EKG given poss CP but unsure how reliable patient is, and trops and admit

## 2018-05-03 NOTE — ED PROVIDER NOTE - RESPIRATORY, MLM
Diminished breath sounds in LLL, half way up L Lung w/o any crackles/rales. Normal aeration on R lung. No inc WOB.

## 2018-05-03 NOTE — ED PROVIDER NOTE - CONSTITUTIONAL, MLM
normal... Well appearing, well nourished, awake, alert to person not place or time. Does not know who son is, thinks she is in a restaurant. Able to be redirected. - - -

## 2018-05-03 NOTE — PROGRESS NOTE ADULT - PROBLEM SELECTOR PLAN 1
None observed since admission. Multiple conversation held again with son by NP,  and . Explained in detail that no further inpatient workup is needed.    Explored potential rehab/placement but son refusing. He is agreeing to discharge at this time.    Suspect underlying psychosocial issues contributing to frequent readmissions. Home care to follow. None observed since admission. Multiple conversations held again with son by NP,  and . Explained in detail that no further inpatient workup is needed.    Explored potential rehab/placement but son refusing. He is agreeing to discharge at this time.    Suspect underlying psychosocial issues contributing to frequent readmissions.

## 2018-05-04 DIAGNOSIS — E87.1 HYPO-OSMOLALITY AND HYPONATREMIA: ICD-10-CM

## 2018-05-04 DIAGNOSIS — F05 DELIRIUM DUE TO KNOWN PHYSIOLOGICAL CONDITION: ICD-10-CM

## 2018-05-04 DIAGNOSIS — F03.90 UNSPECIFIED DEMENTIA WITHOUT BEHAVIORAL DISTURBANCE: ICD-10-CM

## 2018-05-04 DIAGNOSIS — E86.0 DEHYDRATION: ICD-10-CM

## 2018-05-04 DIAGNOSIS — J21.0 ACUTE BRONCHIOLITIS DUE TO RESPIRATORY SYNCYTIAL VIRUS: ICD-10-CM

## 2018-05-04 DIAGNOSIS — Z29.9 ENCOUNTER FOR PROPHYLACTIC MEASURES, UNSPECIFIED: ICD-10-CM

## 2018-05-04 DIAGNOSIS — F03.91 UNSPECIFIED DEMENTIA WITH BEHAVIORAL DISTURBANCE: ICD-10-CM

## 2018-05-04 DIAGNOSIS — Z87.39 PERSONAL HISTORY OF OTHER DISEASES OF THE MUSCULOSKELETAL SYSTEM AND CONNECTIVE TISSUE: ICD-10-CM

## 2018-05-04 LAB
ALBUMIN SERPL ELPH-MCNC: 3.6 G/DL — SIGNIFICANT CHANGE UP (ref 3.3–5)
ALP SERPL-CCNC: 63 U/L — SIGNIFICANT CHANGE UP (ref 40–120)
ALT FLD-CCNC: 11 U/L — SIGNIFICANT CHANGE UP (ref 4–33)
APPEARANCE UR: CLEAR — SIGNIFICANT CHANGE UP
AST SERPL-CCNC: 17 U/L — SIGNIFICANT CHANGE UP (ref 4–32)
B PERT DNA SPEC QL NAA+PROBE: SIGNIFICANT CHANGE UP
BILIRUB SERPL-MCNC: 0.2 MG/DL — SIGNIFICANT CHANGE UP (ref 0.2–1.2)
BILIRUB UR-MCNC: NEGATIVE — SIGNIFICANT CHANGE UP
BLOOD UR QL VISUAL: NEGATIVE — SIGNIFICANT CHANGE UP
BUN SERPL-MCNC: 30 MG/DL — HIGH (ref 7–23)
BUN SERPL-MCNC: 30 MG/DL — HIGH (ref 7–23)
C PNEUM DNA SPEC QL NAA+PROBE: NOT DETECTED — SIGNIFICANT CHANGE UP
CALCIUM SERPL-MCNC: 8.8 MG/DL — SIGNIFICANT CHANGE UP (ref 8.4–10.5)
CALCIUM SERPL-MCNC: 9.2 MG/DL — SIGNIFICANT CHANGE UP (ref 8.4–10.5)
CHLORIDE SERPL-SCNC: 94 MMOL/L — LOW (ref 98–107)
CHLORIDE SERPL-SCNC: 97 MMOL/L — LOW (ref 98–107)
CO2 SERPL-SCNC: 24 MMOL/L — SIGNIFICANT CHANGE UP (ref 22–31)
CO2 SERPL-SCNC: 25 MMOL/L — SIGNIFICANT CHANGE UP (ref 22–31)
COLOR SPEC: SIGNIFICANT CHANGE UP
CREAT SERPL-MCNC: 1.11 MG/DL — SIGNIFICANT CHANGE UP (ref 0.5–1.3)
CREAT SERPL-MCNC: 1.2 MG/DL — SIGNIFICANT CHANGE UP (ref 0.5–1.3)
FLUAV H1 2009 PAND RNA SPEC QL NAA+PROBE: NOT DETECTED — SIGNIFICANT CHANGE UP
FLUAV H1 RNA SPEC QL NAA+PROBE: NOT DETECTED — SIGNIFICANT CHANGE UP
FLUAV H3 RNA SPEC QL NAA+PROBE: NOT DETECTED — SIGNIFICANT CHANGE UP
FLUAV SUBTYP SPEC NAA+PROBE: SIGNIFICANT CHANGE UP
FLUBV RNA SPEC QL NAA+PROBE: NOT DETECTED — SIGNIFICANT CHANGE UP
GLUCOSE SERPL-MCNC: 123 MG/DL — HIGH (ref 70–99)
GLUCOSE SERPL-MCNC: 84 MG/DL — SIGNIFICANT CHANGE UP (ref 70–99)
GLUCOSE UR-MCNC: NEGATIVE — SIGNIFICANT CHANGE UP
HADV DNA SPEC QL NAA+PROBE: NOT DETECTED — SIGNIFICANT CHANGE UP
HCOV 229E RNA SPEC QL NAA+PROBE: NOT DETECTED — SIGNIFICANT CHANGE UP
HCOV HKU1 RNA SPEC QL NAA+PROBE: NOT DETECTED — SIGNIFICANT CHANGE UP
HCOV NL63 RNA SPEC QL NAA+PROBE: NOT DETECTED — SIGNIFICANT CHANGE UP
HCOV OC43 RNA SPEC QL NAA+PROBE: NOT DETECTED — SIGNIFICANT CHANGE UP
HMPV RNA SPEC QL NAA+PROBE: NOT DETECTED — SIGNIFICANT CHANGE UP
HPIV1 RNA SPEC QL NAA+PROBE: NOT DETECTED — SIGNIFICANT CHANGE UP
HPIV2 RNA SPEC QL NAA+PROBE: NOT DETECTED — SIGNIFICANT CHANGE UP
HPIV3 RNA SPEC QL NAA+PROBE: NOT DETECTED — SIGNIFICANT CHANGE UP
HPIV4 RNA SPEC QL NAA+PROBE: NOT DETECTED — SIGNIFICANT CHANGE UP
KETONES UR-MCNC: NEGATIVE — SIGNIFICANT CHANGE UP
LEUKOCYTE ESTERASE UR-ACNC: NEGATIVE — SIGNIFICANT CHANGE UP
M PNEUMO DNA SPEC QL NAA+PROBE: NOT DETECTED — SIGNIFICANT CHANGE UP
MUCOUS THREADS # UR AUTO: SIGNIFICANT CHANGE UP
NITRITE UR-MCNC: NEGATIVE — SIGNIFICANT CHANGE UP
NT-PROBNP SERPL-SCNC: 1427 PG/ML — SIGNIFICANT CHANGE UP
PH UR: 6.5 — SIGNIFICANT CHANGE UP (ref 4.6–8)
POTASSIUM SERPL-MCNC: 5 MMOL/L — SIGNIFICANT CHANGE UP (ref 3.5–5.3)
POTASSIUM SERPL-MCNC: 5.1 MMOL/L — SIGNIFICANT CHANGE UP (ref 3.5–5.3)
POTASSIUM SERPL-SCNC: 5 MMOL/L — SIGNIFICANT CHANGE UP (ref 3.5–5.3)
POTASSIUM SERPL-SCNC: 5.1 MMOL/L — SIGNIFICANT CHANGE UP (ref 3.5–5.3)
PROT SERPL-MCNC: 6.4 G/DL — SIGNIFICANT CHANGE UP (ref 6–8.3)
PROT UR-MCNC: NEGATIVE MG/DL — SIGNIFICANT CHANGE UP
RBC CASTS # UR COMP ASSIST: SIGNIFICANT CHANGE UP (ref 0–?)
RSV RNA SPEC QL NAA+PROBE: POSITIVE — HIGH
RV+EV RNA SPEC QL NAA+PROBE: NOT DETECTED — SIGNIFICANT CHANGE UP
SODIUM SERPL-SCNC: 133 MMOL/L — LOW (ref 135–145)
SODIUM SERPL-SCNC: 136 MMOL/L — SIGNIFICANT CHANGE UP (ref 135–145)
SP GR SPEC: 1.02 — SIGNIFICANT CHANGE UP (ref 1–1.04)
SPECIMEN SOURCE: SIGNIFICANT CHANGE UP
SPECIMEN SOURCE: SIGNIFICANT CHANGE UP
SQUAMOUS # UR AUTO: SIGNIFICANT CHANGE UP
TROPONIN T SERPL-MCNC: < 0.06 NG/ML — SIGNIFICANT CHANGE UP (ref 0–0.06)
TROPONIN T SERPL-MCNC: < 0.06 NG/ML — SIGNIFICANT CHANGE UP (ref 0–0.06)
UROBILINOGEN FLD QL: NORMAL MG/DL — SIGNIFICANT CHANGE UP
WBC UR QL: SIGNIFICANT CHANGE UP (ref 0–?)

## 2018-05-04 PROCEDURE — 71275 CT ANGIOGRAPHY CHEST: CPT | Mod: 26

## 2018-05-04 PROCEDURE — 71045 X-RAY EXAM CHEST 1 VIEW: CPT | Mod: 26

## 2018-05-04 PROCEDURE — 99223 1ST HOSP IP/OBS HIGH 75: CPT

## 2018-05-04 RX ORDER — OMEPRAZOLE 10 MG/1
1 CAPSULE, DELAYED RELEASE ORAL
Qty: 0 | Refills: 0 | COMMUNITY

## 2018-05-04 RX ORDER — VALPROIC ACID (AS SODIUM SALT) 250 MG/5ML
500 SOLUTION, ORAL ORAL
Qty: 0 | Refills: 0 | Status: DISCONTINUED | OUTPATIENT
Start: 2018-05-04 | End: 2018-05-05

## 2018-05-04 RX ORDER — SODIUM CHLORIDE 9 MG/ML
1000 INJECTION INTRAMUSCULAR; INTRAVENOUS; SUBCUTANEOUS
Qty: 0 | Refills: 0 | Status: DISCONTINUED | OUTPATIENT
Start: 2018-05-04 | End: 2018-05-10

## 2018-05-04 RX ORDER — MIDAZOLAM HYDROCHLORIDE 1 MG/ML
1 INJECTION, SOLUTION INTRAMUSCULAR; INTRAVENOUS ONCE
Qty: 0 | Refills: 0 | Status: DISCONTINUED | OUTPATIENT
Start: 2018-05-04 | End: 2018-05-04

## 2018-05-04 RX ORDER — QUETIAPINE FUMARATE 200 MG/1
50 TABLET, FILM COATED ORAL
Qty: 0 | Refills: 0 | Status: DISCONTINUED | OUTPATIENT
Start: 2018-05-04 | End: 2018-05-10

## 2018-05-04 RX ORDER — HEPARIN SODIUM 5000 [USP'U]/ML
5000 INJECTION INTRAVENOUS; SUBCUTANEOUS EVERY 12 HOURS
Qty: 0 | Refills: 0 | Status: DISCONTINUED | OUTPATIENT
Start: 2018-05-04 | End: 2018-05-10

## 2018-05-04 RX ORDER — HALOPERIDOL DECANOATE 100 MG/ML
2.5 INJECTION INTRAMUSCULAR ONCE
Qty: 0 | Refills: 0 | Status: COMPLETED | OUTPATIENT
Start: 2018-05-04 | End: 2018-05-04

## 2018-05-04 RX ORDER — OLANZAPINE 15 MG/1
2.5 TABLET, FILM COATED ORAL ONCE
Qty: 0 | Refills: 0 | Status: DISCONTINUED | OUTPATIENT
Start: 2018-05-04 | End: 2018-05-10

## 2018-05-04 RX ORDER — ALBUTEROL 90 UG/1
2.5 AEROSOL, METERED ORAL EVERY 6 HOURS
Qty: 0 | Refills: 0 | Status: DISCONTINUED | OUTPATIENT
Start: 2018-05-04 | End: 2018-05-10

## 2018-05-04 RX ORDER — QUETIAPINE FUMARATE 200 MG/1
25 TABLET, FILM COATED ORAL EVERY 6 HOURS
Qty: 0 | Refills: 0 | Status: DISCONTINUED | OUTPATIENT
Start: 2018-05-04 | End: 2018-05-10

## 2018-05-04 RX ORDER — OXYCODONE AND ACETAMINOPHEN 5; 325 MG/1; MG/1
1 TABLET ORAL EVERY 6 HOURS
Qty: 0 | Refills: 0 | Status: DISCONTINUED | OUTPATIENT
Start: 2018-05-04 | End: 2018-05-10

## 2018-05-04 RX ORDER — ALPRAZOLAM 0.25 MG
0.5 TABLET ORAL
Qty: 0 | Refills: 0 | Status: DISCONTINUED | OUTPATIENT
Start: 2018-05-04 | End: 2018-05-04

## 2018-05-04 RX ADMIN — SODIUM CHLORIDE 60 MILLILITER(S): 9 INJECTION INTRAMUSCULAR; INTRAVENOUS; SUBCUTANEOUS at 11:37

## 2018-05-04 RX ADMIN — Medication 500 MILLIGRAM(S): at 18:21

## 2018-05-04 RX ADMIN — HALOPERIDOL DECANOATE 2.5 MILLIGRAM(S): 100 INJECTION INTRAMUSCULAR at 01:38

## 2018-05-04 RX ADMIN — HEPARIN SODIUM 5000 UNIT(S): 5000 INJECTION INTRAVENOUS; SUBCUTANEOUS at 18:20

## 2018-05-04 RX ADMIN — QUETIAPINE FUMARATE 50 MILLIGRAM(S): 200 TABLET, FILM COATED ORAL at 18:21

## 2018-05-04 RX ADMIN — MIDAZOLAM HYDROCHLORIDE 1 MILLIGRAM(S): 1 INJECTION, SOLUTION INTRAMUSCULAR; INTRAVENOUS at 01:59

## 2018-05-04 NOTE — H&P ADULT - PROBLEM SELECTOR PLAN 6
hsq for dvt ppx   IMPROVE VTE Individual Risk Assessment    RISK                                                          Points  [] Previous VTE                                           3  [] Thrombophilia                                        2  [] Lower limb paralysis                              2   [] Current Cancer                                       2   [x] Immobilization > 24 hrs                        1  [] ICU/CCU stay > 24 hours                       1  [x] Age > 60                                                   1    IMPROVE VTE Score: 2

## 2018-05-04 NOTE — BEHAVIORAL HEALTH ASSESSMENT NOTE - SUMMARY
Pt is a 89 y/o female, retired, , mother of 4 adult children, domiciled with son, with PMx Dementia, Spinal stenosis and drug induced Parkinson's disease, and PPHx of Dementia and behavioral disturbance, admitted with pneumonia. Pt was noted to become agitated with staff and psychiatry consulted to assist with the management of agitation.    Pt engaged minimally in interview, was perseverative, was noted to have echolalia, and staff reports she has been combative. Chart review and collateral history from pt's car provider and son suggests 6 yrs hx of progressive neurocognitive decline, 2 yrs hx of behavioral disturbance, parkinsonian symptoms 2/2 antipsychotic agents but despite this Seroquel was initiated due to severe agitation and combative behavior. Pt began to have acute worsening of behavior and mental status over the last week, at which time she was brought in for medical evaluation and was found to have pneumonia. This presentation is consistent with delirium superimposed on dementia with behavioral disturbances.    1) Please obtain  level and increase night time Valproic acid to 750mg and continue morning dose at 500mg    2) Continue Seroquel 50mg PO BID    3) Give Seroquel 25mg PO Q6 PRN for agitation and for severe agitation, give Zyprexa 2.5mg IM Q6 PRN Pt is a 89 y/o female, retired, , mother of 4 adult children, domiciled with son, with PMx Dementia, Spinal stenosis and drug induced Parkinson's disease, and PPHx of Dementia and behavioral disturbance, admitted with pneumonia. Pt was noted to become agitated with staff and psychiatry consulted to assist with the management of agitation.    Pt engaged minimally in interview, was perseverative, was noted to have echolalia, and staff reports she has been combative. Chart review and collateral history from pt's car provider and son suggests 6 yrs hx of progressive neurocognitive decline, 2 yrs hx of behavioral disturbance, parkinsonian symptoms 2/2 antipsychotic agents but despite this Seroquel was initiated due to severe agitation and combative behavior. Pt began to have acute worsening of behavior and mental status over the last week, at which time she was brought in for medical evaluation and was found to have pneumonia. This presentation is consistent with delirium superimposed on dementia with behavioral disturbances.    1) Please obtain VPA level and increase night time Valproic acid to 750mg and continue morning dose at 500mg    2) Continue Seroquel 50mg PO BID    3) Give Seroquel 25mg PO Q6 PRN for agitation and for severe agitation, give Zyprexa 2.5mg IM Q6 PRN    4) Avoid use of benzos/opiates/anticholinergics as they can worsen delirium    5) Monitor qtc while on antipsychotics

## 2018-05-04 NOTE — BEHAVIORAL HEALTH ASSESSMENT NOTE - HPI (INCLUDE ILLNESS QUALITY, SEVERITY, DURATION, TIMING, CONTEXT, MODIFYING FACTORS, ASSOCIATED SIGNS AND SYMPTOMS)
Pt is a 89 y/o female, retired, , mother of 4 adult children, domiciled with son, with PMx Dementia, Spinal stenosis and drug induced Parkinson's disease, and PPHx of Dementia and behavioral disturbance, admitted with pneumonia. Pt was noted to become agitated with staff and psychiatry consulted to assist with the management of agitation.    Pt found laying in bed, looking away from provider, made poor eye contact, and reluctant to engage in interview. She was perseverative, stated "water, water, water" multiple times, then stated, "candy, candy, candy, I like candy." She suddenly looked up, shook hands of provider, stated "hi sweetie," then looked away again. She did not engage further in the interview.    Collateral history from pt's son Zac who reports that for the last 1 week, pt has had difficulty feeding herself and her son became concerned about deteriorating medical status. Her son has been living with patient and taking care of her since 2012 when her behavioral symptoms secondary to dementia became severe, she was breaking into other people's cars and trying to drive away. Pt's mental status and episodes of agitation has worsened over time, especially in the last 2 years. Depakote was initiated 2 y/a and has had good results but this past year she has had episodes of agitation, hitting and punching her care givers. At the time, due to episodes of severe agitation, Seroquel was introduced despite this causing Parkinsonian symptoms. Pt at baseline is able to feed herself, but requires assistance with all her other ADLs, including bathing and cleaning herself. Her Medicare [employer] program has just been approved 2 weeks ago and pt has had an aid for 9hrs daily X 5 days a week.

## 2018-05-04 NOTE — BEHAVIORAL HEALTH ASSESSMENT NOTE - NSBHCHARTREVIEWLAB_PSY_A_CORE FT
05-03    133<L>  |  94<L>  |  30<H>  ----------------------------<  84  5.1   |  24  |  1.20    Ca    8.8      03 May 2018 22:10    TPro  6.4  /  Alb  3.6  /  TBili  0.2  /  DBili  x   /  AST  17  /  ALT  11  /  AlkPhos  63  05-03  LIVER FUNCTIONS - ( 03 May 2018 22:10 )  Alb: 3.6 g/dL / Pro: 6.4 g/dL / ALK PHOS: 63 u/L / ALT: 11 u/L / AST: 17 u/L / GGT: x         CBC Full  -  ( 03 May 2018 22:10 )  WBC Count : 9.36 K/uL  Hemoglobin : 11.8 g/dL  Hematocrit : 36.0 %  Platelet Count - Automated : 276 K/uL  Mean Cell Volume : 94.5 fL  Mean Cell Hemoglobin : 31.0 pg  Mean Cell Hemoglobin Concentration : 32.8 %  Auto Neutrophil # : 5.80 K/uL  Auto Lymphocyte # : 2.28 K/uL  Auto Monocyte # : 1.04 K/uL  Auto Eosinophil # : 0.14 K/uL  Auto Basophil # : 0.06 K/uL  Auto Neutrophil % : 62.0 %  Auto Lymphocyte % : 24.4 %  Auto Monocyte % : 11.1 %  Auto Eosinophil % : 1.5 %  Auto Basophil % : 0.6 %

## 2018-05-04 NOTE — BEHAVIORAL HEALTH ASSESSMENT NOTE - RISK ASSESSMENT
Low risk: Pt is a chronic high risk. She is advanced in age, has multiple medical issues and has a hx of mood disorder. However, she is protected by her residence with her son who is supportive, having an aid, compliant with meds.

## 2018-05-04 NOTE — BEHAVIORAL HEALTH ASSESSMENT NOTE - NSBHCHARTREVIEWVS_PSY_A_CORE FT
Vital Signs Last 24 Hrs  T(C): 36.4 (04 May 2018 12:24), Max: 37.7 (03 May 2018 23:20)  T(F): 97.5 (04 May 2018 12:24), Max: 99.9 (03 May 2018 23:20)  HR: 90 (04 May 2018 12:24) (70 - 105)  BP: 139/88 (04 May 2018 12:24) (117/71 - 167/107)  BP(mean): --  RR: 18 (04 May 2018 12:24) (16 - 20)  SpO2: 99% (04 May 2018 12:24) (95% - 100%)

## 2018-05-04 NOTE — H&P ADULT - HISTORY OF PRESENT ILLNESS
89yom with h/o Dementia, Spinal stenosis recent admission to [facility] for pna, treated with abx, diarrhea/FTT ( 4/20- 4/25, 4/26-5/1, cdiff was neg, and 5/1 to 5/3) brought in for hypotension, tachycardia, fever and chest pain per son, pt was sent home on 3rd from [facility] and after pt reached home pt's son found her to have tachycardia to 100-110, fever of 101, and hypotensive to 100/50 was having pain around the xiphoid process around 730pm and hence brought her to [facility] ER. Pt herself has Dementia and is oriented to self only which is her baseline per son, cannot give any history except she said that she has back pain ( per son which is chronic and takes percocet prn) pt's son has some medical background taking care of pts in the  per him.  In the ED however did not have any fever or hypotension. No other issues reported.    In the ED pt had ct pa which was neg for PE, found to have RSV, mild dehydration, mild hyponatremia. Pt has recieved 1 dose of haldol for agitation, and placed on constant observation and admitted to medicine for further management.

## 2018-05-04 NOTE — BEHAVIORAL HEALTH ASSESSMENT NOTE - NSBHADMITCOUNSEL_PSY_A_CORE
risk factor reduction/instructions for management, treatment and follow up/client/family/caregiver education

## 2018-05-04 NOTE — H&P ADULT - ASSESSMENT
89yom with h/o Dementia, Spinal stenosis recent admission to [facility] for pna, treated with abx, diarrhea/FTT ( 4/20- 4/25, 4/26-5/1, cdiff was neg, and 5/1 to 5/3) brought in for hypotension, tachycardia, fever and chest pain found to have RSV and mild hyponatremia and dehydration.

## 2018-05-04 NOTE — BEHAVIORAL HEALTH ASSESSMENT NOTE - NSBHCONSULTFOLLOWAFTERCARE_PSY_A_CORE FT
[facility] outpt. clinic: [phone]  [facility] outpt. walk in clinic : [phone] (9AM-7PM)

## 2018-05-04 NOTE — H&P ADULT - PROBLEM SELECTOR PLAN 1
cont supportive care, contact isolation, monitor respiratory status, nebs prn cont supportive care, contact isolation, monitor respiratory status, nebs prn, f/u official cxr, f/u cultures, no need for abx
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
## 2018-05-04 NOTE — BEHAVIORAL HEALTH ASSESSMENT NOTE - NSBHCHARTREVIEWINVESTIGATE_PSY_A_CORE FT
< from: 12 Lead ECG (05.03.18 @ 22:17) >      Ventricular Rate 77 BPM    Atrial Rate 77 BPM    P-R Interval 162 ms    QRS Duration 98 ms    Q-T Interval 384 ms    QTC Calculation(Bezet) 434 ms    P Axis 74 degrees    R Axis 57 degrees    T Axis 56 degrees    Diagnosis Line Normal sinus rhythm  Possible Left atrial enlargement  Left ventricular hypertrophy  Abnormal ECG

## 2018-05-04 NOTE — H&P ADULT - NSHPLABSRESULTS_GEN_ALL_CORE
labs reviewed significant for mild hyponatremia, mild dehydration ( elevated bun), RSV+  EKG: NSR at 77, lVH

## 2018-05-04 NOTE — BEHAVIORAL HEALTH ASSESSMENT NOTE - CASE SUMMARY
Pt seen, agree with above. Pt is a 89 y/o female with PMx Dementia, Spinal stenosis and drug induced Parkinsonism, and PPHx of Dementia and behavioral disturbance, admitted with pneumonia. Pt was noted to become agitated with staff here. On exam pt confused, unable to state day/place, tells me one of her sons' names, repeating syllables of words, unable to meaningfully participate in the interview. Impression: delirium 2/2 PNA superimposed on dementia with behavioral disturbance. Plan: as above- titrate VPA as above, continue standing seroquel, with seroquel/zyprexa prns for agitation. Avoid use of benzos/opiates/anticholinergics as they can worsen delirium. Monitor qtc. Frequent re-orientation. Will follow.

## 2018-05-05 LAB
BACTERIA UR CULT: SIGNIFICANT CHANGE UP
BUN SERPL-MCNC: 26 MG/DL — HIGH (ref 7–23)
CALCIUM SERPL-MCNC: 9.1 MG/DL — SIGNIFICANT CHANGE UP (ref 8.4–10.5)
CHLORIDE SERPL-SCNC: 98 MMOL/L — SIGNIFICANT CHANGE UP (ref 98–107)
CO2 SERPL-SCNC: 26 MMOL/L — SIGNIFICANT CHANGE UP (ref 22–31)
CREAT SERPL-MCNC: 1.02 MG/DL — SIGNIFICANT CHANGE UP (ref 0.5–1.3)
FOLATE SERPL-MCNC: 17.3 NG/ML — SIGNIFICANT CHANGE UP (ref 4.7–20)
GLUCOSE SERPL-MCNC: 85 MG/DL — SIGNIFICANT CHANGE UP (ref 70–99)
HCT VFR BLD CALC: 38.4 % — SIGNIFICANT CHANGE UP (ref 34.5–45)
HGB BLD-MCNC: 12.4 G/DL — SIGNIFICANT CHANGE UP (ref 11.5–15.5)
MCHC RBC-ENTMCNC: 31.4 PG — SIGNIFICANT CHANGE UP (ref 27–34)
MCHC RBC-ENTMCNC: 32.3 % — SIGNIFICANT CHANGE UP (ref 32–36)
MCV RBC AUTO: 97.2 FL — SIGNIFICANT CHANGE UP (ref 80–100)
NRBC # FLD: 0 — SIGNIFICANT CHANGE UP
PLATELET # BLD AUTO: 246 K/UL — SIGNIFICANT CHANGE UP (ref 150–400)
PMV BLD: 11 FL — SIGNIFICANT CHANGE UP (ref 7–13)
POTASSIUM SERPL-MCNC: 4.4 MMOL/L — SIGNIFICANT CHANGE UP (ref 3.5–5.3)
POTASSIUM SERPL-SCNC: 4.4 MMOL/L — SIGNIFICANT CHANGE UP (ref 3.5–5.3)
RBC # BLD: 3.95 M/UL — SIGNIFICANT CHANGE UP (ref 3.8–5.2)
RBC # FLD: 13.6 % — SIGNIFICANT CHANGE UP (ref 10.3–14.5)
SODIUM SERPL-SCNC: 137 MMOL/L — SIGNIFICANT CHANGE UP (ref 135–145)
SPECIMEN SOURCE: SIGNIFICANT CHANGE UP
TSH SERPL-MCNC: 1.5 UIU/ML — SIGNIFICANT CHANGE UP (ref 0.27–4.2)
VALPROATE SERPL-MCNC: 83.7 UG/ML — SIGNIFICANT CHANGE UP (ref 50–100)
VIT B12 SERPL-MCNC: 628 PG/ML — SIGNIFICANT CHANGE UP (ref 200–900)
WBC # BLD: 7.07 K/UL — SIGNIFICANT CHANGE UP (ref 3.8–10.5)
WBC # FLD AUTO: 7.07 K/UL — SIGNIFICANT CHANGE UP (ref 3.8–10.5)

## 2018-05-05 PROCEDURE — 99233 SBSQ HOSP IP/OBS HIGH 50: CPT

## 2018-05-05 RX ORDER — VALPROIC ACID (AS SODIUM SALT) 250 MG/5ML
750 SOLUTION, ORAL ORAL AT BEDTIME
Qty: 0 | Refills: 0 | Status: DISCONTINUED | OUTPATIENT
Start: 2018-05-05 | End: 2018-05-10

## 2018-05-05 RX ORDER — VALPROIC ACID (AS SODIUM SALT) 250 MG/5ML
500 SOLUTION, ORAL ORAL DAILY
Qty: 0 | Refills: 0 | Status: DISCONTINUED | OUTPATIENT
Start: 2018-05-05 | End: 2018-05-10

## 2018-05-05 RX ADMIN — Medication 500 MILLIGRAM(S): at 05:18

## 2018-05-05 RX ADMIN — Medication 750 MILLIGRAM(S): at 21:19

## 2018-05-05 RX ADMIN — SODIUM CHLORIDE 60 MILLILITER(S): 9 INJECTION INTRAMUSCULAR; INTRAVENOUS; SUBCUTANEOUS at 05:18

## 2018-05-05 RX ADMIN — QUETIAPINE FUMARATE 50 MILLIGRAM(S): 200 TABLET, FILM COATED ORAL at 05:19

## 2018-05-05 RX ADMIN — Medication 500 MILLIGRAM(S): at 18:25

## 2018-05-05 RX ADMIN — OXYCODONE AND ACETAMINOPHEN 1 TABLET(S): 5; 325 TABLET ORAL at 19:38

## 2018-05-05 RX ADMIN — OXYCODONE AND ACETAMINOPHEN 1 TABLET(S): 5; 325 TABLET ORAL at 18:27

## 2018-05-05 RX ADMIN — QUETIAPINE FUMARATE 50 MILLIGRAM(S): 200 TABLET, FILM COATED ORAL at 18:25

## 2018-05-05 RX ADMIN — HEPARIN SODIUM 5000 UNIT(S): 5000 INJECTION INTRAVENOUS; SUBCUTANEOUS at 18:25

## 2018-05-05 RX ADMIN — HEPARIN SODIUM 5000 UNIT(S): 5000 INJECTION INTRAVENOUS; SUBCUTANEOUS at 05:18

## 2018-05-06 LAB
ALBUMIN SERPL ELPH-MCNC: 3.5 G/DL — SIGNIFICANT CHANGE UP (ref 3.3–5)
ALP SERPL-CCNC: 59 U/L — SIGNIFICANT CHANGE UP (ref 40–120)
ALT FLD-CCNC: 10 U/L — SIGNIFICANT CHANGE UP (ref 4–33)
AST SERPL-CCNC: 14 U/L — SIGNIFICANT CHANGE UP (ref 4–32)
BASOPHILS # BLD AUTO: 0.05 K/UL — SIGNIFICANT CHANGE UP (ref 0–0.2)
BASOPHILS NFR BLD AUTO: 0.5 % — SIGNIFICANT CHANGE UP (ref 0–2)
BILIRUB SERPL-MCNC: 0.2 MG/DL — SIGNIFICANT CHANGE UP (ref 0.2–1.2)
BUN SERPL-MCNC: 26 MG/DL — HIGH (ref 7–23)
CALCIUM SERPL-MCNC: 8.8 MG/DL — SIGNIFICANT CHANGE UP (ref 8.4–10.5)
CHLORIDE SERPL-SCNC: 97 MMOL/L — LOW (ref 98–107)
CO2 SERPL-SCNC: 25 MMOL/L — SIGNIFICANT CHANGE UP (ref 22–31)
CREAT SERPL-MCNC: 0.99 MG/DL — SIGNIFICANT CHANGE UP (ref 0.5–1.3)
EOSINOPHIL # BLD AUTO: 0.18 K/UL — SIGNIFICANT CHANGE UP (ref 0–0.5)
EOSINOPHIL NFR BLD AUTO: 1.9 % — SIGNIFICANT CHANGE UP (ref 0–6)
GLUCOSE SERPL-MCNC: 92 MG/DL — SIGNIFICANT CHANGE UP (ref 70–99)
HCT VFR BLD CALC: 35 % — SIGNIFICANT CHANGE UP (ref 34.5–45)
HGB BLD-MCNC: 11.4 G/DL — LOW (ref 11.5–15.5)
IMM GRANULOCYTES # BLD AUTO: 0.03 # — SIGNIFICANT CHANGE UP
IMM GRANULOCYTES NFR BLD AUTO: 0.3 % — SIGNIFICANT CHANGE UP (ref 0–1.5)
LYMPHOCYTES # BLD AUTO: 3.09 K/UL — SIGNIFICANT CHANGE UP (ref 1–3.3)
LYMPHOCYTES # BLD AUTO: 33.3 % — SIGNIFICANT CHANGE UP (ref 13–44)
MAGNESIUM SERPL-MCNC: 2.4 MG/DL — SIGNIFICANT CHANGE UP (ref 1.6–2.6)
MCHC RBC-ENTMCNC: 31.7 PG — SIGNIFICANT CHANGE UP (ref 27–34)
MCHC RBC-ENTMCNC: 32.6 % — SIGNIFICANT CHANGE UP (ref 32–36)
MCV RBC AUTO: 97.2 FL — SIGNIFICANT CHANGE UP (ref 80–100)
MONOCYTES # BLD AUTO: 1.04 K/UL — HIGH (ref 0–0.9)
MONOCYTES NFR BLD AUTO: 11.2 % — SIGNIFICANT CHANGE UP (ref 2–14)
NEUTROPHILS # BLD AUTO: 4.89 K/UL — SIGNIFICANT CHANGE UP (ref 1.8–7.4)
NEUTROPHILS NFR BLD AUTO: 52.8 % — SIGNIFICANT CHANGE UP (ref 43–77)
NRBC # FLD: 0 — SIGNIFICANT CHANGE UP
PHOSPHATE SERPL-MCNC: 3.2 MG/DL — SIGNIFICANT CHANGE UP (ref 2.5–4.5)
PLATELET # BLD AUTO: 225 K/UL — SIGNIFICANT CHANGE UP (ref 150–400)
PMV BLD: 11.4 FL — SIGNIFICANT CHANGE UP (ref 7–13)
POTASSIUM SERPL-MCNC: 4.4 MMOL/L — SIGNIFICANT CHANGE UP (ref 3.5–5.3)
POTASSIUM SERPL-SCNC: 4.4 MMOL/L — SIGNIFICANT CHANGE UP (ref 3.5–5.3)
PROT SERPL-MCNC: 6.2 G/DL — SIGNIFICANT CHANGE UP (ref 6–8.3)
RBC # BLD: 3.6 M/UL — LOW (ref 3.8–5.2)
RBC # FLD: 13.7 % — SIGNIFICANT CHANGE UP (ref 10.3–14.5)
SODIUM SERPL-SCNC: 138 MMOL/L — SIGNIFICANT CHANGE UP (ref 135–145)
WBC # BLD: 9.28 K/UL — SIGNIFICANT CHANGE UP (ref 3.8–10.5)
WBC # FLD AUTO: 9.28 K/UL — SIGNIFICANT CHANGE UP (ref 3.8–10.5)

## 2018-05-06 PROCEDURE — 99233 SBSQ HOSP IP/OBS HIGH 50: CPT

## 2018-05-06 RX ADMIN — Medication 750 MILLIGRAM(S): at 23:06

## 2018-05-06 RX ADMIN — QUETIAPINE FUMARATE 50 MILLIGRAM(S): 200 TABLET, FILM COATED ORAL at 05:33

## 2018-05-06 RX ADMIN — OXYCODONE AND ACETAMINOPHEN 1 TABLET(S): 5; 325 TABLET ORAL at 17:35

## 2018-05-06 RX ADMIN — Medication 500 MILLIGRAM(S): at 11:58

## 2018-05-06 RX ADMIN — QUETIAPINE FUMARATE 50 MILLIGRAM(S): 200 TABLET, FILM COATED ORAL at 17:01

## 2018-05-06 RX ADMIN — OXYCODONE AND ACETAMINOPHEN 1 TABLET(S): 5; 325 TABLET ORAL at 17:02

## 2018-05-06 RX ADMIN — HEPARIN SODIUM 5000 UNIT(S): 5000 INJECTION INTRAVENOUS; SUBCUTANEOUS at 17:03

## 2018-05-06 RX ADMIN — HEPARIN SODIUM 5000 UNIT(S): 5000 INJECTION INTRAVENOUS; SUBCUTANEOUS at 05:33

## 2018-05-07 ENCOUNTER — TRANSCRIPTION ENCOUNTER (OUTPATIENT)
Age: 83
End: 2018-05-07

## 2018-05-07 PROCEDURE — 99232 SBSQ HOSP IP/OBS MODERATE 35: CPT

## 2018-05-07 RX ORDER — ALPRAZOLAM 0.25 MG
1 TABLET ORAL
Qty: 0 | Refills: 0 | COMMUNITY

## 2018-05-07 RX ADMIN — Medication 500 MILLIGRAM(S): at 11:43

## 2018-05-07 RX ADMIN — OXYCODONE AND ACETAMINOPHEN 1 TABLET(S): 5; 325 TABLET ORAL at 17:16

## 2018-05-07 RX ADMIN — HEPARIN SODIUM 5000 UNIT(S): 5000 INJECTION INTRAVENOUS; SUBCUTANEOUS at 17:12

## 2018-05-07 RX ADMIN — HEPARIN SODIUM 5000 UNIT(S): 5000 INJECTION INTRAVENOUS; SUBCUTANEOUS at 06:29

## 2018-05-07 RX ADMIN — QUETIAPINE FUMARATE 50 MILLIGRAM(S): 200 TABLET, FILM COATED ORAL at 17:12

## 2018-05-07 RX ADMIN — OXYCODONE AND ACETAMINOPHEN 1 TABLET(S): 5; 325 TABLET ORAL at 17:45

## 2018-05-07 RX ADMIN — QUETIAPINE FUMARATE 50 MILLIGRAM(S): 200 TABLET, FILM COATED ORAL at 06:30

## 2018-05-07 RX ADMIN — Medication 750 MILLIGRAM(S): at 22:53

## 2018-05-07 NOTE — DISCHARGE NOTE ADULT - COMMUNITY RESOURCES
[facility]   at 5:30pm [facility]   at 5:30pm Trip # [phone]

## 2018-05-07 NOTE — DISCHARGE NOTE ADULT - FUNCTIONAL SCREEN CURRENT LEVEL: AMBULATION, MLM
(3) assistive equipment and person wheelchair/(3) assistive equipment and person (4) completely dependent/bed bound

## 2018-05-07 NOTE — DISCHARGE NOTE ADULT - MEDICATION SUMMARY - MEDICATIONS TO CHANGE
I will SWITCH the dose or number of times a day I take the medications listed below when I get home from the hospital:    valproic acid 250 mg/5 mL oral syrup  -- 10 milliliter(s) by mouth 2 times a day

## 2018-05-07 NOTE — DISCHARGE NOTE ADULT - PLAN OF CARE
Stable You were found to be positive for respiratory syncytial virus. You were treated symptomatically with improvement. Stable. Follow up PCP in 1-2 weeks for further management. Continue supportive care and medications as recommend. Stable. Follow up PCP in 1-2 weeks for further management. You were given IV fluids. Stable. Please drink lots of fluids. Stable. Follow up PCP in 1-2 weeks for repeat labs to monitor electrolytes. Continue supportive care and medications as recommend. Stable. Follow up outpatient [facility] Geriatric clinic at [phone] or [facility] outpatient walk in clinic at [phone] (9AM-7PM). resolution You had a culture that was negative for C.diff, treated with imodium PRN. Diarrhea improved.

## 2018-05-07 NOTE — DISCHARGE NOTE ADULT - PATIENT PORTAL LINK FT
You can access the [facility] Portal, offered by [facility], by registering with the following website: [email or web address]

## 2018-05-07 NOTE — DISCHARGE NOTE ADULT - CARE PLAN
Principal Discharge DX:	RSV (acute bronchiolitis due to respiratory syncytial virus)  Goal:	Stable  Assessment and plan of treatment:	You were found to be positive for respiratory syncytial virus. You were treated symptomatically with improvement. Stable. Follow up PCP in 1-2 weeks for further management.  Secondary Diagnosis:	Dementia  Assessment and plan of treatment:	Continue supportive care and medications as recommend. Stable. Follow up PCP in 1-2 weeks for further management.  Secondary Diagnosis:	Dehydration  Assessment and plan of treatment:	You were given IV fluids. Stable. Please drink lots of fluids. Stable. Follow up PCP in 1-2 weeks for repeat labs to monitor electrolytes. Principal Discharge DX:	RSV (acute bronchiolitis due to respiratory syncytial virus)  Goal:	Stable  Assessment and plan of treatment:	You were found to be positive for respiratory syncytial virus. You were treated symptomatically with improvement. Stable. Follow up PCP in 1-2 weeks for further management.  Secondary Diagnosis:	Dementia  Assessment and plan of treatment:	Continue supportive care and medications as recommend. Stable. Follow up outpatient [facility] Geriatric clinic at [phone] or [facility] outpatient walk in clinic at [phone] (9AM-7PM).  Secondary Diagnosis:	Dehydration  Assessment and plan of treatment:	You were given IV fluids. Stable. Please drink lots of fluids. Stable. Follow up PCP in 1-2 weeks for repeat labs to monitor electrolytes. Principal Discharge DX:	RSV (acute bronchiolitis due to respiratory syncytial virus)  Goal:	Stable  Assessment and plan of treatment:	You were found to be positive for respiratory syncytial virus. You were treated symptomatically with improvement. Stable. Follow up PCP in 1-2 weeks for further management.  Secondary Diagnosis:	Dementia  Assessment and plan of treatment:	Continue supportive care and medications as recommend. Stable. Follow up outpatient [facility] Geriatric clinic at [phone] or [facility] outpatient walk in clinic at [phone] (9AM-7PM).  Secondary Diagnosis:	Dehydration  Assessment and plan of treatment:	You were given IV fluids. Stable. Please drink lots of fluids. Stable. Follow up PCP in 1-2 weeks for repeat labs to monitor electrolytes.  Secondary Diagnosis:	Diarrhea, unspecified type  Goal:	resolution  Assessment and plan of treatment:	You had a culture that was negative for C.diff, treated with imodium PRN. Diarrhea improved.

## 2018-05-07 NOTE — DISCHARGE NOTE ADULT - MEDICATION SUMMARY - MEDICATIONS TO TAKE
I will START or STAY ON the medications listed below when I get home from the hospital:    Percocet 5/325 oral tablet  -- 1 tab(s) by mouth every 6 hours, As Needed for moderate pain  -- Indication: For pain    valproic acid 250 mg/5 mL oral syrup  -- 15 milliliter(s) by mouth once a day (at bedtime)  -- Indication: For Dementia/agitation    valproic acid 250 mg/5 mL oral syrup  -- 10 milliliter(s) by mouth once a day  -- Indication: For Dementia/agitation    QUEtiapine 50 mg oral tablet  -- 1 tab(s) by mouth 2 times a day  -- Indication: For Dementia    QUEtiapine 25 mg oral tablet  -- 1 tab(s) by mouth every 6 hours, As needed for agitation  -- Indication: For Dementia

## 2018-05-07 NOTE — DISCHARGE NOTE ADULT - HOSPITAL COURSE
89 F with PMHx dementia, spinal stenosis presents with hypotension (100/50), tachycardia (100-110), fever (101) and CP per son. Endorses pain around xiphoid process. Recent admission [facility] for PNA, S/P Abx, diarrhea/FTT (4/20-25, 4/26-5/3). Discharge 5/3.    RSV  -Duonebs PRN  -No need for Abx   -Stable, F/U outpatient PCP      Dehydration  -Gentle hydration  -Stable, F/U outpatient PCP     Hyponatremia  -Likely from hypovolemia  -IVF  -Stable, F/U outpatient PCP     Dementia  -Seroquel, valproic acid, Zyprexa  -Psych Cx   -Stable, F/U outpatient PCP     Spinal stenosis   -Percocet PRN   -Stable, F/U outpatient PCP     Discharge to --------------------------- 89 F with PMHx dementia, spinal stenosis presents with hypotension (100/50), tachycardia (100-110), fever (101) and CP per son. Endorses pain around xiphoid process. Recent admission [facility] for PNA, S/P Abx, diarrhea/FTT (4/20-25, 4/26-5/3). Discharge 5/3.    RSV  -Duonebs PRN  -No need for Abx   -Stable, F/U outpatient PCP     Dehydration  -Gentle hydration  -Stable, F/U outpatient PCP     Hyponatremia  -Likely from hypovolemia  -IVF  -resolved, F/U outpatient PCP    Dementia  -Seroquel, valproic acid, Zyprexa  -Psych Cx   -Stable, F/U outpatient PCP     Spinal stenosis   -Percocet PRN   -Stable, F/U outpatient PCP and [facility] geriatric clinic    Discharge to home w/ HHA  --------------------------------------------- 89 F with PMHx dementia, spinal stenosis presents with hypotension (100/50), tachycardia (100-110), fever (101) and CP per son. Endorses pain around xiphoid process. Recent admission [facility] for PNA, S/P Abx, diarrhea/FTT (4/20-25, 4/26-5/3). Discharge 5/3.    RSV  -Duonebs PRN  -No need for Abx   -Stable, F/U outpatient PCP     Dehydration  -Gentle hydration  -Stable, F/U outpatient PCP     Hyponatremia  -Likely from hypovolemia  -IVF  -resolved, F/U outpatient PCP    Dementia  -Seroquel, valproic acid, Zyprexa  -Psych Cx   -Stable, F/U outpatient PCP     Spinal stenosis   -Percocet PRN   -Stable, F/U outpatient PCP and [facility] geriatric clinic    Discharge to home w/ HHA

## 2018-05-07 NOTE — DISCHARGE NOTE ADULT - CONDITIONS AT DISCHARGE
Pt aox1 and bedrest. VS stable. No chest pain noted & lungs are clear. Pt in NAD. Pt being discharged to home.

## 2018-05-08 LAB
BACTERIA BLD CULT: SIGNIFICANT CHANGE UP
BACTERIA BLD CULT: SIGNIFICANT CHANGE UP
BUN SERPL-MCNC: 26 MG/DL — HIGH (ref 7–23)
CALCIUM SERPL-MCNC: 9.1 MG/DL — SIGNIFICANT CHANGE UP (ref 8.4–10.5)
CHLORIDE SERPL-SCNC: 99 MMOL/L — SIGNIFICANT CHANGE UP (ref 98–107)
CO2 SERPL-SCNC: 30 MMOL/L — SIGNIFICANT CHANGE UP (ref 22–31)
CREAT SERPL-MCNC: 0.86 MG/DL — SIGNIFICANT CHANGE UP (ref 0.5–1.3)
GLUCOSE SERPL-MCNC: 78 MG/DL — SIGNIFICANT CHANGE UP (ref 70–99)
HCT VFR BLD CALC: 37.4 % — SIGNIFICANT CHANGE UP (ref 34.5–45)
HGB BLD-MCNC: 12 G/DL — SIGNIFICANT CHANGE UP (ref 11.5–15.5)
MCHC RBC-ENTMCNC: 31.7 PG — SIGNIFICANT CHANGE UP (ref 27–34)
MCHC RBC-ENTMCNC: 32.1 % — SIGNIFICANT CHANGE UP (ref 32–36)
MCV RBC AUTO: 98.7 FL — SIGNIFICANT CHANGE UP (ref 80–100)
NRBC # FLD: 0 — SIGNIFICANT CHANGE UP
PLATELET # BLD AUTO: 214 K/UL — SIGNIFICANT CHANGE UP (ref 150–400)
PMV BLD: 11.3 FL — SIGNIFICANT CHANGE UP (ref 7–13)
POTASSIUM SERPL-MCNC: 4.6 MMOL/L — SIGNIFICANT CHANGE UP (ref 3.5–5.3)
POTASSIUM SERPL-SCNC: 4.6 MMOL/L — SIGNIFICANT CHANGE UP (ref 3.5–5.3)
RBC # BLD: 3.79 M/UL — LOW (ref 3.8–5.2)
RBC # FLD: 13.3 % — SIGNIFICANT CHANGE UP (ref 10.3–14.5)
SODIUM SERPL-SCNC: 139 MMOL/L — SIGNIFICANT CHANGE UP (ref 135–145)
WBC # BLD: 8.9 K/UL — SIGNIFICANT CHANGE UP (ref 3.8–10.5)
WBC # FLD AUTO: 8.9 K/UL — SIGNIFICANT CHANGE UP (ref 3.8–10.5)

## 2018-05-08 PROCEDURE — 99233 SBSQ HOSP IP/OBS HIGH 50: CPT | Mod: GC

## 2018-05-08 RX ORDER — VALPROIC ACID (AS SODIUM SALT) 250 MG/5ML
10 SOLUTION, ORAL ORAL
Qty: 0 | Refills: 0 | COMMUNITY
Start: 2018-05-08

## 2018-05-08 RX ORDER — QUETIAPINE FUMARATE 200 MG/1
1 TABLET, FILM COATED ORAL
Qty: 120 | Refills: 0 | OUTPATIENT
Start: 2018-05-08 | End: 2018-06-06

## 2018-05-08 RX ADMIN — HEPARIN SODIUM 5000 UNIT(S): 5000 INJECTION INTRAVENOUS; SUBCUTANEOUS at 06:47

## 2018-05-08 RX ADMIN — Medication 750 MILLIGRAM(S): at 22:23

## 2018-05-08 RX ADMIN — QUETIAPINE FUMARATE 50 MILLIGRAM(S): 200 TABLET, FILM COATED ORAL at 06:48

## 2018-05-08 RX ADMIN — Medication 500 MILLIGRAM(S): at 11:12

## 2018-05-08 RX ADMIN — QUETIAPINE FUMARATE 50 MILLIGRAM(S): 200 TABLET, FILM COATED ORAL at 18:13

## 2018-05-08 RX ADMIN — HEPARIN SODIUM 5000 UNIT(S): 5000 INJECTION INTRAVENOUS; SUBCUTANEOUS at 18:13

## 2018-05-08 NOTE — PHYSICAL THERAPY INITIAL EVALUATION ADULT - DIAGNOSIS, PT EVAL
Pt admitted RSV, mild hyponatremia, and dehydration; pt presents with decreased strength and decreased sitting balance.

## 2018-05-08 NOTE — PHYSICAL THERAPY INITIAL EVALUATION ADULT - PATIENT PROFILE REVIEW, REHAB EVAL
yes/No Formal Activity Order in the computer; spoke with RN Jaz Licea prior to PT evaluation--> Pt OK for PT consult

## 2018-05-08 NOTE — PHYSICAL THERAPY INITIAL EVALUATION ADULT - MD/RN NOTIFIED
January 19, 2018         Patient: Irvin Smith   YOB: 2006   Date of Visit: 1/19/2018           To Whom it May Concern:    Irvin Smith was seen in my clinic on 1/19/2018.        If you have any questions or concerns, please don't hesitate to call.        Sincerely,           Mahad Kimble M.D.  Electronically Signed     
yes

## 2018-05-08 NOTE — PHYSICAL THERAPY INITIAL EVALUATION ADULT - DISCHARGE DISPOSITION, PT EVAL
no skilled PT needs/home/Pt appears to be at baseline level; pt will benefit from PT program while @ [facility] to prevent deconditioning

## 2018-05-08 NOTE — PHYSICAL THERAPY INITIAL EVALUATION ADULT - PERTINENT HX OF CURRENT PROBLEM, REHAB EVAL
89yom with h/o Dementia, Spinal stenosis recent admission to [facility] for pna, treated with abx, diarrhea/FTT ( 4/20- 4/25, 4/26-5/1, cdiff was neg, and 5/1 to 5/3) brought in for hypotension, tachycardia, fever and chest pain found to have RSV, mild hyponatremia and dehydration.

## 2018-05-08 NOTE — PHYSICAL THERAPY INITIAL EVALUATION ADULT - MANUAL MUSCLE TESTING RESULTS, REHAB EVAL
Cognitive impairment/ cardiac precautions; bilateral UE at least 3+/5, Bilateral LE at least 3-/5/grossly assessed due to

## 2018-05-08 NOTE — PHYSICAL THERAPY INITIAL EVALUATION ADULT - ADDITIONAL COMMENTS
Pt is a poor historian; As per chart review; Pt lives with son in a private house with one flight of steps to enter. Pt required assist with all ADLs and has a HHA 5day/week x 9hrs/day and is assisted into w/c.

## 2018-05-09 DIAGNOSIS — R19.7 DIARRHEA, UNSPECIFIED: ICD-10-CM

## 2018-05-09 LAB — C DIFF TOX GENS STL QL NAA+PROBE: SIGNIFICANT CHANGE UP

## 2018-05-09 PROCEDURE — 99232 SBSQ HOSP IP/OBS MODERATE 35: CPT

## 2018-05-09 RX ADMIN — Medication 750 MILLIGRAM(S): at 22:38

## 2018-05-09 RX ADMIN — QUETIAPINE FUMARATE 50 MILLIGRAM(S): 200 TABLET, FILM COATED ORAL at 17:23

## 2018-05-09 RX ADMIN — QUETIAPINE FUMARATE 50 MILLIGRAM(S): 200 TABLET, FILM COATED ORAL at 06:21

## 2018-05-09 RX ADMIN — HEPARIN SODIUM 5000 UNIT(S): 5000 INJECTION INTRAVENOUS; SUBCUTANEOUS at 06:19

## 2018-05-09 RX ADMIN — HEPARIN SODIUM 5000 UNIT(S): 5000 INJECTION INTRAVENOUS; SUBCUTANEOUS at 17:22

## 2018-05-09 RX ADMIN — Medication 500 MILLIGRAM(S): at 09:58

## 2018-05-10 VITALS
RESPIRATION RATE: 18 BRPM | SYSTOLIC BLOOD PRESSURE: 140 MMHG | TEMPERATURE: 98 F | HEART RATE: 70 BPM | OXYGEN SATURATION: 98 % | DIASTOLIC BLOOD PRESSURE: 54 MMHG

## 2018-05-10 PROCEDURE — 99239 HOSP IP/OBS DSCHRG MGMT >30: CPT

## 2018-05-10 RX ORDER — LOPERAMIDE HCL 2 MG
2 TABLET ORAL DAILY
Qty: 0 | Refills: 0 | Status: DISCONTINUED | OUTPATIENT
Start: 2018-05-10 | End: 2018-05-10

## 2018-05-10 RX ORDER — OXYCODONE AND ACETAMINOPHEN 5; 325 MG/1; MG/1
1 TABLET ORAL EVERY 6 HOURS
Qty: 0 | Refills: 0 | Status: DISCONTINUED | OUTPATIENT
Start: 2018-05-10 | End: 2018-05-10

## 2018-05-10 RX ORDER — VALPROIC ACID (AS SODIUM SALT) 250 MG/5ML
10 SOLUTION, ORAL ORAL
Qty: 300 | Refills: 0 | OUTPATIENT
Start: 2018-05-10 | End: 2018-06-08

## 2018-05-10 RX ORDER — VALPROIC ACID (AS SODIUM SALT) 250 MG/5ML
15 SOLUTION, ORAL ORAL
Qty: 450 | Refills: 0 | OUTPATIENT
Start: 2018-05-10 | End: 2018-06-08

## 2018-05-10 RX ADMIN — Medication 500 MILLIGRAM(S): at 10:20

## 2018-05-10 RX ADMIN — QUETIAPINE FUMARATE 50 MILLIGRAM(S): 200 TABLET, FILM COATED ORAL at 06:50

## 2018-05-10 NOTE — PROGRESS NOTE ADULT - ATTENDING COMMENTS
Dispo- Pending; patient's son (Zac Herbert) called by me today with no answer. VM left regarding patient's stability and likelihood of discharge tomorrow
Awaiting PT eval
Dispo- Pending
Plan for discharge, awaiting a call back from son for discharge planning. Left a message Spent 40 mins on discharge
-discharge home 32 min
Left a message for son to discuss discharge planning

## 2018-05-10 NOTE — PROGRESS NOTE ADULT - PROBLEM SELECTOR PLAN 5
Appreciate psych recs  - VPA level within range  - C/w VPA 500mg QAM and 750mg QHS  - C/w Seroquel and Zyprexa prn
cont percocet prn for severe pain
cont percocet prn for severe pain
Appreciate psych recs  - VPA level within range  - C/w VPA 500mg QAM and 750mg QHS  - C/w Seroquel and Zyprexa prn
cont percocet prn for severe pain
cont percocet prn for severe pain

## 2018-05-10 NOTE — PROGRESS NOTE ADULT - PROBLEM SELECTOR PLAN 2
cont supportive care, contact isolation, monitor respiratory status, nebs prn; no need for abx  - CT chest negative for PNA  - BCx NGTD
Improved; monitor BMP  - Encourage PO intake
Improved; monitor BMP  - PO intake good; DC IVF
cont supportive care, contact isolation, monitor respiratory status, nebs prn; no need for abx  - CT chest negative for PNA  - BCx negative for 72 hours

## 2018-05-10 NOTE — PROGRESS NOTE ADULT - PROBLEM SELECTOR PLAN 1
- neg for c diff likely due to viral infection  -PRN imodium
cont supportive care, contact isolation, monitor respiratory status, nebs prn; no need for abx  - CT chest negative for PNA  - BCx negative for 48 hours
cont supportive care, contact isolation, monitor respiratory status, nebs prn; no need for abx  - CT chest negative for PNA  - BCx negative for 72 hours
- pt had recent admission for diarrhea, will send Cdiff  - if neg, will start imodium
cont supportive care, contact isolation, monitor respiratory status, nebs prn; no need for abx  - CT chest negative for PNA  - BCx negative for 24 hours
cont supportive care, contact isolation, monitor respiratory status, nebs prn; no need for abx  - CT chest negative for PNA  - BCx negative for 72 hours

## 2018-05-10 NOTE — PROGRESS NOTE ADULT - PROBLEM SELECTOR PROBLEM 6
History of Spinal Stenosis
Need for prophylactic measure
Need for prophylactic measure
History of Spinal Stenosis
Need for prophylactic measure
Need for prophylactic measure

## 2018-05-10 NOTE — PROGRESS NOTE ADULT - PROBLEM SELECTOR PROBLEM 5
Dementia
History of Spinal Stenosis
History of Spinal Stenosis
Dementia
History of Spinal Stenosis
History of Spinal Stenosis

## 2018-05-10 NOTE — PROGRESS NOTE ADULT - PROBLEM SELECTOR PLAN 6
cont percocet prn for severe pain
hsq for dvt ppx   IMPROVE VTE Individual Risk Assessment    RISK                                                          Points  [] Previous VTE                                           3  [] Thrombophilia                                        2  [] Lower limb paralysis                              2   [] Current Cancer                                       2   [x] Immobilization > 24 hrs                        1  [] ICU/CCU stay > 24 hours                       1  [x] Age > 60                                                   1    IMPROVE VTE Score: 2
cont percocet prn for severe pain
hsq for dvt ppx   IMPROVE VTE Individual Risk Assessment    RISK                                                          Points  [] Previous VTE                                           3  [] Thrombophilia                                        2  [] Lower limb paralysis                              2   [] Current Cancer                                       2   [x] Immobilization > 24 hrs                        1  [] ICU/CCU stay > 24 hours                       1  [x] Age > 60                                                   1    IMPROVE VTE Score: 2

## 2018-05-10 NOTE — PROGRESS NOTE ADULT - PROBLEM SELECTOR PLAN 3
Improved; monitor BMP  - Encourage PO intake
Resolved  - Monitor BMP daily
Resolved  - Monitor BMP daily
Improved; monitor BMP  - Encourage PO intake
Resolved  - Monitor BMP daily
Resolved  - Monitor BMP daily

## 2018-05-10 NOTE — PROGRESS NOTE ADULT - PROBLEM SELECTOR PROBLEM 2
RSV (acute bronchiolitis due to respiratory syncytial virus)
Dehydration
RSV (acute bronchiolitis due to respiratory syncytial virus)

## 2018-05-10 NOTE — PROGRESS NOTE ADULT - PROBLEM SELECTOR PROBLEM 1
Diarrhea, unspecified type
RSV (acute bronchiolitis due to respiratory syncytial virus)
RSV (acute bronchiolitis due to respiratory syncytial virus)
Diarrhea, unspecified type
RSV (acute bronchiolitis due to respiratory syncytial virus)
RSV (acute bronchiolitis due to respiratory syncytial virus)

## 2018-05-10 NOTE — PROGRESS NOTE ADULT - ASSESSMENT
89yom with h/o Dementia, Spinal stenosis recent admission to [facility] for pna, treated with abx, diarrhea/FTT ( 4/20- 4/25, 4/26-5/1, cdiff was neg, and 5/1 to 5/3) brought in for hypotension, tachycardia, fever and chest pain found to have RSV and mild hyponatremia and dehydration.
89yom with h/o Dementia, Spinal stenosis recent admission to [facility] for pna, treated with abx, diarrhea/FTT ( 4/20- 4/25, 4/26-5/1, cdiff was neg, and 5/1 to 5/3) brought in for hypotension, tachycardia, fever and chest pain found to have RSV and mild hyponatremia and dehydration.
89yom with h/o Dementia, Spinal stenosis recent admission to [facility] for pna, treated with abx, diarrhea/FTT ( 4/20- 4/25, 4/26-5/1, cdiff was neg, and 5/1 to 5/3) brought in for hypotension, tachycardia, fever and chest pain found to have RSV and mild hyponatremia and dehydration.
89yom with h/o Dementia, Spinal stenosis recent admission to [facility] for pna, treated with abx, diarrhea/FTT ( 4/20- 4/25, 4/26-5/1, cdiff was neg, and 5/1 to 5/3) brought in for hypotension, tachycardia, fever and chest pain found to have RSV and mild hyponatremia and dehydration.
89yom with h/o Dementia, Spinal stenosis recent admission to [facility] for pna, treated with abx, diarrhea/FTT ( 4/20- 4/25, 4/26-5/1, cdiff was neg, and 5/1 to 5/3) brought in for hypotension, tachycardia, fever and chest pain found to have RSV and mild hyponatremia and dehydration.
89yom with h/o Dementia, Spinal stenosis recent admission to [facility] for pna, treated with abx, diarrhea/FTT ( 4/20- 4/25, 4/26-5/1, cdiff was neg, and 5/1 to 5/3) brought in for hypotension, tachycardia, fever and chest pain found to have RSV and mild hyponatremia and dehydration.

## 2018-05-10 NOTE — PROGRESS NOTE ADULT - PROBLEM SELECTOR PLAN 4
Resolved  - Monitor BMP daily
Appreciate psych recs  - VPA level within range  - C/w VPA 500mg QAM and 750mg QHS  - C/w Seroquel and Zyprexa prn
Appreciate psych recs  - VPA level within range  - Increase QHS dose to 750mg  - C/w 500mg of Valproic acid in AM  - C/w Seroquel and Zyprexa prn
Resolved  - Monitor BMP daily

## 2018-05-10 NOTE — PROGRESS NOTE ADULT - SUBJECTIVE AND OBJECTIVE BOX
Patient is a 89y old  Female who presents with a chief complaint of Hypotension, tachycardia and fever (07 May 2018 11:32)      SUBJECTIVE / OVERNIGHT EVENTS: Unable to obtain due to dementia.    MEDICATIONS  (STANDING):  heparin  Injectable 5000 Unit(s) SubCutaneous every 12 hours  QUEtiapine 50 milliGRAM(s) Oral two times a day  sodium chloride 0.9%. 1000 milliLiter(s) (60 mL/Hr) IV Continuous <Continuous>  valproic  acid Syrup 500 milliGRAM(s) Oral daily  valproic  acid Syrup 750 milliGRAM(s) Oral at bedtime    MEDICATIONS  (PRN):  ALBUTerol   0.5% 2.5 milliGRAM(s) Nebulizer every 6 hours PRN Shortness of Breath and/or Wheezing  OLANZapine Injectable 2.5 milliGRAM(s) IntraMuscular once PRN severe agitation per psych recs  oxyCODONE    5 mG/acetaminophen 325 mG 1 Tablet(s) Oral every 6 hours PRN Severe Pain (7 - 10)  QUEtiapine 25 milliGRAM(s) Oral every 6 hours PRN agitation      T(C): 36.3 (05-07-18 @ 10:13), Max: 36.9 (05-06-18 @ 14:35)  HR: 67 (05-07-18 @ 10:13) (67 - 87)  BP: 147/51 (05-07-18 @ 10:13) (99/61 - 147/51)  RR: 16 (05-07-18 @ 10:13) (16 - 18)  SpO2: 98% (05-07-18 @ 10:13) (93% - 98%)  CAPILLARY BLOOD GLUCOSE        I&O's Summary    06 May 2018 07:01  -  07 May 2018 07:00  --------------------------------------------------------  IN: 360 mL / OUT: 0 mL / NET: 360 mL        PHYSICAL EXAM:  GENERAL: NAD,   HEAD:  Normocephalic  EYES: EOMI,  conjunctiva and sclera clear  NECK: Supple,   CHEST/LUNG: upper airway transmitted sound  HEART:  s1 s2 + Regular rate and rhythm;   ABDOMEN: Soft, Nontender, Nondistended; Bowel sounds present  EXTREMITIES:   No clubbing, cyanosis  NEURO: AAOx1 ( self)      LABS:                        11.4   9.28  )-----------( 225      ( 06 May 2018 06:00 )             35.0     05-06    138  |  97<L>  |  26<H>  ----------------------------<  92  4.4   |  25  |  0.99    Ca    8.8      06 May 2018 06:00  Phos  3.2     05-06  Mg     2.4     05-06    TPro  6.2  /  Alb  3.5  /  TBili  0.2  /  DBili  x   /  AST  14  /  ALT  10  /  AlkPhos  59  05-06
Patient is a 89y old  Female who presents with a chief complaint of Hypotension, tachycardia and fever per son (04 May 2018 09:59)    SUBJECTIVE / OVERNIGHT EVENTS: Patient seen and examined. No overnight events. Patient extremely pleasant with no complaints saying "I love you". Denies any fevers, SOB, or cough.     Review of Systems: All others negative except for above    MEDICATIONS  (STANDING):  heparin  Injectable 5000 Unit(s) SubCutaneous every 12 hours  QUEtiapine 50 milliGRAM(s) Oral two times a day  sodium chloride 0.9%. 1000 milliLiter(s) (60 mL/Hr) IV Continuous <Continuous>  valproic  acid Syrup 500 milliGRAM(s) Oral daily  valproic  acid Syrup 750 milliGRAM(s) Oral at bedtime    MEDICATIONS  (PRN):  ALBUTerol   0.5% 2.5 milliGRAM(s) Nebulizer every 6 hours PRN Shortness of Breath and/or Wheezing  OLANZapine Injectable 2.5 milliGRAM(s) IntraMuscular once PRN severe agitation per psych recs  oxyCODONE    5 mG/acetaminophen 325 mG 1 Tablet(s) Oral every 6 hours PRN Severe Pain (7 - 10)  QUEtiapine 25 milliGRAM(s) Oral every 6 hours PRN agitation    PHYSICAL EXAM:  Vital Signs Last 24 Hrs  T(C): 36.9 (06 May 2018 14:35), Max: 36.9 (06 May 2018 14:35)  T(F): 98.5 (06 May 2018 14:35), Max: 98.5 (06 May 2018 14:35)  HR: 78 (06 May 2018 14:35) (67 - 90)  BP: 99/61 (06 May 2018 14:35) (99/61 - 130/50)  BP(mean): --  RR: 18 (06 May 2018 14:35) (18 - 18)  SpO2: 97% (06 May 2018 14:35) (95% - 97%)    GENERAL: NAD, well-developed  HEAD:  Atraumatic, Normocephalic  CHEST/LUNG: Poor inspiratory effort, otherwise CTAB  HEART: Regular rate and rhythm; No murmurs, rubs, or gallops  ABDOMEN: Soft, Nontender, Nondistended; Bowel sounds present  EXTREMITIES:  2+ Peripheral Pulses, No clubbing, cyanosis, or edema  PSYCH: AAOx1x  NEUROLOGY: non-focal  SKIN: No rashes or lesions    LABS:                        11.4   9.28  )-----------( 225      ( 06 May 2018 06:00 )             35.0   05-06    138  |  97<L>  |  26<H>  ----------------------------<  92  4.4   |  25  |  0.99    Ca    8.8      06 May 2018 06:00  Phos  3.2     05-06  Mg     2.4     05-06    TPro  6.2  /  Alb  3.5  /  TBili  0.2  /  DBili  x   /  AST  14  /  ALT  10  /  AlkPhos  59  05-06      CARDIAC MARKERS ( 04 May 2018 03:25 )  x     / < 0.06 ng/mL / x     / x     / x      CARDIAC MARKERS ( 03 May 2018 22:10 )  x     / < 0.06 ng/mL / x     / x     / x        Urinalysis Basic - ( 04 May 2018 00:30 )    Color: PLYEL / Appearance: CLEAR / S.017 / pH: 6.5  Gluc: NEGATIVE / Ketone: NEGATIVE  / Bili: NEGATIVE / Urobili: NORMAL mg/dL   Blood: NEGATIVE / Protein: NEGATIVE mg/dL / Nitrite: NEGATIVE   Leuk Esterase: NEGATIVE / RBC: 0-2 / WBC 2-5   Sq Epi: OCC / Non Sq Epi: x / Bacteria: x    RVP- RSV positive     RADIOLOGY & ADDITIONAL TESTS:    Imaging Personally Reviewed:      Consultant(s) Notes Reviewed:      Care Discussed with Consultants/Other Providers:
Patient is a 89y old  Female who presents with a chief complaint of Hypotension, tachycardia and fever (07 May 2018 11:32)      SUBJECTIVE / OVERNIGHT EVENTS: Per staff., pt with bouts of diarrhea. Pt denies abd pain    MEDICATIONS  (STANDING):  heparin  Injectable 5000 Unit(s) SubCutaneous every 12 hours  QUEtiapine 50 milliGRAM(s) Oral two times a day  sodium chloride 0.9%. 1000 milliLiter(s) (60 mL/Hr) IV Continuous <Continuous>  valproic  acid Syrup 500 milliGRAM(s) Oral daily  valproic  acid Syrup 750 milliGRAM(s) Oral at bedtime    MEDICATIONS  (PRN):  ALBUTerol   0.5% 2.5 milliGRAM(s) Nebulizer every 6 hours PRN Shortness of Breath and/or Wheezing  OLANZapine Injectable 2.5 milliGRAM(s) IntraMuscular once PRN severe agitation per psych recs  oxyCODONE    5 mG/acetaminophen 325 mG 1 Tablet(s) Oral every 6 hours PRN Severe Pain (7 - 10)  QUEtiapine 25 milliGRAM(s) Oral every 6 hours PRN agitation      T(C): 36.5 (05-09-18 @ 11:50), Max: 36.6 (05-08-18 @ 13:46)  HR: 78 (05-09-18 @ 11:50) (63 - 113)  BP: 138/94 (05-09-18 @ 11:50) (113/57 - 139/82)  RR: 18 (05-09-18 @ 11:50) (18 - 18)  SpO2: 99% (05-09-18 @ 11:50) (95% - 100%)  CAPILLARY BLOOD GLUCOSE        I&O's Summary      PHYSICAL EXAM:  GENERAL: NAD,   HEAD:  Normocephalic  EYES: EOMI,  conjunctiva and sclera clear  NECK: Supple,   CHEST/LUNG: No rhonchi, No wheeze poor effort  HEART:  s1 s2 + Regular rate and rhythm;   ABDOMEN: Soft, Nontender, Nondistended; Bowel sounds present  EXTREMITIES:   No clubbing, cyanosis  NEURO: AAOx1 (self)      LABS:                        12.0   8.90  )-----------( 214      ( 08 May 2018 06:00 )             37.4     05-08    139  |  99  |  26<H>  ----------------------------<  78  4.6   |  30  |  0.86    Ca    9.1      08 May 2018 06:00
Patient is a 89y old  Female who presents with a chief complaint of Hypotension, tachycardia and fever (07 May 2018 11:32)      SUBJECTIVE / OVERNIGHT EVENTS: Unable to obtain due to dementia    MEDICATIONS  (STANDING):  heparin  Injectable 5000 Unit(s) SubCutaneous every 12 hours  QUEtiapine 50 milliGRAM(s) Oral two times a day  sodium chloride 0.9%. 1000 milliLiter(s) (60 mL/Hr) IV Continuous <Continuous>  valproic  acid Syrup 500 milliGRAM(s) Oral daily  valproic  acid Syrup 750 milliGRAM(s) Oral at bedtime    MEDICATIONS  (PRN):  ALBUTerol   0.5% 2.5 milliGRAM(s) Nebulizer every 6 hours PRN Shortness of Breath and/or Wheezing  OLANZapine Injectable 2.5 milliGRAM(s) IntraMuscular once PRN severe agitation per psych recs  oxyCODONE    5 mG/acetaminophen 325 mG 1 Tablet(s) Oral every 6 hours PRN Severe Pain (7 - 10)  QUEtiapine 25 milliGRAM(s) Oral every 6 hours PRN agitation      T(C): 36.7 (05-08-18 @ 09:44), Max: 36.7 (05-07-18 @ 16:55)  HR: 69 (05-08-18 @ 09:44) (55 - 69)  BP: 115/55 (05-08-18 @ 09:44) (115/55 - 145/63)  RR: 19 (05-08-18 @ 09:44) (18 - 20)  SpO2: 98% (05-08-18 @ 09:44) (96% - 98%)  CAPILLARY BLOOD GLUCOSE        I&O's Summary    07 May 2018 07:01  -  08 May 2018 07:00  --------------------------------------------------------  IN: 600 mL / OUT: 0 mL / NET: 600 mL        PHYSICAL EXAM:  GENERAL: NAD,   HEAD:  Normocephalic  EYES: EOMI,  conjunctiva and sclera clear  NECK: Supple,   CHEST/LUNG: No rhonchi, No wheeze ( poor effort)  HEART:  s1 s2 + Regular rate and rhythm;   ABDOMEN: Soft, Nontender, Nondistended; Bowel sounds present  EXTREMITIES:   No clubbing, cyanosis  NEURO: AAOx1 ( self)      LABS:                        12.0   8.90  )-----------( 214      ( 08 May 2018 06:00 )             37.4     05-08    139  |  99  |  26<H>  ----------------------------<  78  4.6   |  30  |  0.86    Ca    9.1      08 May 2018 06:00
Patient is a 89y old  Female who presents with a chief complaint of Hypotension, tachycardia and fever per son (04 May 2018 09:59)    SUBJECTIVE / OVERNIGHT EVENTS: Patient seen and examined. No overnight events. Patient extremely pleasant with no complaints. Denies any fevers, SOB, or cough.     Review of Systems: All others negative except for above    MEDICATIONS  (STANDING):  heparin  Injectable 5000 Unit(s) SubCutaneous every 12 hours  QUEtiapine 50 milliGRAM(s) Oral two times a day  sodium chloride 0.9%. 1000 milliLiter(s) (60 mL/Hr) IV Continuous <Continuous>  valproic  acid Syrup 500 milliGRAM(s) Oral daily  valproic  acid Syrup 750 milliGRAM(s) Oral at bedtime    MEDICATIONS  (PRN):  ALBUTerol   0.5% 2.5 milliGRAM(s) Nebulizer every 6 hours PRN Shortness of Breath and/or Wheezing  OLANZapine Injectable 2.5 milliGRAM(s) IntraMuscular once PRN severe agitation per psych recs  oxyCODONE    5 mG/acetaminophen 325 mG 1 Tablet(s) Oral every 6 hours PRN Severe Pain (7 - 10)  QUEtiapine 25 milliGRAM(s) Oral every 6 hours PRN agitation      PHYSICAL EXAM:  Vital Signs Last 24 Hrs  T(C): 36.1 (05 May 2018 09:49), Max: 36.9 (04 May 2018 20:25)  T(F): 97 (05 May 2018 09:49), Max: 98.4 (04 May 2018 20:25)  HR: 66 (05 May 2018 09:49) (66 - 92)  BP: 135/57 (05 May 2018 09:49) (118/51 - 135/57)  BP(mean): --  RR: 18 (05 May 2018 09:49) (18 - 20)  SpO2: 98% (05 May 2018 05:11) (97% - 100%)    GENERAL: NAD, well-developed  HEAD:  Atraumatic, Normocephalic  NECK: Supple, No JVD  CHEST/LUNG: Poor inspiratory effort, otherwise CTAB  HEART: Regular rate and rhythm; No murmurs, rubs, or gallops  ABDOMEN: Soft, Nontender, Nondistended; Bowel sounds present  EXTREMITIES:  2+ Peripheral Pulses, No clubbing, cyanosis, or edema  PSYCH: AAOx3  NEUROLOGY: non-focal  SKIN: No rashes or lesions    LABS:  CAPILLARY BLOOD GLUCOSE                      12.4   7.07  )-----------( 246      ( 05 May 2018 05:45 )             38.4         137  |  98  |  26<H>  ----------------------------<  85  4.4   |  26  |  1.02    Ca    9.1      05 May 2018 05:45    TPro  6.4  /  Alb  3.6  /  TBili  0.2  /  DBili  x   /  AST  17  /  ALT  11  /  AlkPhos  63  05-03      CARDIAC MARKERS ( 04 May 2018 03:25 )  x     / < 0.06 ng/mL / x     / x     / x      CARDIAC MARKERS ( 03 May 2018 22:10 )  x     / < 0.06 ng/mL / x     / x     / x          Urinalysis Basic - ( 04 May 2018 00:30 )    Color: PLYEL / Appearance: CLEAR / S.017 / pH: 6.5  Gluc: NEGATIVE / Ketone: NEGATIVE  / Bili: NEGATIVE / Urobili: NORMAL mg/dL   Blood: NEGATIVE / Protein: NEGATIVE mg/dL / Nitrite: NEGATIVE   Leuk Esterase: NEGATIVE / RBC: 0-2 / WBC 2-5   Sq Epi: OCC / Non Sq Epi: x / Bacteria: x    RVP- RSV positive     RADIOLOGY & ADDITIONAL TESTS:    Imaging Personally Reviewed:      Consultant(s) Notes Reviewed:      Care Discussed with Consultants/Other Providers:
Patient is a 89y old  Female who presents with a chief complaint of Hypotension, tachycardia and fever (07 May 2018 11:32)      SUBJECTIVE / OVERNIGHT EVENTS: Pt in NAD no further episodes of diarrhea afebrile. eating well asking for more juice this morning. no fever/SOB/N/V    MEDICATIONS  (STANDING):  heparin  Injectable 5000 Unit(s) SubCutaneous every 12 hours  QUEtiapine 50 milliGRAM(s) Oral two times a day  sodium chloride 0.9%. 1000 milliLiter(s) (60 mL/Hr) IV Continuous <Continuous>  valproic  acid Syrup 500 milliGRAM(s) Oral daily  valproic  acid Syrup 750 milliGRAM(s) Oral at bedtime    MEDICATIONS  (PRN):  ALBUTerol   0.5% 2.5 milliGRAM(s) Nebulizer every 6 hours PRN Shortness of Breath and/or Wheezing  loperamide 2 milliGRAM(s) Oral daily PRN Diarrhea  OLANZapine Injectable 2.5 milliGRAM(s) IntraMuscular once PRN severe agitation per psych recs  oxyCODONE    5 mG/acetaminophen 325 mG 1 Tablet(s) Oral every 6 hours PRN Severe Pain (7 - 10)  QUEtiapine 25 milliGRAM(s) Oral every 6 hours PRN agitation        CAPILLARY BLOOD GLUCOSE        I&O's Summary      T(C): 36.7 (05-10-18 @ 09:18), Max: 36.8 (05-10-18 @ 06:46)  HR: 68 (05-10-18 @ 09:18) (56 - 87)  BP: 117/86 (05-10-18 @ 09:18) (110/90 - 154/50)  RR: 18 (05-10-18 @ 09:18) (17 - 18)  SpO2: 97% (05-10-18 @ 09:18) (94% - 100%)    PHYSICAL EXAM:  GENERAL: NAD, well-developed  HEAD:  Atraumatic, Normocephalic  EYES: EOMI, PERRLA, conjunctiva and sclera clear  NECK: Supple, No JVD  CHEST/LUNG: Clear to auscultation bilaterally; No wheeze  HEART: Regular rate and rhythm; No murmurs, rubs, or gallops  ABDOMEN: Soft, Nontender, Nondistended; Bowel sounds present  EXTREMITIES:  2+ Peripheral Pulses, No clubbing, cyanosis, or edema  PSYCH: AAOx1 self       LABS:        c diff neg               RADIOLOGY & ADDITIONAL TESTS:    Imaging Personally Reviewed:    Consultant(s) Notes Reviewed:      Care Discussed with Consultants/Other Providers:

## 2018-05-22 ENCOUNTER — INPATIENT (INPATIENT)
Facility: HOSPITAL | Age: 83
LOS: 1 days | Discharge: HOME HEALTH | End: 2018-05-24
Attending: HOSPITALIST | Admitting: HOSPITALIST
Payer: MEDICARE

## 2018-05-22 VITALS
OXYGEN SATURATION: 96 % | RESPIRATION RATE: 18 BRPM | TEMPERATURE: 99 F | DIASTOLIC BLOOD PRESSURE: 62 MMHG | SYSTOLIC BLOOD PRESSURE: 136 MMHG | HEART RATE: 71 BPM

## 2018-05-22 DIAGNOSIS — N39.0 URINARY TRACT INFECTION, SITE NOT SPECIFIED: ICD-10-CM

## 2018-05-22 DIAGNOSIS — Z29.9 ENCOUNTER FOR PROPHYLACTIC MEASURES, UNSPECIFIED: ICD-10-CM

## 2018-05-22 DIAGNOSIS — F03.90 UNSPECIFIED DEMENTIA WITHOUT BEHAVIORAL DISTURBANCE: ICD-10-CM

## 2018-05-22 LAB
ALBUMIN SERPL ELPH-MCNC: 3.7 G/DL — SIGNIFICANT CHANGE UP (ref 3.3–5)
ALP SERPL-CCNC: 70 U/L — SIGNIFICANT CHANGE UP (ref 40–120)
ALT FLD-CCNC: 14 U/L — SIGNIFICANT CHANGE UP (ref 4–33)
APPEARANCE UR: SIGNIFICANT CHANGE UP
APTT BLD: 30.7 SEC — SIGNIFICANT CHANGE UP (ref 27.5–37.4)
AST SERPL-CCNC: 33 U/L — HIGH (ref 4–32)
BACTERIA # UR AUTO: HIGH
BASE EXCESS BLDV CALC-SCNC: 9.5 MMOL/L — SIGNIFICANT CHANGE UP
BASOPHILS # BLD AUTO: 0.05 K/UL — SIGNIFICANT CHANGE UP (ref 0–0.2)
BASOPHILS NFR BLD AUTO: 0.5 % — SIGNIFICANT CHANGE UP (ref 0–2)
BILIRUB SERPL-MCNC: 0.3 MG/DL — SIGNIFICANT CHANGE UP (ref 0.2–1.2)
BILIRUB UR-MCNC: NEGATIVE — SIGNIFICANT CHANGE UP
BLOOD GAS VENOUS - CREATININE: 0.94 MG/DL — SIGNIFICANT CHANGE UP (ref 0.5–1.3)
BLOOD UR QL VISUAL: HIGH
BUN SERPL-MCNC: 24 MG/DL — HIGH (ref 7–23)
CALCIUM SERPL-MCNC: 9.5 MG/DL — SIGNIFICANT CHANGE UP (ref 8.4–10.5)
CHLORIDE BLDV-SCNC: 103 MMOL/L — SIGNIFICANT CHANGE UP (ref 96–108)
CHLORIDE SERPL-SCNC: 98 MMOL/L — SIGNIFICANT CHANGE UP (ref 98–107)
CO2 SERPL-SCNC: 30 MMOL/L — SIGNIFICANT CHANGE UP (ref 22–31)
COLOR SPEC: YELLOW — SIGNIFICANT CHANGE UP
CREAT SERPL-MCNC: 0.95 MG/DL — SIGNIFICANT CHANGE UP (ref 0.5–1.3)
EOSINOPHIL # BLD AUTO: 0.16 K/UL — SIGNIFICANT CHANGE UP (ref 0–0.5)
EOSINOPHIL NFR BLD AUTO: 1.6 % — SIGNIFICANT CHANGE UP (ref 0–6)
EPI CELLS # UR: SIGNIFICANT CHANGE UP
GAS PNL BLDV: 132 MMOL/L — LOW (ref 136–146)
GLUCOSE BLDV-MCNC: 97 — SIGNIFICANT CHANGE UP (ref 70–99)
GLUCOSE SERPL-MCNC: 89 MG/DL — SIGNIFICANT CHANGE UP (ref 70–99)
GLUCOSE UR-MCNC: NEGATIVE — SIGNIFICANT CHANGE UP
HCO3 BLDV-SCNC: 31 MMOL/L — HIGH (ref 20–27)
HCT VFR BLD CALC: 37.4 % — SIGNIFICANT CHANGE UP (ref 34.5–45)
HCT VFR BLDV CALC: 39.1 % — SIGNIFICANT CHANGE UP (ref 34.5–45)
HGB BLD-MCNC: 12.2 G/DL — SIGNIFICANT CHANGE UP (ref 11.5–15.5)
HGB BLDV-MCNC: 12.7 G/DL — SIGNIFICANT CHANGE UP (ref 11.5–15.5)
IMM GRANULOCYTES # BLD AUTO: 0.03 # — SIGNIFICANT CHANGE UP
IMM GRANULOCYTES NFR BLD AUTO: 0.3 % — SIGNIFICANT CHANGE UP (ref 0–1.5)
INR BLD: 0.95 — SIGNIFICANT CHANGE UP (ref 0.88–1.17)
KETONES UR-MCNC: NEGATIVE — SIGNIFICANT CHANGE UP
LACTATE BLDV-MCNC: 1.1 MMOL/L — SIGNIFICANT CHANGE UP (ref 0.5–2)
LEUKOCYTE ESTERASE UR-ACNC: HIGH
LYMPHOCYTES # BLD AUTO: 1.85 K/UL — SIGNIFICANT CHANGE UP (ref 1–3.3)
LYMPHOCYTES # BLD AUTO: 18.9 % — SIGNIFICANT CHANGE UP (ref 13–44)
MCHC RBC-ENTMCNC: 31.4 PG — SIGNIFICANT CHANGE UP (ref 27–34)
MCHC RBC-ENTMCNC: 32.6 % — SIGNIFICANT CHANGE UP (ref 32–36)
MCV RBC AUTO: 96.1 FL — SIGNIFICANT CHANGE UP (ref 80–100)
MONOCYTES # BLD AUTO: 1.15 K/UL — HIGH (ref 0–0.9)
MONOCYTES NFR BLD AUTO: 11.8 % — SIGNIFICANT CHANGE UP (ref 2–14)
NEUTROPHILS # BLD AUTO: 6.53 K/UL — SIGNIFICANT CHANGE UP (ref 1.8–7.4)
NEUTROPHILS NFR BLD AUTO: 66.9 % — SIGNIFICANT CHANGE UP (ref 43–77)
NITRITE UR-MCNC: POSITIVE — HIGH
NRBC # FLD: 0 — SIGNIFICANT CHANGE UP
PCO2 BLDV: 55 MMHG — HIGH (ref 41–51)
PH BLDV: 7.41 PH — SIGNIFICANT CHANGE UP (ref 7.32–7.43)
PH UR: 6.5 — SIGNIFICANT CHANGE UP (ref 5–8)
PLATELET # BLD AUTO: 169 K/UL — SIGNIFICANT CHANGE UP (ref 150–400)
PMV BLD: 11.3 FL — SIGNIFICANT CHANGE UP (ref 7–13)
PO2 BLDV: < 24 MMHG — LOW (ref 35–40)
POTASSIUM BLDV-SCNC: 4.5 MMOL/L — SIGNIFICANT CHANGE UP (ref 3.4–4.5)
POTASSIUM SERPL-MCNC: 4.9 MMOL/L — SIGNIFICANT CHANGE UP (ref 3.5–5.3)
POTASSIUM SERPL-SCNC: 4.9 MMOL/L — SIGNIFICANT CHANGE UP (ref 3.5–5.3)
PROT SERPL-MCNC: 6.6 G/DL — SIGNIFICANT CHANGE UP (ref 6–8.3)
PROT UR-MCNC: 30 MG/DL — HIGH
PROTHROM AB SERPL-ACNC: 10.5 SEC — SIGNIFICANT CHANGE UP (ref 9.8–13.1)
RBC # BLD: 3.89 M/UL — SIGNIFICANT CHANGE UP (ref 3.8–5.2)
RBC # FLD: 13 % — SIGNIFICANT CHANGE UP (ref 10.3–14.5)
RBC CASTS # UR COMP ASSIST: HIGH (ref 0–?)
SAO2 % BLDV: 27.3 % — LOW (ref 60–85)
SODIUM SERPL-SCNC: 140 MMOL/L — SIGNIFICANT CHANGE UP (ref 135–145)
SP GR SPEC: 1.01 — SIGNIFICANT CHANGE UP (ref 1–1.04)
UROBILINOGEN FLD QL: 0.2 MG/DL — SIGNIFICANT CHANGE UP
VALPROATE SERPL-MCNC: 38.9 UG/ML — LOW (ref 50–100)
WBC # BLD: 9.77 K/UL — SIGNIFICANT CHANGE UP (ref 3.8–10.5)
WBC # FLD AUTO: 9.77 K/UL — SIGNIFICANT CHANGE UP (ref 3.8–10.5)
WBC UR QL: HIGH (ref 0–?)

## 2018-05-22 PROCEDURE — 71045 X-RAY EXAM CHEST 1 VIEW: CPT | Mod: 26

## 2018-05-22 PROCEDURE — 99223 1ST HOSP IP/OBS HIGH 75: CPT | Mod: AI,GC

## 2018-05-22 RX ORDER — CEFTRIAXONE 500 MG/1
1 INJECTION, POWDER, FOR SOLUTION INTRAMUSCULAR; INTRAVENOUS EVERY 24 HOURS
Qty: 0 | Refills: 0 | Status: DISCONTINUED | OUTPATIENT
Start: 2018-05-23 | End: 2018-05-24

## 2018-05-22 RX ORDER — ENOXAPARIN SODIUM 100 MG/ML
30 INJECTION SUBCUTANEOUS EVERY 24 HOURS
Qty: 0 | Refills: 0 | Status: DISCONTINUED | OUTPATIENT
Start: 2018-05-22 | End: 2018-05-24

## 2018-05-22 RX ORDER — CEFTRIAXONE 500 MG/1
1 INJECTION, POWDER, FOR SOLUTION INTRAMUSCULAR; INTRAVENOUS ONCE
Qty: 0 | Refills: 0 | Status: COMPLETED | OUTPATIENT
Start: 2018-05-22 | End: 2018-05-22

## 2018-05-22 RX ORDER — QUETIAPINE FUMARATE 200 MG/1
50 TABLET, FILM COATED ORAL
Qty: 0 | Refills: 0 | Status: DISCONTINUED | OUTPATIENT
Start: 2018-05-22 | End: 2018-05-24

## 2018-05-22 RX ORDER — QUETIAPINE FUMARATE 200 MG/1
25 TABLET, FILM COATED ORAL EVERY 6 HOURS
Qty: 0 | Refills: 0 | Status: DISCONTINUED | OUTPATIENT
Start: 2018-05-22 | End: 2018-05-24

## 2018-05-22 RX ORDER — LANOLIN ALCOHOL/MO/W.PET/CERES
3 CREAM (GRAM) TOPICAL AT BEDTIME
Qty: 0 | Refills: 0 | Status: DISCONTINUED | OUTPATIENT
Start: 2018-05-22 | End: 2018-05-24

## 2018-05-22 RX ORDER — CALCIUM GLUCONATE 100 MG/ML
1 VIAL (ML) INTRAVENOUS ONCE
Qty: 0 | Refills: 0 | Status: COMPLETED | OUTPATIENT
Start: 2018-05-22 | End: 2018-05-22

## 2018-05-22 RX ORDER — HEPARIN SODIUM 5000 [USP'U]/ML
5000 INJECTION INTRAVENOUS; SUBCUTANEOUS EVERY 8 HOURS
Qty: 0 | Refills: 0 | Status: DISCONTINUED | OUTPATIENT
Start: 2018-05-22 | End: 2018-05-22

## 2018-05-22 RX ORDER — VALPROIC ACID (AS SODIUM SALT) 250 MG/5ML
500 SOLUTION, ORAL ORAL
Qty: 0 | Refills: 0 | Status: DISCONTINUED | OUTPATIENT
Start: 2018-05-22 | End: 2018-05-22

## 2018-05-22 RX ORDER — VALPROIC ACID (AS SODIUM SALT) 250 MG/5ML
500 SOLUTION, ORAL ORAL
Qty: 0 | Refills: 0 | Status: DISCONTINUED | OUTPATIENT
Start: 2018-05-22 | End: 2018-05-24

## 2018-05-22 RX ADMIN — Medication 200 GRAM(S): at 11:39

## 2018-05-22 RX ADMIN — Medication 500 MILLIGRAM(S): at 17:39

## 2018-05-22 RX ADMIN — CEFTRIAXONE 100 GRAM(S): 500 INJECTION, POWDER, FOR SOLUTION INTRAMUSCULAR; INTRAVENOUS at 12:04

## 2018-05-22 RX ADMIN — Medication 3 MILLIGRAM(S): at 22:16

## 2018-05-22 RX ADMIN — QUETIAPINE FUMARATE 50 MILLIGRAM(S): 200 TABLET, FILM COATED ORAL at 17:39

## 2018-05-22 NOTE — ED PROVIDER NOTE - ATTENDING CONTRIBUTION TO CARE
I performed a history and physical exam of the patient and discussed their management with the resident and /or advanced care provider. I reviewed the resident and /or ACP's note and agree with the documented findings and plan of care. My medical decison making and observations are found above.  Lungs clear, abd soft

## 2018-05-22 NOTE — PROVIDER CONTACT NOTE (OTHER) - ASSESSMENT
Pt came to [facility] due to not being responsive to the HHA this morning.  Pt recently admitted for UTI and released on Pt came to [facility] due to not being responsive to the HHA this morning.  Pt recently admitted for UTI and released on May 10th.  Pt has HHA that is at Pt came to [facility] due to not being responsive to the HHA this morning.  Pt recently admitted for UTI and released on May 10th.  Pt has HHA that is scheduled for M-F 8a to 5pm or 9a to 6pm.  Pt has omer and neftali Frey [phone] reinstates the pt for HHA when needed.  Pt curently came to [facility] spelling of fish.  swk to continue to following.

## 2018-05-22 NOTE — H&P ADULT - ASSESSMENT
89 yoF with MHx of dementia p/w foul smelling urine as well as episode of unresponsiveness with bradycardia.

## 2018-05-22 NOTE — H&P ADULT - PROBLEM SELECTOR PLAN 3
- DVT: Lovenox 40 mg q 24  - GOC:  Spoke with son regarding code status.  Patient is DNR, DNI, but otherwise maximal medical therapy.  The son may trial a short course of intubation if needed for a procedure which so far is not anticipated during this stay.    Benson Segundo MD PGY-1  Department of Medicine  [phone] / [phone]

## 2018-05-22 NOTE — ED ADULT TRIAGE NOTE - CHIEF COMPLAINT QUOTE
Brought in by ems from home with aide. Family called EMS because patient was not talking this morning. Baseline mental status A&Ox1. Pt told ems in ambulance " I just didn't want to talk". Pt currently at baseline mental status. recently admitted for pneumonia. urine is foul smelling. Brought in by ems from home with aide. Family called EMS because patient was not talking this morning. Baseline mental status A&Ox1. Pt told ems in ambulance " I just didn't want to talk". Pt currently at baseline mental status. Recently admitted for pneumonia. Urine is foul smelling. On scene bgl 108.

## 2018-05-22 NOTE — ED PROVIDER NOTE - MEDICAL DECISION MAKING DETAILS
Pt presenting with AMS, ?seizure history, unable to provide further history, A&O x1 to self only. Plan: EKG, CXR, labs, urine Pt presenting with AMS, ?seizure history, unable to provide further history, A&O x1 to self only. Plan: EKG, CXR, labs, urine  Alejandrina: not speaking and behaving differently at home, now speaking well. found to have UTI. discussed with health care proxy. Will admit for antibiotics.

## 2018-05-22 NOTE — ED ADULT NURSE NOTE - OBJECTIVE STATEMENT
received pt to rm 17, Brought in by ems from home with aide. Family called EMS because patient was not talking this morning. Baseline mental status A&Ox1. Pt told ems in ambulance " I just didn't want to talk". Pt currently at baseline mental status. Recently admitted for pneumonia. Urine is foul smelling. On scene bgl 108. IV access lft ac 20g, labs sent, straight cath for urine as ordered, rectal T WNL, EKG show peaked T waves-patient medicated with calcium gluc as ordered, skin with rt hip/buttock ecchymosis from fall out of bed last week in healing process, patient is full assist/wheelchair bound, lives with son and has HHA services during daytime.

## 2018-05-22 NOTE — H&P ADULT - ATTENDING COMMENTS
89F dementia with episode AMS in setting of UTI  --metabolic encephalopathy due to infection has improved  --CTX and f/u urine cx  --continue psych meds and consider psych eval if behavior management is needed

## 2018-05-22 NOTE — H&P ADULT - NSHPPHYSICALEXAM_GEN_ALL_CORE
Constitutional:  Well-appearing, looks younger than her stated age, in no distress  Eyes: EOMI, PERRLA  ENMT: Atraumatic.  Dentition relatively preserved.  MMM.  Respiratory:  CTA bilaterally  Cardiovascular: RRR, no appreciated murmur  Gastrointestinal: NT/D, no bowel sounds present  Extremities: No peripheral edema noted.  Pulses non-palpable  Neurological: No focal deficits noted  Skin: Intact to gross observation  Psychiatric: A&Ox1.  Pleasantly demented, only oriented to self.

## 2018-05-22 NOTE — H&P ADULT - PROBLEM SELECTOR PLAN 1
- UTI with + leukocyte esterase, nitrates, and WBCs  - Given one dose of ceftriaxone, will continue with 1g q 24

## 2018-05-22 NOTE — ED ADULT NURSE NOTE - CHIEF COMPLAINT QUOTE
Brought in by ems from home with aide. Family called EMS because patient was not talking this morning. Baseline mental status A&Ox1. Pt told ems in ambulance " I just didn't want to talk". Pt currently at baseline mental status. Recently admitted for pneumonia. Urine is foul smelling. On scene bgl 108.

## 2018-05-22 NOTE — H&P ADULT - HISTORY OF PRESENT ILLNESS
Patient is a pleasant 89 year old female with MHx of dementia and carotid stent for carotid stenosis, who was BIBEMS after she was unresponsive this AM.  Per her son (Zac) this AM patient was unresponsive.  The son was unable to obtain a blood pressure, and he was unable to auscultate breath sounds except in the upper lobes.  He had her home health aide attempt a blood pressure/heart rate and it was 133/80 and 51, respectively.  He called EMS to have her brought in.    In the ambulance the patient was noted to be incontinent (new for her) with foul-smelling urine.  In the ED patient had a CXR which was clear.  UTI had + leukocyte esterase, + nitrites, and + WBCs, and she was given 1 dose of ceftriaxone.  She was admitted to medicine.    At time of examination patient remains pleasantly demented and A&Ox1.      Accessory information obtained from the son.  The patient takes Valproic acid 500 mg BID and Seroquel 50 BID for her dementia.  Additionally, the patient takes 0.5 mg Ativan 4-5 nights out of the week as needed for agitation.  This is from an old prescription that the son had.  She was recently admitted in the hospital, first in early April for diarrhea, again in late April for RSV, and again in early/mid-May for pneumonia.  She was last discharged on 5/7.

## 2018-05-22 NOTE — ED ADULT NURSE REASSESSMENT NOTE - NS ED NURSE REASSESS COMMENT FT1
Pt pulled IV line.  Verbal orientation given to situation and treatment plan.  Pt now calm, cooperative.  New IV inserted on L arm 22G, wrapped with sling dressing.

## 2018-05-22 NOTE — ED PROVIDER NOTE - OBJECTIVE STATEMENT
89 yo female with PMH of dementia, spinal stenosis, ?seizure disorder, recently discharged from hospital on 5/7 for RSV respiratory infection, FTT, presents to the ED BIBA from home for AMS, awake but non-responding to aid today, noted to be covered in urine. Aid states the patient was looking at her but would not respond and was not making reactive facial gestures. Patient was noted to be covered in urine. No tongue biting or bowel incontinence notes. Aid unsure if patient has h/o seizures. Pt A&O x1 to self, unable to provide any history.     Pt's son: Zac ([phone]; [phone])    Brought in by ems from home with aide. Family called EMS because patient was not talking this morning. Baseline mental status A&Ox1. Pt told ems in ambulance " I just didn't want to talk". Pt currently at baseline mental status. Recently admitted for pneumonia. Urine is foul smelling. On scene bgl 108.

## 2018-05-23 LAB — SPECIMEN SOURCE: SIGNIFICANT CHANGE UP

## 2018-05-23 PROCEDURE — 80048 BASIC METABOLIC PNL TOTAL CA: CPT

## 2018-05-23 PROCEDURE — 85730 THROMBOPLASTIN TIME PARTIAL: CPT

## 2018-05-23 PROCEDURE — 81001 URINALYSIS AUTO W/SCOPE: CPT

## 2018-05-23 PROCEDURE — 93971 EXTREMITY STUDY: CPT

## 2018-05-23 PROCEDURE — 99233 SBSQ HOSP IP/OBS HIGH 50: CPT | Mod: GC

## 2018-05-23 PROCEDURE — 99285 EMERGENCY DEPT VISIT HI MDM: CPT

## 2018-05-23 PROCEDURE — 84100 ASSAY OF PHOSPHORUS: CPT

## 2018-05-23 PROCEDURE — 74230 X-RAY XM SWLNG FUNCJ C+: CPT

## 2018-05-23 PROCEDURE — 99285 EMERGENCY DEPT VISIT HI MDM: CPT | Mod: 25

## 2018-05-23 PROCEDURE — 92610 EVALUATE SWALLOWING FUNCTION: CPT

## 2018-05-23 PROCEDURE — 83605 ASSAY OF LACTIC ACID: CPT

## 2018-05-23 PROCEDURE — 84443 ASSAY THYROID STIM HORMONE: CPT

## 2018-05-23 PROCEDURE — 96374 THER/PROPH/DIAG INJ IV PUSH: CPT | Mod: XU

## 2018-05-23 PROCEDURE — 93005 ELECTROCARDIOGRAM TRACING: CPT | Mod: XU

## 2018-05-23 PROCEDURE — 85027 COMPLETE CBC AUTOMATED: CPT

## 2018-05-23 PROCEDURE — 51701 INSERT BLADDER CATHETER: CPT

## 2018-05-23 PROCEDURE — 82435 ASSAY OF BLOOD CHLORIDE: CPT

## 2018-05-23 PROCEDURE — 97162 PT EVAL MOD COMPLEX 30 MIN: CPT

## 2018-05-23 PROCEDURE — 92611 MOTION FLUOROSCOPY/SWALLOW: CPT

## 2018-05-23 PROCEDURE — 82947 ASSAY GLUCOSE BLOOD QUANT: CPT

## 2018-05-23 PROCEDURE — 87086 URINE CULTURE/COLONY COUNT: CPT

## 2018-05-23 PROCEDURE — 80053 COMPREHEN METABOLIC PANEL: CPT

## 2018-05-23 PROCEDURE — 82803 BLOOD GASES ANY COMBINATION: CPT

## 2018-05-23 PROCEDURE — 87449 NOS EACH ORGANISM AG IA: CPT

## 2018-05-23 PROCEDURE — 85014 HEMATOCRIT: CPT

## 2018-05-23 PROCEDURE — 83735 ASSAY OF MAGNESIUM: CPT

## 2018-05-23 PROCEDURE — 82330 ASSAY OF CALCIUM: CPT

## 2018-05-23 PROCEDURE — 84132 ASSAY OF SERUM POTASSIUM: CPT

## 2018-05-23 PROCEDURE — 85610 PROTHROMBIN TIME: CPT

## 2018-05-23 PROCEDURE — 70450 CT HEAD/BRAIN W/O DYE: CPT

## 2018-05-23 PROCEDURE — 96375 TX/PRO/DX INJ NEW DRUG ADDON: CPT | Mod: XU

## 2018-05-23 PROCEDURE — 87040 BLOOD CULTURE FOR BACTERIA: CPT

## 2018-05-23 PROCEDURE — 93005 ELECTROCARDIOGRAM TRACING: CPT

## 2018-05-23 PROCEDURE — 71045 X-RAY EXAM CHEST 1 VIEW: CPT

## 2018-05-23 PROCEDURE — 84484 ASSAY OF TROPONIN QUANT: CPT

## 2018-05-23 PROCEDURE — 84295 ASSAY OF SERUM SODIUM: CPT

## 2018-05-23 RX ADMIN — Medication 3 MILLIGRAM(S): at 21:34

## 2018-05-23 RX ADMIN — QUETIAPINE FUMARATE 50 MILLIGRAM(S): 200 TABLET, FILM COATED ORAL at 05:31

## 2018-05-23 RX ADMIN — ENOXAPARIN SODIUM 30 MILLIGRAM(S): 100 INJECTION SUBCUTANEOUS at 05:32

## 2018-05-23 RX ADMIN — Medication 500 MILLIGRAM(S): at 17:17

## 2018-05-23 RX ADMIN — QUETIAPINE FUMARATE 50 MILLIGRAM(S): 200 TABLET, FILM COATED ORAL at 17:18

## 2018-05-23 RX ADMIN — Medication 500 MILLIGRAM(S): at 05:32

## 2018-05-23 RX ADMIN — CEFTRIAXONE 100 GRAM(S): 500 INJECTION, POWDER, FOR SOLUTION INTRAMUSCULAR; INTRAVENOUS at 12:30

## 2018-05-23 NOTE — PROGRESS NOTE ADULT - PROBLEM SELECTOR PLAN 3
- DVT: Lovenox 40 mg q 24  - GOC:  Spoke with son regarding code status.  Patient is DNR, DNI, but otherwise maximal medical therapy.  The son may trial a short course of intubation if needed for a procedure which so far is not anticipated during this stay.    Benson Segundo MD PGY-1  Department of Medicine  [phone] / [phone]

## 2018-05-23 NOTE — PROGRESS NOTE ADULT - SUBJECTIVE AND OBJECTIVE BOX
Patient is a 89y old  Female who presents with a chief complaint of Unresponsiveness (22 May 2018 15:41)        SUBJECTIVE / OVERNIGHT EVENTS:      MEDICATIONS  (STANDING):  cefTRIAXone   IVPB 1 Gram(s) IV Intermittent every 24 hours  enoxaparin Injectable 30 milliGRAM(s) SubCutaneous every 24 hours  melatonin 3 milliGRAM(s) Oral at bedtime  QUEtiapine 50 milliGRAM(s) Oral two times a day  valproic  acid Syrup 500 milliGRAM(s) Oral two times a day      MEDICATIONS  (PRN):  QUEtiapine 25 milliGRAM(s) Oral every 6 hours PRN agitation      Vital Signs Last 24 Hrs  T(C): 37.1 (23 May 2018 05:30), Max: 37.1 (23 May 2018 05:30)  T(F): 98.8 (23 May 2018 05:30), Max: 98.8 (23 May 2018 05:30)  HR: 88 (23 May 2018 05:30) (68 - 88)  BP: 146/81 (23 May 2018 05:30) (141/66 - 162/88)  BP(mean): --  RR: 18 (23 May 2018 05:30) (18 - 19)  SpO2: 100% (23 May 2018 05:30) (98% - 100%)      PHYSICAL EXAM:  GENERAL:   HEAD:    EYES:   ENMT:   NECK:   CHEST/LUNG:   HEART:   ABDOMEN:   EXTREMITIES:    PSYCH:   NEUROLOGY:   SKIN:       LABS:                        12.2   9.77  )-----------( 169      ( 22 May 2018 12:00 )             37.4     05-22    140  |  98  |  24<H>  ----------------------------<  89  4.9   |  30  |  0.95    Ca    9.5      22 May 2018 11:30    TPro  6.6  /  Alb  3.7  /  TBili  0.3  /  DBili  x   /  AST  33<H>  /  ALT  14  /  AlkPhos  70  05-22    LIVER FUNCTIONS - ( 22 May 2018 11:30 )  Alb: 3.7 g/dL / Pro: 6.6 g/dL / ALK PHOS: 70 u/L / ALT: 14 u/L / AST: 33 u/L / GGT: x           PT/INR - ( 22 May 2018 11:30 )   PT: 10.5 SEC;   INR: 0.95       PTT - ( 22 May 2018 11:30 )  PTT:30.7 SEC    Urinalysis Basic - ( 22 May 2018 10:10 )    Color: YELLOW / Appearance: TURBID / S.015 / pH: 6.5  Gluc: NEGATIVE / Ketone: NEGATIVE  / Bili: NEGATIVE / Urobili: 0.2 mg/dL   Blood: MODERATE / Protein: 30 mg/dL / Nitrite: POSITIVE   Leuk Esterase: LARGE / RBC: 3-5 / WBC TNTC   Sq Epi: x / Non Sq Epi: OCC / Bacteria: MODERATE      RADIOLOGY & ADDITIONAL TESTS:  Imaging Personally Reviewed: YES    Consultant(s) Notes Reviewed: YES    Care Discussed with Consultants/Other Providers: YES      Benson Segundo MD PGY-1  Department of Medicine  [phone] Patient is a 89y old  Female who presents with a chief complaint of Unresponsiveness (22 May 2018 15:41)      SUBJECTIVE / OVERNIGHT EVENTS:  No events overnight.  Patient slept well, and although A&Ox1 with bizarre answers, does not appear to be endorsing pain at this time.      MEDICATIONS  (STANDING):  cefTRIAXone   IVPB 1 Gram(s) IV Intermittent every 24 hours  enoxaparin Injectable 30 milliGRAM(s) SubCutaneous every 24 hours  melatonin 3 milliGRAM(s) Oral at bedtime  QUEtiapine 50 milliGRAM(s) Oral two times a day  valproic  acid Syrup 500 milliGRAM(s) Oral two times a day      MEDICATIONS  (PRN):  QUEtiapine 25 milliGRAM(s) Oral every 6 hours PRN agitation      Vital Signs Last 24 Hrs  T(C): 37.1 (23 May 2018 05:30), Max: 37.1 (23 May 2018 05:30)  T(F): 98.8 (23 May 2018 05:30), Max: 98.8 (23 May 2018 05:30)  HR: 88 (23 May 2018 05:30) (68 - 88)  BP: 146/81 (23 May 2018 05:30) (141/66 - 162/88)  BP(mean): --  RR: 18 (23 May 2018 05:30) (18 - 19)  SpO2: 100% (23 May 2018 05:30) (98% - 100%)      PHYSICAL EXAM:  Constitutional:  Well-appearing, looks younger than her stated age, in no distress  Eyes: EOMI, PERRLA  ENMT: Atraumatic.  Dentition relatively preserved.  MMM.  Respiratory:  CTA bilaterally  Cardiovascular: RRR, no appreciated murmur  Gastrointestinal: NT/D, no bowel sounds present  Extremities: No peripheral edema noted.  Pulses non-palpable  Neurological: No focal deficits noted  Skin: Intact to gross observation  Psychiatric: A&Ox1.  Pleasantly demented, only oriented to self.      LABS:                        12.2   9.77  )-----------( 169      ( 22 May 2018 12:00 )             37.4     05-    140  |  98  |  24<H>  ----------------------------<  89  4.9   |  30  |  0.95    Ca    9.5      22 May 2018 11:30    TPro  6.6  /  Alb  3.7  /  TBili  0.3  /  DBili  x   /  AST  33<H>  /  ALT  14  /  AlkPhos  70  05-22    LIVER FUNCTIONS - ( 22 May 2018 11:30 )  Alb: 3.7 g/dL / Pro: 6.6 g/dL / ALK PHOS: 70 u/L / ALT: 14 u/L / AST: 33 u/L / GGT: x           PT/INR - ( 22 May 2018 11:30 )   PT: 10.5 SEC;   INR: 0.95       PTT - ( 22 May 2018 11:30 )  PTT:30.7 SEC    Urinalysis Basic - ( 22 May 2018 10:10 )    Color: YELLOW / Appearance: TURBID / S.015 / pH: 6.5  Gluc: NEGATIVE / Ketone: NEGATIVE  / Bili: NEGATIVE / Urobili: 0.2 mg/dL   Blood: MODERATE / Protein: 30 mg/dL / Nitrite: POSITIVE   Leuk Esterase: LARGE / RBC: 3-5 / WBC TNTC   Sq Epi: x / Non Sq Epi: OCC / Bacteria: MODERATE      RADIOLOGY & ADDITIONAL TESTS:  Imaging Personally Reviewed: YES    Consultant(s) Notes Reviewed: YES    Care Discussed with Consultants/Other Providers: YES      Benson Segundo MD PGY-1  Department of Medicine  [phone]

## 2018-05-23 NOTE — PROGRESS NOTE ADULT - PROBLEM SELECTOR PLAN 1
- UTI with + leukocyte esterase, nitrates, and WBCs  - Continue ceftriaxone 1g q24; will transition to oral medications and anticipate discharge today or tomorrow

## 2018-05-24 ENCOUNTER — TRANSCRIPTION ENCOUNTER (OUTPATIENT)
Age: 83
End: 2018-05-24

## 2018-05-24 VITALS
DIASTOLIC BLOOD PRESSURE: 60 MMHG | TEMPERATURE: 98 F | RESPIRATION RATE: 18 BRPM | HEART RATE: 61 BPM | SYSTOLIC BLOOD PRESSURE: 151 MMHG | OXYGEN SATURATION: 100 %

## 2018-05-24 LAB
-  AMIKACIN: SIGNIFICANT CHANGE UP
-  AMPICILLIN/SULBACTAM: SIGNIFICANT CHANGE UP
-  AMPICILLIN: SIGNIFICANT CHANGE UP
-  AZTREONAM: SIGNIFICANT CHANGE UP
-  CEFAZOLIN: SIGNIFICANT CHANGE UP
-  CEFEPIME: SIGNIFICANT CHANGE UP
-  CEFOXITIN: SIGNIFICANT CHANGE UP
-  CEFTAZIDIME: SIGNIFICANT CHANGE UP
-  CEFTRIAXONE: SIGNIFICANT CHANGE UP
-  CIPROFLOXACIN: SIGNIFICANT CHANGE UP
-  ERTAPENEM: SIGNIFICANT CHANGE UP
-  GENTAMICIN: SIGNIFICANT CHANGE UP
-  IMIPENEM: SIGNIFICANT CHANGE UP
-  LEVOFLOXACIN: SIGNIFICANT CHANGE UP
-  MEROPENEM: SIGNIFICANT CHANGE UP
-  NITROFURANTOIN: SIGNIFICANT CHANGE UP
-  PIPERACILLIN/TAZOBACTAM: SIGNIFICANT CHANGE UP
-  TIGECYCLINE: SIGNIFICANT CHANGE UP
-  TOBRAMYCIN: SIGNIFICANT CHANGE UP
-  TRIMETHOPRIM/SULFAMETHOXAZOLE: SIGNIFICANT CHANGE UP
BACTERIA UR CULT: SIGNIFICANT CHANGE UP
METHOD TYPE: SIGNIFICANT CHANGE UP
ORGANISM # SPEC MICROSCOPIC CNT: SIGNIFICANT CHANGE UP
ORGANISM # SPEC MICROSCOPIC CNT: SIGNIFICANT CHANGE UP

## 2018-05-24 PROCEDURE — 99239 HOSP IP/OBS DSCHRG MGMT >30: CPT

## 2018-05-24 RX ADMIN — QUETIAPINE FUMARATE 50 MILLIGRAM(S): 200 TABLET, FILM COATED ORAL at 17:27

## 2018-05-24 RX ADMIN — Medication 500 MILLIGRAM(S): at 05:55

## 2018-05-24 RX ADMIN — CEFTRIAXONE 100 GRAM(S): 500 INJECTION, POWDER, FOR SOLUTION INTRAMUSCULAR; INTRAVENOUS at 14:00

## 2018-05-24 RX ADMIN — QUETIAPINE FUMARATE 50 MILLIGRAM(S): 200 TABLET, FILM COATED ORAL at 05:55

## 2018-05-24 RX ADMIN — Medication 500 MILLIGRAM(S): at 17:27

## 2018-05-24 RX ADMIN — ENOXAPARIN SODIUM 30 MILLIGRAM(S): 100 INJECTION SUBCUTANEOUS at 05:55

## 2018-05-24 NOTE — DISCHARGE NOTE ADULT - MEDICATION SUMMARY - MEDICATIONS TO TAKE
I will START or STAY ON the medications listed below when I get home from the hospital:    Percocet 5/325 oral tablet  -- 1 tab(s) by mouth every 6 hours, As Needed for moderate pain  -- Indication: For Pain    valproic acid 500 mg oral delayed release capsule  -- 1 tab(s) by mouth 2 times a day  -- Indication: For Dementia    QUEtiapine 50 mg oral tablet  -- 1 tab(s) by mouth 2 times a day  -- Indication: For Dementia    QUEtiapine 25 mg oral tablet  -- 1 tab(s) by mouth every 6 hours, As needed for agitation  -- Indication: For Dementia

## 2018-05-24 NOTE — PROGRESS NOTE ADULT - SUBJECTIVE AND OBJECTIVE BOX
Patient is a 89y old  Female who presents with a chief complaint of Unresponsiveness (22 May 2018 15:41)      SUBJECTIVE / OVERNIGHT EVENTS:  No events overnight.  Patient sitting upright comofrtably this AM, with no complaints.  Still with bizarre answers.      MEDICATIONS  (STANDING):  cefTRIAXone   IVPB 1 Gram(s) IV Intermittent every 24 hours  enoxaparin Injectable 30 milliGRAM(s) SubCutaneous every 24 hours  melatonin 3 milliGRAM(s) Oral at bedtime  QUEtiapine 50 milliGRAM(s) Oral two times a day  valproic  acid Syrup 500 milliGRAM(s) Oral two times a day      MEDICATIONS  (PRN):  QUEtiapine 25 milliGRAM(s) Oral every 6 hours PRN agitation      Vital Signs Last 24 Hrs  T(C): 36.7 (24 May 2018 05:58), Max: 36.8 (23 May 2018 13:32)  T(F): 98 (24 May 2018 05:58), Max: 98.3 (23 May 2018 19:41)  HR: 82 (24 May 2018 05:58) (82 - 118)  BP: 112/84 (24 May 2018 05:58) (104/84 - 112/84)  BP(mean): --  RR: 18 (24 May 2018 05:58) (18 - 20)  SpO2: 100% (24 May 2018 05:58) (100% - 100%)      PHYSICAL EXAM:  Constitutional:  Well-appearing, looks younger than her stated age, in no distress  Eyes: EOMI, PERRLA  ENMT: Atraumatic.  Dentition relatively preserved.  MMM.  Respiratory:  CTA bilaterally  Cardiovascular: RRR, no appreciated murmur  Gastrointestinal: NT/D, no bowel sounds present  Extremities: No peripheral edema noted.  Pulses non-palpable  Neurological: No focal deficits noted  Skin: Intact to gross observation  Psychiatric: A&Ox1.  Pleasantly demented, only oriented to self.      LABS:                        12.2   9.77  )-----------( 169      ( 22 May 2018 12:00 )             37.4     05-22    140  |  98  |  24<H>  ----------------------------<  89  4.9   |  30  |  0.95    Ca    9.5      22 May 2018 11:30    TPro  6.6  /  Alb  3.7  /  TBili  0.3  /  DBili  x   /  AST  33<H>  /  ALT  14  /  AlkPhos  70  05-22    LIVER FUNCTIONS - ( 22 May 2018 11:30 )  Alb: 3.7 g/dL / Pro: 6.6 g/dL / ALK PHOS: 70 u/L / ALT: 14 u/L / AST: 33 u/L / GGT: x           PT/INR - ( 22 May 2018 11:30 )   PT: 10.5 SEC;   INR: 0.95       PTT - ( 22 May 2018 11:30 )  PTT:30.7 SEC    Urinalysis Basic - ( 22 May 2018 10:10 )    Color: YELLOW / Appearance: TURBID / S.015 / pH: 6.5  Gluc: NEGATIVE / Ketone: NEGATIVE  / Bili: NEGATIVE / Urobili: 0.2 mg/dL   Blood: MODERATE / Protein: 30 mg/dL / Nitrite: POSITIVE   Leuk Esterase: LARGE / RBC: 3-5 / WBC TNTC   Sq Epi: x / Non Sq Epi: OCC / Bacteria: MODERATE      RADIOLOGY & ADDITIONAL TESTS:  Imaging Personally Reviewed: YES    Consultant(s) Notes Reviewed: YES    Care Discussed with Consultants/Other Providers: YES      Benson Segundo MD PGY-1  Department of Medicine  [phone]

## 2018-05-24 NOTE — DISCHARGE NOTE ADULT - PLAN OF CARE
Resolution of infection You were admitted to the hospital after an unresponsive episode, and found to have a urinary tract infection.  With antibiotics your condition greatly improved and you were back to your baseline level.  You have received 3 doses of IV antibiotics, which is sufficient to treat urinary tract infections that have not spread to the kidney or the blood stream.  If you experience any urinary burning, urgency, or incontinence, please call your primary physician right away, as they will be able to get you a prescription for antibiotics.

## 2018-05-24 NOTE — DISCHARGE NOTE ADULT - HOME CARE AGENCY
Aide 9 am-5 pm through [facility] [phone],  [place]  [phone]. Aide supplied by [employer] [phone].

## 2018-05-24 NOTE — PROGRESS NOTE ADULT - PROBLEM SELECTOR PLAN 3
- DVT: Lovenox 40 mg q 24  - GOC:  Spoke with son regarding code status.  Patient is DNR, DNI, but otherwise maximal medical therapy.  The son may trial a short course of intubation if needed for a procedure which so far is not anticipated during this stay.    Benson Segundo MD PGY-1  Department of Medicine  [phone] / [phone]

## 2018-05-24 NOTE — DISCHARGE NOTE ADULT - PATIENT PORTAL LINK FT
You can access the [facility] Patient Portal, offered by [facility], by registering with the following website: [email or web address]

## 2018-05-24 NOTE — PROGRESS NOTE ADULT - PROBLEM SELECTOR PLAN 1
- UTI with + leukocyte esterase, nitrates, and WBCs  - UCx with gram-negative rods; awaiting speciation and sensitivity  - Continue ceftriaxone 1g q24; will transition to oral medications once sensitivity known and discharge this afternoon

## 2018-05-24 NOTE — DISCHARGE NOTE ADULT - OTHER SIGNIFICANT FINDINGS
Urinalysis (05.22.18 @ 10:10)    Color: YELLOW    Urine Appearance: TURBID    Glucose: NEGATIVE    Bilirubin: NEGATIVE    Ketone - Urine: NEGATIVE    Specific Gravity: 1.015    Blood: MODERATE    pH - Urine: 6.5    Protein, Urine: 30 mg/dL    Urobilinogen: 0.2 mg/dL    Nitrite: POSITIVE    Leukocyte Esterase Concentration: LARGE    Red Blood Cell - Urine: 3-5    White Blood Cell - Urine: TNTC    Epithelial Cells: OCC    Bacteria: MODERATE

## 2018-05-24 NOTE — DISCHARGE NOTE ADULT - HOSPITAL COURSE
Patient is a 89 yoF with a MHx of dementia and frequent rehospitalizations in the last two months, who presented after a brief unresponsive episode at home.  She was found to have a positive urinary analysis, and urine culture revealed ______.  She was started on ceftriaxone IV, and received three doses.  She returned to her baseline mental status.  She was never febrile, and her blood cultures were negative.  She was medically stable to discharge home with home services. Patient is a 89 yoF with a MHx of dementia and frequent rehospitalizations in the last two months, who presented after a brief unresponsive episode at home.  She was found to have a positive urinary analysis, and urine culture revealed E. coli sensitive to ceftriaxone.  She was started on ceftriaxone IV, and received three doses.  She returned to her baseline mental status.  She was never febrile, and her blood cultures were negative.  She was medically stable to discharge home with home services.  She did not require antibiotics on discharge.

## 2018-05-24 NOTE — DISCHARGE NOTE ADULT - CARE PLAN
Principal Discharge DX:	UTI (urinary tract infection)  Goal:	Resolution of infection  Assessment and plan of treatment:	You were admitted to the hospital after an unresponsive episode, and found to have a urinary tract infection.  With antibiotics your condition greatly improved and you were back to your baseline level.  You have received 3 doses of IV antibiotics, which is sufficient to treat urinary tract infections that have not spread to the kidney or the blood stream.  If you experience any urinary burning, urgency, or incontinence, please call your primary physician right away, as they will be able to get you a prescription for antibiotics.

## 2018-05-24 NOTE — PROGRESS NOTE ADULT - ATTENDING COMMENTS
89F dementia with episode AMS in setting of UTI  --metabolic encephalopathy due to infection has improved to baseline  --completed treatment for CTX sens Ecoli UTI  --behavior controlled  stable for d/c home  d/c time 45 min
89F dementia with episode AMS in setting of UTI  --metabolic encephalopathy due to infection has improved to baseline  --CTX and f/u urine cx--can transition to PO abx   --behavior controlled  stable for d/c home  d/c time 45 min

## 2018-06-01 ENCOUNTER — OUTPATIENT (OUTPATIENT)
Dept: OUTPATIENT SERVICES | Facility: HOSPITAL | Age: 83
LOS: 1 days | End: 2018-06-01
Payer: MEDICAID

## 2018-06-01 PROCEDURE — G9001: CPT

## 2018-06-24 ENCOUNTER — INPATIENT (INPATIENT)
Facility: HOSPITAL | Age: 83
LOS: 8 days | Discharge: HOME HEALTH | End: 2018-07-03
Attending: SPECIALIST | Admitting: SPECIALIST
Payer: MEDICARE

## 2018-06-24 VITALS
HEART RATE: 96 BPM | SYSTOLIC BLOOD PRESSURE: 179 MMHG | DIASTOLIC BLOOD PRESSURE: 114 MMHG | TEMPERATURE: 99 F | OXYGEN SATURATION: 97 % | RESPIRATION RATE: 16 BRPM

## 2018-06-24 DIAGNOSIS — Z90.49 ACQUIRED ABSENCE OF OTHER SPECIFIED PARTS OF DIGESTIVE TRACT: Chronic | ICD-10-CM

## 2018-06-24 DIAGNOSIS — K56.609 UNSPECIFIED INTESTINAL OBSTRUCTION, UNSPECIFIED AS TO PARTIAL VERSUS COMPLETE OBSTRUCTION: ICD-10-CM

## 2018-06-24 LAB
ALBUMIN SERPL ELPH-MCNC: 4.1 G/DL — SIGNIFICANT CHANGE UP (ref 3.3–5)
ALP SERPL-CCNC: 66 U/L — SIGNIFICANT CHANGE UP (ref 40–120)
ALT FLD-CCNC: 22 U/L — SIGNIFICANT CHANGE UP (ref 4–33)
AMPHET UR-MCNC: NEGATIVE — SIGNIFICANT CHANGE UP
APAP SERPL-MCNC: < 15 UG/ML — LOW (ref 15–25)
APPEARANCE UR: CLEAR — SIGNIFICANT CHANGE UP
APTT BLD: 24.1 SEC — LOW (ref 27.5–37.4)
AST SERPL-CCNC: 54 U/L — HIGH (ref 4–32)
BARBITURATES UR SCN-MCNC: NEGATIVE — SIGNIFICANT CHANGE UP
BASOPHILS # BLD AUTO: 0.03 K/UL — SIGNIFICANT CHANGE UP (ref 0–0.2)
BASOPHILS NFR BLD AUTO: 0.2 % — SIGNIFICANT CHANGE UP (ref 0–2)
BENZODIAZ UR-MCNC: POSITIVE — SIGNIFICANT CHANGE UP
BILIRUB SERPL-MCNC: 0.5 MG/DL — SIGNIFICANT CHANGE UP (ref 0.2–1.2)
BILIRUB UR-MCNC: NEGATIVE — SIGNIFICANT CHANGE UP
BLOOD UR QL VISUAL: NEGATIVE — SIGNIFICANT CHANGE UP
BUN SERPL-MCNC: 23 MG/DL — SIGNIFICANT CHANGE UP (ref 7–23)
CALCIUM SERPL-MCNC: 9.3 MG/DL — SIGNIFICANT CHANGE UP (ref 8.4–10.5)
CANNABINOIDS UR-MCNC: NEGATIVE — SIGNIFICANT CHANGE UP
CHLORIDE SERPL-SCNC: 94 MMOL/L — LOW (ref 98–107)
CK MB BLD-MCNC: 3.39 NG/ML — SIGNIFICANT CHANGE UP (ref 1–4.7)
CK MB BLD-MCNC: 5.56 — HIGH (ref 0–2.5)
CK SERPL-CCNC: 61 U/L — SIGNIFICANT CHANGE UP (ref 25–170)
CO2 SERPL-SCNC: 28 MMOL/L — SIGNIFICANT CHANGE UP (ref 22–31)
COCAINE METAB.OTHER UR-MCNC: NEGATIVE — SIGNIFICANT CHANGE UP
COLOR SPEC: YELLOW — SIGNIFICANT CHANGE UP
CREAT SERPL-MCNC: 0.87 MG/DL — SIGNIFICANT CHANGE UP (ref 0.5–1.3)
EOSINOPHIL # BLD AUTO: 0.01 K/UL — SIGNIFICANT CHANGE UP (ref 0–0.5)
EOSINOPHIL NFR BLD AUTO: 0.1 % — SIGNIFICANT CHANGE UP (ref 0–6)
ETHANOL BLD-MCNC: < 10 MG/DL — SIGNIFICANT CHANGE UP
GLUCOSE SERPL-MCNC: 143 MG/DL — HIGH (ref 70–99)
GLUCOSE UR-MCNC: NEGATIVE — SIGNIFICANT CHANGE UP
HCT VFR BLD CALC: 49.6 % — HIGH (ref 34.5–45)
HGB BLD-MCNC: 16.5 G/DL — HIGH (ref 11.5–15.5)
HYALINE CASTS # UR AUTO: SIGNIFICANT CHANGE UP (ref 0–?)
IMM GRANULOCYTES # BLD AUTO: 0.05 # — SIGNIFICANT CHANGE UP
IMM GRANULOCYTES NFR BLD AUTO: 0.3 % — SIGNIFICANT CHANGE UP (ref 0–1.5)
INR BLD: 0.82 — LOW (ref 0.88–1.17)
KETONES UR-MCNC: SIGNIFICANT CHANGE UP
LEUKOCYTE ESTERASE UR-ACNC: NEGATIVE — SIGNIFICANT CHANGE UP
LYMPHOCYTES # BLD AUTO: 18.3 % — SIGNIFICANT CHANGE UP (ref 13–44)
LYMPHOCYTES # BLD AUTO: 2.64 K/UL — SIGNIFICANT CHANGE UP (ref 1–3.3)
MCHC RBC-ENTMCNC: 31.3 PG — SIGNIFICANT CHANGE UP (ref 27–34)
MCHC RBC-ENTMCNC: 33.3 % — SIGNIFICANT CHANGE UP (ref 32–36)
MCV RBC AUTO: 94.1 FL — SIGNIFICANT CHANGE UP (ref 80–100)
METHADONE UR-MCNC: NEGATIVE — SIGNIFICANT CHANGE UP
MONOCYTES # BLD AUTO: 1.14 K/UL — HIGH (ref 0–0.9)
MONOCYTES NFR BLD AUTO: 7.9 % — SIGNIFICANT CHANGE UP (ref 2–14)
NEUTROPHILS # BLD AUTO: 10.59 K/UL — HIGH (ref 1.8–7.4)
NEUTROPHILS NFR BLD AUTO: 73.2 % — SIGNIFICANT CHANGE UP (ref 43–77)
NITRITE UR-MCNC: NEGATIVE — SIGNIFICANT CHANGE UP
NRBC # FLD: 0 — SIGNIFICANT CHANGE UP
OPIATES UR-MCNC: NEGATIVE — SIGNIFICANT CHANGE UP
OXYCODONE UR-MCNC: POSITIVE — HIGH
PCP UR-MCNC: NEGATIVE — SIGNIFICANT CHANGE UP
PH UR: 6 — SIGNIFICANT CHANGE UP (ref 4.6–8)
PLATELET # BLD AUTO: 199 K/UL — SIGNIFICANT CHANGE UP (ref 150–400)
PMV BLD: 11.8 FL — SIGNIFICANT CHANGE UP (ref 7–13)
POTASSIUM SERPL-MCNC: 5.4 MMOL/L — HIGH (ref 3.5–5.3)
POTASSIUM SERPL-SCNC: 5.4 MMOL/L — HIGH (ref 3.5–5.3)
PROT SERPL-MCNC: 7.6 G/DL — SIGNIFICANT CHANGE UP (ref 6–8.3)
PROT UR-MCNC: 30 MG/DL — HIGH
PROTHROM AB SERPL-ACNC: 9.4 SEC — LOW (ref 9.8–13.1)
RBC # BLD: 5.27 M/UL — HIGH (ref 3.8–5.2)
RBC # FLD: 12.3 % — SIGNIFICANT CHANGE UP (ref 10.3–14.5)
RBC CASTS # UR COMP ASSIST: HIGH (ref 0–?)
SALICYLATES SERPL-MCNC: < 5 MG/DL — LOW (ref 15–30)
SODIUM SERPL-SCNC: 137 MMOL/L — SIGNIFICANT CHANGE UP (ref 135–145)
SP GR SPEC: 1.02 — SIGNIFICANT CHANGE UP (ref 1–1.04)
TROPONIN T, HIGH SENSITIVITY: 30 NG/L — SIGNIFICANT CHANGE UP (ref ?–14)
UROBILINOGEN FLD QL: NORMAL MG/DL — SIGNIFICANT CHANGE UP
WBC # BLD: 14.46 K/UL — HIGH (ref 3.8–10.5)
WBC # FLD AUTO: 14.46 K/UL — HIGH (ref 3.8–10.5)
WBC UR QL: SIGNIFICANT CHANGE UP (ref 0–?)

## 2018-06-24 PROCEDURE — 72170 X-RAY EXAM OF PELVIS: CPT | Mod: 26

## 2018-06-24 PROCEDURE — 74176 CT ABD & PELVIS W/O CONTRAST: CPT | Mod: 26

## 2018-06-24 PROCEDURE — 71045 X-RAY EXAM CHEST 1 VIEW: CPT | Mod: 26

## 2018-06-24 PROCEDURE — 70450 CT HEAD/BRAIN W/O DYE: CPT | Mod: 26

## 2018-06-24 RX ORDER — DIVALPROEX SODIUM 500 MG/1
500 TABLET, DELAYED RELEASE ORAL ONCE
Qty: 0 | Refills: 0 | Status: COMPLETED | OUTPATIENT
Start: 2018-06-24 | End: 2018-06-24

## 2018-06-24 RX ORDER — QUETIAPINE FUMARATE 200 MG/1
50 TABLET, FILM COATED ORAL ONCE
Qty: 0 | Refills: 0 | Status: COMPLETED | OUTPATIENT
Start: 2018-06-24 | End: 2018-06-24

## 2018-06-24 RX ORDER — CHLORPROMAZINE HCL 10 MG
25 TABLET ORAL ONCE
Qty: 0 | Refills: 0 | Status: COMPLETED | OUTPATIENT
Start: 2018-06-24 | End: 2018-06-24

## 2018-06-24 RX ORDER — HALOPERIDOL DECANOATE 100 MG/ML
5 INJECTION INTRAMUSCULAR ONCE
Qty: 0 | Refills: 0 | Status: COMPLETED | OUTPATIENT
Start: 2018-06-24 | End: 2018-06-24

## 2018-06-24 RX ORDER — SODIUM CHLORIDE 9 MG/ML
500 INJECTION INTRAMUSCULAR; INTRAVENOUS; SUBCUTANEOUS ONCE
Qty: 0 | Refills: 0 | Status: COMPLETED | OUTPATIENT
Start: 2018-06-24 | End: 2018-06-24

## 2018-06-24 RX ORDER — ONDANSETRON 8 MG/1
4 TABLET, FILM COATED ORAL ONCE
Qty: 0 | Refills: 0 | Status: COMPLETED | OUTPATIENT
Start: 2018-06-24 | End: 2018-06-24

## 2018-06-24 RX ORDER — SODIUM CHLORIDE 9 MG/ML
1000 INJECTION, SOLUTION INTRAVENOUS
Qty: 0 | Refills: 0 | Status: DISCONTINUED | OUTPATIENT
Start: 2018-06-24 | End: 2018-06-26

## 2018-06-24 RX ORDER — VALPROIC ACID (AS SODIUM SALT) 250 MG/5ML
500 SOLUTION, ORAL ORAL
Qty: 0 | Refills: 0 | Status: DISCONTINUED | OUTPATIENT
Start: 2018-06-24 | End: 2018-06-27

## 2018-06-24 RX ORDER — ENOXAPARIN SODIUM 100 MG/ML
40 INJECTION SUBCUTANEOUS EVERY 24 HOURS
Qty: 0 | Refills: 0 | Status: DISCONTINUED | OUTPATIENT
Start: 2018-06-24 | End: 2018-06-27

## 2018-06-24 RX ADMIN — HALOPERIDOL DECANOATE 5 MILLIGRAM(S): 100 INJECTION INTRAMUSCULAR at 06:36

## 2018-06-24 RX ADMIN — SODIUM CHLORIDE 100 MILLILITER(S): 9 INJECTION, SOLUTION INTRAVENOUS at 16:33

## 2018-06-24 RX ADMIN — ONDANSETRON 4 MILLIGRAM(S): 8 TABLET, FILM COATED ORAL at 06:55

## 2018-06-24 RX ADMIN — Medication 25 MILLIGRAM(S): at 04:50

## 2018-06-24 RX ADMIN — Medication 27.5 MILLIGRAM(S): at 17:30

## 2018-06-24 RX ADMIN — SODIUM CHLORIDE 500 MILLILITER(S): 9 INJECTION INTRAMUSCULAR; INTRAVENOUS; SUBCUTANEOUS at 07:02

## 2018-06-24 NOTE — ED PROVIDER NOTE - MEDICAL DECISION MAKING DETAILS
plan to r/o aucte delirium, will control agitation with Thorazine and then give antiemetics, iv fludis, do ct head, cxr, xr pelvis, ua

## 2018-06-24 NOTE — H&P ADULT - NSHPPHYSICALEXAM_GEN_ALL_CORE
General: mildly cachectic, some distress  Neurology: A&Ox0, nonfocal  Head:  Normocephalic, atraumatic  ENT:  dry mucous membranes, no ulcerations  Neck:  Supple, no sinuses or palpable masses  Lymphatic:  No palpable cervical, supraclavicular, axillary or inguinal adenopathy  Respiratory: CTA B/L  CV: RRR, S1S2, no murmur  Abdominal: Softly distended, no obviously tender  MSK: No edema, + peripheral pulses, FROM all 4 extremity  Incisions: midline incision healed

## 2018-06-24 NOTE — ED PROVIDER NOTE - SKIN, MLM
Skin normal color for race, warm, dry and intact. No evidence of rash. mod turogr, appears dry oral mucosa

## 2018-06-24 NOTE — ED ADULT TRIAGE NOTE - CHIEF COMPLAINT QUOTE
Pt arrives to ED c/o N/V starting at 9:30 PM.  Pt hypertensive in triage 179/114.  Pt hx of dementia.  Pt able to answer questions and denies pain.  EMS called by son who did not come to hospital. Pt arrives to ED c/o N/V starting at 9:30 PM.  Pt hypertensive in triage 179/114.  Pt hx of dementia.  Pt able to answer questions and denies pain.  EMS called by son who did not come to hospital.      Please call son upon eval - [phone]

## 2018-06-24 NOTE — PATIENT PROFILE ADULT. - VISION (WITH CORRECTIVE LENSES IF THE PATIENT USUALLY WEARS THEM):
unable to fully assess due to confusion/Normal vision: sees adequately in most situations; can see medication labels, newsprint

## 2018-06-24 NOTE — H&P ADULT - PSH
After Cataract, Bilateral    History of Carotid Endarterectomy  right 2008  History of Laminectomy  1970's  S/P Cholecystectomy  laparoscopic 2 years ago  S/P Dilation and Curettage  1970's menorrhagia  S/P Knee Replacement  left knee 1997  right knee 2004  S/P small bowel resection

## 2018-06-24 NOTE — ED PROVIDER NOTE - NS ED ROS FT
son called at 7:40 am states that she had 3 episodes of vomiting today and had abnormal heart beat and was skipping beat with his exam son called at 7:40 am states that she had 3 episodes of vomiting today and had abnormal heart beat and was skipping beat with his exam.

## 2018-06-24 NOTE — H&P ADULT - NSHPLABSRESULTS_GEN_ALL_CORE
CBC Full  -  ( 2018 04:50 )  WBC Count : 14.46 K/uL  Hemoglobin : 16.5 g/dL  Hematocrit : 49.6 %  Platelet Count - Automated : 199 K/uL  Mean Cell Volume : 94.1 fL  Mean Cell Hemoglobin : 31.3 pg  Mean Cell Hemoglobin Concentration : 33.3 %  Auto Neutrophil # : 10.59 K/uL  Auto Lymphocyte # : 2.64 K/uL  Auto Monocyte # : 1.14 K/uL  Auto Eosinophil # : 0.01 K/uL  Auto Basophil # : 0.03 K/uL  Auto Neutrophil % : 73.2 %  Auto Lymphocyte % : 18.3 %  Auto Monocyte % : 7.9 %  Auto Eosinophil % : 0.1 %  Auto Basophil % : 0.2 %        137  |  94<L>  |  23  ----------------------------<  143<H>  5.4<H>   |  28  |  0.87    Ca    9.3      2018 05:55    TPro  7.6  /  Alb  4.1  /  TBili  0.5  /  DBili  x   /  AST  54<H>  /  ALT  22  /  AlkPhos  66  06-24    LIVER FUNCTIONS - ( 2018 05:55 )  Alb: 4.1 g/dL / Pro: 7.6 g/dL / ALK PHOS: 66 u/L / ALT: 22 u/L / AST: 54 u/L / GGT: x           CAPILLARY BLOOD GLUCOSE      POCT Blood Glucose.: 137 mg/dL (2018 04:11)    Urinalysis Basic - ( 2018 05:20 )    Color: YELLOW / Appearance: CLEAR / S.016 / pH: 6.0  Gluc: NEGATIVE / Ketone: TRACE  / Bili: NEGATIVE / Urobili: NORMAL mg/dL   Blood: NEGATIVE / Protein: 30 mg/dL / Nitrite: NEGATIVE   Leuk Esterase: NEGATIVE / RBC: 5-10 / WBC 2-5   Sq Epi: x / Non Sq Epi: x / Bacteria: x      PT/INR - ( 2018 05:55 )   PT: 9.4 SEC;   INR: 0.82          PTT - ( 2018 05:55 )  PTT:24.1 SEC

## 2018-06-24 NOTE — ED PROVIDER NOTE - PROGRESS NOTE DETAILS
Giulia SAMSON- pt continued to remain agitated, was placed on enhanced observation, given thorazine with mild effect Giulia SAMSON- pt given haldol for agitation, has difficult iv access, mutliple attempts to place US guided iv failed, was able to placed regualr iv on rt lower leg, u tox showed benzo and oxycodone, Tried to call son who has message saying that ' I am ignoring you if I am missing your call". Left message. Pt apepars to have social issues, will do ct abd w/o contrast to r/o obstruction thoguh abd soft nt, and then likely admit Giulia SAMSON- pt given haldol for agitation, has difficult iv access, multiple attempts to place US guided iv failed, was able to placed regular iv on rt lower leg, u tox showed benzo and oxycodone, Tried to call son who has message saying that ' I am ignoring you if I am missing your call". Left message. Pt apepars to have social issues, will do ct abd w/o contrast to r/o obstruction thoguh abd soft nt, and then likely admit Giulia SAMSON- sonn very responsive, had a discussion, he has deep understandign of dementia nad sent her for vomiting 3 times and has HHA at home to help him, does not want placement, would like to be called      after ct abd if no acute pathology and she tolerates po. Per son, pt on depakote and seroquel and usually is calm with that and also takes xanax. Pt manages sacral ulcer Giulia SAMSON- sonn very responsive, had a discussion, he has deep understanding of dementia and sent her for vomiting 3 times with elevated bp which she never had before and has HHA at home to help him, does not want placement, would like to be called      after ct abd if no acute pathology and she tolerates po. Per son, pt on depakote and seroquel and usually is calm with that and also takes xanax. Pt manages sacral ulcer SUSIE Harris: Pt case SUSIE Harris: Pt case discussed with : 89yF w/pmhx dementia p/w vomiting x yesterday. Pt signed out to me pending CT scan. Radiology spoke with attending , there is a small bowel obstruction. Spoke with surgery who will see pt in the ED. SUSIE Harris: Spoke with surgery, pt can be admitted to . Spokew ith pts son who agrees with plan and had a discussion with the surgical team over the phone

## 2018-06-24 NOTE — ED PROVIDER NOTE - CRANIAL NERVE AND PUPILLARY EXAM
COUGH REFLEX NOT INTACT/moves all 4 ext equally/central and peripheral vision intact/extra-ocular movements intact

## 2018-06-24 NOTE — ED ADULT NURSE NOTE - CHIEF COMPLAINT QUOTE
Pt arrives to ED c/o N/V starting at 9:30 PM.  Pt hypertensive in triage 179/114.  Pt hx of dementia.  Pt able to answer questions and denies pain.  EMS called by son who did not come to hospital.      Please call son upon eval - [phone]

## 2018-06-24 NOTE — ED ADULT NURSE NOTE - OBJECTIVE STATEMENT
pt received alert and oriented x1(to self) . pt has pmhx dementia,CAD, htn. pt is unable to partiaviapte in interview. As per triage note patient son called because pt was vomiting. No family at bedside. pt noted to be hypertensive. Md made aware. pt has a right trochanter stage 1 pressure ulcer. Pt placed on continuous tele monitoring. will continue to monitor pt received alert and oriented x1(to self) . pt has pmhx dementia,CAD, htn. pt is unable to partiaviapte in interview. As per triage note patient son called because pt was vomiting. No family at bedside. pt noted to be hypertensive. Md made aware. pt has a right trochanter stage 1 pressure ulcer. Pt placed on continuous tele monitoring. will continue to monitor  Received report from PM RN. Patient has a 1:1 in progress. Patient is comfortable and being monitored. VS stable. Waiting for bed assignment.    KENNETH Uribe

## 2018-06-24 NOTE — ED PROVIDER NOTE - OBJECTIVE STATEMENT
89 y/o F with h/o dementia sent by ems by son fro acute vomiting. No other symptoms reported or endorsed. Son not at bedside. Pt is demenetd and confused which is her baseline and not jignesh to provide history. Pt is actively vomiting and uncooperative and getting out of the bed. No fevers reported, no falls reported 89 y/o F with h/o dementia sent by ems by son fro acute vomiting. No other symptoms reported or endorsed. Son not at bedside. Pt is demenetd and confused which is her baseline and not able to provide history. Pt is actively vomiting and uncooperative and getting out of the bed. No fevers reported, no falls reported

## 2018-06-24 NOTE — H&P ADULT - HISTORY OF PRESENT ILLNESS
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 As per son, he last fed his mother in the afternoon yesterday. He reports that he usually feeds her as tolerated but does practice aspiration precautions, like limiting amounts of thin liquids and sitting her upright. He noticed the first episode of emesis, all food particles, and then she had a second episode with the rest of what he had fed her and so he decided to call EMS. As per son and live in girlfriend, who the patient lives with, she had been having large bowel movements until Thursday and passing flatus until yesterday.   As per son, patient recently hospitalized at [place] for pneumonia and recurrent diarrhea, during which her mental status slowly declined and now is A&O x0.   In the ED this am, patient with normal vital signs. WBC elevated to 14.5. Carbon dioxide 28. CT scan showing small bowel obstruction with transition point in the pelvis in the mid small bowel.   Son, Zac (retired EMS), extensively updated on current status. He reports that she is DNR, but that he would be interested in surgery for her if it were indicated. Also discussed risks and benefits of NGT at this time, with him in agreement that she would not tolerate the tube, and made aware of aspiration risks, etc.

## 2018-06-24 NOTE — ED PROVIDER NOTE - CRITICAL CARE PROVIDED
consult w/ pt's family directly relating to pts condition/additional history taking/documentation/telephone consultation with the patient's family/consultation with other physicians/direct patient care (not related to procedure)

## 2018-06-24 NOTE — H&P ADULT - ASSESSMENT
89F with dementia presents with SBR    -admit to B team surgery, Dr. Vargas  -NPO for now, with IV hydration  -maintain head of bed elevation at all times, patient high aspiration risk  -after discussion with son, Zac, patient DNR but would want surgical intervention if indicated  -page [id number] with questions

## 2018-06-25 LAB
BACTERIA UR CULT: SIGNIFICANT CHANGE UP
BASE EXCESS BLDV CALC-SCNC: 6.8 MMOL/L — SIGNIFICANT CHANGE UP
BLOOD GAS VENOUS - CREATININE: 1.1 MG/DL — SIGNIFICANT CHANGE UP (ref 0.5–1.3)
BUN SERPL-MCNC: 28 MG/DL — HIGH (ref 7–23)
CA-I BLD-SCNC: 1.15 MMOL/L — SIGNIFICANT CHANGE UP (ref 1.03–1.23)
CALCIUM SERPL-MCNC: 8.7 MG/DL — SIGNIFICANT CHANGE UP (ref 8.4–10.5)
CHLORIDE BLDV-SCNC: 102 MMOL/L — SIGNIFICANT CHANGE UP (ref 96–108)
CHLORIDE SERPL-SCNC: 98 MMOL/L — SIGNIFICANT CHANGE UP (ref 98–107)
CO2 SERPL-SCNC: 32 MMOL/L — HIGH (ref 22–31)
CREAT SERPL-MCNC: 1.1 MG/DL — SIGNIFICANT CHANGE UP (ref 0.5–1.3)
GAS PNL BLDV: 137 MMOL/L — SIGNIFICANT CHANGE UP (ref 136–146)
GLUCOSE BLDV-MCNC: 109 — HIGH (ref 70–99)
GLUCOSE SERPL-MCNC: 104 MG/DL — HIGH (ref 70–99)
HCO3 BLDV-SCNC: 30 MMOL/L — HIGH (ref 20–27)
HCT VFR BLD CALC: 35.4 % — SIGNIFICANT CHANGE UP (ref 34.5–45)
HCT VFR BLDV CALC: 39.2 % — SIGNIFICANT CHANGE UP (ref 34.5–45)
HGB BLD-MCNC: 11.9 G/DL — SIGNIFICANT CHANGE UP (ref 11.5–15.5)
HGB BLDV-MCNC: 12.8 G/DL — SIGNIFICANT CHANGE UP (ref 11.5–15.5)
LACTATE BLDV-MCNC: 1.7 MMOL/L — SIGNIFICANT CHANGE UP (ref 0.5–2)
MAGNESIUM SERPL-MCNC: 2.1 MG/DL — SIGNIFICANT CHANGE UP (ref 1.6–2.6)
MCHC RBC-ENTMCNC: 30.9 PG — SIGNIFICANT CHANGE UP (ref 27–34)
MCHC RBC-ENTMCNC: 33.6 % — SIGNIFICANT CHANGE UP (ref 32–36)
MCV RBC AUTO: 91.9 FL — SIGNIFICANT CHANGE UP (ref 80–100)
NRBC # FLD: 0 — SIGNIFICANT CHANGE UP
PCO2 BLDV: 41 MMHG — SIGNIFICANT CHANGE UP (ref 41–51)
PH BLDV: 7.49 PH — HIGH (ref 7.32–7.43)
PHOSPHATE SERPL-MCNC: 2.9 MG/DL — SIGNIFICANT CHANGE UP (ref 2.5–4.5)
PLATELET # BLD AUTO: 156 K/UL — SIGNIFICANT CHANGE UP (ref 150–400)
PMV BLD: 11.9 FL — SIGNIFICANT CHANGE UP (ref 7–13)
PO2 BLDV: 58 MMHG — HIGH (ref 35–40)
POTASSIUM BLDV-SCNC: 3.9 MMOL/L — SIGNIFICANT CHANGE UP (ref 3.4–4.5)
POTASSIUM SERPL-MCNC: 4 MMOL/L — SIGNIFICANT CHANGE UP (ref 3.5–5.3)
POTASSIUM SERPL-SCNC: 4 MMOL/L — SIGNIFICANT CHANGE UP (ref 3.5–5.3)
RBC # BLD: 3.85 M/UL — SIGNIFICANT CHANGE UP (ref 3.8–5.2)
RBC # FLD: 12.4 % — SIGNIFICANT CHANGE UP (ref 10.3–14.5)
SAO2 % BLDV: 90.5 % — HIGH (ref 60–85)
SODIUM SERPL-SCNC: 140 MMOL/L — SIGNIFICANT CHANGE UP (ref 135–145)
SPECIMEN SOURCE: SIGNIFICANT CHANGE UP
WBC # BLD: 9.53 K/UL — SIGNIFICANT CHANGE UP (ref 3.8–10.5)
WBC # FLD AUTO: 9.53 K/UL — SIGNIFICANT CHANGE UP (ref 3.8–10.5)

## 2018-06-25 PROCEDURE — 71045 X-RAY EXAM CHEST 1 VIEW: CPT | Mod: 26

## 2018-06-25 RX ADMIN — Medication 27.5 MILLIGRAM(S): at 18:50

## 2018-06-25 RX ADMIN — ENOXAPARIN SODIUM 40 MILLIGRAM(S): 100 INJECTION SUBCUTANEOUS at 12:41

## 2018-06-25 RX ADMIN — Medication 27.5 MILLIGRAM(S): at 06:45

## 2018-06-25 RX ADMIN — SODIUM CHLORIDE 100 MILLILITER(S): 9 INJECTION, SOLUTION INTRAVENOUS at 12:42

## 2018-06-25 NOTE — PROGRESS NOTE ADULT - SUBJECTIVE AND OBJECTIVE BOX
Surgery Progress Note    S: Patient resting in bed. Demented.  No events overnight.    T(C): 36.8 (06-25-18 @ 10:00), Max: 37.3 (06-25-18 @ 02:06)  HR: 72 (06-25-18 @ 10:00) (72 - 90)  BP: 117/58 (06-25-18 @ 10:00) (117/58 - 162/58)  RR: 18 (06-25-18 @ 10:00) (16 - 20)  SpO2: 97% (06-25-18 @ 10:00) (94% - 100%)  Wt(kg): --    06-24 @ 07:01  -  06-25 @ 07:00  --------------------------------------------------------  IN:    IV PiggyBack: 50 mL    lactated ringers.: 800 mL  Total IN: 850 mL    OUT:    Emesis: 1000 mL    Indwelling Catheter - Urethral: 425 mL    Nasoenteral Tube: 500 mL  Total OUT: 1925 mL    Total NET: -1075 mL      06-25 @ 07:01  -  06-25 @ 12:59  --------------------------------------------------------  IN:  Total IN: 0 mL    OUT:    Indwelling Catheter - Urethral: 100 mL  Total OUT: 100 mL    Total NET: -100 mL                              11.9   9.53  )-----------( 156      ( 25 Jun 2018 10:10 )             35.4     CAPILLARY BLOOD GLUCOSE          PE:   Gen: AAOX 3, NAD  Abd: Soft, NT, ND

## 2018-06-25 NOTE — PROGRESS NOTE ADULT - ASSESSMENT
89F with dementia and history of SBR for closed loop SBR presents with SBO    -NPO for now, with IV hydration  -OR planning for ex lap  -maintain head of bed elevation at all times, patient high aspiration risk  - pain control  - 1:1 obs for dementia and agitation

## 2018-06-26 DIAGNOSIS — R69 ILLNESS, UNSPECIFIED: ICD-10-CM

## 2018-06-26 LAB
BLD GP AB SCN SERPL QL: NEGATIVE — SIGNIFICANT CHANGE UP
BUN SERPL-MCNC: 15 MG/DL — SIGNIFICANT CHANGE UP (ref 7–23)
BUN SERPL-MCNC: 15 MG/DL — SIGNIFICANT CHANGE UP (ref 7–23)
BUN SERPL-MCNC: 20 MG/DL — SIGNIFICANT CHANGE UP (ref 7–23)
CALCIUM SERPL-MCNC: 8.5 MG/DL — SIGNIFICANT CHANGE UP (ref 8.4–10.5)
CALCIUM SERPL-MCNC: 8.6 MG/DL — SIGNIFICANT CHANGE UP (ref 8.4–10.5)
CALCIUM SERPL-MCNC: 8.7 MG/DL — SIGNIFICANT CHANGE UP (ref 8.4–10.5)
CHLORIDE SERPL-SCNC: 100 MMOL/L — SIGNIFICANT CHANGE UP (ref 98–107)
CHLORIDE SERPL-SCNC: 98 MMOL/L — SIGNIFICANT CHANGE UP (ref 98–107)
CHLORIDE SERPL-SCNC: 99 MMOL/L — SIGNIFICANT CHANGE UP (ref 98–107)
CK MB BLD-MCNC: 3.36 NG/ML — SIGNIFICANT CHANGE UP (ref 1–4.7)
CK SERPL-CCNC: 213 U/L — HIGH (ref 25–170)
CO2 SERPL-SCNC: 24 MMOL/L — SIGNIFICANT CHANGE UP (ref 22–31)
CO2 SERPL-SCNC: 28 MMOL/L — SIGNIFICANT CHANGE UP (ref 22–31)
CO2 SERPL-SCNC: 29 MMOL/L — SIGNIFICANT CHANGE UP (ref 22–31)
CREAT SERPL-MCNC: 0.79 MG/DL — SIGNIFICANT CHANGE UP (ref 0.5–1.3)
CREAT SERPL-MCNC: 0.81 MG/DL — SIGNIFICANT CHANGE UP (ref 0.5–1.3)
CREAT SERPL-MCNC: 0.85 MG/DL — SIGNIFICANT CHANGE UP (ref 0.5–1.3)
GLUCOSE SERPL-MCNC: 176 MG/DL — HIGH (ref 70–99)
GLUCOSE SERPL-MCNC: 199 MG/DL — HIGH (ref 70–99)
GLUCOSE SERPL-MCNC: 91 MG/DL — SIGNIFICANT CHANGE UP (ref 70–99)
HCT VFR BLD CALC: 32.4 % — LOW (ref 34.5–45)
HCT VFR BLD CALC: 38.2 % — SIGNIFICANT CHANGE UP (ref 34.5–45)
HGB BLD-MCNC: 11.1 G/DL — LOW (ref 11.5–15.5)
HGB BLD-MCNC: 13.1 G/DL — SIGNIFICANT CHANGE UP (ref 11.5–15.5)
INR BLD: 1.04 — SIGNIFICANT CHANGE UP (ref 0.88–1.17)
LACTATE BLDA-SCNC: 3.2 MMOL/L — HIGH (ref 0.5–2)
MAGNESIUM SERPL-MCNC: 1.7 MG/DL — SIGNIFICANT CHANGE UP (ref 1.6–2.6)
MAGNESIUM SERPL-MCNC: 1.9 MG/DL — SIGNIFICANT CHANGE UP (ref 1.6–2.6)
MCHC RBC-ENTMCNC: 31 PG — SIGNIFICANT CHANGE UP (ref 27–34)
MCHC RBC-ENTMCNC: 31 PG — SIGNIFICANT CHANGE UP (ref 27–34)
MCHC RBC-ENTMCNC: 34.3 % — SIGNIFICANT CHANGE UP (ref 32–36)
MCHC RBC-ENTMCNC: 34.3 % — SIGNIFICANT CHANGE UP (ref 32–36)
MCV RBC AUTO: 90.5 FL — SIGNIFICANT CHANGE UP (ref 80–100)
MCV RBC AUTO: 90.5 FL — SIGNIFICANT CHANGE UP (ref 80–100)
NRBC # FLD: 0 — SIGNIFICANT CHANGE UP
NRBC # FLD: 0 — SIGNIFICANT CHANGE UP
PHOSPHATE SERPL-MCNC: 1.9 MG/DL — LOW (ref 2.5–4.5)
PHOSPHATE SERPL-MCNC: 2.4 MG/DL — LOW (ref 2.5–4.5)
PLATELET # BLD AUTO: 137 K/UL — LOW (ref 150–400)
PLATELET # BLD AUTO: 182 K/UL — SIGNIFICANT CHANGE UP (ref 150–400)
PMV BLD: 11.6 FL — SIGNIFICANT CHANGE UP (ref 7–13)
PMV BLD: 12 FL — SIGNIFICANT CHANGE UP (ref 7–13)
POTASSIUM SERPL-MCNC: 3.1 MMOL/L — LOW (ref 3.5–5.3)
POTASSIUM SERPL-MCNC: 3.4 MMOL/L — LOW (ref 3.5–5.3)
POTASSIUM SERPL-MCNC: 3.6 MMOL/L — SIGNIFICANT CHANGE UP (ref 3.5–5.3)
POTASSIUM SERPL-SCNC: 3.1 MMOL/L — LOW (ref 3.5–5.3)
POTASSIUM SERPL-SCNC: 3.4 MMOL/L — LOW (ref 3.5–5.3)
POTASSIUM SERPL-SCNC: 3.6 MMOL/L — SIGNIFICANT CHANGE UP (ref 3.5–5.3)
PROTHROM AB SERPL-ACNC: 11.5 SEC — SIGNIFICANT CHANGE UP (ref 9.8–13.1)
RBC # BLD: 3.58 M/UL — LOW (ref 3.8–5.2)
RBC # BLD: 4.22 M/UL — SIGNIFICANT CHANGE UP (ref 3.8–5.2)
RBC # FLD: 12.2 % — SIGNIFICANT CHANGE UP (ref 10.3–14.5)
RBC # FLD: 12.2 % — SIGNIFICANT CHANGE UP (ref 10.3–14.5)
RH IG SCN BLD-IMP: POSITIVE — SIGNIFICANT CHANGE UP
SODIUM SERPL-SCNC: 138 MMOL/L — SIGNIFICANT CHANGE UP (ref 135–145)
SODIUM SERPL-SCNC: 141 MMOL/L — SIGNIFICANT CHANGE UP (ref 135–145)
SODIUM SERPL-SCNC: 141 MMOL/L — SIGNIFICANT CHANGE UP (ref 135–145)
TROPONIN T, HIGH SENSITIVITY: 57 NG/L — CRITICAL HIGH (ref ?–14)
WBC # BLD: 10.18 K/UL — SIGNIFICANT CHANGE UP (ref 3.8–10.5)
WBC # BLD: 15.37 K/UL — HIGH (ref 3.8–10.5)
WBC # FLD AUTO: 10.18 K/UL — SIGNIFICANT CHANGE UP (ref 3.8–10.5)
WBC # FLD AUTO: 15.37 K/UL — HIGH (ref 3.8–10.5)

## 2018-06-26 PROCEDURE — 71045 X-RAY EXAM CHEST 1 VIEW: CPT | Mod: 26

## 2018-06-26 PROCEDURE — 93010 ELECTROCARDIOGRAM REPORT: CPT

## 2018-06-26 PROCEDURE — 74245: CPT | Mod: 26

## 2018-06-26 RX ORDER — POTASSIUM PHOSPHATE, MONOBASIC POTASSIUM PHOSPHATE, DIBASIC 236; 224 MG/ML; MG/ML
15 INJECTION, SOLUTION INTRAVENOUS ONCE
Qty: 0 | Refills: 0 | Status: COMPLETED | OUTPATIENT
Start: 2018-06-26 | End: 2018-06-27

## 2018-06-26 RX ORDER — POTASSIUM PHOSPHATE, MONOBASIC POTASSIUM PHOSPHATE, DIBASIC 236; 224 MG/ML; MG/ML
15 INJECTION, SOLUTION INTRAVENOUS ONCE
Qty: 0 | Refills: 0 | Status: COMPLETED | OUTPATIENT
Start: 2018-06-26 | End: 2018-06-26

## 2018-06-26 RX ORDER — SODIUM CHLORIDE 9 MG/ML
1000 INJECTION, SOLUTION INTRAVENOUS
Qty: 0 | Refills: 0 | Status: DISCONTINUED | OUTPATIENT
Start: 2018-06-26 | End: 2018-06-27

## 2018-06-26 RX ORDER — POTASSIUM CHLORIDE 20 MEQ
10 PACKET (EA) ORAL
Qty: 0 | Refills: 0 | Status: COMPLETED | OUTPATIENT
Start: 2018-06-26 | End: 2018-06-26

## 2018-06-26 RX ADMIN — ENOXAPARIN SODIUM 40 MILLIGRAM(S): 100 INJECTION SUBCUTANEOUS at 13:07

## 2018-06-26 RX ADMIN — Medication 100 MILLIEQUIVALENT(S): at 10:15

## 2018-06-26 RX ADMIN — Medication 100 MILLIEQUIVALENT(S): at 11:59

## 2018-06-26 RX ADMIN — Medication 27.5 MILLIGRAM(S): at 19:31

## 2018-06-26 RX ADMIN — Medication 100 MILLIEQUIVALENT(S): at 13:24

## 2018-06-26 RX ADMIN — Medication 27.5 MILLIGRAM(S): at 05:19

## 2018-06-26 RX ADMIN — POTASSIUM PHOSPHATE, MONOBASIC POTASSIUM PHOSPHATE, DIBASIC 62.5 MILLIMOLE(S): 236; 224 INJECTION, SOLUTION INTRAVENOUS at 15:10

## 2018-06-26 NOTE — PROGRESS NOTE ADULT - SUBJECTIVE AND OBJECTIVE BOX
Surgery Progress Note - pager [phone]      SUBJECTIVE: Patient seen and examined on morning rounds. No acute events overnight. Vitals stable. NG tube output 1400ml in 24 hours. Exam/history unreliable due to dementia. Was agitated overnight.      OBJECTIVE:     ** Vital signs / I&O's **    Vital Signs Last 24 Hrs  T(C): 36.7 (26 Jun 2018 09:49), Max: 37.2 (25 Jun 2018 17:17)  T(F): 98 (26 Jun 2018 09:49), Max: 99 (25 Jun 2018 17:17)  HR: 98 (26 Jun 2018 09:49) (63 - 98)  BP: 149/77 (26 Jun 2018 09:49) (114/53 - 159/68)  BP(mean): --  RR: 18 (26 Jun 2018 09:49) (18 - 18)  SpO2: 96% (26 Jun 2018 09:49) (96% - 100%)      25 Jun 2018 07:01  -  26 Jun 2018 07:00  --------------------------------------------------------  IN:    IV PiggyBack: 50 mL    lactated ringers.: 2200 mL  Total IN: 2250 mL    OUT:    Indwelling Catheter - Urethral: 1900 mL    Nasoenteral Tube: 1400 mL  Total OUT: 3300 mL    Total NET: -1050 mL      26 Jun 2018 07:01  -  26 Jun 2018 10:26  --------------------------------------------------------  IN:    dextrose 5% + sodium chloride 0.45%: 200 mL    lactated ringers.: 200 mL  Total IN: 400 mL    OUT:    Nasoenteral Tube: 300 mL  Total OUT: 300 mL    Total NET: 100 mL      ** Physical Exam **  Gen: Alert, NAD  Abd: Soft, no guarding, no masses, nondistended    ** Labs **                          11.1   10.18 )-----------( 137      ( 26 Jun 2018 06:30 )             32.4     26 Jun 2018 06:30    141    |  100    |  20     ----------------------------<  91     3.1     |  29     |  0.85     Ca    8.6        26 Jun 2018 06:30  Phos  1.9       26 Jun 2018 06:30  Mg     1.9       26 Jun 2018 06:30      CAPILLARY BLOOD GLUCOSE                  MEDICATIONS  (STANDING):  dextrose 5% + sodium chloride 0.45% 1000 milliLiter(s) (100 mL/Hr) IV Continuous <Continuous>  enoxaparin Injectable 40 milliGRAM(s) SubCutaneous every 24 hours  potassium chloride  10 mEq/100 mL IVPB 10 milliEquivalent(s) IV Intermittent every 1 hour  potassium phosphate IVPB 15 milliMole(s) IV Intermittent once  valproate sodium IVPB 500 milliGRAM(s) IV Intermittent two times a day    MEDICATIONS  (PRN):

## 2018-06-26 NOTE — PROGRESS NOTE ADULT - ASSESSMENT
89F with dementia and history of SBR for closed loop SBR presents with SBO    - NPO for now, with IV hydration  - NG tube, monitor output  - Monitor for GI function  - 1:1 obs for dementia and agitation  - DVT ppx

## 2018-06-26 NOTE — CHART NOTE - NSCHARTNOTEFT_GEN_A_CORE
A rapid response was called when the nurse found the patient gargling and desaturating to the 80s. Zoll pads were placed and she was put on continuous pulse oximetry and a non-rebreather. Saturation improved as she underwent nasotracheal suction and chest PT. She was started on fulls today after a small bowel series and the suspicion is that she may have aspirated. Labs were sent and a chest X-ray was obtained. SICU came to evaluate. Will continue to follow closely through the night and morning. - Supriya White MD, PGY-1

## 2018-06-27 LAB
APPEARANCE UR: SIGNIFICANT CHANGE UP
BACTERIA # UR AUTO: SIGNIFICANT CHANGE UP
BILIRUB UR-MCNC: NEGATIVE — SIGNIFICANT CHANGE UP
BLOOD UR QL VISUAL: HIGH
BUN SERPL-MCNC: 16 MG/DL — SIGNIFICANT CHANGE UP (ref 7–23)
CALCIUM SERPL-MCNC: 8.4 MG/DL — SIGNIFICANT CHANGE UP (ref 8.4–10.5)
CHLORIDE SERPL-SCNC: 98 MMOL/L — SIGNIFICANT CHANGE UP (ref 98–107)
CO2 SERPL-SCNC: 26 MMOL/L — SIGNIFICANT CHANGE UP (ref 22–31)
COLOR SPEC: YELLOW — SIGNIFICANT CHANGE UP
CREAT SERPL-MCNC: 0.88 MG/DL — SIGNIFICANT CHANGE UP (ref 0.5–1.3)
GLUCOSE SERPL-MCNC: 159 MG/DL — HIGH (ref 70–99)
GLUCOSE UR-MCNC: NEGATIVE — SIGNIFICANT CHANGE UP
HCT VFR BLD CALC: 36.9 % — SIGNIFICANT CHANGE UP (ref 34.5–45)
HGB BLD-MCNC: 12.9 G/DL — SIGNIFICANT CHANGE UP (ref 11.5–15.5)
KETONES UR-MCNC: SIGNIFICANT CHANGE UP
LEUKOCYTE ESTERASE UR-ACNC: HIGH
MAGNESIUM SERPL-MCNC: 1.7 MG/DL — SIGNIFICANT CHANGE UP (ref 1.6–2.6)
MCHC RBC-ENTMCNC: 31.8 PG — SIGNIFICANT CHANGE UP (ref 27–34)
MCHC RBC-ENTMCNC: 35 % — SIGNIFICANT CHANGE UP (ref 32–36)
MCV RBC AUTO: 90.9 FL — SIGNIFICANT CHANGE UP (ref 80–100)
MUCOUS THREADS # UR AUTO: SIGNIFICANT CHANGE UP
NITRITE UR-MCNC: POSITIVE — HIGH
NRBC # FLD: 0 — SIGNIFICANT CHANGE UP
PH UR: 8.5 — HIGH (ref 4.6–8)
PHOSPHATE SERPL-MCNC: 2.2 MG/DL — LOW (ref 2.5–4.5)
PLATELET # BLD AUTO: 161 K/UL — SIGNIFICANT CHANGE UP (ref 150–400)
PMV BLD: 11.8 FL — SIGNIFICANT CHANGE UP (ref 7–13)
POTASSIUM SERPL-MCNC: 3.5 MMOL/L — SIGNIFICANT CHANGE UP (ref 3.5–5.3)
POTASSIUM SERPL-SCNC: 3.5 MMOL/L — SIGNIFICANT CHANGE UP (ref 3.5–5.3)
PROT UR-MCNC: 500 MG/DL — HIGH
RBC # BLD: 4.06 M/UL — SIGNIFICANT CHANGE UP (ref 3.8–5.2)
RBC # FLD: 12.3 % — SIGNIFICANT CHANGE UP (ref 10.3–14.5)
RBC CASTS # UR COMP ASSIST: >50 — HIGH (ref 0–?)
SODIUM SERPL-SCNC: 139 MMOL/L — SIGNIFICANT CHANGE UP (ref 135–145)
SP GR SPEC: 1.01 — SIGNIFICANT CHANGE UP (ref 1–1.04)
TROPONIN T, HIGH SENSITIVITY: 59 NG/L — CRITICAL HIGH (ref ?–14)
TROPONIN T, HIGH SENSITIVITY: 61 NG/L — CRITICAL HIGH (ref ?–14)
UROBILINOGEN FLD QL: NORMAL MG/DL — SIGNIFICANT CHANGE UP
WBC # BLD: 13.29 K/UL — HIGH (ref 3.8–10.5)
WBC # FLD AUTO: 13.29 K/UL — HIGH (ref 3.8–10.5)
WBC CLUMPS #/AREA URNS HPF: PRESENT — HIGH (ref 0–?)
WBC UR QL: SIGNIFICANT CHANGE UP (ref 0–?)
YEAST BUDDING # UR COMP ASSIST: SIGNIFICANT CHANGE UP

## 2018-06-27 PROCEDURE — 71045 X-RAY EXAM CHEST 1 VIEW: CPT | Mod: 26

## 2018-06-27 PROCEDURE — 99291 CRITICAL CARE FIRST HOUR: CPT

## 2018-06-27 RX ORDER — VANCOMYCIN HCL 1 G
1000 VIAL (EA) INTRAVENOUS EVERY 24 HOURS
Qty: 0 | Refills: 0 | Status: DISCONTINUED | OUTPATIENT
Start: 2018-06-27 | End: 2018-06-27

## 2018-06-27 RX ORDER — POTASSIUM CHLORIDE 20 MEQ
10 PACKET (EA) ORAL
Qty: 0 | Refills: 0 | Status: COMPLETED | OUTPATIENT
Start: 2018-06-27 | End: 2018-06-27

## 2018-06-27 RX ORDER — CEFTRIAXONE 500 MG/1
1 INJECTION, POWDER, FOR SOLUTION INTRAMUSCULAR; INTRAVENOUS EVERY 24 HOURS
Qty: 0 | Refills: 0 | Status: DISCONTINUED | OUTPATIENT
Start: 2018-06-28 | End: 2018-07-01

## 2018-06-27 RX ORDER — ENOXAPARIN SODIUM 100 MG/ML
40 INJECTION SUBCUTANEOUS EVERY 24 HOURS
Qty: 0 | Refills: 0 | Status: DISCONTINUED | OUTPATIENT
Start: 2018-06-27 | End: 2018-07-03

## 2018-06-27 RX ORDER — CEFTRIAXONE 500 MG/1
1 INJECTION, POWDER, FOR SOLUTION INTRAMUSCULAR; INTRAVENOUS ONCE
Qty: 0 | Refills: 0 | Status: COMPLETED | OUTPATIENT
Start: 2018-06-27 | End: 2018-06-27

## 2018-06-27 RX ORDER — VALPROIC ACID (AS SODIUM SALT) 250 MG/5ML
500 SOLUTION, ORAL ORAL
Qty: 0 | Refills: 0 | Status: DISCONTINUED | OUTPATIENT
Start: 2018-06-27 | End: 2018-07-02

## 2018-06-27 RX ORDER — POTASSIUM PHOSPHATE, MONOBASIC POTASSIUM PHOSPHATE, DIBASIC 236; 224 MG/ML; MG/ML
15 INJECTION, SOLUTION INTRAVENOUS ONCE
Qty: 0 | Refills: 0 | Status: COMPLETED | OUTPATIENT
Start: 2018-06-27 | End: 2018-06-27

## 2018-06-27 RX ORDER — PIPERACILLIN AND TAZOBACTAM 4; .5 G/20ML; G/20ML
3.38 INJECTION, POWDER, LYOPHILIZED, FOR SOLUTION INTRAVENOUS EVERY 8 HOURS
Qty: 0 | Refills: 0 | Status: DISCONTINUED | OUTPATIENT
Start: 2018-06-27 | End: 2018-06-27

## 2018-06-27 RX ORDER — MAGNESIUM SULFATE 500 MG/ML
2 VIAL (ML) INJECTION ONCE
Qty: 0 | Refills: 0 | Status: COMPLETED | OUTPATIENT
Start: 2018-06-27 | End: 2018-06-27

## 2018-06-27 RX ORDER — ACETAMINOPHEN 500 MG
1000 TABLET ORAL ONCE
Qty: 0 | Refills: 0 | Status: COMPLETED | OUTPATIENT
Start: 2018-06-27 | End: 2018-06-27

## 2018-06-27 RX ORDER — POTASSIUM PHOSPHATE, MONOBASIC POTASSIUM PHOSPHATE, DIBASIC 236; 224 MG/ML; MG/ML
15 INJECTION, SOLUTION INTRAVENOUS ONCE
Qty: 0 | Refills: 0 | Status: DISCONTINUED | OUTPATIENT
Start: 2018-06-27 | End: 2018-06-27

## 2018-06-27 RX ORDER — POTASSIUM CHLORIDE 20 MEQ
10 PACKET (EA) ORAL
Qty: 0 | Refills: 0 | Status: DISCONTINUED | OUTPATIENT
Start: 2018-06-27 | End: 2018-06-27

## 2018-06-27 RX ORDER — CEFTRIAXONE 500 MG/1
INJECTION, POWDER, FOR SOLUTION INTRAMUSCULAR; INTRAVENOUS
Qty: 0 | Refills: 0 | Status: DISCONTINUED | OUTPATIENT
Start: 2018-06-27 | End: 2018-07-01

## 2018-06-27 RX ORDER — SODIUM CHLORIDE 9 MG/ML
1000 INJECTION, SOLUTION INTRAVENOUS
Qty: 0 | Refills: 0 | Status: DISCONTINUED | OUTPATIENT
Start: 2018-06-27 | End: 2018-07-02

## 2018-06-27 RX ADMIN — Medication 0.5 MILLIGRAM(S): at 19:45

## 2018-06-27 RX ADMIN — Medication 100 MILLIEQUIVALENT(S): at 13:46

## 2018-06-27 RX ADMIN — CEFTRIAXONE 100 GRAM(S): 500 INJECTION, POWDER, FOR SOLUTION INTRAMUSCULAR; INTRAVENOUS at 20:23

## 2018-06-27 RX ADMIN — Medication 400 MILLIGRAM(S): at 05:00

## 2018-06-27 RX ADMIN — Medication 100 MILLIEQUIVALENT(S): at 15:05

## 2018-06-27 RX ADMIN — Medication 27.5 MILLIGRAM(S): at 18:03

## 2018-06-27 RX ADMIN — Medication 100 MILLIEQUIVALENT(S): at 12:47

## 2018-06-27 RX ADMIN — Medication 250 MILLIGRAM(S): at 07:47

## 2018-06-27 RX ADMIN — POTASSIUM PHOSPHATE, MONOBASIC POTASSIUM PHOSPHATE, DIBASIC 62.5 MILLIMOLE(S): 236; 224 INJECTION, SOLUTION INTRAVENOUS at 04:32

## 2018-06-27 RX ADMIN — ENOXAPARIN SODIUM 40 MILLIGRAM(S): 100 INJECTION SUBCUTANEOUS at 12:50

## 2018-06-27 RX ADMIN — POTASSIUM PHOSPHATE, MONOBASIC POTASSIUM PHOSPHATE, DIBASIC 62.5 MILLIMOLE(S): 236; 224 INJECTION, SOLUTION INTRAVENOUS at 13:36

## 2018-06-27 RX ADMIN — Medication 50 GRAM(S): at 12:47

## 2018-06-27 RX ADMIN — Medication 27.5 MILLIGRAM(S): at 06:13

## 2018-06-27 NOTE — CONSULT NOTE ADULT - ATTENDING COMMENTS
89y Female with SBO.     PLAN:    Neurologic: Dementia, agitated   - Continue anti-seizure medications    Respiratory: Desaturations possibly secondary to aspiration  - Continue supplemental O2  - Chest PT and suctioning q4h and PRN     Cardiovascular: No acute issues  - Trend lactate. 3.2 during rapid response.    Gastrointestinal/Nutrition: SBO with recent removal of NGT after small bowel series  - Keep NPO for now  - Formal speech and swallow     Renal/Genitourinary: No acute issues    Hematologic: No acute signs of bleeding     Infectious Disease: No radiographic evidence of pneumonia, elevated white count. Positive UA  - Start treatment for UTI after urine cultures     Tubes/Lines/Drains: PIV    Endocrine: No acute issues      Disposition: Floor, will be placed on continuum        The patient is a critical care patient with life threatening hemodynamic and metabolic instability in SICU.  I have personally interviewed and examined the patient, reviewed data and laboratory tests/x-rays and all pertinent electronic images.  The SICU team discusses on an ongoing basis with the primary team and all consultants, House staff and PA's to have a permanent risk benefit analyses on all decisions.  These diagnoses are unrelated to the surgical procedure noted above.  I met with family if needed to get further history, discuss the case and make care decisions for this patient who might not be able to participate.  Time involved in performance of separately billable procedures was not counted toward my critical care time. There is no overlap.  I spent 55-75 minutes  in total providing critical care for the diagnoses, assessment and plan above.

## 2018-06-27 NOTE — PROVIDER CONTACT NOTE (OTHER) - RECOMMENDATIONS
MD to come and place another IV in pt's foot
MD notified
MD notify- start IV tylenol, IV abx, cold packs, blood cultures.
MD orders labs reassessed for the morning, another chest xray will be done today.
MD to reassess restraint and constant observation order

## 2018-06-27 NOTE — PROVIDER CONTACT NOTE (OTHER) - ACTION/TREATMENT ORDERED:
MD okay with not continuing potassium phosphate. Start IV antibx when seizure medication is done infusing
MD aware, MD already ordered sepsis workup
MD aware- will put in orders.
Team will discuss and decide how to proceed.

## 2018-06-27 NOTE — CONSULT NOTE ADULT - SUBJECTIVE AND OBJECTIVE BOX
SICU Consultation Note  =====================================================  HPI: 89y female with dementia s/p SBR in 2015 for closed loop SBO was brought in by EMS for vomiting    HISTORY  89y Female  HPI:  89F with dementia (A&O x 0 to 1 at baseline), s/p small bowel resection in 2015 for a closed loop SBO, whose son called EMS this am after patient had two episodes of emesis last night. As per son, he last fed his mother in the afternoon yesterday. He reports that he usually feeds her as tolerated but does practice aspiration precautions, like limiting amounts of thin liquids and sitting her upright. He noticed the first episode of emesis, all food particles, and then she had a second episode with the rest of what he had fed her and so he decided to call EMS. As per son and live in girlfriend, who the patient lives with, she had been having large bowel movements until Thursday and passing flatus until yesterday.   As per son, patient recently hospitalized at [place] for pneumonia and recurrent diarrhea, during which her mental status slowly declined and now is A&O x0.   In the ED this am, patient with normal vital signs. WBC elevated to 14.5. Carbon dioxide 28. CT scan showing small bowel obstruction with transition point in the pelvis in the mid small bowel.   Son, Zac (retired EMS), extensively updated on current status. He reports that she is DNR, but that he would be interested in surgery for her if it were indicated. Also discussed risks and benefits of NGT at this time, with him in agreement that she would not tolerate the tube, and made aware of aspiration risks, etc. (24 Jun 2018 14:30)    Allergies: Risperdal (Unknown)    PAST MEDICAL & SURGICAL HISTORY:  Dementia  History of Spinal Stenosis  Lung Nodules  Hepatitis C: possible during transfusion in 1970&#x27;s  Carotid Artery Disease  Tendonitis: left hand: s/p steroid injection 3/10  Back Pain  Hypertension  Hyperlipidemia  S/P small bowel resection  History of Carotid Endarterectomy: right 2008  S/P Dilation and Curettage: 1970&#x27;s menorrhagia  After Cataract, Bilateral  S/P Cholecystectomy: laparoscopic 2 years ago  History of Laminectomy: 1970&#x27;s  S/P Knee Replacement: left knee 1997  right knee 2004    FAMILY HISTORY:  No pertinent family history in first degree relatives      SOCIAL HISTORY:  ***    ADVANCE DIRECTIVES: Presumed Full Code  ***    REVIEW OF SYSTEMS:  ***  General: Non-Contributory  Skin/Breast: Non-Contributory  Ophthalmologic: Non-Contributory  ENMT: Non-Contributory  Respiratory and Thorax: Non-Contributory  Cardiovascular: Non-Contributory  Gastrointestinal: Non-Contributory  Genitourinary: Non-Contributory  Musculoskeletal: Non-Contributory  Neurological: Non-Contributory  Psychiatric: Non-Contributory  Hematology/Lymphatics: Non-Contributory  Endocrine: Non-Contributory  Allergic/Immunologic: Non-Contributory    HOME MEDICATIONS:  ***    CURRENT MEDICATIONS:   --------------------------------------------------------------------------------------  Neurologic Medications  valproate sodium IVPB 500 milliGRAM(s) IV Intermittent two times a day    Respiratory Medications    Cardiovascular Medications    Gastrointestinal Medications  dextrose 5% + sodium chloride 0.45% 1000 milliLiter(s) IV Continuous <Continuous>  potassium phosphate IVPB 15 milliMole(s) IV Intermittent once    Genitourinary Medications    Hematologic/Oncologic Medications  enoxaparin Injectable 40 milliGRAM(s) SubCutaneous every 24 hours    Antimicrobial/Immunologic Medications    Endocrine/Metabolic Medications    Topical/Other Medications    --------------------------------------------------------------------------------------    VITAL SIGNS, INS/OUTS (last 24 hours):  --------------------------------------------------------------------------------------  ((Insert SICU Vitals / Is+Os here)) ***  --------------------------------------------------------------------------------------    EXAM  NEUROLOGY  RASS:   	GCS:    Exam: Normal, NAD, alert, oriented x 3, no focal deficits. ***    HEENT  Exam: Normocephalic, atraumatic.  EOMI ***    RESPIRATORY  Exam: Lungs clear to auscultation, Normal expansion/effort.  ***  Mechanical Ventilation:     CARDIOVASCULAR  Exam: S1, S2.  Regular rate and rhythm.  Peripheral edema  ***    GI/NUTRITION  Exam: Abdomen soft, Non-tender, Non-distended.  ***  Wound:   ***  Current Diet:  NPO ***    VASCULAR  Exam: Extremities warm, pink, well-perfused.  ***    MUSCULOSKELETAL  Exam: All extremities moving spontaneously without limitations.  ***    SKIN:  Exam: Good skin turgor, no skin breakdown.  ***    METABOLIC/FLUIDS/ELECTROLYTES  dextrose 5% + sodium chloride 0.45% 1000 milliLiter(s) IV Continuous <Continuous>  potassium phosphate IVPB 15 milliMole(s) IV Intermittent once      HEMATOLOGIC  [x] DVT Prophylaxis: enoxaparin Injectable 40 milliGRAM(s) SubCutaneous every 24 hours    Transfusions:	[] PRBC	[] Platelets		[] FFP	[] Cryoprecipitate    INFECTIOUS DISEASE  Antimicrobials/Immunologic Medications:    Day #  	of    ***    Tubes/Lines/Drains  ***  [x] Peripheral IV  [] Central Venous Line     	[] R	[] L	[] IJ	[] Fem	[] SC	Date Placed:   [] Arterial Line		[] R	[] L	[] Fem	[] Rad	[] Ax	Date Placed:   [] PICC:         	[] Midline		[] Mediport  [] Urinary Catheter		Date Placed:     LABS  --------------------------------------------------------------------------------------  ((Insert SICU Labs here))***  --------------------------------------------------------------------------------------    OTHER LABS    IMAGING RESULTS  Echo:   CT:   Xray: SICU Consultation Note  =====================================================  HPI: 89y female with dementia (oriented x 0-1 according to nursing), s/p SBR in  for closed loop SBO was brought in by EMS for vomiting, found to have a small bowel obstruction with transition point in the pelvis in the mid small bowel. Surgical rapid response called for desaturation. Saturations improved after nasotracheal suctioning. Patient just had NGT removed during the day and started on full liquids.     Allergies: Risperdal (Unknown)    PAST MEDICAL & SURGICAL HISTORY:  Dementia  History of Spinal Stenosis  Lung Nodules  Hepatitis C: possible during transfusion in &#x27;s  Carotid Artery Disease  Tendonitis: left hand: s/p steroid injection 3/10  Back Pain  Hypertension  Hyperlipidemia  S/P small bowel resection  History of Carotid Endarterectomy: right   S/P Dilation and Curettage: &#x27;s menorrhagia  After Cataract, Bilateral  S/P Cholecystectomy: laparoscopic 2 years ago  History of Laminectomy: 1970&#x27;s  S/P Knee Replacement: left knee 1997  right knee 2004    FAMILY HISTORY:  No pertinent family history in first degree relatives      SOCIAL HISTORY:  Lives at home with her son    ADVANCE DIRECTIVES: Presumed Full Code      REVIEW OF SYSTEMS:    General: Non-Contributory  Skin/Breast: Non-Contributory  Ophthalmologic: Non-Contributory  ENMT: Non-Contributory  Respiratory and Thorax: Non-Contributory  Cardiovascular: Non-Contributory  Gastrointestinal: Non-Contributory  Genitourinary: Non-Contributory  Musculoskeletal: Non-Contributory  Neurological: Non-Contributory  Psychiatric: Non-Contributory  Hematology/Lymphatics: Non-Contributory  Endocrine: Non-Contributory  Allergic/Immunologic: Non-Contributory    HOME MEDICATIONS:    Percocet 5/325 oral tablet: 1 tab(s) orally every 6 hours, As Needed for moderate pain (22 May 2018 16:04)  QUEtiapine 50 mg oral tablet: 1 tab(s) orally 2 times a day (22 May 2018 16:04)  valproic acid 500 mg oral delayed release capsule: 1 tab(s) orally 2 times a day (22 May 2018 16:04)      CURRENT MEDICATIONS:   --------------------------------------------------------------------------------------  Neurologic Medications  valproate sodium IVPB 500 milliGRAM(s) IV Intermittent two times a day    Respiratory Medications    Cardiovascular Medications    Gastrointestinal Medications  dextrose 5% + sodium chloride 0.45% 1000 milliLiter(s) IV Continuous <Continuous>  potassium phosphate IVPB 15 milliMole(s) IV Intermittent once    Genitourinary Medications    Hematologic/Oncologic Medications  enoxaparin Injectable 40 milliGRAM(s) SubCutaneous every 24 hours    Antimicrobial/Immunologic Medications    Endocrine/Metabolic Medications    Topical/Other Medications    --------------------------------------------------------------------------------------    VITAL SIGNS, INS/OUTS (last 24 hours):  --------------------------------------------------------------------------------------  Vital Signs Last 24 Hrs  T(C): 39 (2018 04:22), Max: 39 (2018 04:22)  T(F): 102.2 (2018 04:22), Max: 102.2 (2018 04:22)  HR: 112 (2018 04:22) (82 - 112)  BP: 167/88 (2018 04:22) (149/77 - 169/99)  BP(mean): --  RR: 20 (2018 04:22) (18 - 20)  SpO2: 98% (2018 04:22) (95% - 99%)    I&O's Detail    2018 07:01  -  2018 07:00  --------------------------------------------------------  IN:    IV PiggyBack: 50 mL    lactated ringers.: 2200 mL  Total IN: 2250 mL    OUT:    Indwelling Catheter - Urethral: 1900 mL    Nasoenteral Tube: 1400 mL  Total OUT: 3300 mL    Total NET: -1050 mL      2018 07:  -  2018 05:13  --------------------------------------------------------  IN:    dextrose 5% + sodium chloride 0.45%: 1000 mL    IV PiggyBack: 350 mL    lactated ringers.: 200 mL  Total IN: 1550 mL    OUT:    Indwelling Catheter - Urethral: 850 mL    Nasoenteral Tube: 300 mL    Voided: 600 mL  Total OUT: 1750 mL    Total NET: -200 mL    --------------------------------------------------------------------------------------    EXAM  NEUROLOGY  Exam: Awake and alert, disoriented    HEENT  Exam: Normocephalic, atraumatic.      RESPIRATORY  Exam: Non-rebreather in place. Coarse crackles in upper airway     CARDIOVASCULAR  Exam: S1, S2.  Regular rate and rhythm.     GI/NUTRITION  Exam: Abdomen soft, Non-tender, Non-distended.      VASCULAR  Exam: Extremities warm, pink, well-perfused.      MUSCULOSKELETAL  Exam: Upper extremities in restraints    SKIN:  Exam: Good skin turgor, no skin breakdown.      METABOLIC/FLUIDS/ELECTROLYTES  dextrose 5% + sodium chloride 0.45% 1000 milliLiter(s) IV Continuous <Continuous>  potassium phosphate IVPB 15 milliMole(s) IV Intermittent once      HEMATOLOGIC  [x] DVT Prophylaxis: enoxaparin Injectable 40 milliGRAM(s) SubCutaneous every 24 hours    Transfusions:	[] PRBC	[] Platelets		[] FFP	[] Cryoprecipitate    INFECTIOUS DISEASE  Antimicrobials/Immunologic Medications:      Tubes/Lines/Drains    [x] Peripheral IV  [] Central Venous Line     	[] R	[] L	[] IJ	[] Fem	[] SC	Date Placed:   [] Arterial Line		[] R	[] L	[] Fem	[] Rad	[] Ax	Date Placed:   [] PICC:         	[] Midline		[] Mediport  [] Urinary Catheter		Date Placed:     LABS  --------------------------------------------------------------------------------------  CBC Full  -  ( 2018 04:40 )  WBC Count : 13.29 K/uL  Hemoglobin : 12.9 g/dL  Hematocrit : 36.9 %  Platelet Count - Automated : 161 K/uL  Mean Cell Volume : 90.9 fL  Mean Cell Hemoglobin : 31.8 pg  Mean Cell Hemoglobin Concentration : 35.0 %  Auto Neutrophil # : x  Auto Lymphocyte # : x  Auto Monocyte # : x  Auto Eosinophil # : x  Auto Basophil # : x  Auto Neutrophil % : x  Auto Lymphocyte % : x  Auto Monocyte % : x  Auto Eosinophil % : x  Auto Basophil % : x    06-27    139  |  98  |  16  ----------------------------<  159<H>  3.5   |  26  |  0.88    Ca    8.4      2018 04:40  Phos  2.2     06-27  Mg     1.7     06-        PT/INR - ( 2018 22:40 )   PT: 11.5 SEC;   INR: 1.04            Urinalysis Basic - ( 2018 04:21 )    Color: YELLOW / Appearance: HAZY / S.015 / pH: 8.5  Gluc: NEGATIVE / Ketone: TRACE  / Bili: NEGATIVE / Urobili: NORMAL mg/dL   Blood: SMALL / Protein: 500 mg/dL / Nitrite: POSITIVE   Leuk Esterase: LARGE / RBC: >50 / WBC 25-50   Sq Epi: x / Non Sq Epi: x / Bacteria: MOD    --------------------------------------------------------------------------------------

## 2018-06-27 NOTE — CONSULT NOTE ADULT - ASSESSMENT
ASSESSMENT:  89y Female ***    PLAN:  ***  Neurologic:   Respiratory:   Cardiovascular:   Gastrointestinal/Nutrition:   Renal/Genitourinary:   Hematologic:   Infectious Disease:   Tubes/Lines/Drains:   Endocrine:   Disposition:     --------------------------------------------------------------------------------------    Critical Care Diagnoses: ASSESSMENT:  89y Female with SBO.     PLAN:    Neurologic: Dementia, agitated   - Continue anti-seizure medications    Respiratory: Desaturations possibly secondary to aspiration  - Continue supplemental O2  - Chest PT and suctioning q4h and PRN     Cardiovascular: No acute issues  - Trend lactate. 3.2 during rapid response.    Gastrointestinal/Nutrition: SBO with recent removal of NGT after small bowel series  - Keep NPO for now  - Formal speech and swallow     Renal/Genitourinary: No acute issues    Hematologic: No acute signs of bleeding     Infectious Disease: No radiographic evidence of pneumonia, elevated white count. Positive UA  - Start treatment for UTI after urine cultures     Tubes/Lines/Drains: PIV    Endocrine: No acute issues      Disposition: Floor, will be placed on continuum

## 2018-06-27 NOTE — PROVIDER CONTACT NOTE (OTHER) - ASSESSMENT
Pt agitated at baseline and continues to be agitated. Due to decompensation reassess need for restraints and constant observation as advised by ADN

## 2018-06-28 LAB
APTT BLD: 32.6 SEC — SIGNIFICANT CHANGE UP (ref 27.5–37.4)
BASE EXCESS BLDA CALC-SCNC: 3.6 MMOL/L — SIGNIFICANT CHANGE UP
BUN SERPL-MCNC: 16 MG/DL — SIGNIFICANT CHANGE UP (ref 7–23)
CA-I BLDA-SCNC: 1.17 MMOL/L — SIGNIFICANT CHANGE UP (ref 1.15–1.29)
CALCIUM SERPL-MCNC: 7.8 MG/DL — LOW (ref 8.4–10.5)
CHLORIDE SERPL-SCNC: 100 MMOL/L — SIGNIFICANT CHANGE UP (ref 98–107)
CO2 SERPL-SCNC: 26 MMOL/L — SIGNIFICANT CHANGE UP (ref 22–31)
CREAT SERPL-MCNC: 0.77 MG/DL — SIGNIFICANT CHANGE UP (ref 0.5–1.3)
GLUCOSE BLDA-MCNC: 138 MG/DL — HIGH (ref 70–99)
GLUCOSE SERPL-MCNC: 132 MG/DL — HIGH (ref 70–99)
HCO3 BLDA-SCNC: 28 MMOL/L — HIGH (ref 22–26)
HCT VFR BLD CALC: 33.1 % — LOW (ref 34.5–45)
HCT VFR BLDA CALC: 34.3 % — LOW (ref 34.5–46.5)
HGB BLD-MCNC: 10.7 G/DL — LOW (ref 11.5–15.5)
HGB BLDA-MCNC: 11.1 G/DL — LOW (ref 11.5–15.5)
INR BLD: 1.12 — SIGNIFICANT CHANGE UP (ref 0.88–1.17)
LACTATE BLDA-SCNC: 1 MMOL/L — SIGNIFICANT CHANGE UP (ref 0.5–2)
LACTATE BLDV-MCNC: 1.5 MMOL/L — SIGNIFICANT CHANGE UP (ref 0.5–2)
MAGNESIUM SERPL-MCNC: 2.1 MG/DL — SIGNIFICANT CHANGE UP (ref 1.6–2.6)
MCHC RBC-ENTMCNC: 31.1 PG — SIGNIFICANT CHANGE UP (ref 27–34)
MCHC RBC-ENTMCNC: 32.3 % — SIGNIFICANT CHANGE UP (ref 32–36)
MCV RBC AUTO: 96.2 FL — SIGNIFICANT CHANGE UP (ref 80–100)
NRBC # FLD: 0 — SIGNIFICANT CHANGE UP
PCO2 BLDA: 34 MMHG — SIGNIFICANT CHANGE UP (ref 32–48)
PH BLDA: 7.51 PH — HIGH (ref 7.35–7.45)
PHOSPHATE SERPL-MCNC: 2 MG/DL — LOW (ref 2.5–4.5)
PLATELET # BLD AUTO: 124 K/UL — LOW (ref 150–400)
PMV BLD: 12.1 FL — SIGNIFICANT CHANGE UP (ref 7–13)
PO2 BLDA: 105 MMHG — SIGNIFICANT CHANGE UP (ref 83–108)
POTASSIUM BLDA-SCNC: 3.6 MMOL/L — SIGNIFICANT CHANGE UP (ref 3.4–4.5)
POTASSIUM SERPL-MCNC: 3.9 MMOL/L — SIGNIFICANT CHANGE UP (ref 3.5–5.3)
POTASSIUM SERPL-SCNC: 3.9 MMOL/L — SIGNIFICANT CHANGE UP (ref 3.5–5.3)
PROTHROM AB SERPL-ACNC: 12.5 SEC — SIGNIFICANT CHANGE UP (ref 9.8–13.1)
RBC # BLD: 3.44 M/UL — LOW (ref 3.8–5.2)
RBC # FLD: 12.2 % — SIGNIFICANT CHANGE UP (ref 10.3–14.5)
SAO2 % BLDA: 98.8 % — SIGNIFICANT CHANGE UP (ref 95–99)
SODIUM BLDA-SCNC: 135 MMOL/L — LOW (ref 136–146)
SODIUM SERPL-SCNC: 138 MMOL/L — SIGNIFICANT CHANGE UP (ref 135–145)
SPECIMEN SOURCE: SIGNIFICANT CHANGE UP
SPECIMEN SOURCE: SIGNIFICANT CHANGE UP
WBC # BLD: 8.18 K/UL — SIGNIFICANT CHANGE UP (ref 3.8–10.5)
WBC # FLD AUTO: 8.18 K/UL — SIGNIFICANT CHANGE UP (ref 3.8–10.5)

## 2018-06-28 PROCEDURE — 71045 X-RAY EXAM CHEST 1 VIEW: CPT | Mod: 26

## 2018-06-28 PROCEDURE — 99233 SBSQ HOSP IP/OBS HIGH 50: CPT

## 2018-06-28 RX ORDER — ALBUMIN HUMAN 25 %
250 VIAL (ML) INTRAVENOUS
Qty: 0 | Refills: 0 | Status: COMPLETED | OUTPATIENT
Start: 2018-06-28 | End: 2018-06-28

## 2018-06-28 RX ORDER — SODIUM CHLORIDE 9 MG/ML
3 INJECTION INTRAMUSCULAR; INTRAVENOUS; SUBCUTANEOUS
Qty: 0 | Refills: 0 | Status: DISCONTINUED | OUTPATIENT
Start: 2018-06-28 | End: 2018-07-03

## 2018-06-28 RX ORDER — COLLAGENASE CLOSTRIDIUM HIST. 250 UNIT/G
1 OINTMENT (GRAM) TOPICAL
Qty: 0 | Refills: 0 | Status: DISCONTINUED | OUTPATIENT
Start: 2018-06-28 | End: 2018-07-02

## 2018-06-28 RX ORDER — SODIUM CHLORIDE 9 MG/ML
3 INJECTION INTRAMUSCULAR; INTRAVENOUS; SUBCUTANEOUS
Qty: 0 | Refills: 0 | Status: DISCONTINUED | OUTPATIENT
Start: 2018-06-28 | End: 2018-06-28

## 2018-06-28 RX ADMIN — Medication 125 MILLILITER(S): at 11:12

## 2018-06-28 RX ADMIN — Medication 125 MILLILITER(S): at 11:45

## 2018-06-28 RX ADMIN — CEFTRIAXONE 100 GRAM(S): 500 INJECTION, POWDER, FOR SOLUTION INTRAMUSCULAR; INTRAVENOUS at 14:52

## 2018-06-28 RX ADMIN — SODIUM CHLORIDE 3 MILLILITER(S): 9 INJECTION INTRAMUSCULAR; INTRAVENOUS; SUBCUTANEOUS at 21:15

## 2018-06-28 RX ADMIN — Medication 1 APPLICATION(S): at 17:42

## 2018-06-28 RX ADMIN — ENOXAPARIN SODIUM 40 MILLIGRAM(S): 100 INJECTION SUBCUTANEOUS at 12:27

## 2018-06-28 RX ADMIN — Medication 27.5 MILLIGRAM(S): at 17:42

## 2018-06-28 RX ADMIN — Medication 27.5 MILLIGRAM(S): at 05:45

## 2018-06-28 NOTE — ADVANCED PRACTICE NURSE CONSULT - REASON FOR CONSULT
Patient seen on skin care rounds after wound care referral received for assessment of skin impairment and recommendations of topical management. Chart reviewed: Serum albumin 4.1, WBC 13.29, Total protein 7.6, Jim 10, patient confused, currently in Constant observation for agitation. Patient H/O of dementia (A&O x 0 to 1 at baseline), s/p small bowel resection in 2015 for a closed loop SBO, as per chart patient recently hospitalized in [facility] for pneumonia. Patient admitted with emesis episodes. Patient is currently seeing by SICU team for desaturation. Patient currently NPO for aspiration precautions. Skin assessment performed on 6/27/2018, [place] system down and unable to document yesterday.  Patient seen on skin care rounds after wound care referral received for assessment of skin impairment and recommendations of topical management. Chart reviewed: Serum albumin 4.1, WBC 13.29, Total protein 7.6, Jim 10, patient confused, currently in Constant observation for agitation. Patient H/O of dementia (A&O x 0 to 1 at baseline), s/p small bowel resection in 2015 for a closed loop SBO, as per chart patient recently hospitalized in [facility] for pneumonia. Patient admitted with emesis episodes. Patient is currently seeing by SICU team for desaturation. Patient currently NPO for aspiration precautions.

## 2018-06-28 NOTE — PROGRESS NOTE ADULT - SUBJECTIVE AND OBJECTIVE BOX
SICU AM Progress Note  =====================================================    Overnight Events:    Pt was febrile 102 and  had  a positive UA started on Ceftriaxone . VAncomycin and  Zosyn was discontinued . 0.5 mg of Ativan was given for agitation . Pt is  NPO for Aspiration precautions thus seroquel was not given .        HPI: 89y female with dementia (oriented x 0-1 according to nursing), s/p SBR in 2015 for closed loop SBO was brought in by EMS for vomiting, found to have a small bowel obstruction with transition point in the pelvis in the mid small bowel. Surgical rapid response called for desaturation. Saturations improved after nasotracheal suctioning. Patient just had NGT removed during the day and started on full liquids.     Allergies: Risperdal (Unknown)    PAST MEDICAL & SURGICAL HISTORY:  Dementia  History of Spinal Stenosis  Lung Nodules  Hepatitis C: possible during transfusion in 1970&#x27;s  Carotid Artery Disease  Tendonitis: left hand: s/p steroid injection 3/10  Back Pain  Hypertension  Hyperlipidemia  S/P small bowel resection  History of Carotid Endarterectomy: right 2008  S/P Dilation and Curettage: 1970&#x27;s menorrhagia  After Cataract, Bilateral  S/P Cholecystectomy: laparoscopic 2 years ago  History of Laminectomy: 1970&#x27;s  S/P Knee Replacement: left knee 1997  right knee 2004    FAMILY HISTORY:  No pertinent family history in first degree relatives      SOCIAL HISTORY:  Lives at home with her son    ADVANCE DIRECTIVES: Presumed Full Code      REVIEW OF SYSTEMS:    General: Non-Contributory  Skin/Breast: Non-Contributory  Ophthalmologic: Non-Contributory  ENMT: Non-Contributory  Respiratory and Thorax: Non-Contributory  Cardiovascular: Non-Contributory  Gastrointestinal: Non-Contributory  Genitourinary: Non-Contributory  Musculoskeletal: Non-Contributory  Neurological: Non-Contributory  Psychiatric: Non-Contributory  Hematology/Lymphatics: Non-Contributory  Endocrine: Non-Contributory  Allergic/Immunologic: Non-Contributory    HOME MEDICATIONS:    Percocet 5/325 oral tablet: 1 tab(s) orally every 6 hours, As Needed for moderate pain (22 May 2018 16:04)  QUEtiapine 50 mg oral tablet: 1 tab(s) orally 2 times a day (22 May 2018 16:04)  valproic acid 500 mg oral delayed release capsule: 1 tab(s) orally 2 times a day (22 May 2018 16:04)      CURRENT MEDICATIONS:   --------------------------------------------------------------------------------------  Neurologic Medications  valproate sodium IVPB 500 milliGRAM(s) IV Intermittent two times a day    Respiratory Medications    Cardiovascular Medications    Gastrointestinal Medications  dextrose 5% + sodium chloride 0.45% 1000 milliLiter(s) IV Continuous <Continuous>  potassium phosphate IVPB 15 milliMole(s) IV Intermittent once    Genitourinary Medications    Hematologic/Oncologic Medications  enoxaparin Injectable 40 milliGRAM(s) SubCutaneous every 24 hours    Antimicrobial/Immunologic Medications    Endocrine/Metabolic Medications    Topical/Other Medications    --------------------------------------------------------------------------------------    VITAL SIGNS, INS/OUTS (last 24 hours):  --------------------------------------------------------------------------------------    T(C): 37.2 (06-27-18 @ 20:00), Max: 39 (06-27-18 @ 04:22)  HR: 75 (06-28-18 @ 00:00) (74 - 112)  BP: 137/57 (06-28-18 @ 00:00) (94/47 - 169/99)  BP(mean): 77 (06-28-18 @ 00:00) (53 - 124)  ABP: --  ABP(mean): --  RR: 39 (06-28-18 @ 00:00) (17 - 39)  SpO2: 96% (06-28-18 @ 00:00) (93% - 100%)  Wt(kg): --  CVP(mm Hg): --  CI: --  CAPILLARY BLOOD GLUCOSE       N/A      06-26 @ 07:01  -  06-27 @ 07:00  --------------------------------------------------------  IN:    dextrose 5% + sodium chloride 0.45%: 1400 mL    IV PiggyBack: 450 mL    lactated ringers.: 200 mL  Total IN: 2050 mL    OUT:    Indwelling Catheter - Urethral: 890 mL    Nasoenteral Tube: 300 mL    Voided: 600 mL  Total OUT: 1790 mL    Total NET: 260 mL      06-27 @ 07:01  -  06-28 @ 01:00  --------------------------------------------------------  IN:    dextrose 5% + sodium chloride 0.45%: 300 mL    dextrose 5% + sodium chloride 0.45%: 1100 mL  Total IN: 1400 mL    OUT:    Indwelling Catheter - Urethral: 465 mL  Total OUT: 465 mL    Total NET: 935 mL        --------------------------------------------------------------------------------------    EXAM  NEUROLOGY  Exam: Awake and alert, disoriented    HEENT  Exam: Normocephalic, atraumatic.      RESPIRATORY  Exam:Coarse crackles in upper airway     CARDIOVASCULAR  Exam: S1, S2.  Regular rate and rhythm.     GI/NUTRITION  Exam: Abdomen soft, Non-tender, Non-distended.      VASCULAR  Exam: Extremities warm, pink, well-perfused.      MUSCULOSKELETAL  Exam: Upper extremities in restraints    SKIN:  Exam: Good skin turgor, no skin breakdown.      METABOLIC/FLUIDS/ELECTROLYTES  dextrose 5% + sodium chloride 0.45% 1000 milliLiter(s) IV Continuous <Continuous>  potassium phosphate IVPB 15 milliMole(s) IV Intermittent once      HEMATOLOGIC  [x] DVT Prophylaxis: enoxaparin Injectable 40 milliGRAM(s) SubCutaneous every 24 hours    Transfusions:	[] PRBC	[] Platelets		[] FFP	[] Cryoprecipitate    INFECTIOUS DISEASE  Antimicrobials/Immunologic Medications:      Tubes/Lines/Drains    [x] Peripheral IV  [] Central Venous Line     	[] R	[] L	[] IJ	[] Fem	[] SC	Date Placed:   [] Arterial Line		[] R	[] L	[] Fem	[] Rad	[] Ax	Date Placed:   [] PICC:         	[] Midline		[] Mediport  [] Urinary Catheter		Date Placed:     LABS  --------------------------------------------------------------------------------------    -------------------------------------------------------------------------------------- SICU AM Progress Note  =====================================================    Overnight Events:    Pt was febrile 102 and  had  a positive UA started on Ceftriaxone . VAncomycin and  Zosyn was discontinued . 0.5 mg of Ativan was given for agitation . Pt is  NPO for Aspiration precautions thus seroquel was not given .        HPI: 89y female with dementia (oriented x 0-1 according to nursing), s/p SBR in  for closed loop SBO was brought in by EMS for vomiting, found to have a small bowel obstruction with transition point in the pelvis in the mid small bowel. Surgical rapid response called for desaturation. Saturations improved after nasotracheal suctioning. Patient just had NGT removed during the day and started on full liquids.     Allergies: Risperdal (Unknown)    HOME MEDICATIONS:    Percocet 5/325 oral tablet: 1 tab(s) orally every 6 hours, As Needed for moderate pain (22 May 2018 16:04)  QUEtiapine 50 mg oral tablet: 1 tab(s) orally 2 times a day (22 May 2018 16:04)  valproic acid 500 mg oral delayed release capsule: 1 tab(s) orally 2 times a day (22 May 2018 16:04)      CURRENT MEDICATIONS:   --------------------------------------------------------------------------------------  Neurologic Medications  valproate sodium IVPB 500 milliGRAM(s) IV Intermittent two times a day    Respiratory Medications    Cardiovascular Medications    Gastrointestinal Medications  dextrose 5% + sodium chloride 0.45% 1000 milliLiter(s) IV Continuous <Continuous>  potassium phosphate IVPB 15 milliMole(s) IV Intermittent once    Genitourinary Medications    Hematologic/Oncologic Medications  enoxaparin Injectable 40 milliGRAM(s) SubCutaneous every 24 hours    Antimicrobial/Immunologic Medications    Endocrine/Metabolic Medications    Topical/Other Medications    --------------------------------------------------------------------------------------    VITAL SIGNS, INS/OUTS (last 24 hours):  --------------------------------------------------------------------------------------    T(C): 37.2 (18 @ 20:00), Max: 39 (18 @ 04:22)  HR: 75 (18 @ 00:00) (74 - 112)  BP: 137/57 (18 @ 00:00) (94/47 - 169/99)  BP(mean): 77 (18 @ 00:00) (53 - 124)  ABP: --  ABP(mean): --  RR: 39 (18 @ 00:00) (17 - 39)  SpO2: 96% (18 @ 00:00) (93% - 100%)  Wt(kg): --  CVP(mm Hg): --  CI: --  CAPILLARY BLOOD GLUCOSE       N/A       @ 07:01  -   @ 07:00  --------------------------------------------------------  IN:    dextrose 5% + sodium chloride 0.45%: 1400 mL    IV PiggyBack: 450 mL    lactated ringers.: 200 mL  Total IN: 2050 mL    OUT:    Indwelling Catheter - Urethral: 890 mL    Nasoenteral Tube: 300 mL    Voided: 600 mL  Total OUT: 1790 mL    Total NET: 260 mL       @ 07:01  -   @ 01:00  --------------------------------------------------------  IN:    dextrose 5% + sodium chloride 0.45%: 300 mL    dextrose 5% + sodium chloride 0.45%: 1100 mL  Total IN: 1400 mL    OUT:    Indwelling Catheter - Urethral: 465 mL  Total OUT: 465 mL    Total NET: 935 mL        --------------------------------------------------------------------------------------    EXAM  NEUROLOGY  Exam: Awake and alert, disoriented    HEENT  Exam: Normocephalic, atraumatic.      RESPIRATORY  Exam:Coarse crackles in upper airway     CARDIOVASCULAR  Exam: S1, S2.  Regular rate and rhythm.     GI/NUTRITION  Exam: Abdomen soft, Non-tender, Non-distended.      VASCULAR  Exam: Extremities warm, pink, well-perfused.      MUSCULOSKELETAL  Exam: Upper extremities in restraints    SKIN:  Exam: Good skin turgor, no skin breakdown.      METABOLIC/FLUIDS/ELECTROLYTES  dextrose 5% + sodium chloride 0.45% 1000 milliLiter(s) IV Continuous <Continuous>  potassium phosphate IVPB 15 milliMole(s) IV Intermittent once      HEMATOLOGIC  [x] DVT Prophylaxis: enoxaparin Injectable 40 milliGRAM(s) SubCutaneous every 24 hours    Transfusions:	[] PRBC	[] Platelets		[] FFP	[] Cryoprecipitate    INFECTIOUS DISEASE  Antimicrobials/Immunologic Medications:      Tubes/Lines/Drains    [x] Peripheral IV  [] Central Venous Line     	[] R	[] L	[] IJ	[] Fem	[] SC	Date Placed:   [] Arterial Line		[] R	[] L	[] Fem	[] Rad	[] Ax	Date Placed:   [] PICC:         	[] Midline		[] Mediport  [] Urinary Catheter		Date Placed:     LABS  --------------------------------------------------------------------------------------  CBC Full  -  ( 2018 02:45 )  WBC Count : 8.18 K/uL  Hemoglobin : 10.7 g/dL  Hematocrit : 33.1 %  Platelet Count - Automated : 124 K/uL  Mean Cell Volume : 96.2 fL  Mean Cell Hemoglobin : 31.1 pg  Mean Cell Hemoglobin Concentration : 32.3 %  Auto Neutrophil # : x  Auto Lymphocyte # : x  Auto Monocyte # : x  Auto Eosinophil # : x  Auto Basophil # : x  Auto Neutrophil % : x  Auto Lymphocyte % : x  Auto Monocyte % : x  Auto Eosinophil % : x  Auto Basophil % : x        138  |  100  |  16  ----------------------------<  132<H>  3.9   |  26  |  0.77    Ca    7.8<L>      2018 02:45  Phos  2.0       Mg     2.1             PT/INR - ( 2018 02:45 )   PT: 12.5 SEC;   INR: 1.12          PTT - ( 2018 02:45 )  PTT:32.6 SEC  Urinalysis Basic - ( 2018 04:21 )    Color: YELLOW / Appearance: HAZY / S.015 / pH: 8.5  Gluc: NEGATIVE / Ketone: TRACE  / Bili: NEGATIVE / Urobili: NORMAL mg/dL   Blood: SMALL / Protein: 500 mg/dL / Nitrite: POSITIVE   Leuk Esterase: LARGE / RBC: >50 / WBC 25-50   Sq Epi: x / Non Sq Epi: x / Bacteria: MOD    --------------------------------------------------------------------------------------

## 2018-06-28 NOTE — ADVANCED PRACTICE NURSE CONSULT - RECOMMEDATIONS
Obtain height to calculate BMI   Optimize Nutrition  Recommend follow up care at [facility]: [phone]. Address: [address] [place] [address].      Topical Recommendations    Right trochanter: Clean with Saf-Clens. Rinse with NS. Pat dry. Apply Liquid barrier film to periwound skin. Apply Collagenase to wound bed, nickel thick,  (wound measures 1.5cmx1.5cmx0.2cm). Cover with foam with borders. Change daily.     Continue low air loss bed therapy, continue heel elevation with Z-flex fluidized positioning boots, continue to turn & reposition q2h with Z-hugo fluidized positioning device, soft pillow between bony prominences, continue moisture management with barrier creams & single breathable pad, CRITIC AIDE barrier Ointment to bilateral buttocks, continue measures to decrease friction/shear/pressure.    Patient unable to participate in care plan.     Please contact Wound Care Service Line if we can be of further assistance (ext [phone]).

## 2018-06-28 NOTE — SWALLOW BEDSIDE ASSESSMENT ADULT - COMMENTS
SLP attempted swallowing evaluation however patient lethargic and was unable to be aroused enough for oral trials.  Patient provided with tactile and thermal cues in attempt to awaken however patient's eyes remained closed.  Covering nurse made aware.  MD team called and notified.  This department to reattempt as schedule permits.

## 2018-06-28 NOTE — PROGRESS NOTE ADULT - SUBJECTIVE AND OBJECTIVE BOX
B Surgery Progress Note    S: Patient resting in bed. Demented.  No events overnight.    Vital Signs Last 24 Hrs  T(C): 37.9 (28 Jun 2018 08:00), Max: 37.9 (28 Jun 2018 08:00)  T(F): 100.2 (28 Jun 2018 08:00), Max: 100.2 (28 Jun 2018 08:00)  HR: 68 (28 Jun 2018 10:05) (66 - 100)  BP: 95/42 (28 Jun 2018 10:05) (82/38 - 162/76)  BP(mean): 52 (28 Jun 2018 10:05) (49 - 124)  RR: 31 (28 Jun 2018 10:05) (17 - 39)  SpO2: 96% (28 Jun 2018 10:05) (91% - 100%)    I&O's Summary    27 Jun 2018 07:01  -  28 Jun 2018 07:00  --------------------------------------------------------  IN: 2200 mL / OUT: 715 mL / NET: 1485 mL    28 Jun 2018 07:01  -  28 Jun 2018 11:01  --------------------------------------------------------  IN: 300 mL / OUT: 100 mL / NET: 200 mL      PE:   Gen: NAD  Abd: Soft, NT, ND

## 2018-06-28 NOTE — PROGRESS NOTE ADULT - ASSESSMENT
89F with dementia and history of SBR for closed loop SBR presents with SBO    - pain control PRN   - NPO for now, with IV hydration  - OR planning for ex lap  - maintain head of bed elevation at all times, patient high aspiration risk  - pain control          B Team Surgery   [id number] 89F with dementia and history of SBR for closed loop SBR presents with SBO    - pain control PRN   - supplemental O2 PRN	  - speech and swallow evaluation per SICU         B Team Surgery   [id number]

## 2018-06-28 NOTE — ADVANCED PRACTICE NURSE CONSULT - ASSESSMENT
General: A&Ox1, currently bedbound, indwelling catheter in place. Face tent in place. Skin warm. Blanchable erythema on bilateral heels. Fungal thickened, long toenails. Ecchymosis noted in right dorsal second toe.     Right trochanter unsteagable pressure injury measuring 1.5cmx1.5cmx0.2cm. True anatomical depth unable to be determine due to necrotic tissue. No dead space. Tissue type presents as 90% yellow/tan firmly attach slough and 10% pink dermis around wound edges. Scant drainage. No odor. Periwound skin with hypopigmentation. NO increased warmth, no induration, no erythema. Goals of care: Enzymatic debridement, monitor for tissue type changes. protect periwound skin, offload.

## 2018-06-28 NOTE — PROGRESS NOTE ADULT - ASSESSMENT
ASSESSMENT:  89y Female with SBO     PLAN:    Neurologic: Dementia, agitated   - Continue anti-seizure medications    Respiratory: Desaturations possibly secondary to aspiration  - Continue supplemental O2- face tent   - Chest PT and suctioning q4h and PRN   - f.u blood gas     Cardiovascular: No acute issues  - Trend lactate. 3.2 during rapid response.      Gastrointestinal/Nutrition: SBO with recent removal of NGT after small bowel series  - Keep NPO for now  - Formal speech and swallow     Renal/Genitourinary: No acute issues    Hematologic: No acute signs of bleeding     Infectious Disease: No radiographic evidence of pneumonia, elevated white count. Positive UA  - Start treatment for UTI after urine cultures     Tubes/Lines/Drains: PIV    Endocrine: No acute issues      Disposition: Floor, will be placed on continuum ASSESSMENT:  89y Female with SBO, in SICU for respiratory distress     PLAN:    Neurologic: Dementia, agitated   - Continue anti-seizure medications    Respiratory: Desaturations possibly secondary to aspiration  - Continue supplemental O2- face tent   - Chest PT and suctioning q4h and PRN     Cardiovascular: No acute issues. Appears well-perfused     Gastrointestinal/Nutrition: SBO with recent removal of NGT after small bowel series  - Keep NPO for now  - Formal speech and swallow     Renal/Genitourinary: No acute issues  - Reduce IVF to 75cc/hr    Hematologic: No acute signs of bleeding  - Continue with VTE ppx     Infectious Disease: No radiographic evidence of pneumonia, elevated white count. Positive UA  - Continue with ceftriaxone      Tubes/Lines/Drains: PIV, Giles    Endocrine: No acute issues      Disposition: Floor    __________________________________________  Critical care diagnoses:  Acute respiratory distress  UTI

## 2018-06-29 LAB
ALBUMIN SERPL ELPH-MCNC: 2.6 G/DL — LOW (ref 3.3–5)
ALP SERPL-CCNC: 43 U/L — SIGNIFICANT CHANGE UP (ref 40–120)
ALT FLD-CCNC: 8 U/L — SIGNIFICANT CHANGE UP (ref 4–33)
AST SERPL-CCNC: 16 U/L — SIGNIFICANT CHANGE UP (ref 4–32)
BILIRUB SERPL-MCNC: 0.4 MG/DL — SIGNIFICANT CHANGE UP (ref 0.2–1.2)
BUN SERPL-MCNC: 13 MG/DL — SIGNIFICANT CHANGE UP (ref 7–23)
CALCIUM SERPL-MCNC: 7.8 MG/DL — LOW (ref 8.4–10.5)
CHLORIDE SERPL-SCNC: 103 MMOL/L — SIGNIFICANT CHANGE UP (ref 98–107)
CO2 SERPL-SCNC: 27 MMOL/L — SIGNIFICANT CHANGE UP (ref 22–31)
CREAT SERPL-MCNC: 0.64 MG/DL — SIGNIFICANT CHANGE UP (ref 0.5–1.3)
GLUCOSE SERPL-MCNC: 110 MG/DL — HIGH (ref 70–99)
HCT VFR BLD CALC: 27 % — LOW (ref 34.5–45)
HGB BLD-MCNC: 9 G/DL — LOW (ref 11.5–15.5)
MAGNESIUM SERPL-MCNC: 2.2 MG/DL — SIGNIFICANT CHANGE UP (ref 1.6–2.6)
MCHC RBC-ENTMCNC: 31.1 PG — SIGNIFICANT CHANGE UP (ref 27–34)
MCHC RBC-ENTMCNC: 33.3 % — SIGNIFICANT CHANGE UP (ref 32–36)
MCV RBC AUTO: 93.4 FL — SIGNIFICANT CHANGE UP (ref 80–100)
NRBC # FLD: 0 — SIGNIFICANT CHANGE UP
PHOSPHATE SERPL-MCNC: 1.9 MG/DL — LOW (ref 2.5–4.5)
PLATELET # BLD AUTO: 115 K/UL — LOW (ref 150–400)
PMV BLD: 11.9 FL — SIGNIFICANT CHANGE UP (ref 7–13)
POTASSIUM SERPL-MCNC: 3.6 MMOL/L — SIGNIFICANT CHANGE UP (ref 3.5–5.3)
POTASSIUM SERPL-SCNC: 3.6 MMOL/L — SIGNIFICANT CHANGE UP (ref 3.5–5.3)
PROT SERPL-MCNC: 4.9 G/DL — LOW (ref 6–8.3)
RBC # BLD: 2.89 M/UL — LOW (ref 3.8–5.2)
RBC # FLD: 12.5 % — SIGNIFICANT CHANGE UP (ref 10.3–14.5)
SODIUM SERPL-SCNC: 139 MMOL/L — SIGNIFICANT CHANGE UP (ref 135–145)
WBC # BLD: 7.13 K/UL — SIGNIFICANT CHANGE UP (ref 3.8–10.5)
WBC # FLD AUTO: 7.13 K/UL — SIGNIFICANT CHANGE UP (ref 3.8–10.5)

## 2018-06-29 PROCEDURE — 99233 SBSQ HOSP IP/OBS HIGH 50: CPT

## 2018-06-29 PROCEDURE — 71045 X-RAY EXAM CHEST 1 VIEW: CPT | Mod: 26

## 2018-06-29 RX ORDER — CHLORHEXIDINE GLUCONATE 213 G/1000ML
1 SOLUTION TOPICAL
Qty: 0 | Refills: 0 | Status: DISCONTINUED | OUTPATIENT
Start: 2018-06-29 | End: 2018-06-29

## 2018-06-29 RX ORDER — POTASSIUM PHOSPHATE, MONOBASIC POTASSIUM PHOSPHATE, DIBASIC 236; 224 MG/ML; MG/ML
15 INJECTION, SOLUTION INTRAVENOUS ONCE
Qty: 0 | Refills: 0 | Status: COMPLETED | OUTPATIENT
Start: 2018-06-29 | End: 2018-06-29

## 2018-06-29 RX ORDER — QUETIAPINE FUMARATE 200 MG/1
50 TABLET, FILM COATED ORAL EVERY 12 HOURS
Qty: 0 | Refills: 0 | Status: DISCONTINUED | OUTPATIENT
Start: 2018-06-29 | End: 2018-07-03

## 2018-06-29 RX ORDER — ACETAMINOPHEN 500 MG
1000 TABLET ORAL ONCE
Qty: 0 | Refills: 0 | Status: COMPLETED | OUTPATIENT
Start: 2018-06-29 | End: 2018-06-29

## 2018-06-29 RX ADMIN — Medication 400 MILLIGRAM(S): at 08:15

## 2018-06-29 RX ADMIN — ENOXAPARIN SODIUM 40 MILLIGRAM(S): 100 INJECTION SUBCUTANEOUS at 11:39

## 2018-06-29 RX ADMIN — CHLORHEXIDINE GLUCONATE 1 APPLICATION(S): 213 SOLUTION TOPICAL at 11:39

## 2018-06-29 RX ADMIN — SODIUM CHLORIDE 3 MILLILITER(S): 9 INJECTION INTRAMUSCULAR; INTRAVENOUS; SUBCUTANEOUS at 22:51

## 2018-06-29 RX ADMIN — Medication 27.5 MILLIGRAM(S): at 06:24

## 2018-06-29 RX ADMIN — Medication 27.5 MILLIGRAM(S): at 19:25

## 2018-06-29 RX ADMIN — POTASSIUM PHOSPHATE, MONOBASIC POTASSIUM PHOSPHATE, DIBASIC 62.5 MILLIMOLE(S): 236; 224 INJECTION, SOLUTION INTRAVENOUS at 06:23

## 2018-06-29 RX ADMIN — Medication 1 APPLICATION(S): at 06:24

## 2018-06-29 RX ADMIN — Medication 1000 MILLIGRAM(S): at 09:40

## 2018-06-29 RX ADMIN — SODIUM CHLORIDE 3 MILLILITER(S): 9 INJECTION INTRAMUSCULAR; INTRAVENOUS; SUBCUTANEOUS at 09:30

## 2018-06-29 RX ADMIN — Medication 1 APPLICATION(S): at 19:25

## 2018-06-29 RX ADMIN — CEFTRIAXONE 100 GRAM(S): 500 INJECTION, POWDER, FOR SOLUTION INTRAMUSCULAR; INTRAVENOUS at 13:46

## 2018-06-29 RX ADMIN — QUETIAPINE FUMARATE 50 MILLIGRAM(S): 200 TABLET, FILM COATED ORAL at 19:46

## 2018-06-29 RX ADMIN — SODIUM CHLORIDE 75 MILLILITER(S): 9 INJECTION, SOLUTION INTRAVENOUS at 16:15

## 2018-06-29 NOTE — PROGRESS NOTE ADULT - SUBJECTIVE AND OBJECTIVE BOX
B Team Surgery Progress Note - pager [phone]      SUBJECTIVE: Patient seen and examined on morning rounds. No acute events overnight. Had bowel movement yesterday. On enhanced supervision. Getting chest PT.       OBJECTIVE:     ** Vital signs / I&O's **    Vital Signs Last 24 Hrs  T(C): 36.4 (29 Jun 2018 08:00), Max: 36.8 (29 Jun 2018 00:00)  T(F): 97.5 (29 Jun 2018 08:00), Max: 98.2 (29 Jun 2018 00:00)  HR: 62 (29 Jun 2018 09:00) (50 - 77)  BP: 157/57 (29 Jun 2018 08:00) (82/38 - 168/57)  BP(mean): 81 (29 Jun 2018 08:00) (44 - 98)  RR: 21 (29 Jun 2018 09:00) (16 - 32)  SpO2: 97% (29 Jun 2018 09:00) (96% - 100%)      28 Jun 2018 07:01  -  29 Jun 2018 07:00  --------------------------------------------------------  IN:    Albumin 5%  - 250 mL: 500 mL    dextrose 5% + sodium chloride 0.45%: 1575 mL    IV PiggyBack: 50 mL  Total IN: 2125 mL    OUT:    Indwelling Catheter - Urethral: 915 mL  Total OUT: 915 mL    Total NET: 1210 mL      29 Jun 2018 07:01  -  29 Jun 2018 09:31  --------------------------------------------------------  IN:    dextrose 5% + sodium chloride 0.45%: 75 mL  Total IN: 75 mL    OUT:    Indwelling Catheter - Urethral: 175 mL  Total OUT: 175 mL    Total NET: -100 mL      ** Physical Exam **  Gen: Confused  Abd: Soft, nontender, nondistended    ** Labs **                          9.0    7.13  )-----------( 115      ( 29 Jun 2018 03:10 )             27.0     29 Jun 2018 03:10    139    |  103    |  13     ----------------------------<  110    3.6     |  27     |  0.64     Ca    7.8        29 Jun 2018 03:10  Phos  1.9       29 Jun 2018 03:10  Mg     2.2       29 Jun 2018 03:10    TPro  4.9    /  Alb  2.6    /  TBili  0.4    /  DBili  x      /  AST  16     /  ALT  8      /  AlkPhos  43     29 Jun 2018 03:10    PT/INR - ( 28 Jun 2018 02:45 )   PT: 12.5 SEC;   INR: 1.12          PTT - ( 28 Jun 2018 02:45 )  PTT:32.6 SEC  CAPILLARY BLOOD GLUCOSE      LIVER FUNCTIONS - ( 29 Jun 2018 03:10 )  Alb: 2.6 g/dL / Pro: 4.9 g/dL / ALK PHOS: 43 u/L / ALT: 8 u/L / AST: 16 u/L / GGT: x             MEDICATIONS  (STANDING):  cefTRIAXone   IVPB 1 Gram(s) IV Intermittent every 24 hours  cefTRIAXone   IVPB      chlorhexidine 4% Liquid 1 Application(s) Topical <User Schedule>  collagenase Ointment 1 Application(s) Topical two times a day  dextrose 5% + sodium chloride 0.45% 1000 milliLiter(s) (75 mL/Hr) IV Continuous <Continuous>  enoxaparin Injectable 40 milliGRAM(s) SubCutaneous every 24 hours  sodium chloride 3%  Inhalation 3 milliLiter(s) Inhalation two times a day  valproate sodium IVPB 500 milliGRAM(s) IV Intermittent two times a day    MEDICATIONS  (PRN):

## 2018-06-29 NOTE — PROGRESS NOTE ADULT - ASSESSMENT
89F with dementia and SBO, now resolving    - supplemental O2 PRN, chest PT	  - f/u GI function  - abx for UTI  - DVT ppx

## 2018-06-29 NOTE — PROGRESS NOTE ADULT - SUBJECTIVE AND OBJECTIVE BOX
SICU Daily Progress Note  =====================================================  Interval/Overnight Events: High secretions, getting inhaled saline and PT vest. Continues on enhanced supervision.     HPI: 89y female with dementia (oriented x 0-1 according to nursing), s/p SBR in 2015 for closed loop SBO was brought in by EMS for vomiting, found to have a small bowel obstruction with transition point in the pelvis in the mid small bowel. Surgical rapid response called for desaturation. Saturations improved after nasotracheal suctioning. Patient just had NGT removed during the day and started on full liquids.     Allergies: Risperdal (Unknown)    HOME MEDICATIONS:    Percocet 5/325 oral tablet: 1 tab(s) orally every 6 hours, As Needed for moderate pain (22 May 2018 16:04)  QUEtiapine 50 mg oral tablet: 1 tab(s) orally 2 times a day (22 May 2018 16:04)  valproic acid 500 mg oral delayed release capsule: 1 tab(s) orally 2 times a day (22 May 2018 16:04)      CURRENT MEDICATIONS:   --------------------------------------------------------------------------------------  Neurologic Medications  valproate sodium IVPB 500 milliGRAM(s) IV Intermittent two times a day    Respiratory Medications  sodium chloride 3%  Inhalation 3 milliLiter(s) Inhalation two times a day    Cardiovascular Medications    Gastrointestinal Medications  dextrose 5% + sodium chloride 0.45% 1000 milliLiter(s) IV Continuous <Continuous>    Genitourinary Medications    Hematologic/Oncologic Medications  enoxaparin Injectable 40 milliGRAM(s) SubCutaneous every 24 hours    Antimicrobial/Immunologic Medications  cefTRIAXone   IVPB 1 Gram(s) IV Intermittent every 24 hours  cefTRIAXone   IVPB        Endocrine/Metabolic Medications    Topical/Other Medications  collagenase Ointment 1 Application(s) Topical two times a day      --------------------------------------------------------------------------------------    VITAL SIGNS, INS/OUTS (last 24 hours):  --------------------------------------------------------------------------------------  T(C): 36.8 (06-29-18 @ 00:00), Max: 37.9 (06-28-18 @ 08:00)  HR: 54 (06-29-18 @ 03:00) (50 - 96)  BP: 106/39 (06-29-18 @ 03:00) (82/38 - 154/59)  BP(mean): 56 (06-29-18 @ 03:00) (44 - 89)  ABP: --  ABP(mean): --  RR: 27 (06-29-18 @ 03:00) (19 - 35)  SpO2: 98% (06-29-18 @ 03:00) (91% - 100%)  Wt(kg): --  CVP(mm Hg): --  CI: --  CAPILLARY BLOOD GLUCOSE       N/A      06-27 @ 07:01  -  06-28 @ 07:00  --------------------------------------------------------  IN:    dextrose 5% + sodium chloride 0.45%: 1900 mL    dextrose 5% + sodium chloride 0.45%: 300 mL  Total IN: 2200 mL    OUT:    Indwelling Catheter - Urethral: 715 mL  Total OUT: 715 mL    Total NET: 1485 mL      06-28 @ 07:01  -  06-29 @ 03:42  --------------------------------------------------------  IN:    Albumin 5%  - 250 mL: 500 mL    dextrose 5% + sodium chloride 0.45%: 1275 mL    IV PiggyBack: 50 mL  Total IN: 1825 mL    OUT:    Indwelling Catheter - Urethral: 690 mL  Total OUT: 690 mL    Total NET: 1135 mL      --------------------------------------------------------------------------------------    EXAM  NEUROLOGY  Exam: Awake and alert, disoriented    HEENT  Exam: Normocephalic, atraumatic.      RESPIRATORY  Exam:Coarse crackles in upper airway     CARDIOVASCULAR  Exam: S1, S2.  Regular rate and rhythm.     GI/NUTRITION  Exam: Abdomen soft, Non-tender, Non-distended.      VASCULAR  Exam: Extremities warm, pink, well-perfused.      MUSCULOSKELETAL  Exam: Upper extremities in restraints    SKIN:  Exam: Good skin turgor, no skin breakdown.      METABOLIC/FLUIDS/ELECTROLYTES  dextrose 5% + sodium chloride 0.45% 1000 milliLiter(s) IV Continuous <Continuous>  potassium phosphate IVPB 15 milliMole(s) IV Intermittent once      HEMATOLOGIC  [x] DVT Prophylaxis: enoxaparin Injectable 40 milliGRAM(s) SubCutaneous every 24 hours    Transfusions:	[] PRBC	[] Platelets		[] FFP	[] Cryoprecipitate    INFECTIOUS DISEASE  Antimicrobials/Immunologic Medications:      Tubes/Lines/Drains    [x] Peripheral IV  [] Central Venous Line     	[] R	[] L	[] IJ	[] Fem	[] SC	Date Placed:   [] Arterial Line		[] R	[] L	[] Fem	[] Rad	[] Ax	Date Placed:   [] PICC:         	[] Midline		[] Mediport  [] Urinary Catheter		Date Placed:     LABS  --------------------------------------------------------------------------------------    --------------------------------------------------------------------------------------

## 2018-06-29 NOTE — SWALLOW BEDSIDE ASSESSMENT ADULT - SPECIFY REASON(S)
to assess swallow function. pt familiar to this service from previous attempt at bedside swallow evaluation made yesterday, 6/28/2018 (please see report for details)

## 2018-06-29 NOTE — SWALLOW BEDSIDE ASSESSMENT ADULT - ASR SWALLOW ASPIRATION MONITOR
oral hygiene/position upright (89Y)/change of breathing pattern/cough/fever/throat clearing/upper respiratory infection/gurgly voice/pneumonia

## 2018-06-29 NOTE — SWALLOW BEDSIDE ASSESSMENT ADULT - SWALLOW EVAL: DIAGNOSIS
1- pt demonstrates mild oral dysphagia given puree, honey thick liquid and nectar thick liquid presented via teaspoon marked by delayed bolus collection, transfer and transport 1- pt demonstrates mild oral dysphagia given puree, honey thick liquid and nectar thick liquid presented via teaspoon marked by delayed bolus collection, transfer and transport.  posterior loss over base of tongue suspected with both liquid consistencies when presented via cup sip. 2- pt demonstrated moderate pharyngeal dysphagia given above stated consistencies marked by delayed swallow trigger and reduced hyolaryngeal excursion without any overt s/s of penetration/aspiration noted. 3- dense textures (i.e. solid, mech soft) and thin liquids not provided during this assessment given recent MBS revealing evidence of silent penetration/aspiration.

## 2018-06-29 NOTE — SWALLOW BEDSIDE ASSESSMENT ADULT - COMMENTS
Pt was awake, cooperative though confusion noted for a clinical assessment of swallow function this am. Per charting, pt is a 89 y/o female with PMHx significant for dementia, CAD, HTN, hyperlipidemia and SBR in 2015 currently admitted to [facility] for SBO.. Recent CXR completed on 6/28/2018 revealed "New right lower lung and perihilar airspace opacity which can be consistent with pneumonia. "    It should be noted, chart review further revealed recent admission to [facility] for pneumonia with cinesophagram completed on 4/24/2018 with recommendation for dysphagia 1 diet with nectar thick liquids 2/2 observed aspiration of thin liquid textures.

## 2018-06-29 NOTE — PROGRESS NOTE ADULT - ASSESSMENT
ASSESSMENT:  89y Female with SBO, in SICU for respiratory distress     PLAN:    Neurologic: Dementia, agitated   - Continue anti-seizure medications    Respiratory: Desaturations possibly secondary to aspiration  - Continue supplemental O2- face tent   - Chest PT and suctioning q4h and PRN     Cardiovascular: No acute issues. Appears well-perfused     Gastrointestinal/Nutrition: SBO with recent removal of NGT after small bowel series  - Keep NPO for now  - Formal speech and swallow     Renal/Genitourinary: No acute issues  - Continue IVF 75cc/hr    Hematologic: No acute signs of bleeding  - Continue with VTE ppx     Infectious Disease: No radiographic evidence of pneumonia, elevated white count. Positive UA  - Continue with ceftriaxone      Tubes/Lines/Drains: PIV, Giles    Endocrine: No acute issues      Disposition: Floor    __________________________________________  Critical care diagnoses:  Acute respiratory distress  UTI ASSESSMENT:  89y Female with SBO, in SICU for respiratory distress     PLAN:    Neurologic: Dementia, agitated   - Continue anti-seizure medications    Respiratory: Desaturations possibly secondary to aspiration  - Continue supplemental O2- face tent as tolerated  - Chest PT and suctioning q4h and PRN     Cardiovascular: No acute issues. Appears well-perfused     Gastrointestinal/Nutrition: SBO with recent removal of NGT after small bowel series  - Keep NPO for now  - Speech and swallow recommending Dysphagia 1 with honey thick liquids      Renal/Genitourinary: No acute issues  - Continue IVF 75cc/hr    Hematologic: No acute signs of bleeding  - Continue with VTE ppx     Infectious Disease: No radiographic evidence of pneumonia, elevated white count. Positive UA  - Continue with ceftriaxone      Tubes/Lines/Drains: Tisha HOGAN    Endocrine: No acute issues      Disposition: Floor    __________________________________________  Critical care diagnoses:  Acute respiratory distress  UTI

## 2018-06-30 LAB
BUN SERPL-MCNC: 7 MG/DL — SIGNIFICANT CHANGE UP (ref 7–23)
CALCIUM SERPL-MCNC: 9.2 MG/DL — SIGNIFICANT CHANGE UP (ref 8.4–10.5)
CHLORIDE SERPL-SCNC: 101 MMOL/L — SIGNIFICANT CHANGE UP (ref 98–107)
CO2 SERPL-SCNC: 30 MMOL/L — SIGNIFICANT CHANGE UP (ref 22–31)
CREAT SERPL-MCNC: 0.67 MG/DL — SIGNIFICANT CHANGE UP (ref 0.5–1.3)
GLUCOSE SERPL-MCNC: 79 MG/DL — SIGNIFICANT CHANGE UP (ref 70–99)
HCT VFR BLD CALC: 38.1 % — SIGNIFICANT CHANGE UP (ref 34.5–45)
HGB BLD-MCNC: 12.4 G/DL — SIGNIFICANT CHANGE UP (ref 11.5–15.5)
MAGNESIUM SERPL-MCNC: 2 MG/DL — SIGNIFICANT CHANGE UP (ref 1.6–2.6)
MCHC RBC-ENTMCNC: 31.4 PG — SIGNIFICANT CHANGE UP (ref 27–34)
MCHC RBC-ENTMCNC: 32.5 % — SIGNIFICANT CHANGE UP (ref 32–36)
MCV RBC AUTO: 96.5 FL — SIGNIFICANT CHANGE UP (ref 80–100)
NRBC # FLD: 0 — SIGNIFICANT CHANGE UP
PHOSPHATE SERPL-MCNC: 2.5 MG/DL — SIGNIFICANT CHANGE UP (ref 2.5–4.5)
PLATELET # BLD AUTO: 173 K/UL — SIGNIFICANT CHANGE UP (ref 150–400)
PMV BLD: 11.3 FL — SIGNIFICANT CHANGE UP (ref 7–13)
POTASSIUM SERPL-MCNC: 3.6 MMOL/L — SIGNIFICANT CHANGE UP (ref 3.5–5.3)
POTASSIUM SERPL-SCNC: 3.6 MMOL/L — SIGNIFICANT CHANGE UP (ref 3.5–5.3)
RBC # BLD: 3.95 M/UL — SIGNIFICANT CHANGE UP (ref 3.8–5.2)
RBC # FLD: 12 % — SIGNIFICANT CHANGE UP (ref 10.3–14.5)
SODIUM SERPL-SCNC: 144 MMOL/L — SIGNIFICANT CHANGE UP (ref 135–145)
WBC # BLD: 8.72 K/UL — SIGNIFICANT CHANGE UP (ref 3.8–10.5)
WBC # FLD AUTO: 8.72 K/UL — SIGNIFICANT CHANGE UP (ref 3.8–10.5)

## 2018-06-30 RX ADMIN — QUETIAPINE FUMARATE 50 MILLIGRAM(S): 200 TABLET, FILM COATED ORAL at 05:18

## 2018-06-30 RX ADMIN — ENOXAPARIN SODIUM 40 MILLIGRAM(S): 100 INJECTION SUBCUTANEOUS at 11:52

## 2018-06-30 RX ADMIN — Medication 1 APPLICATION(S): at 05:18

## 2018-06-30 RX ADMIN — Medication 27.5 MILLIGRAM(S): at 17:55

## 2018-06-30 RX ADMIN — Medication 27.5 MILLIGRAM(S): at 05:18

## 2018-06-30 RX ADMIN — Medication 1 APPLICATION(S): at 17:54

## 2018-06-30 RX ADMIN — SODIUM CHLORIDE 50 MILLILITER(S): 9 INJECTION, SOLUTION INTRAVENOUS at 13:56

## 2018-06-30 RX ADMIN — SODIUM CHLORIDE 3 MILLILITER(S): 9 INJECTION INTRAMUSCULAR; INTRAVENOUS; SUBCUTANEOUS at 10:53

## 2018-06-30 RX ADMIN — SODIUM CHLORIDE 3 MILLILITER(S): 9 INJECTION INTRAMUSCULAR; INTRAVENOUS; SUBCUTANEOUS at 20:56

## 2018-06-30 RX ADMIN — CEFTRIAXONE 100 GRAM(S): 500 INJECTION, POWDER, FOR SOLUTION INTRAMUSCULAR; INTRAVENOUS at 13:55

## 2018-06-30 RX ADMIN — QUETIAPINE FUMARATE 50 MILLIGRAM(S): 200 TABLET, FILM COATED ORAL at 17:55

## 2018-06-30 NOTE — PROGRESS NOTE ADULT - SUBJECTIVE AND OBJECTIVE BOX
B Team Surgery Progress Note - pager [phone]      SUBJECTIVE: Patient seen and examined on morning rounds. No acute events overnight. Transferred from SICU to floor. Advanced to dysphagia 1 diet. No nausea/emesis.      OBJECTIVE:     ** Vital signs / I&O's **    Vital Signs Last 24 Hrs  T(C): 36.4 (30 Jun 2018 04:00), Max: 36.8 (29 Jun 2018 15:35)  T(F): 97.6 (30 Jun 2018 04:00), Max: 98.3 (29 Jun 2018 17:56)  HR: 87 (30 Jun 2018 04:00) (48 - 88)  BP: 164/98 (30 Jun 2018 04:00) (110/81 - 168/57)  BP(mean): 99 (29 Jun 2018 14:00) (73 - 99)  RR: 20 (30 Jun 2018 04:00) (17 - 24)  SpO2: 95% (30 Jun 2018 04:00) (90% - 100%)      28 Jun 2018 07:01  -  29 Jun 2018 07:00  --------------------------------------------------------  IN:    Albumin 5%  - 250 mL: 500 mL    dextrose 5% + sodium chloride 0.45%: 1575 mL    IV PiggyBack: 50 mL  Total IN: 2125 mL    OUT:    Indwelling Catheter - Urethral: 915 mL  Total OUT: 915 mL    Total NET: 1210 mL      29 Jun 2018 07:01  -  30 Jun 2018 05:55  --------------------------------------------------------  IN:    dextrose 5% + sodium chloride 0.45%: 450 mL    IV PiggyBack: 150 mL  Total IN: 600 mL    OUT:    Indwelling Catheter - Urethral: 565 mL  Total OUT: 565 mL    Total NET: 35 mL      ** Physical Exam **  Gen: Alert, NAD  Abd: Soft, nontender, nondistended    ** Labs **                          9.0    7.13  )-----------( 115      ( 29 Jun 2018 03:10 )             27.0     29 Jun 2018 03:10    139    |  103    |  13     ----------------------------<  110    3.6     |  27     |  0.64     Ca    7.8        29 Jun 2018 03:10  Phos  1.9       29 Jun 2018 03:10  Mg     2.2       29 Jun 2018 03:10    TPro  4.9    /  Alb  2.6    /  TBili  0.4    /  DBili  x      /  AST  16     /  ALT  8      /  AlkPhos  43     29 Jun 2018 03:10      LIVER FUNCTIONS - ( 29 Jun 2018 03:10 )  Alb: 2.6 g/dL / Pro: 4.9 g/dL / ALK PHOS: 43 u/L / ALT: 8 u/L / AST: 16 u/L / GGT: x             MEDICATIONS  (STANDING):  cefTRIAXone   IVPB 1 Gram(s) IV Intermittent every 24 hours  cefTRIAXone   IVPB      collagenase Ointment 1 Application(s) Topical two times a day  dextrose 5% + sodium chloride 0.45% 1000 milliLiter(s) (50 mL/Hr) IV Continuous <Continuous>  enoxaparin Injectable 40 milliGRAM(s) SubCutaneous every 24 hours  QUEtiapine 50 milliGRAM(s) Oral every 12 hours  sodium chloride 3%  Inhalation 3 milliLiter(s) Inhalation two times a day  valproate sodium IVPB 500 milliGRAM(s) IV Intermittent two times a day    MEDICATIONS  (PRN):

## 2018-06-30 NOTE — PROGRESS NOTE ADULT - ASSESSMENT
89F with dementia and SBO, now resolving    - supplemental O2 PRN, chest PT  - monitor O2 saturation	  - F/u diet tolerance  - f/u GI function - has been passing gas  - ceftriaxone for UTI  - DVT ppx

## 2018-07-01 LAB
BUN SERPL-MCNC: 13 MG/DL — SIGNIFICANT CHANGE UP (ref 7–23)
CALCIUM SERPL-MCNC: 8.8 MG/DL — SIGNIFICANT CHANGE UP (ref 8.4–10.5)
CHLORIDE SERPL-SCNC: 103 MMOL/L — SIGNIFICANT CHANGE UP (ref 98–107)
CO2 SERPL-SCNC: 30 MMOL/L — SIGNIFICANT CHANGE UP (ref 22–31)
CREAT SERPL-MCNC: 0.79 MG/DL — SIGNIFICANT CHANGE UP (ref 0.5–1.3)
GLUCOSE SERPL-MCNC: 78 MG/DL — SIGNIFICANT CHANGE UP (ref 70–99)
HCT VFR BLD CALC: 35.3 % — SIGNIFICANT CHANGE UP (ref 34.5–45)
HGB BLD-MCNC: 11.3 G/DL — LOW (ref 11.5–15.5)
MAGNESIUM SERPL-MCNC: 1.9 MG/DL — SIGNIFICANT CHANGE UP (ref 1.6–2.6)
MCHC RBC-ENTMCNC: 31.1 PG — SIGNIFICANT CHANGE UP (ref 27–34)
MCHC RBC-ENTMCNC: 32 % — SIGNIFICANT CHANGE UP (ref 32–36)
MCV RBC AUTO: 97.2 FL — SIGNIFICANT CHANGE UP (ref 80–100)
NRBC # FLD: 0 — SIGNIFICANT CHANGE UP
PHOSPHATE SERPL-MCNC: 3.4 MG/DL — SIGNIFICANT CHANGE UP (ref 2.5–4.5)
PLATELET # BLD AUTO: 179 K/UL — SIGNIFICANT CHANGE UP (ref 150–400)
PMV BLD: 10.8 FL — SIGNIFICANT CHANGE UP (ref 7–13)
POTASSIUM SERPL-MCNC: 3.6 MMOL/L — SIGNIFICANT CHANGE UP (ref 3.5–5.3)
POTASSIUM SERPL-SCNC: 3.6 MMOL/L — SIGNIFICANT CHANGE UP (ref 3.5–5.3)
RBC # BLD: 3.63 M/UL — LOW (ref 3.8–5.2)
RBC # FLD: 12.3 % — SIGNIFICANT CHANGE UP (ref 10.3–14.5)
SODIUM SERPL-SCNC: 142 MMOL/L — SIGNIFICANT CHANGE UP (ref 135–145)
WBC # BLD: 7.49 K/UL — SIGNIFICANT CHANGE UP (ref 3.8–10.5)
WBC # FLD AUTO: 7.49 K/UL — SIGNIFICANT CHANGE UP (ref 3.8–10.5)

## 2018-07-01 RX ORDER — CIPROFLOXACIN LACTATE 400MG/40ML
500 VIAL (ML) INTRAVENOUS EVERY 12 HOURS
Qty: 0 | Refills: 0 | Status: DISCONTINUED | OUTPATIENT
Start: 2018-07-01 | End: 2018-07-02

## 2018-07-01 RX ADMIN — SODIUM CHLORIDE 50 MILLILITER(S): 9 INJECTION, SOLUTION INTRAVENOUS at 13:53

## 2018-07-01 RX ADMIN — SODIUM CHLORIDE 3 MILLILITER(S): 9 INJECTION INTRAMUSCULAR; INTRAVENOUS; SUBCUTANEOUS at 11:25

## 2018-07-01 RX ADMIN — Medication 1 APPLICATION(S): at 18:20

## 2018-07-01 RX ADMIN — CEFTRIAXONE 100 GRAM(S): 500 INJECTION, POWDER, FOR SOLUTION INTRAMUSCULAR; INTRAVENOUS at 13:52

## 2018-07-01 RX ADMIN — Medication 1 APPLICATION(S): at 05:06

## 2018-07-01 RX ADMIN — SODIUM CHLORIDE 50 MILLILITER(S): 9 INJECTION, SOLUTION INTRAVENOUS at 02:05

## 2018-07-01 RX ADMIN — SODIUM CHLORIDE 3 MILLILITER(S): 9 INJECTION INTRAMUSCULAR; INTRAVENOUS; SUBCUTANEOUS at 22:26

## 2018-07-01 RX ADMIN — ENOXAPARIN SODIUM 40 MILLIGRAM(S): 100 INJECTION SUBCUTANEOUS at 13:53

## 2018-07-01 RX ADMIN — Medication 27.5 MILLIGRAM(S): at 05:06

## 2018-07-01 RX ADMIN — QUETIAPINE FUMARATE 50 MILLIGRAM(S): 200 TABLET, FILM COATED ORAL at 18:23

## 2018-07-01 RX ADMIN — QUETIAPINE FUMARATE 50 MILLIGRAM(S): 200 TABLET, FILM COATED ORAL at 05:06

## 2018-07-01 RX ADMIN — Medication 27.5 MILLIGRAM(S): at 18:20

## 2018-07-01 NOTE — PHYSICAL THERAPY INITIAL EVALUATION ADULT - PERTINENT HX OF CURRENT PROBLEM, REHAB EVAL
Pt. is a 89 year old female admitted to [facility] secondary small bowel obstruction. PMH dementia, CAD, HTN.

## 2018-07-01 NOTE — PHYSICAL THERAPY INITIAL EVALUATION ADULT - PATIENT/FAMILY/SIGNIFICANT OTHER GOALS STATEMENT, PT EVAL
unable to provide goal secondary to pt. presenting with confusion and difficulty following commands.

## 2018-07-01 NOTE — PROGRESS NOTE ADULT - SUBJECTIVE AND OBJECTIVE BOX
GENERAL SURGERY DAILY PROGRESS NOTE:     Subjective:  Pt seen and examined on morning rounds. NAEO. Tolerating dysphagia diet. No nausea/vomiting.       Objective:    Vital Signs Last 24 Hrs  T(C): 36.5 (01 Jul 2018 10:27), Max: 36.9 (30 Jun 2018 15:10)  T(F): 97.7 (01 Jul 2018 10:27), Max: 98.4 (30 Jun 2018 15:10)  HR: 73 (01 Jul 2018 11:29) (67 - 76)  BP: 110/58 (01 Jul 2018 10:27) (109/52 - 150/71)  BP(mean): --  RR: 20 (01 Jul 2018 11:29) (18 - 20)  SpO2: 100% (01 Jul 2018 11:29) (96% - 100%)    PHYSICAL EXAM:  Respirations nonlabored  Abdomen soft, nontender, nondistended  No guarding or rebound tenderness       MEDICATIONS  (STANDING):  cefTRIAXone   IVPB 1 Gram(s) IV Intermittent every 24 hours  cefTRIAXone   IVPB      collagenase Ointment 1 Application(s) Topical two times a day  dextrose 5% + sodium chloride 0.45% 1000 milliLiter(s) (50 mL/Hr) IV Continuous <Continuous>  enoxaparin Injectable 40 milliGRAM(s) SubCutaneous every 24 hours  QUEtiapine 50 milliGRAM(s) Oral every 12 hours  sodium chloride 3%  Inhalation 3 milliLiter(s) Inhalation two times a day  valproate sodium IVPB 500 milliGRAM(s) IV Intermittent two times a day    MEDICATIONS  (PRN):    I&O's Detail    30 Jun 2018 07:01  -  01 Jul 2018 07:00  --------------------------------------------------------  IN:    dextrose 5% + sodium chloride 0.45%: 600 mL  Total IN: 600 mL    OUT:  Total OUT: 0 mL    Total NET: 600 mL      LABS:                        11.3   7.49  )-----------( 179      ( 01 Jul 2018 07:10 )             35.3     07-01    142  |  103  |  13  ----------------------------<  78  3.6   |  30  |  0.79    Ca    8.8      01 Jul 2018 07:10  Phos  3.4     07-01  Mg     1.9     07-01        RADIOLOGY & ADDITIONAL STUDIES:    No new imaging studies for review.

## 2018-07-01 NOTE — PHYSICAL THERAPY INITIAL EVALUATION ADULT - MANUAL MUSCLE TESTING RESULTS, REHAB EVAL
unable to formally assess secondary to pt. presenting with confusion and difficulty following commands.

## 2018-07-01 NOTE — PHYSICAL THERAPY INITIAL EVALUATION ADULT - ADDITIONAL COMMENTS
Unable to obtain accurate social history secondary to pt. presenting with confusion during subjective history and presenting with difficulty following commands, limited social history obtained through past medical documents, please refer to social work for more attainable social history. as per documents, pt. lives in a pvt house with her son. Pt. returned supine in bed with all tubes/lines intact, call bell in reach and in NAD.

## 2018-07-01 NOTE — PHYSICAL THERAPY INITIAL EVALUATION ADULT - RANGE OF MOTION EXAMINATION, REHAB EVAL
bilateral upper extremity ROM was WFL (within functional limits)/passive/active ROM/bilateral lower extremity ROM was WFL (within functional limits)

## 2018-07-01 NOTE — PHYSICAL THERAPY INITIAL EVALUATION ADULT - LEVEL OF INDEPENDENCE: SIT/SUPINE, REHAB EVAL
unable to formally assess secondary to pt. presenting with confusion and difficulty following commands./unable to perform

## 2018-07-01 NOTE — PROGRESS NOTE ADULT - ASSESSMENT
89F with dementia and SBO, now resolving. Pt tolerating diet.     - supplemental O2 PRN, chest PT  - monitor O2 saturation	  - f/u son regarding d/c planning  - d/c rectal tube  - ceftriaxone for UTI  - DVT ppx    Surgery B-Team  Pager: [phone]

## 2018-07-01 NOTE — PHYSICAL THERAPY INITIAL EVALUATION ADULT - DISCHARGE DISPOSITION, PT EVAL
TBD upon further assessment of functional mobility. Please refer to PT notes for further assessment when feasible.

## 2018-07-01 NOTE — PROGRESS NOTE ADULT - ATTENDING COMMENTS
Above noted. Agree with the planned treatment.
Above noted. No change.  Plan: Speak with the patient's son regarding discharge plans.
89y Female with SBO, in SICU for respiratory distress     PLAN:    Neurologic: Dementia, agitated   - Continue anti-seizure medications    Respiratory: Desaturations possibly secondary to aspiration  - Continue supplemental O2- face tent as tolerated  - Chest PT and suctioning q4h and PRN     Cardiovascular: No acute issues. Appears well-perfused     Gastrointestinal/Nutrition: SBO with recent removal of NGT after small bowel series  - Keep NPO for now  - Speech and swallow recommending Dysphagia 1 with honey thick liquids      Renal/Genitourinary: No acute issues  - Continue IVF 75cc/hr    Hematologic: No acute signs of bleeding  - Continue with VTE ppx     Infectious Disease: No radiographic evidence of pneumonia, elevated white count. Positive UA  - Continue with ceftriaxone      Tubes/Lines/Drains: SAMIAR, Giles    Endocrine: No acute issues      Disposition: Floor    __________________________________________  Critical care diagnoses:  Acute respiratory distress  UTI    The patient is a critical care patient with life threatening hemodynamic and metabolic instability in SICU.  I have personally interviewed and examined the patient, reviewed data and laboratory tests/x-rays and all pertinent electronic images.  The SICU team discusses on an ongoing basis with the primary team and all consultants, House staff and PA's to have a permanent risk benefit analyses on all decisions.  These diagnoses are unrelated to the surgical procedure noted above.  I met with family if needed to get further history, discuss the case and make care decisions for this patient who might not be able to participate.  Time involved in performance of separately billable procedures was not counted toward my critical care time. There is no overlap.  I spent 55-75 minutes  in total providing critical care for the diagnoses, assessment and plan above.
89y Female with SBO, in SICU for respiratory distress     PLAN:    Neurologic: Dementia, agitated   - Continue anti-seizure medications    Respiratory: Desaturations possibly secondary to aspiration  - Continue supplemental O2- face tent   - Chest PT and suctioning q4h and PRN     Cardiovascular: No acute issues. Appears well-perfused     Gastrointestinal/Nutrition: SBO with recent removal of NGT after small bowel series  - Keep NPO for now  - Formal speech and swallow     Renal/Genitourinary: No acute issues  - Reduce IVF to 75cc/hr    Hematologic: No acute signs of bleeding  - Continue with VTE ppx     Infectious Disease: No radiographic evidence of pneumonia, elevated white count. Positive UA  - Continue with ceftriaxone      Tubes/Lines/Drains: PIV, Giles    Endocrine: No acute issues      Disposition: Floor    __________________________________________  Critical care diagnoses:  Acute respiratory distress  UTI    The patient is a critical care patient with life threatening hemodynamic and metabolic instability in SICU.  I have personally interviewed and examined the patient, reviewed data and laboratory tests/x-rays and all pertinent electronic images.  The SICU team discusses on an ongoing basis with the primary team and all consultants, House staff and PA's to have a permanent risk benefit analyses on all decisions.  These diagnoses are unrelated to the surgical procedure noted above.  I met with family if needed to get further history, discuss the case and make care decisions for this patient who might not be able to participate.  Time involved in performance of separately billable procedures was not counted toward my critical care time. There is no overlap.  I spent 55-75 minutes  in total providing critical care for the diagnoses, assessment and plan above.

## 2018-07-02 ENCOUNTER — TRANSCRIPTION ENCOUNTER (OUTPATIENT)
Age: 83
End: 2018-07-02

## 2018-07-02 LAB
BACTERIA BLD CULT: SIGNIFICANT CHANGE UP
BACTERIA BLD CULT: SIGNIFICANT CHANGE UP
BUN SERPL-MCNC: 12 MG/DL — SIGNIFICANT CHANGE UP (ref 7–23)
CALCIUM SERPL-MCNC: 8.6 MG/DL — SIGNIFICANT CHANGE UP (ref 8.4–10.5)
CHLORIDE SERPL-SCNC: 102 MMOL/L — SIGNIFICANT CHANGE UP (ref 98–107)
CO2 SERPL-SCNC: 30 MMOL/L — SIGNIFICANT CHANGE UP (ref 22–31)
CREAT SERPL-MCNC: 0.6 MG/DL — SIGNIFICANT CHANGE UP (ref 0.5–1.3)
GLUCOSE SERPL-MCNC: 88 MG/DL — SIGNIFICANT CHANGE UP (ref 70–99)
HCT VFR BLD CALC: 32.4 % — LOW (ref 34.5–45)
HGB BLD-MCNC: 10.7 G/DL — LOW (ref 11.5–15.5)
MAGNESIUM SERPL-MCNC: 1.7 MG/DL — SIGNIFICANT CHANGE UP (ref 1.6–2.6)
MCHC RBC-ENTMCNC: 31.3 PG — SIGNIFICANT CHANGE UP (ref 27–34)
MCHC RBC-ENTMCNC: 33 % — SIGNIFICANT CHANGE UP (ref 32–36)
MCV RBC AUTO: 94.7 FL — SIGNIFICANT CHANGE UP (ref 80–100)
NRBC # FLD: 0 — SIGNIFICANT CHANGE UP
PHOSPHATE SERPL-MCNC: 2.8 MG/DL — SIGNIFICANT CHANGE UP (ref 2.5–4.5)
PLATELET # BLD AUTO: 198 K/UL — SIGNIFICANT CHANGE UP (ref 150–400)
PMV BLD: 10.5 FL — SIGNIFICANT CHANGE UP (ref 7–13)
POTASSIUM SERPL-MCNC: 3.4 MMOL/L — LOW (ref 3.5–5.3)
POTASSIUM SERPL-SCNC: 3.4 MMOL/L — LOW (ref 3.5–5.3)
RBC # BLD: 3.42 M/UL — LOW (ref 3.8–5.2)
RBC # FLD: 12.5 % — SIGNIFICANT CHANGE UP (ref 10.3–14.5)
SODIUM SERPL-SCNC: 142 MMOL/L — SIGNIFICANT CHANGE UP (ref 135–145)
WBC # BLD: 8.92 K/UL — SIGNIFICANT CHANGE UP (ref 3.8–10.5)
WBC # FLD AUTO: 8.92 K/UL — SIGNIFICANT CHANGE UP (ref 3.8–10.5)

## 2018-07-02 RX ORDER — POTASSIUM PHOSPHATE, MONOBASIC POTASSIUM PHOSPHATE, DIBASIC 236; 224 MG/ML; MG/ML
30 INJECTION, SOLUTION INTRAVENOUS ONCE
Qty: 0 | Refills: 0 | Status: DISCONTINUED | OUTPATIENT
Start: 2018-07-02 | End: 2018-07-02

## 2018-07-02 RX ORDER — COLLAGENASE CLOSTRIDIUM HIST. 250 UNIT/G
1 OINTMENT (GRAM) TOPICAL DAILY
Qty: 0 | Refills: 0 | Status: DISCONTINUED | OUTPATIENT
Start: 2018-07-02 | End: 2018-07-03

## 2018-07-02 RX ORDER — COLLAGENASE CLOSTRIDIUM HIST. 250 UNIT/G
1 OINTMENT (GRAM) TOPICAL
Qty: 0 | Refills: 0 | COMMUNITY
Start: 2018-07-02

## 2018-07-02 RX ORDER — MAGNESIUM SULFATE 500 MG/ML
2 VIAL (ML) INJECTION ONCE
Qty: 0 | Refills: 0 | Status: COMPLETED | OUTPATIENT
Start: 2018-07-02 | End: 2018-07-02

## 2018-07-02 RX ORDER — NITROFURANTOIN MACROCRYSTAL 50 MG
100 CAPSULE ORAL
Qty: 0 | Refills: 0 | Status: DISCONTINUED | OUTPATIENT
Start: 2018-07-02 | End: 2018-07-03

## 2018-07-02 RX ORDER — VALPROIC ACID (AS SODIUM SALT) 250 MG/5ML
500 SOLUTION, ORAL ORAL
Qty: 0 | Refills: 0 | Status: DISCONTINUED | OUTPATIENT
Start: 2018-07-02 | End: 2018-07-03

## 2018-07-02 RX ORDER — CIPROFLOXACIN LACTATE 400MG/40ML
1 VIAL (ML) INTRAVENOUS
Qty: 0 | Refills: 0 | COMMUNITY
Start: 2018-07-02

## 2018-07-02 RX ORDER — POTASSIUM CHLORIDE 20 MEQ
40 PACKET (EA) ORAL EVERY 4 HOURS
Qty: 0 | Refills: 0 | Status: COMPLETED | OUTPATIENT
Start: 2018-07-02 | End: 2018-07-02

## 2018-07-02 RX ORDER — COLLAGENASE CLOSTRIDIUM HIST. 250 UNIT/G
1 OINTMENT (GRAM) TOPICAL
Qty: 1 | Refills: 0
Start: 2018-07-02

## 2018-07-02 RX ORDER — NITROFURANTOIN MACROCRYSTAL 50 MG
1 CAPSULE ORAL
Qty: 6 | Refills: 0 | OUTPATIENT
Start: 2018-07-02 | End: 2018-07-04

## 2018-07-02 RX ADMIN — SODIUM CHLORIDE 3 MILLILITER(S): 9 INJECTION INTRAMUSCULAR; INTRAVENOUS; SUBCUTANEOUS at 10:29

## 2018-07-02 RX ADMIN — QUETIAPINE FUMARATE 50 MILLIGRAM(S): 200 TABLET, FILM COATED ORAL at 19:46

## 2018-07-02 RX ADMIN — Medication 50 GRAM(S): at 11:04

## 2018-07-02 RX ADMIN — Medication 500 MILLIGRAM(S): at 19:49

## 2018-07-02 RX ADMIN — QUETIAPINE FUMARATE 50 MILLIGRAM(S): 200 TABLET, FILM COATED ORAL at 07:03

## 2018-07-02 RX ADMIN — Medication 100 MILLIGRAM(S): at 19:46

## 2018-07-02 RX ADMIN — Medication 27.5 MILLIGRAM(S): at 07:03

## 2018-07-02 RX ADMIN — Medication 40 MILLIEQUIVALENT(S): at 13:22

## 2018-07-02 RX ADMIN — Medication 1 APPLICATION(S): at 07:03

## 2018-07-02 RX ADMIN — ENOXAPARIN SODIUM 40 MILLIGRAM(S): 100 INJECTION SUBCUTANEOUS at 13:21

## 2018-07-02 RX ADMIN — Medication 1 APPLICATION(S): at 13:27

## 2018-07-02 RX ADMIN — Medication 40 MILLIEQUIVALENT(S): at 19:45

## 2018-07-02 NOTE — DISCHARGE NOTE ADULT - MEDICATION SUMMARY - MEDICATIONS TO TAKE
I will START or STAY ON the medications listed below when I get home from the hospital:    valproic acid 500 mg oral delayed release capsule  -- 1 tab(s) by mouth 2 times a day  -- Indication: For home med    QUEtiapine 50 mg oral tablet  -- 1 tab(s) by mouth 2 times a day  -- Indication: For home med    collagenase 250 units/g topical ointment  -- 1 application on skin once a day, please apply  to affected area  -- Indication: For topical cream    nitrofurantoin macrocrystals-monohydrate 100 mg oral capsule  -- 1 cap(s) by mouth 2 times a day (with meals) MDD:2  -- Indication: For antibiotic I will START or STAY ON the medications listed below when I get home from the hospital:    semi electric hospital bed  -- Indication: For equipment     wheel chair  -- Indication: For equipment    rocio lift  -- Indication: For equipment    valproic acid 500 mg oral delayed release capsule  -- 1 tab(s) by mouth 2 times a day  -- Indication: For home med    QUEtiapine 50 mg oral tablet  -- 1 tab(s) by mouth 2 times a day  -- Indication: For home med    SEROquel 25 mg oral tablet  -- 1 tab(s) by mouth every 6 hours, As Needed agitation  -- Indication: For home med    collagenase 250 units/g topical ointment  -- 1 application on skin once a day, please apply  to affected area  -- Indication: For topical cream    nitrofurantoin macrocrystals-monohydrate 100 mg oral capsule  -- 1 cap(s) by mouth 2 times a day (with meals) MDD:2  -- Indication: For antibiotics

## 2018-07-02 NOTE — DISCHARGE NOTE ADULT - CARE PROVIDER_API CALL
Luke Vargas), Surgery  [address] [facility]  [address] [place] [id number]  Phone: [phone]  Fax: [phone]

## 2018-07-02 NOTE — DISCHARGE NOTE ADULT - DURABLE MEDICAL EQUIPMENT AGENCY
Transport wheelchair, Semi electric bed, rocio lift ordered from [facility] [phone]. Scheduled for home delivery on 7/3/18.

## 2018-07-02 NOTE — DISCHARGE NOTE ADULT - HOSPITAL COURSE
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 As per son, he last fed his mother in the afternoon yesterday. He reports that he usually feeds her as tolerated but does practice aspiration precautions, like limiting amounts of thin liquids and sitting her upright. He noticed the first episode of emesis, all food particles, and then she had a second episode with the rest of what he had fed her and so he decided to call EMS. As per son and live in girlfriend, who the patient lives with, she had been having large bowel movements until Thursday and passing flatus until yesterday.   As per son, patient recently hospitalized at [facility] for pneumonia and recurrent diarrhea, during which her mental status slowly declined and now is A&O x0.   In the ED this am, patient with normal vital signs. WBC elevated to 14.5. Carbon dioxide 28. CT scan showing small bowel obstruction with transition point in the pelvis in the mid small bowel.   Son, Zac (retired EMS), extensively updated on current status. He reports that she is DNR, but that he would be interested in surgery for her if it were indicated. Also discussed risks and benefits of NGT at this time, with him in agreement that she would not tolerate the tube, and made aware of aspiration risks, etc.     CXR performed and showed Clear lungs. No pleural effusions or pneumothorax. Stable cardiac and mediastinal silhouettes including aortic   calcifications. Trachea midline. Generalized osteopenia, spinal degenerative changes with broad  dextrocurvature, and multiple old healed left rib fracture deformities   again noted.    CT head performed and showed No evidence for acute territorial infarct, intracranial hemorrhage or   mass effect.    CT abdomen performed and showed Small bowel obstruction with transition point in the pelvis likely   related to radiation. No free air to suggest perforation.    Pt admitted to surgical service, pt managed conservatively with bowel rest and NGT decompression.    6/26 SB series performed for follow up and showed contrast reaching the descending colon. Nonobstructive bowel gas   pattern.    Pt with return bowel function and started on full liquid diet    6/26 pt was a SRR for desaturation. Pt was started on fulls today after a small bowel series and the suspicion is that she may have aspirated. Labs were sent and a chest X-ray was obtained and showed clear lungs. Pt became febrile to 102, cultures collected and empirically started on antibiotics. Pt made NPO pending S&S evaluation.  Pt cont to require non-rebreather mask so pt transferred to SICU. CXR repeated and cont to show clear lungs so antibiotics for poss PNA stopped. Pt received inhaled saline and chest PT for high secretions.    Pt febrile and UA+ for which started on IV antibiotics and later switched to macrobid.    S&S exam performed and recommend dysphagia 1 diet with honey thick liquids via teaspoon    PT evaluated pt and recommend pt be discharge to rehab but family would like to have pt discharge home    Per Attending pt now stable for discharge home. 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 As per son, he last fed his mother in the afternoon yesterday. He reports that he usually feeds her as tolerated but does practice aspiration precautions, like limiting amounts of thin liquids and sitting her upright. He noticed the first episode of emesis, all food particles, and then she had a second episode with the rest of what he had fed her and so he decided to call EMS. As per son and live in girlfriend, who the patient lives with, she had been having large bowel movements until Thursday and passing flatus until yesterday.   As per son, patient recently hospitalized at [facility] for pneumonia and recurrent diarrhea, during which her mental status slowly declined and now is A&O x0.   In the ED this am, patient with normal vital signs. WBC elevated to 14.5. Carbon dioxide 28.     Son, Zac (retired EMS), extensively updated on current status. He reports that she is DNR, but that he would be interested in surgery for her if it were indicated. Also discussed risks and benefits of NGT at this time, with him in agreement that she would not tolerate the tube, and made aware of aspiration risks, etc.     CXR performed and showed Clear lungs. No pleural effusions or pneumothorax. Stable cardiac and mediastinal silhouettes including aortic calcifications. Trachea midline. Generalized osteopenia, spinal degenerative changes with broad  dextrocurvature, and multiple old healed left rib fracture deformities   again noted.    CT head performed and showed No evidence for acute territorial infarct, intracranial hemorrhage or   mass effect.    CT abdomen performed and showed Small bowel obstruction with transition point in the pelvis likely   related to radiation. No free air to suggest perforation.    Pt admitted to surgical service, pt managed conservatively with bowel rest and NGT decompression.    6/26 SB series performed for follow up and showed contrast reaching the descending colon. Nonobstructive bowel gas   pattern.    Pt with return bowel function and started on full liquid diet    6/26 pt was a SRR for desaturation. Pt was started on full diet earlier in day after small bowel series performed and suspected pt may have aspirated. Labs were sent and a chest X-ray was obtained and showed clear lungs. Pt became febrile to 102, cultures collected and empirically started on antibiotics. Pt made NPO pending S&S evaluation.  Pt cont to require non-rebreather mask so transferred to SICU. CXR repeated and cont to show clear lungs so antibiotics for poss PNA stopped. Pt received inhaled saline and chest PT for high secretions and was weaned of supplemental oxygen.     Pt febrile and UA+ for which started on IV antibiotics and later switched to oral macrobid, per Attending to complete 7 day course antibiotics.     S&S exam performed and recommend dysphagia 1 diet with honey thick liquids via teaspoon    6/29 pt transferred to general surgical floor when hemodynamically stable    PT evaluated pt and recommend be discharge to rehab but family would like to have pt discharge home. Pt with history of dementia, bedbound and unable to ambulate, will require wheel chair for assistance with ADLs.  Patient will require semi electric hospital bed for management of her wound and frequent turning and positioning.     Per Attending pt now stable for discharge home. Pt to complete course antibiotics as prescribed. 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 As per son, he last fed his mother in the afternoon yesterday. He reports that he usually feeds her as tolerated but does practice aspiration precautions, like limiting amounts of thin liquids and sitting her upright. He noticed the first episode of emesis, all food particles, and then she had a second episode with the rest of what he had fed her and so he decided to call EMS. As per son and live in girlfriend, who the patient lives with, she had been having large bowel movements until Thursday and passing flatus until yesterday.   As per son, patient recently hospitalized at [facility] for pneumonia and recurrent diarrhea, during which her mental status slowly declined and now is A&O x0.   In the ED this am, patient with normal vital signs. WBC elevated to 14.5. Carbon dioxide 28.     Son, Zac (retired EMS), extensively updated on current status. He reports that she is DNR, but that he would be interested in surgery for her if it were indicated. Also discussed risks and benefits of NGT at this time, with him in agreement that she would not tolerate the tube, and made aware of aspiration risks, etc.     CXR performed and showed Clear lungs. No pleural effusions or pneumothorax. Stable cardiac and mediastinal silhouettes including aortic calcifications. Trachea midline. Generalized osteopenia, spinal degenerative changes with broad  dextrocurvature, and multiple old healed left rib fracture deformities   again noted.    CT head performed and showed No evidence for acute territorial infarct, intracranial hemorrhage or   mass effect.    CT abdomen performed and showed Small bowel obstruction with transition point in the pelvis likely   related to radiation. No free air to suggest perforation.    Pt admitted to surgical service, pt managed conservatively with bowel rest and NGT decompression.    6/26 SB series performed for follow up and showed contrast reaching the descending colon. Nonobstructive bowel gas   pattern.    Pt with return bowel function and started on full liquid diet    6/26 pt was a SRR for desaturation. Pt was started on full diet earlier in day after small bowel series performed and suspected pt may have aspirated. Labs were sent and a chest X-ray was obtained and showed clear lungs. Pt became febrile to 102, cultures collected and empirically started on antibiotics. Pt made NPO pending S&S evaluation.  Pt cont to require non-rebreather mask so transferred to SICU. CXR repeated and cont to show clear lungs so antibiotics for poss PNA stopped. Pt received inhaled saline and chest PT for high secretions and was weaned of supplemental oxygen.     Pt febrile and UA+ for which started on IV antibiotics and later switched to oral macrobid, per Attending to complete 7 day course antibiotics.     S&S exam performed and recommend dysphagia 1 diet with honey thick liquids via teaspoon    6/29 pt transferred to general surgical floor when hemodynamically stable    PT evaluated pt and recommend be discharge to rehab but family would like to have pt discharge home. Pt with history of dementia, bedbound and unable to ambulate, will require wheel chair for assistance with ADLs. Pt will need transport wheel chair to assist goign to and from doctor's appointments and move around home. as pt does not self propel.  Patient will require semi electric hospital bed for management of her wound and frequent turning and positioning. Pt will need to keep head of bed greater than 30 degrees to prevent risk aspiration. Pt will require a Harsh lift to transfer pt from bed to wheel chair.    Per Attending pt now stable for discharge home. Pt to complete course antibiotics as prescribed. Pt to follow up with Dr Vargas as an outpatient, instructed to call to schedule appointment

## 2018-07-02 NOTE — DISCHARGE NOTE ADULT - PLAN OF CARE
treated conservatively with NGT decompression and bowel rest, now with return bowel function and tolerating diet DIET: dysphagia 1 diet  NOTIFY YOUR SURGEON IF: You have any fever (over 100.4 F) or chills, persistent nausea/vomiting, persistent diarrhea, stop passing gas or having bowel movements, or if your pain is not controlled on your discharge pain medications.  FOLLOW-UP: Please follow up with your primary care physician in one week regarding your hospitalization  Please follow up with Dr Vargas as an outpatient, please call to schedule appointment DIET: dysphagia 1 diet  NOTIFY YOUR SURGEON IF: You have any fever (over 100.4 F) or chills, persistent nausea/vomiting, persistent diarrhea, stop passing gas or having bowel movements, or if your pain is not controlled on your discharge pain medications.  FOLLOW-UP: Please follow up with your primary care physician in one week regarding your hospitalization  Please follow up with Dr Vargas as an outpatient, please call to schedule appointment  While in the hospital you were diagnosed with a UTI for which you were treated with IV antibiotics and being sent home with a prescription of oral antibiotics to complete treatment course. Please complete antibiotics as prescribed Wound Care: Right hip: please wash with Safe-Cleanse, rinse with sterile normal saline, apply liquid barrier film to ines-wound area, apply diana size collagenase and cover with foam  DIET: dysphagia 1 diet  NOTIFY YOUR SURGEON IF: You have any fever (over 100.4 F) or chills, persistent nausea/vomiting, persistent diarrhea, stop passing gas or having bowel movements, or if your pain is not controlled on your discharge pain medications.  FOLLOW-UP: Please follow up with your primary care physician in one week regarding your hospitalization  Please follow up with Dr Vargas as an outpatient, please call to schedule appointment  While in the hospital you were diagnosed with a UTI for which you were treated with IV antibiotics and being sent home with a prescription of oral antibiotics to complete treatment course. Please complete antibiotics as prescribed (complete 7/5)

## 2018-07-02 NOTE — DISCHARGE NOTE ADULT - CONDITIONS AT DISCHARGE
Alert to self only. Turn & position q2h. Tolerating dyspagic diet without nausea or vomiting. HOB elevated during meals. Incontinent of stool & urine. Right hip wound covered with mepilex. Vitals stable, afebrile. Remains on constant observation for safety. Alert to self only. Turn & position q2h. Tolerating dyspagic diet without nausea or vomiting. HOB elevated during meals. Incontinent of stool & urine. Right hip wound covered with mepilex. Vitals stable, afebrile. Remained on constant observation for safety till discharge.

## 2018-07-02 NOTE — DISCHARGE NOTE ADULT - CARE PLAN
Principal Discharge DX:	Small bowel obstruction  Goal:	treated conservatively with NGT decompression and bowel rest, now with return bowel function and tolerating diet  Assessment and plan of treatment:	DIET: dysphagia 1 diet  NOTIFY YOUR SURGEON IF: You have any fever (over 100.4 F) or chills, persistent nausea/vomiting, persistent diarrhea, stop passing gas or having bowel movements, or if your pain is not controlled on your discharge pain medications.  FOLLOW-UP: Please follow up with your primary care physician in one week regarding your hospitalization  Please follow up with Dr Vargas as an outpatient, please call to schedule appointment Principal Discharge DX:	Small bowel obstruction  Goal:	treated conservatively with NGT decompression and bowel rest, now with return bowel function and tolerating diet  Assessment and plan of treatment:	DIET: dysphagia 1 diet  NOTIFY YOUR SURGEON IF: You have any fever (over 100.4 F) or chills, persistent nausea/vomiting, persistent diarrhea, stop passing gas or having bowel movements, or if your pain is not controlled on your discharge pain medications.  FOLLOW-UP: Please follow up with your primary care physician in one week regarding your hospitalization  Please follow up with Dr Vargas as an outpatient, please call to schedule appointment  While in the hospital you were diagnosed with a UTI for which you were treated with IV antibiotics and being sent home with a prescription of oral antibiotics to complete treatment course. Please complete antibiotics as prescribed Principal Discharge DX:	Small bowel obstruction  Goal:	treated conservatively with NGT decompression and bowel rest, now with return bowel function and tolerating diet  Assessment and plan of treatment:	Wound Care: Right hip: please wash with Safe-Cleanse, rinse with sterile normal saline, apply liquid barrier film to ines-wound area, apply diana size collagenase and cover with foam  DIET: dysphagia 1 diet  NOTIFY YOUR SURGEON IF: You have any fever (over 100.4 F) or chills, persistent nausea/vomiting, persistent diarrhea, stop passing gas or having bowel movements, or if your pain is not controlled on your discharge pain medications.  FOLLOW-UP: Please follow up with your primary care physician in one week regarding your hospitalization  Please follow up with Dr Vargas as an outpatient, please call to schedule appointment  While in the hospital you were diagnosed with a UTI for which you were treated with IV antibiotics and being sent home with a prescription of oral antibiotics to complete treatment course. Please complete antibiotics as prescribed (complete 7/5)

## 2018-07-02 NOTE — DISCHARGE NOTE ADULT - VISION (WITH CORRECTIVE LENSES IF THE PATIENT USUALLY WEARS THEM):
Normal vision: sees adequately in most situations; can see medication labels, newsprint/unable to fully assess due to confusion

## 2018-07-02 NOTE — DISCHARGE NOTE ADULT - INSTRUCTIONS
Notify MD with any difficulty swallowing dysphagic diet. Keep head of bed elevated while eating. Avoid thin liquids which may produce coughing & possible aspiration. Dysphagia diet sent home with patient. Change mepilex on right hip daily, cleanse with saf clens, rinse with saline, pat dry, apply liquid skin barrier around outside of wound,, apply collagenase to wound itself  & cover with foam dressing. Provide safe environment, if mobility is limited, change position every 2 hours to prevent skin breakdown. Provide ines care after each bowel movement & urinary elimination.

## 2018-07-02 NOTE — PROGRESS NOTE ADULT - ASSESSMENT
89F with dementia and SBO, now resolving. Pt tolerating diet and continues to recover well.  Will begin discharge planning.     - monitor O2 saturation	  - plan for d/c home with home PT  - DVT ppx  - Rec'd d/c: Pt with history of dementia will require wheel chair for assistance with ADLs.  Patient will require semi electric hospital bed for management of her wound and turning.     Surgery B-Team  Pager: [phone] 89F with dementia and SBO, now resolving. Pt tolerating diet and continues to recover well.  Will begin discharge planning.     - monitor O2 saturation	  - plan for d/c home with home PT  - DVT ppx  - Rec'd d/c: Pt with history of dementia, bedbound and unable to ambulate, will require wheel chair for assistance with ADLs.  Patient will require semi electric hospital bed for management of her wound and frequent turning and positioning.     Surgery B-Team  Pager: [phone] 89F with dementia and SBO, now resolving. Pt tolerating diet and continues to recover well.  Will begin discharge planning.     - monitor O2 saturation	  - plan for d/c home with home PT  - DVT ppx  - Rec'd d/c: Pt with history of dementia, bedbound and unable to ambulate, will require wheel chair for assistance with ADLs. Pt will need transport wheel chair to assist goign to and from doctor's appointments and move around home. as pt does not self propel.  Patient will require semi electric hospital bed for management of her wound and frequent turning and positioning. Pt will need to keep head of bed greater than 30 degrees to prevent risk aspiration. Pt will require a Harsh lift to transfer pt from bed to wheel chair.    Surgery B-Team  Pager: [phone]

## 2018-07-02 NOTE — DISCHARGE NOTE ADULT - MEDICATION SUMMARY - MEDICATIONS TO STOP TAKING
I will STOP taking the medications listed below when I get home from the hospital:    Percocet 5/325 oral tablet  -- 1 tab(s) by mouth every 6 hours, As Needed for moderate pain

## 2018-07-02 NOTE — DISCHARGE NOTE ADULT - COMMUNITY RESOURCES
[facility]HA via [facility] reinstated for 7/3/18, will meet pt at home at 2pm. Ambulance transport set up with [facility] [phone] for 1pm  on 7/3/18.

## 2018-07-02 NOTE — DISCHARGE NOTE ADULT - PATIENT PORTAL LINK FT
You can access the [facility] Patient Portal, offered by [facility], by registering with the following website: [email or web address]

## 2018-07-02 NOTE — PROGRESS NOTE ADULT - SUBJECTIVE AND OBJECTIVE BOX
GENERAL SURGERY DAILY PROGRESS NOTE:     Subjective:    Pt seen and examined during morning rounds. Pt pain well controlled.  Tolerating diet. +flatus. +BM.     Objective:    MEDICATIONS  (STANDING):  collagenase Ointment 1 Application(s) Topical daily  enoxaparin Injectable 40 milliGRAM(s) SubCutaneous every 24 hours  nitrofurantoin (MACROBID) 100 milliGRAM(s) Oral two times a day with meals  potassium chloride   Powder 40 milliEquivalent(s) Oral every 4 hours  QUEtiapine 50 milliGRAM(s) Oral every 12 hours  sodium chloride 3%  Inhalation 3 milliLiter(s) Inhalation two times a day  valproic  acid Syrup 500 milliGRAM(s) Oral two times a day    MEDICATIONS  (PRN):      Vital Signs Last 24 Hrs  T(C): 36.6 (02 Jul 2018 12:00), Max: 36.8 (01 Jul 2018 21:48)  T(F): 97.8 (02 Jul 2018 12:00), Max: 98.3 (02 Jul 2018 10:36)  HR: 64 (02 Jul 2018 12:00) (62 - 74)  BP: 137/60 (02 Jul 2018 12:00) (137/60 - 156/77)  BP(mean): --  RR: 18 (02 Jul 2018 12:00) (18 - 20)  SpO2: 95% (02 Jul 2018 12:00) (95% - 100%)    I&O's Detail    01 Jul 2018 07:01  -  02 Jul 2018 07:00  --------------------------------------------------------  IN:    dextrose 5% + sodium chloride 0.45%: 1000 mL    IV PiggyBack: 200 mL    Oral Fluid: 720 mL  Total IN: 1920 mL    OUT:  Total OUT: 0 mL    Total NET: 1920 mL      02 Jul 2018 07:01  -  02 Jul 2018 14:47  --------------------------------------------------------  IN:    dextrose 5% + sodium chloride 0.45%: 200 mL    IV PiggyBack: 50 mL    Oral Fluid: 330 mL  Total IN: 580 mL    OUT:  Total OUT: 0 mL    Total NET: 580 mL      Daily     LABS:                        10.7   8.92  )-----------( 198      ( 02 Jul 2018 08:00 )             32.4     07-02    142  |  102  |  12  ----------------------------<  88  3.4<L>   |  30  |  0.60    Ca    8.6      02 Jul 2018 08:00  Phos  2.8     07-02  Mg     1.7     07-02            RADIOLOGY & ADDITIONAL STUDIES:

## 2018-07-03 VITALS — OXYGEN SATURATION: 96 %

## 2018-07-03 RX ORDER — NITROFURANTOIN MACROCRYSTAL 50 MG
1 CAPSULE ORAL
Qty: 4 | Refills: 0 | OUTPATIENT
Start: 2018-07-03 | End: 2018-07-04

## 2018-07-03 RX ORDER — QUETIAPINE FUMARATE 200 MG/1
1 TABLET, FILM COATED ORAL
Qty: 0 | Refills: 0 | COMMUNITY
Start: 2018-07-03

## 2018-07-03 RX ADMIN — QUETIAPINE FUMARATE 50 MILLIGRAM(S): 200 TABLET, FILM COATED ORAL at 07:19

## 2018-07-03 RX ADMIN — ENOXAPARIN SODIUM 40 MILLIGRAM(S): 100 INJECTION SUBCUTANEOUS at 12:31

## 2018-07-03 RX ADMIN — SODIUM CHLORIDE 3 MILLILITER(S): 9 INJECTION INTRAMUSCULAR; INTRAVENOUS; SUBCUTANEOUS at 11:45

## 2018-07-03 RX ADMIN — Medication 500 MILLIGRAM(S): at 05:49

## 2018-07-03 RX ADMIN — Medication 1 APPLICATION(S): at 12:31

## 2018-07-03 RX ADMIN — Medication 100 MILLIGRAM(S): at 10:09

## 2018-07-09 ENCOUNTER — INPATIENT (INPATIENT)
Facility: HOSPITAL | Age: 83
LOS: 7 days | Discharge: HOME HEALTH | End: 2018-07-17
Attending: INTERNAL MEDICINE | Admitting: INTERNAL MEDICINE
Payer: MEDICARE

## 2018-07-09 VITALS
RESPIRATION RATE: 18 BRPM | SYSTOLIC BLOOD PRESSURE: 129 MMHG | OXYGEN SATURATION: 99 % | TEMPERATURE: 98 F | DIASTOLIC BLOOD PRESSURE: 78 MMHG | HEART RATE: 87 BPM

## 2018-07-09 DIAGNOSIS — Z90.49 ACQUIRED ABSENCE OF OTHER SPECIFIED PARTS OF DIGESTIVE TRACT: Chronic | ICD-10-CM

## 2018-07-09 DIAGNOSIS — J18.9 PNEUMONIA, UNSPECIFIED ORGANISM: ICD-10-CM

## 2018-07-09 LAB
ALBUMIN SERPL ELPH-MCNC: 3.2 G/DL — LOW (ref 3.3–5)
ALP SERPL-CCNC: 54 U/L — SIGNIFICANT CHANGE UP (ref 40–120)
ALT FLD-CCNC: 18 U/L — SIGNIFICANT CHANGE UP (ref 4–33)
AMORPH CRY # UR COMP ASSIST: SIGNIFICANT CHANGE UP (ref 0–0)
APPEARANCE UR: CLEAR — SIGNIFICANT CHANGE UP
AST SERPL-CCNC: 34 U/L — HIGH (ref 4–32)
BASE EXCESS BLDV CALC-SCNC: 8.8 MMOL/L — SIGNIFICANT CHANGE UP
BASOPHILS # BLD AUTO: 0.04 K/UL — SIGNIFICANT CHANGE UP (ref 0–0.2)
BASOPHILS NFR BLD AUTO: 0.2 % — SIGNIFICANT CHANGE UP (ref 0–2)
BILIRUB SERPL-MCNC: 0.4 MG/DL — SIGNIFICANT CHANGE UP (ref 0.2–1.2)
BILIRUB UR-MCNC: NEGATIVE — SIGNIFICANT CHANGE UP
BLOOD GAS VENOUS - CREATININE: 0.81 MG/DL — SIGNIFICANT CHANGE UP (ref 0.5–1.3)
BLOOD UR QL VISUAL: NEGATIVE — SIGNIFICANT CHANGE UP
BUN SERPL-MCNC: 34 MG/DL — HIGH (ref 7–23)
CALCIUM SERPL-MCNC: 9.4 MG/DL — SIGNIFICANT CHANGE UP (ref 8.4–10.5)
CHLORIDE BLDV-SCNC: 101 MMOL/L — SIGNIFICANT CHANGE UP (ref 96–108)
CHLORIDE SERPL-SCNC: 97 MMOL/L — LOW (ref 98–107)
CO2 SERPL-SCNC: 29 MMOL/L — SIGNIFICANT CHANGE UP (ref 22–31)
COLOR SPEC: YELLOW — SIGNIFICANT CHANGE UP
CREAT SERPL-MCNC: 0.91 MG/DL — SIGNIFICANT CHANGE UP (ref 0.5–1.3)
EOSINOPHIL # BLD AUTO: 0.65 K/UL — HIGH (ref 0–0.5)
EOSINOPHIL NFR BLD AUTO: 3.5 % — SIGNIFICANT CHANGE UP (ref 0–6)
GAS PNL BLDV: 136 MMOL/L — SIGNIFICANT CHANGE UP (ref 136–146)
GLUCOSE BLDV-MCNC: 104 — HIGH (ref 70–99)
GLUCOSE SERPL-MCNC: 108 MG/DL — HIGH (ref 70–99)
GLUCOSE UR-MCNC: NEGATIVE — SIGNIFICANT CHANGE UP
HCO3 BLDV-SCNC: 30 MMOL/L — HIGH (ref 20–27)
HCT VFR BLD CALC: 37.6 % — SIGNIFICANT CHANGE UP (ref 34.5–45)
HCT VFR BLDV CALC: 39.5 % — SIGNIFICANT CHANGE UP (ref 34.5–45)
HGB BLD-MCNC: 12 G/DL — SIGNIFICANT CHANGE UP (ref 11.5–15.5)
HGB BLDV-MCNC: 12.8 G/DL — SIGNIFICANT CHANGE UP (ref 11.5–15.5)
HYALINE CASTS # UR AUTO: SIGNIFICANT CHANGE UP (ref 0–?)
IMM GRANULOCYTES # BLD AUTO: 0.14 # — SIGNIFICANT CHANGE UP
IMM GRANULOCYTES NFR BLD AUTO: 0.8 % — SIGNIFICANT CHANGE UP (ref 0–1.5)
KETONES UR-MCNC: SIGNIFICANT CHANGE UP
LACTATE BLDV-MCNC: 2.4 MMOL/L — HIGH (ref 0.5–2)
LEUKOCYTE ESTERASE UR-ACNC: NEGATIVE — SIGNIFICANT CHANGE UP
LYMPHOCYTES # BLD AUTO: 1.58 K/UL — SIGNIFICANT CHANGE UP (ref 1–3.3)
LYMPHOCYTES # BLD AUTO: 8.6 % — LOW (ref 13–44)
MCHC RBC-ENTMCNC: 30.8 PG — SIGNIFICANT CHANGE UP (ref 27–34)
MCHC RBC-ENTMCNC: 31.9 % — LOW (ref 32–36)
MCV RBC AUTO: 96.7 FL — SIGNIFICANT CHANGE UP (ref 80–100)
MONOCYTES # BLD AUTO: 0.79 K/UL — SIGNIFICANT CHANGE UP (ref 0–0.9)
MONOCYTES NFR BLD AUTO: 4.3 % — SIGNIFICANT CHANGE UP (ref 2–14)
MUCOUS THREADS # UR AUTO: SIGNIFICANT CHANGE UP
NEUTROPHILS # BLD AUTO: 15.17 K/UL — HIGH (ref 1.8–7.4)
NEUTROPHILS NFR BLD AUTO: 82.6 % — HIGH (ref 43–77)
NITRITE UR-MCNC: NEGATIVE — SIGNIFICANT CHANGE UP
NRBC # FLD: 0 — SIGNIFICANT CHANGE UP
PCO2 BLDV: 56 MMHG — HIGH (ref 41–51)
PH BLDV: 7.4 PH — SIGNIFICANT CHANGE UP (ref 7.32–7.43)
PH UR: 6.5 — SIGNIFICANT CHANGE UP (ref 4.6–8)
PLATELET # BLD AUTO: 248 K/UL — SIGNIFICANT CHANGE UP (ref 150–400)
PMV BLD: 9.7 FL — SIGNIFICANT CHANGE UP (ref 7–13)
PO2 BLDV: < 24 MMHG — LOW (ref 35–40)
POTASSIUM BLDV-SCNC: 4.4 MMOL/L — SIGNIFICANT CHANGE UP (ref 3.4–4.5)
POTASSIUM SERPL-MCNC: 4.8 MMOL/L — SIGNIFICANT CHANGE UP (ref 3.5–5.3)
POTASSIUM SERPL-SCNC: 4.8 MMOL/L — SIGNIFICANT CHANGE UP (ref 3.5–5.3)
PROT SERPL-MCNC: 6.6 G/DL — SIGNIFICANT CHANGE UP (ref 6–8.3)
PROT UR-MCNC: 20 MG/DL — SIGNIFICANT CHANGE UP
RBC # BLD: 3.89 M/UL — SIGNIFICANT CHANGE UP (ref 3.8–5.2)
RBC # FLD: 12.7 % — SIGNIFICANT CHANGE UP (ref 10.3–14.5)
RBC CASTS # UR COMP ASSIST: SIGNIFICANT CHANGE UP (ref 0–?)
SAO2 % BLDV: 18.8 % — LOW (ref 60–85)
SODIUM SERPL-SCNC: 139 MMOL/L — SIGNIFICANT CHANGE UP (ref 135–145)
SP GR SPEC: 1.02 — SIGNIFICANT CHANGE UP (ref 1–1.04)
SQUAMOUS # UR AUTO: SIGNIFICANT CHANGE UP
UROBILINOGEN FLD QL: 1 MG/DL — SIGNIFICANT CHANGE UP
WBC # BLD: 18.37 K/UL — HIGH (ref 3.8–10.5)
WBC # FLD AUTO: 18.37 K/UL — HIGH (ref 3.8–10.5)
WBC UR QL: HIGH (ref 0–?)

## 2018-07-09 PROCEDURE — 73030 X-RAY EXAM OF SHOULDER: CPT | Mod: 26,RT

## 2018-07-09 PROCEDURE — 74176 CT ABD & PELVIS W/O CONTRAST: CPT | Mod: 26

## 2018-07-09 PROCEDURE — 71045 X-RAY EXAM CHEST 1 VIEW: CPT | Mod: 26

## 2018-07-09 RX ORDER — PIPERACILLIN AND TAZOBACTAM 4; .5 G/20ML; G/20ML
3.38 INJECTION, POWDER, LYOPHILIZED, FOR SOLUTION INTRAVENOUS ONCE
Qty: 0 | Refills: 0 | Status: COMPLETED | OUTPATIENT
Start: 2018-07-09 | End: 2018-07-09

## 2018-07-09 RX ORDER — SODIUM CHLORIDE 9 MG/ML
1000 INJECTION INTRAMUSCULAR; INTRAVENOUS; SUBCUTANEOUS ONCE
Qty: 0 | Refills: 0 | Status: COMPLETED | OUTPATIENT
Start: 2018-07-09 | End: 2018-07-09

## 2018-07-09 RX ORDER — SODIUM CHLORIDE 9 MG/ML
500 INJECTION INTRAMUSCULAR; INTRAVENOUS; SUBCUTANEOUS ONCE
Qty: 0 | Refills: 0 | Status: COMPLETED | OUTPATIENT
Start: 2018-07-09 | End: 2018-07-09

## 2018-07-09 RX ORDER — CEFEPIME 1 G/1
1000 INJECTION, POWDER, FOR SOLUTION INTRAMUSCULAR; INTRAVENOUS ONCE
Qty: 0 | Refills: 0 | Status: DISCONTINUED | OUTPATIENT
Start: 2018-07-09 | End: 2018-07-09

## 2018-07-09 RX ORDER — SODIUM CHLORIDE 9 MG/ML
1000 INJECTION INTRAMUSCULAR; INTRAVENOUS; SUBCUTANEOUS ONCE
Qty: 0 | Refills: 0 | Status: DISCONTINUED | OUTPATIENT
Start: 2018-07-09 | End: 2018-07-09

## 2018-07-09 RX ORDER — VANCOMYCIN HCL 1 G
1000 VIAL (EA) INTRAVENOUS ONCE
Qty: 0 | Refills: 0 | Status: DISCONTINUED | OUTPATIENT
Start: 2018-07-09 | End: 2018-07-10

## 2018-07-09 RX ADMIN — SODIUM CHLORIDE 1000 MILLILITER(S): 9 INJECTION INTRAMUSCULAR; INTRAVENOUS; SUBCUTANEOUS at 15:26

## 2018-07-09 RX ADMIN — SODIUM CHLORIDE 500 MILLILITER(S): 9 INJECTION INTRAMUSCULAR; INTRAVENOUS; SUBCUTANEOUS at 23:57

## 2018-07-09 RX ADMIN — PIPERACILLIN AND TAZOBACTAM 200 GRAM(S): 4; .5 INJECTION, POWDER, LYOPHILIZED, FOR SOLUTION INTRAVENOUS at 23:52

## 2018-07-09 NOTE — ED PROVIDER NOTE - NEUROLOGICAL SPEECH
After Visit Summary   5/10/2018    Doug Galicia    MRN: [id number]           Patient Information     Date Of Birth          1977        Visit Information        Provider Department      5/10/2018 11:15 AM Love Marte MD [facility]        Care Instructions    [facility]   Procedure Discharge Instructions    Today you saw:     Dr. Love Marte      You had an:  sacro-iliac joint injection       Medications used:  Lidocaine  Ropivicaine   Kenalog  Omnipaque              Be cautious when walking. Numbness and/or weakness in the lower extremities may occur for up to 6-8 hours after the procedure due to effect of the local anesthetic    Do not drive for 6 hours. The effect of the local anesthetic could slow your reflexes.     You may resume your regular activities after 24 hours    Avoid strenuous activity for the first 24 hours    You may shower, however avoid swimming, tub baths or hot tubs for 24 hours following your procedure    You may have a mild to moderate increase in pain for several days following the injection.    It may take up to 14 days for the steroid medication to start working although you may feel the effect as early as a few days after the procedure.       You may use ice packs for 10-15 minutes, 3 to 4 times a day at the injection site for comfort    Do not use heat to painful areas for 6 to 8 hours. This will give the local anesthetic time to wear off and prevent you from accidentally burning your skin.     You may use anti-inflammatory medications (such as Ibuprofen or Aleve or Advil) or Tylenol for pain control if necessary    If you experience any of the following, call the Pain Clinic during work hours at [phone] or the Provider Line after hours at [phone]:  -Fever over 100 degree F  -Swelling, bleeding, redness, drainage, warmth at the injection site  -Progressive weakness or numbness in your legs or arms  -Loss of  bowel or bladder function  -Unusual headache that is not relieved by Tylenol or other pain reliever  -Unusual new onset of pain that is not improving                Follow-ups after your visit        Who to contact     If you have questions or need follow up information about today's clinic visit or your schedule please contact [facility] directly at [phone].  Normal or non-critical lab and imaging results will be communicated to you by MyChart, letter or phone within 4 business days after the clinic has received the results. If you do not hear from us within 7 days, please contact the clinic through [employer] or phone. If you have a critical or abnormal lab result, we will notify you by phone as soon as possible.  Submit refill requests through [facility] or call your pharmacy and they will forward the refill request to us. Please allow 3 business days for your refill to be completed.          Additional Information About Your Visit        [employer] Information     [facility] gives you secure access to your electronic health record. If you see a primary care provider, you can also send messages to your care team and make appointments. If you have questions, please call your primary care clinic.  If you do not have a primary care provider, please call [phone] and they will assist you.        Care EveryWhere ID     This is your Care EveryWhere ID. This could be used by other organizations to access your [facility] medical records  [id number]        Your Vitals Were     Pulse                   80            Blood Pressure from Last 3 Encounters:   05/10/18 116/80   02/15/18 135/81   02/13/18 127/81    Weight from Last 3 Encounters:   02/15/18 97.5 kg (215 lb)   02/13/18 97.5 kg (215 lb)   11/06/17 104.3 kg (230 lb)              Today, you had the following     No orders found for display       Primary Care Provider Office Phone # Fax #    Ryan Johnston [phone]       [address]  [facility] [address]        Equal Access to Services     [facility] Center[facility]ELSEY : Hadii aad ku hadluizageorgia Sobrigidali, waaxda luqadaha, qaybta kaalmaaustin sosa, kaya mac. So [facility] [phone].    ATENCIÓN: Si habla español, tiene a forrester disposición servicios gratuitos de asistencia lingüística. [facility] [phone].    We comply with applicable federal civil rights laws and [place] laws. We do not discriminate on the basis of race, color, national origin, age, disability, sex, sexual orientation, or gender identity.            Thank you!     Thank you for choosing [facility]  for your care. Our goal is always to provide you with excellent care. Hearing back from our patients is one way we can continue to improve our services. Please take a few minutes to complete the written survey that you may receive in the mail after your visit with us. Thank you!             Your Updated Medication List - Protect others around you: Learn how to safely use, store and throw away your medicines at www.disposemymeds.org.          This list is accurate as of 5/10/18 11:41 AM.  Always use your most recent med list.                   Brand Name Dispense Instructions for use Diagnosis    amphetamine-dextroamphetamine 20 MG per 24 hr capsule    ADDERALL XR    30 capsule    Take 1 capsule (20 mg) by mouth daily More needed through psychiatry    Attention deficit hyperactivity disorder (ADHD), unspecified ADHD type       MELATONIN PO      Take by mouth At Bedtime 6 mg        methocarbamol 500 MG tablet    ROBAXIN    60 tablet    Take 1-2 tablets (500-1,000 mg) by mouth 3 times daily as needed for muscle spasms    Back muscle spasm       Multi-vitamin Tabs tablet      Take 1 tablet by mouth daily        OMEPRAZOLE PO      Take 20 mg by mouth every morning        sulfamethoxazole-trimethoprim 800-160 MG per tablet    BACTRIM DS/SEPTRA DS    20 tablet    Take 1 tablet by mouth 2 times daily    Rash        traMADol 50 MG tablet    ULTRAM    120 tablet    Take 1-2 tablets ( mg) by mouth every 6 hours as needed for moderate pain (max of 4 per day.) 30 day supply    SI (sacroiliac) joint dysfunction       zolpidem 10 MG tablet    AMBIEN    30 tablet    Take 1 tablet (10 mg) by mouth nightly as needed for sleep Don't take within 4 hours of tramadol or with ALCOHOL    Other insomnia          TC

## 2018-07-09 NOTE — ED PROVIDER NOTE - ATTENDING CONTRIBUTION TO CARE
Dr Bloch, ATTENDING MD-  I performed a face to face bedside interview with patient regarding history of present illness, review of symptoms and past medical history. I completed an independent physical exam.  I have discussed patient's plan of care with PA.   Documentation as above in the note.  confused, mildly agitated, HEENT nml lungs clear, abd soft tender diffusely, right shoulder, no deformity, pain on range, pulse present. unable to assess motor but moving all extrem. preeure area right heel, and on sacrum.  pulses present Dr Bloch, ATTENDING MD-  I performed a face to face bedside interview with patient regarding history of present illness, review of symptoms and past medical history. I completed an independent physical exam.  I have discussed patient's plan of care with PA.   Documentation as above in the note.  confused, mildly agitated, HEENT nml lungs clear, abd soft tender diffusely, right shoulder, no deformity, pain on range, pulse present. unable to assess motor but moving all extrem. pressure area right heel, and on sacrum.  pulses present

## 2018-07-09 NOTE — ED ADULT TRIAGE NOTE - CHIEF COMPLAINT QUOTE
Patient brought to ER from home by EMS for c/o right shoulder pain. Pt had a new Home Health Aide on Saturday, as per son, might have had an unwitnessed occurrence. Decreased motor and unable to lift independently.

## 2018-07-09 NOTE — ED ADULT NURSE NOTE - OBJECTIVE STATEMENT
pt AO x0, h/o dementia, unable to ambulate, c/o right shoulder pain and abdominal pain. As per pt's family, might have had an unwitnessed occurrence with her new home aide within last week. Also her facial expression shows abdominal pain. As per family, she had last bowel movement last Friday. pt's family also reports pt had h/o bowel resection surgery few years ago and recently has bowel obstruction noted s/p scar tissues. pt was recently hospitalized for 4 days due to vomiting/UTI/scar tissues on her bowel and discharged last Tuesday.  Found pressure ulcers on her right heel and sacrum area. stage 1 on sacrum 2x4cm and stage 2 2.5x4cm on her right heel. VSS on arrival. Evaluated by provider. Awaiting further study. will continue to monitor. pt AO x0, h/o dementia, unable to ambulate, c/o right shoulder pain and abdominal pain. As per pt's family, might have had an unwitnessed occurrence with her new home aide within last week. Also her facial expression shows abdominal pain. As per family, she had last bowel movement last Friday. pt's family also reports pt had h/o bowel resection surgery few years ago and recently has bowel obstruction noted s/p scar tissues. pt was recently hospitalized for 4 days due to vomiting/UTI/scar tissues on her bowel and discharged last Tuesday.  Found pressure ulcers on her right heel and sacrum area. stage 1 on sacrum 2x4cm, stage 2 2.5x4cm on her right heel and scab with stage 1 to right hip. VSS on arrival. Evaluated by provider. Awaiting further study. will continue to monitor.

## 2018-07-09 NOTE — ED PROVIDER NOTE - MEDICAL DECISION MAKING DETAILS
1 yo F with PMHx of dementia, SBO presents to the ED brought in by aide and family member with concern for a questionable right shoulder injury, Son was concerned because for the past 3 days patient right shoulder looks asymmetric, with decreased ROM, tenderness to palpations. Pt recently dced from hospital for SBO, aide reporting no BM for 4 days but patient also has poor PO intake.   Aide reporting no fever, no vomiting. Pt only complaints randomly of chronic back pain.  Plan: labs, ua, Ct a/p, xray right shoulder. 89 yo F with PMHx of dementia, SBO presents to the ED brought in by aide and family member with concern for a questionable right shoulder injury, Son was concerned because for the past 3 days patient right shoulder looks asymmetric, with decreased ROM, tenderness to palpations. Pt recently dced from hospital for SBO, aide reporting no BM for 4 days but patient also has poor PO intake.   Aide reporting no fever, no vomiting. Pt only complaints randomly of chronic back pain.  Plan: labs, ua, Ct a/p, xray right shoulder.

## 2018-07-10 DIAGNOSIS — K83.8 OTHER SPECIFIED DISEASES OF BILIARY TRACT: ICD-10-CM

## 2018-07-10 DIAGNOSIS — M25.511 PAIN IN RIGHT SHOULDER: ICD-10-CM

## 2018-07-10 DIAGNOSIS — E86.0 DEHYDRATION: ICD-10-CM

## 2018-07-10 DIAGNOSIS — A41.9 SEPSIS, UNSPECIFIED ORGANISM: ICD-10-CM

## 2018-07-10 DIAGNOSIS — Z29.9 ENCOUNTER FOR PROPHYLACTIC MEASURES, UNSPECIFIED: ICD-10-CM

## 2018-07-10 DIAGNOSIS — L89.90 PRESSURE ULCER OF UNSPECIFIED SITE, UNSPECIFIED STAGE: ICD-10-CM

## 2018-07-10 DIAGNOSIS — F03.90 UNSPECIFIED DEMENTIA WITHOUT BEHAVIORAL DISTURBANCE: ICD-10-CM

## 2018-07-10 DIAGNOSIS — M79.89 OTHER SPECIFIED SOFT TISSUE DISORDERS: ICD-10-CM

## 2018-07-10 DIAGNOSIS — J69.0 PNEUMONITIS DUE TO INHALATION OF FOOD AND VOMIT: ICD-10-CM

## 2018-07-10 LAB
ALBUMIN SERPL ELPH-MCNC: 2.8 G/DL — LOW (ref 3.3–5)
ALP SERPL-CCNC: 45 U/L — SIGNIFICANT CHANGE UP (ref 40–120)
ALT FLD-CCNC: 17 U/L — SIGNIFICANT CHANGE UP (ref 4–33)
AST SERPL-CCNC: 27 U/L — SIGNIFICANT CHANGE UP (ref 4–32)
BASE EXCESS BLDV CALC-SCNC: 5.5 MMOL/L — SIGNIFICANT CHANGE UP
BASOPHILS # BLD AUTO: 0.06 K/UL — SIGNIFICANT CHANGE UP (ref 0–0.2)
BASOPHILS NFR BLD AUTO: 0.6 % — SIGNIFICANT CHANGE UP (ref 0–2)
BILIRUB SERPL-MCNC: 0.4 MG/DL — SIGNIFICANT CHANGE UP (ref 0.2–1.2)
BLOOD GAS VENOUS - CREATININE: 0.66 MG/DL — SIGNIFICANT CHANGE UP (ref 0.5–1.3)
BUN SERPL-MCNC: 25 MG/DL — HIGH (ref 7–23)
CALCIUM SERPL-MCNC: 8.6 MG/DL — SIGNIFICANT CHANGE UP (ref 8.4–10.5)
CHLORIDE BLDV-SCNC: 104 MMOL/L — SIGNIFICANT CHANGE UP (ref 96–108)
CHLORIDE SERPL-SCNC: 103 MMOL/L — SIGNIFICANT CHANGE UP (ref 98–107)
CO2 SERPL-SCNC: 27 MMOL/L — SIGNIFICANT CHANGE UP (ref 22–31)
CREAT SERPL-MCNC: 0.74 MG/DL — SIGNIFICANT CHANGE UP (ref 0.5–1.3)
EOSINOPHIL # BLD AUTO: 0.73 K/UL — HIGH (ref 0–0.5)
EOSINOPHIL NFR BLD AUTO: 7.8 % — HIGH (ref 0–6)
GAS PNL BLDV: 136 MMOL/L — SIGNIFICANT CHANGE UP (ref 136–146)
GLUCOSE BLDV-MCNC: 81 — SIGNIFICANT CHANGE UP (ref 70–99)
GLUCOSE SERPL-MCNC: 89 MG/DL — SIGNIFICANT CHANGE UP (ref 70–99)
HCO3 BLDV-SCNC: 28 MMOL/L — HIGH (ref 20–27)
HCT VFR BLD CALC: 35.2 % — SIGNIFICANT CHANGE UP (ref 34.5–45)
HCT VFR BLDV CALC: 34.2 % — LOW (ref 34.5–45)
HGB BLD-MCNC: 11.8 G/DL — SIGNIFICANT CHANGE UP (ref 11.5–15.5)
HGB BLDV-MCNC: 11.1 G/DL — LOW (ref 11.5–15.5)
IMM GRANULOCYTES # BLD AUTO: 0.07 # — SIGNIFICANT CHANGE UP
IMM GRANULOCYTES NFR BLD AUTO: 0.7 % — SIGNIFICANT CHANGE UP (ref 0–1.5)
LACTATE BLDV-MCNC: 2.2 MMOL/L — HIGH (ref 0.5–2)
LYMPHOCYTES # BLD AUTO: 2.19 K/UL — SIGNIFICANT CHANGE UP (ref 1–3.3)
LYMPHOCYTES # BLD AUTO: 23.4 % — SIGNIFICANT CHANGE UP (ref 13–44)
MAGNESIUM SERPL-MCNC: 2.2 MG/DL — SIGNIFICANT CHANGE UP (ref 1.6–2.6)
MCHC RBC-ENTMCNC: 30.8 PG — SIGNIFICANT CHANGE UP (ref 27–34)
MCHC RBC-ENTMCNC: 33.5 % — SIGNIFICANT CHANGE UP (ref 32–36)
MCV RBC AUTO: 91.9 FL — SIGNIFICANT CHANGE UP (ref 80–100)
MONOCYTES # BLD AUTO: 0.71 K/UL — SIGNIFICANT CHANGE UP (ref 0–0.9)
MONOCYTES NFR BLD AUTO: 7.6 % — SIGNIFICANT CHANGE UP (ref 2–14)
NEUTROPHILS # BLD AUTO: 5.58 K/UL — SIGNIFICANT CHANGE UP (ref 1.8–7.4)
NEUTROPHILS NFR BLD AUTO: 59.9 % — SIGNIFICANT CHANGE UP (ref 43–77)
NRBC # FLD: 0 — SIGNIFICANT CHANGE UP
PCO2 BLDV: 54 MMHG — HIGH (ref 41–51)
PH BLDV: 7.37 PH — SIGNIFICANT CHANGE UP (ref 7.32–7.43)
PHOSPHATE SERPL-MCNC: 2.8 MG/DL — SIGNIFICANT CHANGE UP (ref 2.5–4.5)
PLATELET # BLD AUTO: 235 K/UL — SIGNIFICANT CHANGE UP (ref 150–400)
PMV BLD: 9.9 FL — SIGNIFICANT CHANGE UP (ref 7–13)
PO2 BLDV: 26 MMHG — LOW (ref 35–40)
POTASSIUM BLDV-SCNC: 3.9 MMOL/L — SIGNIFICANT CHANGE UP (ref 3.4–4.5)
POTASSIUM SERPL-MCNC: 3.8 MMOL/L — SIGNIFICANT CHANGE UP (ref 3.5–5.3)
POTASSIUM SERPL-SCNC: 3.8 MMOL/L — SIGNIFICANT CHANGE UP (ref 3.5–5.3)
PROT SERPL-MCNC: 5.9 G/DL — LOW (ref 6–8.3)
RBC # BLD: 3.83 M/UL — SIGNIFICANT CHANGE UP (ref 3.8–5.2)
RBC # FLD: 12.8 % — SIGNIFICANT CHANGE UP (ref 10.3–14.5)
SAO2 % BLDV: 41.4 % — LOW (ref 60–85)
SODIUM SERPL-SCNC: 141 MMOL/L — SIGNIFICANT CHANGE UP (ref 135–145)
SPECIMEN SOURCE: SIGNIFICANT CHANGE UP
VALPROATE SERPL-MCNC: 19.1 UG/ML — LOW (ref 50–100)
WBC # BLD: 9.34 K/UL — SIGNIFICANT CHANGE UP (ref 3.8–10.5)
WBC # FLD AUTO: 9.34 K/UL — SIGNIFICANT CHANGE UP (ref 3.8–10.5)

## 2018-07-10 PROCEDURE — 74230 X-RAY XM SWLNG FUNCJ C+: CPT | Mod: 26

## 2018-07-10 PROCEDURE — 12345: CPT | Mod: NC

## 2018-07-10 PROCEDURE — 99223 1ST HOSP IP/OBS HIGH 75: CPT | Mod: GC

## 2018-07-10 PROCEDURE — 93971 EXTREMITY STUDY: CPT | Mod: 26

## 2018-07-10 RX ORDER — SODIUM CHLORIDE 9 MG/ML
1000 INJECTION, SOLUTION INTRAVENOUS
Qty: 0 | Refills: 0 | Status: DISCONTINUED | OUTPATIENT
Start: 2018-07-10 | End: 2018-07-12

## 2018-07-10 RX ORDER — VANCOMYCIN HCL 1 G
1000 VIAL (EA) INTRAVENOUS EVERY 24 HOURS
Qty: 0 | Refills: 0 | Status: DISCONTINUED | OUTPATIENT
Start: 2018-07-11 | End: 2018-07-12

## 2018-07-10 RX ORDER — HALOPERIDOL DECANOATE 100 MG/ML
1 INJECTION INTRAMUSCULAR EVERY 6 HOURS
Qty: 0 | Refills: 0 | Status: DISCONTINUED | OUTPATIENT
Start: 2018-07-10 | End: 2018-07-17

## 2018-07-10 RX ORDER — SODIUM CHLORIDE 9 MG/ML
1000 INJECTION, SOLUTION INTRAVENOUS
Qty: 0 | Refills: 0 | Status: DISCONTINUED | OUTPATIENT
Start: 2018-07-10 | End: 2018-07-10

## 2018-07-10 RX ORDER — VANCOMYCIN HCL 1 G
1000 VIAL (EA) INTRAVENOUS ONCE
Qty: 0 | Refills: 0 | Status: COMPLETED | OUTPATIENT
Start: 2018-07-10 | End: 2018-07-10

## 2018-07-10 RX ORDER — QUETIAPINE FUMARATE 200 MG/1
25 TABLET, FILM COATED ORAL EVERY 6 HOURS
Qty: 0 | Refills: 0 | Status: DISCONTINUED | OUTPATIENT
Start: 2018-07-10 | End: 2018-07-10

## 2018-07-10 RX ORDER — QUETIAPINE FUMARATE 200 MG/1
50 TABLET, FILM COATED ORAL
Qty: 0 | Refills: 0 | Status: DISCONTINUED | OUTPATIENT
Start: 2018-07-10 | End: 2018-07-10

## 2018-07-10 RX ORDER — VANCOMYCIN HCL 1 G
VIAL (EA) INTRAVENOUS
Qty: 0 | Refills: 0 | Status: DISCONTINUED | OUTPATIENT
Start: 2018-07-10 | End: 2018-07-12

## 2018-07-10 RX ORDER — PIPERACILLIN AND TAZOBACTAM 4; .5 G/20ML; G/20ML
3.38 INJECTION, POWDER, LYOPHILIZED, FOR SOLUTION INTRAVENOUS EVERY 8 HOURS
Qty: 0 | Refills: 0 | Status: DISCONTINUED | OUTPATIENT
Start: 2018-07-10 | End: 2018-07-14

## 2018-07-10 RX ORDER — HEPARIN SODIUM 5000 [USP'U]/ML
5000 INJECTION INTRAVENOUS; SUBCUTANEOUS EVERY 8 HOURS
Qty: 0 | Refills: 0 | Status: DISCONTINUED | OUTPATIENT
Start: 2018-07-10 | End: 2018-07-17

## 2018-07-10 RX ORDER — COLLAGENASE CLOSTRIDIUM HIST. 250 UNIT/G
1 OINTMENT (GRAM) TOPICAL DAILY
Qty: 0 | Refills: 0 | Status: DISCONTINUED | OUTPATIENT
Start: 2018-07-10 | End: 2018-07-10

## 2018-07-10 RX ADMIN — SODIUM CHLORIDE 75 MILLILITER(S): 9 INJECTION, SOLUTION INTRAVENOUS at 10:10

## 2018-07-10 RX ADMIN — Medication 250 MILLIGRAM(S): at 06:49

## 2018-07-10 RX ADMIN — PIPERACILLIN AND TAZOBACTAM 25 GRAM(S): 4; .5 INJECTION, POWDER, LYOPHILIZED, FOR SOLUTION INTRAVENOUS at 08:03

## 2018-07-10 RX ADMIN — PIPERACILLIN AND TAZOBACTAM 25 GRAM(S): 4; .5 INJECTION, POWDER, LYOPHILIZED, FOR SOLUTION INTRAVENOUS at 15:33

## 2018-07-10 RX ADMIN — HEPARIN SODIUM 5000 UNIT(S): 5000 INJECTION INTRAVENOUS; SUBCUTANEOUS at 15:33

## 2018-07-10 RX ADMIN — PIPERACILLIN AND TAZOBACTAM 25 GRAM(S): 4; .5 INJECTION, POWDER, LYOPHILIZED, FOR SOLUTION INTRAVENOUS at 22:03

## 2018-07-10 RX ADMIN — HEPARIN SODIUM 5000 UNIT(S): 5000 INJECTION INTRAVENOUS; SUBCUTANEOUS at 22:03

## 2018-07-10 RX ADMIN — SODIUM CHLORIDE 75 MILLILITER(S): 9 INJECTION, SOLUTION INTRAVENOUS at 18:49

## 2018-07-10 NOTE — H&P ADULT - PROBLEM SELECTOR PLAN 2
Most likely Asp PNA given dementia and was dispo last time on dysphagia 1 diet  -f/u S&S recs and MBS  -NPO and no PO meds for now  -Chest PT for now for possible mucus plugging Most likely Asp PNA given dementia and prior disposition diet: dysphagia 1  -f/u S&S recs and MBS  -NPO and no PO meds for now  -Chest PT for now for possible mucus plugging

## 2018-07-10 NOTE — H&P ADULT - PROBLEM SELECTOR PLAN 5
holding off PO seroquel and PO valproic acid  starting on low dose haldol 1mg PRN  consider psych eval if pt becomes agitated  -f/u EKG holding off PO Seroquel and PO valproic acid due to aspiration risk   starting on low dose haldol IV/IM 1mg PRN  consider psych eval if pt becomes agitated  -f/u EKG

## 2018-07-10 NOTE — SWALLOW VFSS/MBS ASSESSMENT ADULT - ROSENBEK'S PENETRATION ASPIRATION SCALE
(1) no aspiration, contrast does not enter airway (3) contrast remains above the vocal cords, visible residue remains (penetration)/1 & 3

## 2018-07-10 NOTE — SWALLOW VFSS/MBS ASSESSMENT ADULT - RECOMMENDED FEEDING/EATING TECHNIQUES
allow for swallow between intakes/oral hygiene/alternate food with liquid/maintain upright posture during/after eating for 30 mins/check mouth frequently for oral residue/pocketing/crush medication (when feasible)/hard swallow w/ each bite or sip/no straws/position upright (90 degrees)/provide rest periods between swallows

## 2018-07-10 NOTE — PROGRESS NOTE ADULT - PROBLEM SELECTOR PLAN 5
holding off PO Seroquel and PO valproic acid due to aspiration risk   starting on low dose haldol IV/IM 1mg PRN  consider psych eval if pt becomes agitated  -f/u EKG

## 2018-07-10 NOTE — PROGRESS NOTE ADULT - PROBLEM SELECTOR PLAN 3
CT A/P significant for pneumobilia, no SBO;  -would monitor for BM and change in abd exams  -pneumobilia present in several CT's - first seen on CT angio chest from May  - likely chronic finding  - monitor abdominal exam - currently benign

## 2018-07-10 NOTE — SWALLOW VFSS/MBS ASSESSMENT ADULT - ORAL PHASE
within functional limits Within functional limits Residue in oral cavity/Delayed oral transit time/Reduced anterior - posterior transport Delayed oral transit time/Reduced anterior - posterior transport

## 2018-07-10 NOTE — H&P ADULT - NSHPPHYSICALEXAM_GEN_ALL_CORE
General: NAD, well nourished, appears stated age  HEENT:  Head: NT, AT  Eyes: EOMI, scleara non-icteric, conjunctiva clear, PERRLA, visual fields full to confrontration   Ears: hearing intact b/l  Nose: no discharge, obstruction, non-deviated  Mouth: no exudates, injected in pharynx, uvula midlines   Neck: Supple, No JVD, no carotid bruits no lymphadenopathy, no thyroidomegaly  Cardiac: RRR, S1 S2, No M/R/G, PMI in 5th IC  Pulmonary: CTA b/l, Breathing unlabored, No Rhonchi/Rales/Wheezing, diaphragm moves symmetrically b/l  Abdomen: Soft, Non -tender, +BS x 4 quads, no hepatomegaly or splenomegaly  Back: straight, no step-offs, no kyphosis, no CVA  Extremities: Warm, nontender, No Rashes, lesions, bruises, No pitting edema, venous stasis  Neuro: AO x 3, No focal deficits, CNII-CNXII grossly intact, Motor: strength 5/5 b/l UE and LE. Sensory: touch and pinprick sensation intact b/l. Cerebellar: no dysmetria, no tremor  Reflexes: 2+ b/l in LE and UE, no Babinski sign b/l

## 2018-07-10 NOTE — SWALLOW VFSS/MBS ASSESSMENT ADULT - COMMENTS
The patient was received in the radiology suite this AM, at which time she was alert and cooperative. Per charting, the patient is a 89 y/o Female, bedbound, w/ Hx of SBO, HTN, HLD, and advanced dementia (AOx0-1 baseline). She presented to [facility] initially because the son was concerned about a Rt shoulder injury and asymmetry in her shoulder. Patient denied any pain of the Rt shoulder however was also unable to clearly give history due to dementia. Pt responded "no" to questions about CP and abd pain otherwise unable to provide meaningful responses. Recent CXR revealed, "Uniform dense opacification in medial left basilar/retrocardiac region with indistinct hemidiaphragm and CP angle which could be due to combination of small pleural effusion and/or underlying airspace consolidation including possible infiltrate/pneumonia in the proper clinical context." Discussed results and recommendations from this evaluation with the patient and call out to MD.

## 2018-07-10 NOTE — PROGRESS NOTE ADULT - SUBJECTIVE AND OBJECTIVE BOX
CHIEF COMPLAINT: Patient is a 89y old  female who presents with a chief complaint of PNA (10 Jul 2018 05:34)      SUBJECTIVE / OVERNIGHT EVENTS:    Speaking incoherently. Interview limited by poor mental status.    MEDICATIONS  (STANDING):  heparin  Injectable 5000 Unit(s) SubCutaneous every 8 hours  piperacillin/tazobactam IVPB. 3.375 Gram(s) IV Intermittent every 8 hours  sodium chloride 0.45%. 1000 milliLiter(s) (75 mL/Hr) IV Continuous <Continuous>  vancomycin  IVPB        MEDICATIONS  (PRN):  haloperidol    Injectable 1 milliGRAM(s) IV Push every 6 hours PRN Agitation      VITALS:  T(F): 97.1 (07-10-18 @ 15:17), Max: 97.7 (07-10-18 @ 06:33)  HR: 64 (07-10-18 @ 15:17) (63 - 88)  BP: 128/59 (07-10-18 @ 15:17) (128/59 - 167/73)  RR: 18 (07-10-18 @ 15:17) (16 - 18)  SpO2: 97% (07-10-18 @ 15:17)  Height (cm): 167.64 (01:46)  Weight (kg): 61.2 (01:46)  BMI (kg/m2): 21.8 (01:46)    CAPILLARY BLOOD GLUCOSE    Output   Height (cm): 167.64 (01:46)  Weight (kg): 61.2 (01:46)  BMI (kg/m2): 21.8 (01:46)  I&O's Summary  T(F): 97.1 (07-10-18 @ 15:17), Max: 97.7 (07-10-18 @ 06:33)  HR: 64 (07-10-18 @ 15:17) (63 - 88)  BP: 128/59 (07-10-18 @ 15:17) (128/59 - 167/73)  RR: 18 (07-10-18 @ 15:17) (16 - 18)  SpO2: 97% (07-10-18 @ 15:17)    PHYSICAL EXAM:  GENERAL: elderly woman lying in bed, speaking incoherently.    LABS:              11.8                 141  | 27   | 25           9.34  >-----------< 235     ------------------------< 89                    35.2                 3.8  | 103  | 0.74                                         Ca 8.6   Mg 2.2   Ph 2.8         TPro  5.9  /  Alb  2.8      TBili  0.4  /  DBili  x         AST  27  /  ALT  17            AlkPhos  45            Urinalysis Basic - ( 2018 14:15 )    Color: YELLOW / Appearance: CLEAR / S.016 / pH: 6.5  Gluc: NEGATIVE / Ketone: TRACE  / Bili: NEGATIVE / Urobili: 1 mg/dL   Blood: NEGATIVE / Protein: 20 mg/dL / Nitrite: NEGATIVE   Leuk Esterase: NEGATIVE / RBC: 0-2 / WBC 5-10   Sq Epi: OCC / Non Sq Epi: x / Bacteria: x        VB-10 @ 12:00  7.37/54/26/28/41.4/5.5    VB-09 @ 14:52  7.40/56/< 24/30/18.8/8.8        MICROBIOLOGY:        RADIOLOGY & ADDITIONAL TESTS:    Imaging Personally Reviewed:    < from: CT Abdomen and Pelvis w/ Oral Cont (18 @ 18:08) >  IMPRESSION:     No evidence for bowel obstruction. Pneumobilia, overall progressed from   2018.    Incidental note made of soft tissue subcutaneous stranding with fluid   surrounding the right greater trochanteric region with overlying skin   thickening suspicious for developing infection.    Extensive soft tissue edema and swellingof the visualized left upper   extremity.    < end of copied text >      [] Consultant(s) Notes Reviewed:    [] Care Discussed with Consultants/Other Providers:

## 2018-07-10 NOTE — H&P ADULT - PROBLEM SELECTOR PLAN 9
DVT ppx w/ Hep subq  GOC should be pursued with the son and possible palliative care DVT ppx w/ Hep subq  Palliative Care: GOC discussion with the son; MOLST form prior to discharge;

## 2018-07-10 NOTE — SWALLOW VFSS/MBS ASSESSMENT ADULT - DIAGNOSTIC IMPRESSIONS
1. The patient demonstrated functional oral management of puree, honey thick, nectar thick, and thin liquid textures marked by delayed bolus collection, transfer, and AP transit.   2. The patient demonstrated a mild oral dysphagia for solid textures marked by prolonged oral manipulation and decreased mastication abilities resulting in delayed bolus collection, transfer, and AP transit. Mild residue noted in the oral cavity which reduced with a liquid wash.   3. The patient demonstrated a mild pharyngeal dysphagia for puree, solid, honey thick, and thin liquid textures marked by a timely pharyngeal swallow trigger with adequate base of tongue retraction, reduced epiglottic deflection, reduced hyolaryngeal elevation, and adequate pharyngeal contractility. Trace stasis noted along the base of tongue, in the vallecula, along the posterior pharyngeal wall, and in the pyriforms which reduced with a liquid wash and a subsequent swallow. No laryngeal penetration/aspiration observed.   4. The patient demonstrated a mild pharyngeal dysphagia for nectar thick liquid textures marked by a timely pharyngeal swallow trigger with adequate base of tongue retraction, reduced epiglottic deflection, reduced hyolaryngeal elevation, and adequate pharyngeal contractility. Trace stasis noted along the base of tongue, in the vallecula, and along the posterior pharyngeal wall. One instance of laryngeal penetration without full retrieval was observed on a larger cup sip of nectar thick liquids. No laryngeal penetration was observed on additional PO trials. 1. The patient demonstrated a mild oral dysphagia for puree, honey thick liquids, nectar thick liquids, and thin liquid textures marked by delayed bolus collection, transfer, and AP transit.   2. The patient demonstrated a mild oral dysphagia for solid textures marked by prolonged oral manipulation and decreased mastication abilities resulting in delayed bolus collection, transfer, and AP transit. Mild residue noted in the oral cavity which reduced with a liquid wash.   3. The patient demonstrated a mild pharyngeal dysphagia for puree, solid, honey thick, and thin liquid textures marked by a timely pharyngeal swallow trigger with adequate base of tongue retraction, reduced epiglottic deflection, reduced hyolaryngeal elevation, and adequate pharyngeal contractility. Trace stasis noted along the base of tongue, in the vallecula, along the posterior pharyngeal wall, and in the pyriforms which reduced with a liquid wash and a subsequent swallow. No laryngeal penetration/aspiration observed.   4. The patient demonstrated a mild pharyngeal dysphagia for nectar thick liquid textures marked by a timely pharyngeal swallow trigger with adequate base of tongue retraction, reduced epiglottic deflection, reduced hyolaryngeal elevation, and adequate pharyngeal contractility. Trace stasis noted along the base of tongue, in the vallecula, and along the posterior pharyngeal wall. One instance of laryngeal penetration without full retrieval was observed on a larger cup sip of nectar thick liquids. No laryngeal penetration was observed on additional PO trials.

## 2018-07-10 NOTE — H&P ADULT - PROBLEM SELECTOR PLAN 6
multiple decub ulcers present on the R heel (unstageable), L heel (stage 1), R hip (unstageable) and L sacrum (stage 1)  -f/u wound care consult  -holding off home collagenase for now

## 2018-07-10 NOTE — PROGRESS NOTE ADULT - PROBLEM SELECTOR PLAN 8
Extensive Swelling of the LUE noted on the CT A/P; No evidence of cellulitis on exam;  -will r/o DVT or fluid collection   -f/u Doppler of the LUE

## 2018-07-10 NOTE — SWALLOW VFSS/MBS ASSESSMENT ADULT - NS SWALLOW VFSS REC ASPIR MON
position upright (89Y)/gurgly voice/pneumonia/cough/throat clearing/change of breathing pattern/upper respiratory infection/oral hygiene/fever

## 2018-07-10 NOTE — H&P ADULT - PROBLEM SELECTOR PLAN 4
-elevated BUN/Cr, start 0.45% -elevated BUN/Cr, start 0.45% 75 cc/hr for 10 hrs  -f/u CMP -elevated BUN/Cr ratio >20, start 0.45% 75 cc/hr for 10 hrs  -f/u CMP

## 2018-07-10 NOTE — H&P ADULT - NSHPLABSRESULTS_GEN_ALL_CORE
LABS:                        12.0   18.37 )-----------( 248      ( 2018 14:52 )             37.6     -    139  |  97<L>  |  34<H>  ----------------------------<  108<H>  4.8   |  29  |  0.91    Ca    9.4      2018 14:52    TPro  6.6  /  Alb  3.2<L>  /  TBili  0.4  /  DBili  x   /  AST  34<H>  /  ALT  18  /  AlkPhos  54            Urinalysis Basic - ( 2018 14:15 )    Color: YELLOW / Appearance: CLEAR / S.016 / pH: 6.5  Gluc: NEGATIVE / Ketone: TRACE  / Bili: NEGATIVE / Urobili: 1 mg/dL   Blood: NEGATIVE / Protein: 20 mg/dL / Nitrite: NEGATIVE   Leuk Esterase: NEGATIVE / RBC: 0-2 / WBC 5-10   Sq Epi: OCC / Non Sq Epi: x / Bacteria: x        RADIOLOGY & ADDITIONAL TESTS:    Imaging Personally Reviewed:  Yes LABS:                        12.0   18.37 )-----------( 248      ( 09 Jul 2018 14:52 )             37.6     CT ABDOMEN AND PELVIS OC    *** ADDENDUM 07/09/2018  ***  Right middle lobe and lower lobe tree-in-bud nodular opacities   representing impacted distal airways which may be infectious in etiology.   Correlate clinically.  *** END OF ADDENDUM 07/09/2018  ***  PROCEDURE DATE:  July 9 2018   COMPARISON: CT of the abdomen pelvis dated 6/24/2018.  LOWER CHEST: Small bilateral pleural effusions, left greater than right   with adjacent atelectasis. Right lung base tree-in-bud opacities within   the right lower lobe and the right middle lobe representing foci of   impacted distal airways.. Aortic valvular, coronary artery, and mitral   annular calcifications.  Extensive soft tissue edema and swelling of the visualized left upper   extremity.  LIVER: Within normal limits.  BILE DUCTS: Pneumobilia, increased from prior exam. Enlargement of the   common bile duct measured about 1.4 cm predominantly unchanged  GALLBLADDER: Status post cholecystectomy.  SPLEEN: Within normal limits.  PANCREAS: Within normal limits.  ADRENALS: Within normal limits.  KIDNEYS/URETERS: Residual contrast opacifying the bilateral renal   collecting system.  BLADDER: Contrast opacified bladder.  REPRODUCTIVE ORGANS: No uterine or adnexal masses.  BOWEL: No bowel obstruction. Appendix is normal.  PERITONEUM: No ascites.  VESSELS:  Atherosclerotic calcifications.  RETROPERITONEUM: No lymphadenopathy.    ABDOMINAL WALL: Soft tissue subcutaneous stranding surrounding the right   greater trochanteric region with overlying skin thickening.  BONES: Degenerative changes of the spine with unchanged grade 1   anterolisthesis of L2 over L3 and retrolisthesis of L5 over S1.  IMPRESSION:   No evidence for bowel obstruction. Pneumobilia, overall progressed from   6/2018.  Incidental note made of soft tissue subcutaneous stranding with fluid   surrounding the right greater trochanteric region with overlying skin   thickening suspicious for developing infection.  Extensive soft tissue edema and swelling of the visualized left upper   extremity.

## 2018-07-10 NOTE — H&P ADULT - PROBLEM SELECTOR PLAN 3
CT A/P showing pneumobilia, no SBO  -would continue to monitor for BM and change in abd pain  -no need for surgery at this moment CT A/P significant for pneumobilia, no SBO;  -would monitor for BM and change in abd exams  -Consider surgery c/s

## 2018-07-10 NOTE — H&P ADULT - PROBLEM SELECTOR PLAN 8
GOC should be pursued with the son Extensive Swelling of the LUE noted on the CT A/P   can r/o DVT or fluid collection   -f/u Doppler of the LUE Extensive Swelling of the LUE noted on the CT A/P; No evidence of cellulitis on exam;  -will r/o DVT or fluid collection   -f/u Doppler of the LUE

## 2018-07-10 NOTE — SWALLOW VFSS/MBS ASSESSMENT ADULT - RECOMMENDED CONSISTENCY
1. Mechanical soft solids with thin liquids, as tolerated  2. Small bites and small, single cup sips  3. Upright positioning during all meals   4. Slow pacing during meals  5. Full assistance during all meals

## 2018-07-10 NOTE — H&P ADULT - HISTORY OF PRESENT ILLNESS
89y/o F w/ PMH of SBO, HTN, HLD, advanced dementia (AOx1 baseline) 89y/o F w/ PMH of SBO, HTN, HLD, advanced dementia (AOx0-1 baseline) presents initially because the son was concerned about a R shoulder injury and asymmetry in her shoulder. Pt denied any pain in the shoulder but was unable to clearly give any history at all.  X-ray of the R shoulder showed no fracture but a subsequent CT A/P was conducted by the ED to eval for recent SBO and no BM for 4 days w/ decreased PO intake as per the family. The CT A/P then showed and RLL PNA along with progression of Pneumobilia but no SBO, fluid around greater trochanteric region w/ overlying skin thickening suspicious for developing infection and LUE swelling. Pt denied any fever, CP, abd pain, nausea, vomiting, diarrhea but was only able to say "ok" for most questions.     In the ED /72, 80, RR 18 and 96% on RA  Labs: WBC 18.37, N 82.6%, BUn 34, Cr 0.91, Albumin 3.2, vBG 7.40/56/<24/30 and lactate 2.4  Imaging as above  Given Zosyn x1, 1.5 L of NS   Admitted to Medicine for PNA 89y/o Female w/ Hx of SBO, HTN, HLD, advanced dementia (AOx0-1 baseline) presents initially because the son was concerned about a Rt shoulder injury and asymmetry in her shoulder. Pt denied any pain of the Rt shoulder however was also unable to clearly give history due to dementia. Pt responded "no" to questions about CP and abd pain otherwise unable to provide meaningful responses;     In the ED /72, 80, RR 18 and 96% on RA; Given Zosyn x1, 1.5 L of NS; 89y/o Female w/ Hx of SBO, HTN, HLD, advanced dementia (AOx0-1 baseline) presents initially because the son was concerned about a Rt shoulder injury and asymmetry in her shoulder. Pt denied any pain of the Rt shoulder however was also unable to clearly give history due to dementia. Pt responded "no" to questions about CP and abd pain otherwise unable to provide meaningful responses;     In the ED /72, 80, RR 18 and 96% on RA; Given Zosyn x1, 1.5 L of NS 89y/o Female, bedbound, w/ Hx of SBO, HTN, HLD, advanced dementia (AOx0-1 baseline) presents initially because the son was concerned about a Rt shoulder injury and asymmetry in her shoulder. Pt denied any pain of the Rt shoulder however was also unable to clearly give history due to dementia. Pt responded "no" to questions about CP and abd pain otherwise unable to provide meaningful responses;     In the ED /72, 80, RR 18 and 96% on RA; Given Zosyn x1, 1.5 L of NS

## 2018-07-10 NOTE — PROGRESS NOTE ADULT - PROBLEM SELECTOR PLAN 1
Sepsis 2/2  RML/RLL PNA (likely aspiration) and L > R pleural effusion found on CT A/P, RVP neg, UA wnl, leukocytosis 18, lactate 2.4  -c/w Zosyn q8h and Vanco 1gm qd (renally dosed) to cover possible HCAP;  -f/u BCx2, urine legionella  -fever trend and monitor for aspiration precautions  -s/p MBS, f/u SLP recs

## 2018-07-10 NOTE — PROGRESS NOTE ADULT - SUBJECTIVE AND OBJECTIVE BOX
Reason for Admission: PNA	  History of Present Illness: 	  89y/o Female, bedbound, w/ Hx of SBO, HTN, HLD, advanced dementia (AOx0-1 baseline) presents initially because the son was concerned about a Rt shoulder injury and asymmetry in her shoulder. Pt denied any pain of the Rt shoulder however was also unable to clearly give history due to dementia. Pt responded "no" to questions about CP and abd pain otherwise unable to provide meaningful responses;     In the ED /72, 80, RR 18 and 96% on RA; Given Zosyn x1, 1.5 L of NS    Interval Events: No acute event overnight. Patient is seen and examined at the bedside this morning.   -Patient speaking incoherently; encounter limited by altered mental status       OBJECTIVE:  Vital Signs Last 24 Hrs  T(C): 36.2 (10 Jul 2018 15:17), Max: 36.5 (10 Jul 2018 06:33)  T(F): 97.1 (10 Jul 2018 15:17), Max: 97.7 (10 Jul 2018 06:33)  HR: 64 (10 Jul 2018 15:17) (63 - 66)  BP: 128/59 (10 Jul 2018 15:17) (128/59 - 167/73)  BP(mean): --  RR: 18 (10 Jul 2018 15:17) (16 - 18)  SpO2: 97% (10 Jul 2018 15:17) (96% - 100%)    CAPILLARY BLOOD GLUCOSE    PHYSICAL EXAM:  General: Elderly demented patient not in any acute distress  Head: Normocephalic  Eyes:  Clear conjuntiva  Throat: Oral cavity and pharynx normal.   Respiratory: Bilateral lung clear to auscultation, no crackles, no wheezes, no rhonchi.   Cardiovascular: S1/S2 auscultated, systolic murmur appreciated.   Abdomen: Soft, non-tender, nondistended, no guarding or rebound tenderness. Active bowel sounds in all 4 quadrants.   Musculoskeletal: R Hip decubitus/ trochanteric bursitis  Neurological: Not able to assess orientation       LINES:    HOSPITAL MEDICATIONS:  heparin  Injectable 5000 Unit(s) SubCutaneous every 8 hours    piperacillin/tazobactam IVPB. 3.375 Gram(s) IV Intermittent every 8 hours  vancomycin  IVPB              haloperidol    Injectable 1 milliGRAM(s) IV Push every 6 hours PRN          sodium chloride 0.45%. 1000 milliLiter(s) IV Continuous <Continuous>            LABS:                        11.8   9.34  )-----------( 235      ( 10 Jul 2018 12:00 )             35.2     Hgb Trend: 11.8<--, 12.0<--  07-10    141  |  103  |  25<H>  ----------------------------<  89  3.8   |  27  |  0.74    Ca    8.6      10 Jul 2018 12:00  Phos  2.8     0710  Mg     2.2     10    TPro  5.9<L>  /  Alb  2.8<L>  /  TBili  0.4  /  DBili  x   /  AST  27  /  ALT  17  /  AlkPhos  45  10    Creatinine Trend: 0.74<--, 0.91<--, 0.60<--, 0.79<--, 0.67<--, 0.64<--    Urinalysis Basic - ( 2018 14:15 )    Color: YELLOW / Appearance: CLEAR / S.016 / pH: 6.5  Gluc: NEGATIVE / Ketone: TRACE  / Bili: NEGATIVE / Urobili: 1 mg/dL   Blood: NEGATIVE / Protein: 20 mg/dL / Nitrite: NEGATIVE   Leuk Esterase: NEGATIVE / RBC: 0-2 / WBC 5-10   Sq Epi: OCC / Non Sq Epi: x / Bacteria: x        Venous Blood Gas:  07-10 @ 12:00  7.37/54/26/28/41.4  VBG Lactate: 2.2  Venous Blood Gas:   @ 14:52  7.40/56/< 24/30/18.8  VBG Lactate: 2.4      MICROBIOLOGY:     RADIOLOGY:  [ ] Reviewed and interpreted by me    EKG:

## 2018-07-11 LAB
ALBUMIN SERPL ELPH-MCNC: 2.7 G/DL — LOW (ref 3.3–5)
ALP SERPL-CCNC: 42 U/L — SIGNIFICANT CHANGE UP (ref 40–120)
ALT FLD-CCNC: 16 U/L — SIGNIFICANT CHANGE UP (ref 4–33)
AST SERPL-CCNC: 25 U/L — SIGNIFICANT CHANGE UP (ref 4–32)
BASOPHILS # BLD AUTO: 0.08 K/UL — SIGNIFICANT CHANGE UP (ref 0–0.2)
BASOPHILS NFR BLD AUTO: 0.7 % — SIGNIFICANT CHANGE UP (ref 0–2)
BILIRUB SERPL-MCNC: 0.4 MG/DL — SIGNIFICANT CHANGE UP (ref 0.2–1.2)
BUN SERPL-MCNC: 17 MG/DL — SIGNIFICANT CHANGE UP (ref 7–23)
CALCIUM SERPL-MCNC: 8.5 MG/DL — SIGNIFICANT CHANGE UP (ref 8.4–10.5)
CHLORIDE SERPL-SCNC: 103 MMOL/L — SIGNIFICANT CHANGE UP (ref 98–107)
CO2 SERPL-SCNC: 29 MMOL/L — SIGNIFICANT CHANGE UP (ref 22–31)
CREAT SERPL-MCNC: 0.75 MG/DL — SIGNIFICANT CHANGE UP (ref 0.5–1.3)
EOSINOPHIL # BLD AUTO: 0.8 K/UL — HIGH (ref 0–0.5)
EOSINOPHIL NFR BLD AUTO: 6.9 % — HIGH (ref 0–6)
GLUCOSE SERPL-MCNC: 78 MG/DL — SIGNIFICANT CHANGE UP (ref 70–99)
HCT VFR BLD CALC: 33 % — LOW (ref 34.5–45)
HGB BLD-MCNC: 10.9 G/DL — LOW (ref 11.5–15.5)
IMM GRANULOCYTES # BLD AUTO: 0.09 # — SIGNIFICANT CHANGE UP
IMM GRANULOCYTES NFR BLD AUTO: 0.8 % — SIGNIFICANT CHANGE UP (ref 0–1.5)
LYMPHOCYTES # BLD AUTO: 3.96 K/UL — HIGH (ref 1–3.3)
LYMPHOCYTES # BLD AUTO: 34 % — SIGNIFICANT CHANGE UP (ref 13–44)
MAGNESIUM SERPL-MCNC: 2 MG/DL — SIGNIFICANT CHANGE UP (ref 1.6–2.6)
MCHC RBC-ENTMCNC: 31.5 PG — SIGNIFICANT CHANGE UP (ref 27–34)
MCHC RBC-ENTMCNC: 33 % — SIGNIFICANT CHANGE UP (ref 32–36)
MCV RBC AUTO: 95.4 FL — SIGNIFICANT CHANGE UP (ref 80–100)
METHOD TYPE: SIGNIFICANT CHANGE UP
MONOCYTES # BLD AUTO: 1.05 K/UL — HIGH (ref 0–0.9)
MONOCYTES NFR BLD AUTO: 9 % — SIGNIFICANT CHANGE UP (ref 2–14)
NEUTROPHILS # BLD AUTO: 5.68 K/UL — SIGNIFICANT CHANGE UP (ref 1.8–7.4)
NEUTROPHILS NFR BLD AUTO: 48.6 % — SIGNIFICANT CHANGE UP (ref 43–77)
NRBC # FLD: 0 — SIGNIFICANT CHANGE UP
ORGANISM # SPEC MICROSCOPIC CNT: SIGNIFICANT CHANGE UP
ORGANISM # SPEC MICROSCOPIC CNT: SIGNIFICANT CHANGE UP
PHOSPHATE SERPL-MCNC: 2.7 MG/DL — SIGNIFICANT CHANGE UP (ref 2.5–4.5)
PLATELET # BLD AUTO: 251 K/UL — SIGNIFICANT CHANGE UP (ref 150–400)
PMV BLD: 10.1 FL — SIGNIFICANT CHANGE UP (ref 7–13)
POTASSIUM SERPL-MCNC: 3.3 MMOL/L — LOW (ref 3.5–5.3)
POTASSIUM SERPL-SCNC: 3.3 MMOL/L — LOW (ref 3.5–5.3)
PROT SERPL-MCNC: 5.5 G/DL — LOW (ref 6–8.3)
RBC # BLD: 3.46 M/UL — LOW (ref 3.8–5.2)
RBC # FLD: 12.6 % — SIGNIFICANT CHANGE UP (ref 10.3–14.5)
SODIUM SERPL-SCNC: 141 MMOL/L — SIGNIFICANT CHANGE UP (ref 135–145)
SPECIMEN SOURCE: SIGNIFICANT CHANGE UP
WBC # BLD: 11.66 K/UL — HIGH (ref 3.8–10.5)
WBC # FLD AUTO: 11.66 K/UL — HIGH (ref 3.8–10.5)

## 2018-07-11 PROCEDURE — 99233 SBSQ HOSP IP/OBS HIGH 50: CPT

## 2018-07-11 RX ORDER — POTASSIUM CHLORIDE 20 MEQ
40 PACKET (EA) ORAL ONCE
Qty: 0 | Refills: 0 | Status: COMPLETED | OUTPATIENT
Start: 2018-07-11 | End: 2018-07-11

## 2018-07-11 RX ORDER — POTASSIUM CHLORIDE 20 MEQ
40 PACKET (EA) ORAL ONCE
Qty: 0 | Refills: 0 | Status: DISCONTINUED | OUTPATIENT
Start: 2018-07-11 | End: 2018-07-11

## 2018-07-11 RX ORDER — COLLAGENASE CLOSTRIDIUM HIST. 250 UNIT/G
1 OINTMENT (GRAM) TOPICAL DAILY
Qty: 0 | Refills: 0 | Status: DISCONTINUED | OUTPATIENT
Start: 2018-07-11 | End: 2018-07-12

## 2018-07-11 RX ADMIN — PIPERACILLIN AND TAZOBACTAM 25 GRAM(S): 4; .5 INJECTION, POWDER, LYOPHILIZED, FOR SOLUTION INTRAVENOUS at 15:44

## 2018-07-11 RX ADMIN — PIPERACILLIN AND TAZOBACTAM 25 GRAM(S): 4; .5 INJECTION, POWDER, LYOPHILIZED, FOR SOLUTION INTRAVENOUS at 23:31

## 2018-07-11 RX ADMIN — Medication 250 MILLIGRAM(S): at 05:33

## 2018-07-11 RX ADMIN — PIPERACILLIN AND TAZOBACTAM 25 GRAM(S): 4; .5 INJECTION, POWDER, LYOPHILIZED, FOR SOLUTION INTRAVENOUS at 07:50

## 2018-07-11 RX ADMIN — HEPARIN SODIUM 5000 UNIT(S): 5000 INJECTION INTRAVENOUS; SUBCUTANEOUS at 13:35

## 2018-07-11 RX ADMIN — SODIUM CHLORIDE 75 MILLILITER(S): 9 INJECTION, SOLUTION INTRAVENOUS at 01:09

## 2018-07-11 RX ADMIN — HALOPERIDOL DECANOATE 1 MILLIGRAM(S): 100 INJECTION INTRAMUSCULAR at 10:49

## 2018-07-11 RX ADMIN — HALOPERIDOL DECANOATE 1 MILLIGRAM(S): 100 INJECTION INTRAMUSCULAR at 23:30

## 2018-07-11 RX ADMIN — HEPARIN SODIUM 5000 UNIT(S): 5000 INJECTION INTRAVENOUS; SUBCUTANEOUS at 05:33

## 2018-07-11 RX ADMIN — Medication 40 MILLIEQUIVALENT(S): at 10:42

## 2018-07-11 RX ADMIN — Medication 1 APPLICATION(S): at 19:38

## 2018-07-11 RX ADMIN — HEPARIN SODIUM 5000 UNIT(S): 5000 INJECTION INTRAVENOUS; SUBCUTANEOUS at 21:56

## 2018-07-11 NOTE — PROVIDER CONTACT NOTE (CRITICAL VALUE NOTIFICATION) - SITUATION
from blood cultures aerobic bottle after 21 hrs of incubation, result is gram positive cocci in clusters

## 2018-07-11 NOTE — CONSULT NOTE ADULT - SUBJECTIVE AND OBJECTIVE BOX
[facility] General Surgery Consultation         HPI:  89y/o Female, bedbound, w/ Hx of SBO, HTN, HLD, advanced dementia (AOx0-1 baseline) presents initially because the son was concerned about a Rt shoulder injury and asymmetry in her shoulder. Pt denied any pain of the. She is admitted to the medicine service with pneumonia.   She has no abdominal pain. She is passing flatus and having bowel movements. she has no fevers. On chart review, she has had pneumobilia since 2017 on 4 CT scans. 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 She has not had ERCP in the past.         PAST MEDICAL & SURGICAL HISTORY:  Dementia  History of Spinal Stenosis  Lung Nodules  Hepatitis C: possible during transfusion in 1970&#x27;s  Carotid Artery Disease  Tendonitis: left hand: s/p steroid injection 3/10  Back Pain  Hypertension  Hyperlipidemia  S/P small bowel resection  History of Carotid Endarterectomy: right 2008  S/P Dilation and Curettage: 1970&#x27;s menorrhagia  After Cataract, Bilateral  S/P Cholecystectomy: laparoscopic 2 years ago  History of Laminectomy: 1970&#x27;s  S/P Knee Replacement: left knee 1997  right knee 2004      FAMILY HISTORY:  No pertinent family history in first degree relatives      SOCIAL HISTORY:    MEDICATIONS  (STANDING):  heparin  Injectable 5000 Unit(s) SubCutaneous every 8 hours  piperacillin/tazobactam IVPB. 3.375 Gram(s) IV Intermittent every 8 hours  sodium chloride 0.45%. 1000 milliLiter(s) (75 mL/Hr) IV Continuous <Continuous>  vancomycin  IVPB 1000 milliGRAM(s) IV Intermittent every 24 hours  vancomycin  IVPB        MEDICATIONS  (PRN):  haloperidol    Injectable 1 milliGRAM(s) IV Push every 6 hours PRN Agitation    Allergies    Risperdal (Unknown)    Intolerances        Vital Signs Last 24 Hrs  T(C): 36.6 (11 Jul 2018 13:28), Max: 37.4 (11 Jul 2018 05:30)  T(F): 97.8 (11 Jul 2018 13:28), Max: 99.4 (11 Jul 2018 05:30)  HR: 73 (11 Jul 2018 13:28) (57 - 87)  BP: 137/59 (11 Jul 2018 13:28) (109/54 - 145/59)  BP(mean): --  RR: 17 (11 Jul 2018 13:28) (16 - 18)  SpO2: 94% (11 Jul 2018 13:28) (94% - 97%)  Daily     Daily     Gen: NAD  Resp: CTA b/l   CV: RRR  Abd: soft, ND, NTTP   Ext: warm                         10.9   11.66 )-----------( 251      ( 11 Jul 2018 05:38 )             33.0     07-11    141  |  103  |  17  ----------------------------<  78  3.3<L>   |  29  |  0.75    Ca    8.5      11 Jul 2018 05:38  Phos  2.7     07-11  Mg     2.0     07-11    TPro  5.5<L>  /  Alb  2.7<L>  /  TBili  0.4  /  DBili  x   /  AST  25  /  ALT  16  /  AlkPhos  42  07-11          Radiographic Findings:   COMPARISON: CT of the abdomen pelvis dated 6/24/2018.    PROCEDURE:   CT of the Abdomen and Pelvis was performed without intravenous contrast.   Intravenous contrast: None.  Oral contrast: positive contrast was administered.  Sagittal and coronal reformats were performed.    FINDINGS:    LOWER CHEST: Small bilateral pleural effusions, left greater than right   with adjacent atelectasis. Right lung base tree-in-bud opacities within   the right lower lobe and the right middle lobe representing foci of   impacted distal airways.. Aortic valvular, coronary artery, and mitral   annular calcifications.    Extensive soft tissue edema and swelling of the visualized left upper   extremity.    LIVER: Within normal limits.  BILE DUCTS: Pneumobilia, increased from prior exam. Enlargement of the   common bile duct measured about 1.4 cm predominantly unchanged  GALLBLADDER: Status post cholecystectomy.  SPLEEN: Within normal limits.  PANCREAS: Within normal limits.  ADRENALS: Within normal limits.  KIDNEYS/URETERS: Residual contrast opacifying the bilateral renal   collecting system.    BLADDER: Contrast opacified bladder.  REPRODUCTIVE ORGANS: No uterine or adnexal masses.    BOWEL: No bowel obstruction. Appendix is normal.  PERITONEUM: No ascites.  VESSELS:  Atherosclerotic calcifications.  RETROPERITONEUM: No lymphadenopathy.    ABDOMINAL WALL: Soft tissue subcutaneous stranding surrounding the right   greater trochanteric region with overlying skin thickening.  BONES: Degenerative changes of the spine with unchanged grade 1   anterolisthesis of L2 over L3 and retrolisthesis of L5 over S1.    IMPRESSION:     No evidence for bowel obstruction. Pneumobilia, overall progressed from   6/2018.    Incidental note made of soft tissue subcutaneous stranding with fluid   surrounding the right greater trochanteric region with overlying skin   thickening suspicious for developing infection.    Extensive soft tissue edema and swelling of the visualized left upper   extremity.      Assessment:   89F with dementia, recent admission for SBO, managed medically, has asymptomatic pneumophilia seen on multiple imaging studies since 2017.      - Patient is asymptomatic, she does not need surgical intervention at this time. Continue current management of PNA  - Discussed with Dr. Vargas  - Call back with any questions [phone]

## 2018-07-11 NOTE — PROVIDER CONTACT NOTE (CRITICAL VALUE NOTIFICATION) - SITUATION
from second set of blood cultures aerobic bottle after 24 hrs of incubation, result is gram positive cocci in clusters

## 2018-07-11 NOTE — PROGRESS NOTE ADULT - PROBLEM SELECTOR PLAN 3
CT A/P significant for pneumobilia, no SBO;  -would monitor for BM and change in abd exams  -pneumobilia present in several CT's - first seen on CT angio chest from May  - likely chronic finding  - monitor abdominal exam - currently benign CT A/P significant for pneumobilia, no SBO;  -would monitor for BM and change in abd exams  -pneumobilia present in several CT's - first seen on CT angio chest from May  - likely chronic finding  - monitor abdominal exam - currently benign  - surgery consulted/ appreciate recs

## 2018-07-11 NOTE — PROGRESS NOTE ADULT - PROBLEM SELECTOR PLAN 1
Sepsis 2/2  RML/RLL PNA (likely aspiration) and L > R pleural effusion found on CT A/P, RVP neg, UA wnl, leukocytosis 18, lactate 2.4  -c/w Zosyn q8h and Vanco 1gm qd (renally dosed) to cover possible HCAP;  -f/u BCx2, urine legionella  -fever trend and monitor for aspiration precautions  -s/p MBS, f/u SLP recs Sepsis 2/2  RML/RLL PNA (likely aspiration) and L > R pleural effusion found on CT A/P, RVP neg, UA wnl, leukocytosis 18, lactate 2.4  -c/w Zosyn q8h and Vanco 1gm qd (renally dosed) to cover possible HCAP;  -f/u BCx2, urine legionella-so far gram stain showing gram positive cocci in clusters/coag -ve staph   -fever trend and monitor for aspiration precautions

## 2018-07-11 NOTE — PROGRESS NOTE ADULT - SUBJECTIVE AND OBJECTIVE BOX
CHIEF COMPLAINT:    Interval Events: No acute event overnight. Patient is seen and examined at the bedside this morning.     REVIEW OF SYSTEMS:  Constitutional: No fever, or chills. No recent weight loss or weight gain.   HEENT: No dry eyes or eye irritation. No postnasal drip or nasal congestion.  CV: No chest pain, or palpitations. No orthopnea.   Resp: No cough, or sputum production. No shortness of breath or dyspnea on exertion.   GI: No nausea or vomiting. No diarrhea or constipation. No abdominal pain.   : No dysuria, no nocturia or increased urinary frequency.  Musculoskeletal: No back pain, no myalgias  Skin: No rash or itchiness.   Neurological: No headache or dizziness. No syncope, no weakness, numbness.  Psychiatric: Denies depressed mood.   Endocrine: No cold or heat intolerance. No dry skin.  Hematologic/Lymphatic: No anemia or bleeding problem.     OBJECTIVE:  Vital Signs Last 24 Hrs  T(C): 37.4 (2018 05:30), Max: 37.4 (2018 05:30)  T(F): 99.4 (2018 05:30), Max: 99.4 (2018 05:30)  HR: 57 (2018 05:30) (57 - 87)  BP: 145/59 (2018 05:30) (109/54 - 145/59)  BP(mean): --  RR: 17 (2018 05:30) (16 - 18)  SpO2: 97% (2018 05:30) (96% - 97%)    07-10 @ 07:01  -  -11 @ 07:00  --------------------------------------------------------  IN: 900 mL / OUT: 0 mL / NET: 900 mL      CAPILLARY BLOOD GLUCOSE          PHYSICAL EXAM:  General: Alert and cooperative. Not in acute stress. Well developed, well nourished.   Head: Normocephalic, no mass and lesions.  Eyes: Intact visual fields. PERRLA, clear conjunctiva. EOMI, no ptosis.   Throat: Oral cavity and pharynx normal. No inflammation, swelling, exudate, or lesions. Teeth and gingiva in good general condition.  Neck: No lymphadenopathy, no masses, no thyromegaly. Carotid pulses 2+. No JVD.   Respiratory: Bilateral lung clear to auscultation, no crackles, no wheezes, no rhonchi.   Cardiovascular: S1/S2 auscultated, no murmur, or gallop. Rhythm is regular. There is no peripheral edema, cyanosis or pallor. Extremities are warm and well perfused. Capillary refill is less than 2 seconds. No carotid bruits.  Abdomen: Soft, non-tender, nondistended, no guarding or rebound tenderness. Active bowel sounds in all 4 quadrants. No hepatosplenomegaly.   Musculoskeletal: Adequately aligned spine. ROM intact spine and extremities. No joint erythema or tenderness. Normal muscular development. Normal gait.   Extremities: No significant deformity or joint abnormality. Peripheral pulses intact. No varicosities. No peripheral edema, atrophy.   Skin: Intact, no rash. Normal color, texture and turgor with no lesions or eruptions.  Neurological: AOAx4. CN2-12 grosslly intact. Strength and sensation symmetric and intact throughout. Reflexes 2+ throughout. Cerebellar testing normal.  Psychiatry: The mental examination revealed the patient was oriented to person, place, and time. The patient was able to demonstrate good judgement and reason, without hallucinations, abnormal affect or abnormal behaviors during the examination. Patient is not suicidal.     LINES:    HOSPITAL MEDICATIONS:  heparin  Injectable 5000 Unit(s) SubCutaneous every 8 hours    piperacillin/tazobactam IVPB. 3.375 Gram(s) IV Intermittent every 8 hours  vancomycin  IVPB 1000 milliGRAM(s) IV Intermittent every 24 hours  vancomycin  IVPB              haloperidol    Injectable 1 milliGRAM(s) IV Push every 6 hours PRN          potassium chloride    Tablet ER 40 milliEquivalent(s) Oral once  sodium chloride 0.45%. 1000 milliLiter(s) IV Continuous <Continuous>            LABS:                        10.9   11.66 )-----------( 251      ( 2018 05:38 )             33.0     Hgb Trend: 10.9<--, 11.8<--, 12.0<--  07-11    141  |  103  |  17  ----------------------------<  78  3.3<L>   |  29  |  0.75    Ca    8.5      2018 05:38  Phos  2.7     07-  Mg     2.0     -11    TPro  5.5<L>  /  Alb  2.7<L>  /  TBili  0.4  /  DBili  x   /  AST  25  /  ALT  16  /  AlkPhos  42  07-    Creatinine Trend: 0.75<--, 0.74<--, 0.91<--, 0.60<--, 0.79<--, 0.67<--    Urinalysis Basic - ( 2018 14:15 )    Color: YELLOW / Appearance: CLEAR / S.016 / pH: 6.5  Gluc: NEGATIVE / Ketone: TRACE  / Bili: NEGATIVE / Urobili: 1 mg/dL   Blood: NEGATIVE / Protein: 20 mg/dL / Nitrite: NEGATIVE   Leuk Esterase: NEGATIVE / RBC: 0-2 / WBC 5-10   Sq Epi: OCC / Non Sq Epi: x / Bacteria: x        Venous Blood Gas:  07-10 @ 12:00  7.37/54/26/28/41.4  VBG Lactate: 2.2  Venous Blood Gas:   @ 14:52  7.40/56/< 24/30/18.8  VBG Lactate: 2.4      MICROBIOLOGY:     RADIOLOGY:  [ ] Reviewed and interpreted by me    EKG: CHIEF COMPLAINT:    Interval Events: No acute event overnight. Patient is seen and examined at the bedside this morning.   -Patient was more alert and responsive this morning. Able to have simple conservations and respond to questions appropriately. Oriented to person      OBJECTIVE:  Vital Signs Last 24 Hrs  T(C): 37.4 (2018 05:30), Max: 37.4 (2018 05:30)  T(F): 99.4 (2018 05:30), Max: 99.4 (2018 05:30)  HR: 57 (2018 05:30) (57 - 87)  BP: 145/59 (2018 05:30) (109/54 - 145/59)  BP(mean): --  RR: 17 (2018 05:30) (16 - 18)  SpO2: 97% (2018 05:30) (96% - 97%)    07-10 @ 07:01  -  07-11 @ 07:00  --------------------------------------------------------  IN: 900 mL / OUT: 0 mL / NET: 900 mL      CAPILLARY BLOOD GLUCOSE          PHYSICAL EXAM:  General: Elderly demented patient not in any acute distress  Head: Normocephalic  Eyes:  Clear conjuntiva  Throat: Oral cavity and pharynx normal.   Respiratory: Bilateral lung clear to auscultation, no crackles, no wheezes, no rhonchi.   Cardiovascular: S1/S2 auscultated, systolic murmur appreciated.   Abdomen: Soft, non-tender, nondistended, no guarding or rebound tenderness. Active bowel sounds in all 4 quadrants.   Musculoskeletal: R Hip decubitus/ trochanteric bursitis  Neurological: Not able to assess orientation     LINES:    HOSPITAL MEDICATIONS:  heparin  Injectable 5000 Unit(s) SubCutaneous every 8 hours    piperacillin/tazobactam IVPB. 3.375 Gram(s) IV Intermittent every 8 hours  vancomycin  IVPB 1000 milliGRAM(s) IV Intermittent every 24 hours  vancomycin  IVPB              haloperidol    Injectable 1 milliGRAM(s) IV Push every 6 hours PRN          potassium chloride    Tablet ER 40 milliEquivalent(s) Oral once  sodium chloride 0.45%. 1000 milliLiter(s) IV Continuous <Continuous>            LABS:                        10.9   11.66 )-----------( 251      ( 2018 05:38 )             33.0     Hgb Trend: 10.9<--, 11.8<--, 12.0<--  07-11    141  |  103  |  17  ----------------------------<  78  3.3<L>   |  29  |  0.75    Ca    8.5      2018 05:38  Phos  2.7       Mg     2.0         TPro  5.5<L>  /  Alb  2.7<L>  /  TBili  0.4  /  DBili  x   /  AST  25  /  ALT  16  /  AlkPhos  42      Creatinine Trend: 0.75<--, 0.74<--, 0.91<--, 0.60<--, 0.79<--, 0.67<--    Urinalysis Basic - ( 2018 14:15 )    Color: YELLOW / Appearance: CLEAR / S.016 / pH: 6.5  Gluc: NEGATIVE / Ketone: TRACE  / Bili: NEGATIVE / Urobili: 1 mg/dL   Blood: NEGATIVE / Protein: 20 mg/dL / Nitrite: NEGATIVE   Leuk Esterase: NEGATIVE / RBC: 0-2 / WBC 5-10   Sq Epi: OCC / Non Sq Epi: x / Bacteria: x        Venous Blood Gas:  07-10 @ 12:00  7.37/54/26/28/41.4  VBG Lactate: 2.2  Venous Blood Gas:   @ 14:52  7.40/56/< 24/30/18.8  VBG Lactate: 2.4

## 2018-07-12 LAB
BASOPHILS # BLD AUTO: 0.12 K/UL — SIGNIFICANT CHANGE UP (ref 0–0.2)
BASOPHILS NFR BLD AUTO: 0.8 % — SIGNIFICANT CHANGE UP (ref 0–2)
BASOPHILS NFR SPEC: 0 % — SIGNIFICANT CHANGE UP (ref 0–2)
BLASTS # FLD: 0 % — SIGNIFICANT CHANGE UP (ref 0–0)
BUN SERPL-MCNC: 10 MG/DL — SIGNIFICANT CHANGE UP (ref 7–23)
CALCIUM SERPL-MCNC: 9 MG/DL — SIGNIFICANT CHANGE UP (ref 8.4–10.5)
CHLORIDE SERPL-SCNC: 102 MMOL/L — SIGNIFICANT CHANGE UP (ref 98–107)
CO2 SERPL-SCNC: 26 MMOL/L — SIGNIFICANT CHANGE UP (ref 22–31)
CREAT SERPL-MCNC: 0.68 MG/DL — SIGNIFICANT CHANGE UP (ref 0.5–1.3)
EOSINOPHIL # BLD AUTO: 0.88 K/UL — HIGH (ref 0–0.5)
EOSINOPHIL NFR BLD AUTO: 6 % — SIGNIFICANT CHANGE UP (ref 0–6)
EOSINOPHIL NFR FLD: 4.7 % — SIGNIFICANT CHANGE UP (ref 0–6)
GIANT PLATELETS BLD QL SMEAR: PRESENT — SIGNIFICANT CHANGE UP
GLUCOSE SERPL-MCNC: 106 MG/DL — HIGH (ref 70–99)
HCT VFR BLD CALC: 34.5 % — SIGNIFICANT CHANGE UP (ref 34.5–45)
HGB BLD-MCNC: 11.5 G/DL — SIGNIFICANT CHANGE UP (ref 11.5–15.5)
IMM GRANULOCYTES # BLD AUTO: 0.14 # — SIGNIFICANT CHANGE UP
IMM GRANULOCYTES NFR BLD AUTO: 1 % — SIGNIFICANT CHANGE UP (ref 0–1.5)
LYMPHOCYTES # BLD AUTO: 42.7 % — SIGNIFICANT CHANGE UP (ref 13–44)
LYMPHOCYTES # BLD AUTO: 6.25 K/UL — HIGH (ref 1–3.3)
LYMPHOCYTES NFR SPEC AUTO: 32.7 % — SIGNIFICANT CHANGE UP (ref 13–44)
MACROCYTES BLD QL: SIGNIFICANT CHANGE UP
MAGNESIUM SERPL-MCNC: 1.9 MG/DL — SIGNIFICANT CHANGE UP (ref 1.6–2.6)
MCHC RBC-ENTMCNC: 30.7 PG — SIGNIFICANT CHANGE UP (ref 27–34)
MCHC RBC-ENTMCNC: 33.3 % — SIGNIFICANT CHANGE UP (ref 32–36)
MCV RBC AUTO: 92.2 FL — SIGNIFICANT CHANGE UP (ref 80–100)
METAMYELOCYTES # FLD: 0 % — SIGNIFICANT CHANGE UP (ref 0–1)
MICROCYTES BLD QL: SLIGHT — SIGNIFICANT CHANGE UP
MONOCYTES # BLD AUTO: 1.41 K/UL — HIGH (ref 0–0.9)
MONOCYTES NFR BLD AUTO: 9.6 % — SIGNIFICANT CHANGE UP (ref 2–14)
MONOCYTES NFR BLD: 7.5 % — SIGNIFICANT CHANGE UP (ref 2–9)
MYELOCYTES NFR BLD: 0 % — SIGNIFICANT CHANGE UP (ref 0–0)
NEUTROPHIL AB SER-ACNC: 46.7 % — SIGNIFICANT CHANGE UP (ref 43–77)
NEUTROPHILS # BLD AUTO: 5.82 K/UL — SIGNIFICANT CHANGE UP (ref 1.8–7.4)
NEUTROPHILS NFR BLD AUTO: 39.9 % — LOW (ref 43–77)
NEUTS BAND # BLD: 0 % — SIGNIFICANT CHANGE UP (ref 0–6)
NRBC # FLD: 0 — SIGNIFICANT CHANGE UP
ORGANISM # SPEC MICROSCOPIC CNT: SIGNIFICANT CHANGE UP
ORGANISM # SPEC MICROSCOPIC CNT: SIGNIFICANT CHANGE UP
OTHER - HEMATOLOGY %: 0 — SIGNIFICANT CHANGE UP
PHOSPHATE SERPL-MCNC: 2.5 MG/DL — SIGNIFICANT CHANGE UP (ref 2.5–4.5)
PLATELET # BLD AUTO: 349 K/UL — SIGNIFICANT CHANGE UP (ref 150–400)
PLATELET COUNT - ESTIMATE: NORMAL — SIGNIFICANT CHANGE UP
PMV BLD: 9.8 FL — SIGNIFICANT CHANGE UP (ref 7–13)
POTASSIUM SERPL-MCNC: 3.6 MMOL/L — SIGNIFICANT CHANGE UP (ref 3.5–5.3)
POTASSIUM SERPL-SCNC: 3.6 MMOL/L — SIGNIFICANT CHANGE UP (ref 3.5–5.3)
PROMYELOCYTES # FLD: 0 % — SIGNIFICANT CHANGE UP (ref 0–0)
RBC # BLD: 3.74 M/UL — LOW (ref 3.8–5.2)
RBC # FLD: 12.5 % — SIGNIFICANT CHANGE UP (ref 10.3–14.5)
REVIEW TO FOLLOW: YES — SIGNIFICANT CHANGE UP
SMUDGE CELLS # BLD: PRESENT — SIGNIFICANT CHANGE UP
SODIUM SERPL-SCNC: 140 MMOL/L — SIGNIFICANT CHANGE UP (ref 135–145)
VANCOMYCIN TROUGH SERPL-MCNC: 6.4 UG/ML — LOW (ref 10–20)
VARIANT LYMPHS # BLD: 8.4 % — SIGNIFICANT CHANGE UP
WBC # BLD: 14.62 K/UL — HIGH (ref 3.8–10.5)
WBC # FLD AUTO: 14.62 K/UL — HIGH (ref 3.8–10.5)

## 2018-07-12 PROCEDURE — 99233 SBSQ HOSP IP/OBS HIGH 50: CPT

## 2018-07-12 RX ORDER — VANCOMYCIN HCL 1 G
1250 VIAL (EA) INTRAVENOUS EVERY 24 HOURS
Qty: 0 | Refills: 0 | Status: DISCONTINUED | OUTPATIENT
Start: 2018-07-12 | End: 2018-07-12

## 2018-07-12 RX ORDER — VANCOMYCIN HCL 1 G
750 VIAL (EA) INTRAVENOUS EVERY 12 HOURS
Qty: 0 | Refills: 0 | Status: DISCONTINUED | OUTPATIENT
Start: 2018-07-12 | End: 2018-07-13

## 2018-07-12 RX ORDER — HYDRALAZINE HCL 50 MG
25 TABLET ORAL EVERY 8 HOURS
Qty: 0 | Refills: 0 | Status: DISCONTINUED | OUTPATIENT
Start: 2018-07-12 | End: 2018-07-17

## 2018-07-12 RX ADMIN — PIPERACILLIN AND TAZOBACTAM 25 GRAM(S): 4; .5 INJECTION, POWDER, LYOPHILIZED, FOR SOLUTION INTRAVENOUS at 14:45

## 2018-07-12 RX ADMIN — HEPARIN SODIUM 5000 UNIT(S): 5000 INJECTION INTRAVENOUS; SUBCUTANEOUS at 07:07

## 2018-07-12 RX ADMIN — Medication 25 MILLIGRAM(S): at 21:47

## 2018-07-12 RX ADMIN — HEPARIN SODIUM 5000 UNIT(S): 5000 INJECTION INTRAVENOUS; SUBCUTANEOUS at 21:47

## 2018-07-12 RX ADMIN — HEPARIN SODIUM 5000 UNIT(S): 5000 INJECTION INTRAVENOUS; SUBCUTANEOUS at 13:26

## 2018-07-12 RX ADMIN — Medication 1 APPLICATION(S): at 11:10

## 2018-07-12 RX ADMIN — Medication 250 MILLIGRAM(S): at 11:07

## 2018-07-12 RX ADMIN — Medication 25 MILLIGRAM(S): at 17:18

## 2018-07-12 RX ADMIN — PIPERACILLIN AND TAZOBACTAM 25 GRAM(S): 4; .5 INJECTION, POWDER, LYOPHILIZED, FOR SOLUTION INTRAVENOUS at 22:49

## 2018-07-12 RX ADMIN — PIPERACILLIN AND TAZOBACTAM 25 GRAM(S): 4; .5 INJECTION, POWDER, LYOPHILIZED, FOR SOLUTION INTRAVENOUS at 07:07

## 2018-07-12 RX ADMIN — Medication 250 MILLIGRAM(S): at 22:22

## 2018-07-12 NOTE — PROGRESS NOTE ADULT - PROBLEM SELECTOR PLAN 6
multiple decub ulcers present on the R heel (unstageable), L heel (stage 1), R hip (unstageable) and L sacrum (stage 1)  -f/u wound care consult  -holding off home collagenase for now multiple decub ulcers present on the R heel (unstageable), L heel (stage 1), R hip (unstageable) and L sacrum (stage 1)  -f/u wound care consult  -collagenase twice daily to be applied to wounds

## 2018-07-12 NOTE — PROGRESS NOTE ADULT - PROBLEM SELECTOR PLAN 3
CT A/P significant for pneumobilia, no SBO;  -would monitor for BM and change in abd exams  -pneumobilia present in several CT's - first seen on CT angio chest from May  - likely chronic finding  - monitor abdominal exam - currently benign  - surgery consulted/ appreciate recs CT A/P significant for pneumobilia, no SBO;  -would monitor for BM and change in abd exams  -pneumobilia present in several CT's - first seen on CT angio chest from May  - likely chronic finding  - monitor abdominal exam - currently benign  - surgery consulted-pneumobilia noted to be chronic, present for >1 year, no intervention indicated at this time.

## 2018-07-12 NOTE — CHART NOTE - NSCHARTNOTEFT_GEN_A_CORE
Patient is a 89 year old female, bedbound, with a history of advanced dementia. spinal stenosis, history of laminectomy, history of small bowel obstruction, hypertension, most recently hospitalized for aspiration pneumonia. Physical examination revealed multiple decub ulcers present on the right heel (unstageable), left heel (stage 1), right hip (unstageable) and left sacrum (stage 1), complicated by compromised circulation. Patient is unable to turn on her own and requires assistance at all times. Patient is cachetic due to her poor nutrition. Patient also found to have urinary and fecal incontinence. Based on my assessment, the patient would benefit significantly from a gel overlay for her bed. Patient is a 89 year old female, bedbound, with a history of advanced dementia. spinal stenosis, history of laminectomy, history of small bowel obstruction, hypertension, most recently hospitalized for aspiration pneumonia. Physical examination revealed multiple decub ulcers present on the right heel (unstageable), left heel (stage 1), right hip (unstageable) and left sacrum (stage 1), complicated by compromised circulation. Patient is unable to turn on her own and requires assistance at all times. Patient is cachetic due to her poor nutrition. Patient is not incontinent of urine and feces, but requires assistance and relieves herself via use of a bedpan.  Based on my assessment, the patient would benefit significantly from a gel overlay for her bed.

## 2018-07-12 NOTE — ADVANCED PRACTICE NURSE CONSULT - REASON FOR CONSULT
Patient known to [facility] service line seen on 6/27/2018 for unstageable pressure injury to right trochanter. Patient seen today on skin care rounds after wound care referral received for reassessment of skin impairment and recommendations of topical management. Chart reviewed: WBC 14.62k/uL, Serum albumin 2.7 g/dL (previously 4.1g/dL), Total serum protein 5.5g/dL, venous lactate 2.2, BMI 21.8kg/m2, Jim 12. CT abdominal and pelvis "Incidental note made of soft tissue subcutaneous stranding with fluid surrounding the right greater trochanteric region with overlying skin thickening suspicious for developing infection." Patient H/O SBO, HTN, HLD, advanced dementia (AOx0-1 baseline) a/w sepsis due to likely RML/RLL aspiration PNA c/b mucus plugging, dehydration  in the context of advanced dementia and debility; Found to have multiple decubiti; Family reports Rt shoulder pain - no X-ray finding of a fracture. Pt seen and followed by internal medicine and by general surgery for pneumobilia on multiple CT- scans, asymptomatic. Full note to follow.

## 2018-07-12 NOTE — PROGRESS NOTE ADULT - PROBLEM SELECTOR PLAN 1
Sepsis 2/2  RML/RLL PNA (likely aspiration) and L > R pleural effusion found on CT A/P, RVP neg, UA wnl, leukocytosis 18, lactate 2.4  -c/w Zosyn q8h and Vanco 1gm qd (renally dosed) to cover possible HCAP;  -f/u BCx2, urine legionella-so far gram stain showing gram positive cocci in clusters/coag -ve staph   -fever trend and monitor for aspiration precautions Sepsis 2/2  RML/RLL PNA (likely aspiration) and L > R pleural effusion found on CT A/P, RVP neg, UA wnl, leukocytosis 18, lactate 2.4  -c/w Zosyn q8h and Vanco; Vanc trough on 7/12-->6.4 Vancomycin increased from 1g q24 to 750mg q12  -f/u BCx2, urine legionella-so far gram stain showing gram positive cocci in clusters/coag -ve staph   -F/U repeat blood cultures (ordered 7/12)  -fever trend and monitor for aspiration precautions

## 2018-07-12 NOTE — ADVANCED PRACTICE NURSE CONSULT - ASSESSMENT
General: A&Ox 1, confused to situation, time, place; pt continuously repeats "I want to go home. Let me go home." Unable to reorient patient, believes she is in a bar, when explaining place and situation patient unable to grasp. Currently bedbound, incontinent of urine and stool, actively incontinent of urine, perineal care provided during assessment. Skin warm, dry with increased moisture in intertriginous folds, adequate skin turgor, scattered areas of ecchymosis on bilateral upper extremities. Blanchable erythema of left heel.     Right trochanter- Unstagable pressure injury- 1cmx0.8cmx0.2cm (unable to determine true anatomical depth due to necrotic tissue)- wound base with 100% yellow-tan firmly attached slough. Scant serous drainage, no odor. Periwound skin with circumferential blanchable erythema extending 0.5cm from wound edge. (+) induration circumferentially extending 0.5cm-4cm from wound edge with 4cm at 6 o'clock, trace edema, mild fluctuance. Goals of care: patient was treated with Collagenase for enzymatic debridement since previous assessment, will change currently treatment as wound appears to have deteriorated. Autolytic debridement of necrotic tissue, antimicrobial in setting of possible cellulitic reaction, protect periwound skin.    Left buttocks extending to sacral fold- mixed etiology suspected deep tissue pressure injury complicated by incontinence associated dermatitis- [id number], well demarcated purple-maroon discoloration, irregular borders, (+) induration beneath site of injury that does not extend past wound edges. Periwound skin with scattered areas of erythema and linear like areas of purple-maroon discoloration. Goals of care: monitor for changes in tissue, protect from fecal and urinary incontinence, continue to offload pressure.    Vascular: Thickened-yellow overgrown toenails. No temperature changes noted. No edema. Capillary refill >3 seconds.  +1 DP/PT pulses, with faint biphasic doppler sounds. Left heel with blanchable erythema. Right lateral heel with wound.    Right lateral heel- mixed etiology suspected deep tissue pressure injury and arterial insufficiency- [id number], 30% blood filled blister, 30% serous filled blister; surrounded circumferentially by 40% non-blanching erythema. Periwound skin with slow blanching erythema, (+) bogginess upon palpation. Goals of care: monitor for changes in tissue type, protect from friction and shear, protect periwound skin.

## 2018-07-12 NOTE — ADVANCED PRACTICE NURSE CONSULT - RECOMMEDATIONS
Recommend [facility] Wound MD, Dr. Wilcox, or general surgery consult for surgical evaluation of unstageable pressure injury to right trochanter with signs of possible cellulitic reaction in periwound skin, see CT-scan results. Dr. Wilcox Contact number: [phone].   Nutrition consult, pt with multiple pressure injuries. Consider protein supplements to optimize wound healing.    Recommend follow up care at [facility]: [phone]. Address: [address] [place] [address].     Topical Recommendations:   Right trochanter- Cleanse wound and periwound skin with SAF-clens, rinse with NS, pat dry. Apply Liquid barrier film to periwound skin. Apply Medihoney gel to wound base, cover with silicone foam with border. Change daily.    Left buttock extending to sacral fold- Provide perineal care every shift and prn with episodes of incontinence. Apply TRIAD moisture barrier paste. While providing perineal care gently remove soiled layer of TRIAD, reapply top coat as needed.    Right lateral heel- Gently cleanse with NS. Paint with betadine, pat dry, apply abdominal pad, secure with Kerlix wrap and paper tape. Change daily.    Continue low air loss bed therapy, continue heel elevation with Z-flex fluidized positioning boots, continue to turn & reposition q2h with Z-hugo fluidized positioning device, soft pillow between bony prominences, continue incontinence/moisture management as recommended above & single breathable pad, continue measures to decrease friction/shear/pressure.     Continue with nutritional support as per dietary/orders.    Findings and plan discussed with primary team. All questions and concerns addressed.    Please contact Wound Care Service Line if we can be of further assistance (ext [phone]).

## 2018-07-12 NOTE — PROGRESS NOTE ADULT - SUBJECTIVE AND OBJECTIVE BOX
CHIEF COMPLAINT:    Interval Events: No acute event overnight. Patient is seen and examined at the bedside this morning.     REVIEW OF SYSTEMS:  Constitutional: No fever, or chills. No recent weight loss or weight gain.   HEENT: No dry eyes or eye irritation. No postnasal drip or nasal congestion.  CV: No chest pain, or palpitations. No orthopnea.   Resp: No cough, or sputum production. No shortness of breath or dyspnea on exertion.   GI: No nausea or vomiting. No diarrhea or constipation. No abdominal pain.   : No dysuria, no nocturia or increased urinary frequency.  Musculoskeletal: No back pain, no myalgias  Skin: No rash or itchiness.   Neurological: No headache or dizziness. No syncope, no weakness, numbness.  Psychiatric: Denies depressed mood.   Endocrine: No cold or heat intolerance. No dry skin.  Hematologic/Lymphatic: No anemia or bleeding problem.     OBJECTIVE:  Vital Signs Last 24 Hrs  T(C): 36.8 (12 Jul 2018 06:28), Max: 36.8 (12 Jul 2018 06:28)  T(F): 98.2 (12 Jul 2018 06:28), Max: 98.2 (12 Jul 2018 06:28)  HR: 88 (12 Jul 2018 06:28) (73 - 88)  BP: 150/76 (12 Jul 2018 06:28) (137/59 - 162/64)  BP(mean): --  RR: 18 (12 Jul 2018 06:28) (17 - 18)  SpO2: 96% (12 Jul 2018 06:28) (94% - 97%)    CAPILLARY BLOOD GLUCOSE          PHYSICAL EXAM:  General: Alert and cooperative. Not in acute stress. Well developed, well nourished.   Head: Normocephalic, no mass and lesions.  Eyes: Intact visual fields. PERRLA, clear conjunctiva. EOMI, no ptosis.   Throat: Oral cavity and pharynx normal. No inflammation, swelling, exudate, or lesions. Teeth and gingiva in good general condition.  Neck: No lymphadenopathy, no masses, no thyromegaly. Carotid pulses 2+. No JVD.   Respiratory: Bilateral lung clear to auscultation, no crackles, no wheezes, no rhonchi.   Cardiovascular: S1/S2 auscultated, no murmur, or gallop. Rhythm is regular. There is no peripheral edema, cyanosis or pallor. Extremities are warm and well perfused. Capillary refill is less than 2 seconds. No carotid bruits.  Abdomen: Soft, non-tender, nondistended, no guarding or rebound tenderness. Active bowel sounds in all 4 quadrants. No hepatosplenomegaly.   Musculoskeletal: Adequately aligned spine. ROM intact spine and extremities. No joint erythema or tenderness. Normal muscular development. Normal gait.   Extremities: No significant deformity or joint abnormality. Peripheral pulses intact. No varicosities. No peripheral edema, atrophy.   Skin: Intact, no rash. Normal color, texture and turgor with no lesions or eruptions.  Neurological: AOAx4. CN2-12 grosslly intact. Strength and sensation symmetric and intact throughout. Reflexes 2+ throughout. Cerebellar testing normal.  Psychiatry: The mental examination revealed the patient was oriented to person, place, and time. The patient was able to demonstrate good judgement and reason, without hallucinations, abnormal affect or abnormal behaviors during the examination. Patient is not suicidal.     LINES:    HOSPITAL MEDICATIONS:  heparin  Injectable 5000 Unit(s) SubCutaneous every 8 hours    piperacillin/tazobactam IVPB. 3.375 Gram(s) IV Intermittent every 8 hours  vancomycin  IVPB 1000 milliGRAM(s) IV Intermittent every 24 hours  vancomycin  IVPB              haloperidol    Injectable 1 milliGRAM(s) IV Push every 6 hours PRN          sodium chloride 0.45%. 1000 milliLiter(s) IV Continuous <Continuous>      collagenase Ointment 1 Application(s) Topical daily        LABS:                        11.5   14.62 )-----------( 349      ( 12 Jul 2018 05:00 )             34.5     Hgb Trend: 11.5<--, 10.9<--, 11.8<--, 12.0<--  07-12    140  |  102  |  10  ----------------------------<  106<H>  3.6   |  26  |  0.68    Ca    9.0      12 Jul 2018 05:00  Phos  2.5     07-12  Mg     1.9     07-12    TPro  5.5<L>  /  Alb  2.7<L>  /  TBili  0.4  /  DBili  x   /  AST  25  /  ALT  16  /  AlkPhos  42  07-11    Creatinine Trend: 0.68<--, 0.75<--, 0.74<--, 0.91<--, 0.60<--, 0.79<--        Venous Blood Gas:  07-10 @ 12:00  7.37/54/26/28/41.4  VBG Lactate: 2.2      MICROBIOLOGY:     RADIOLOGY:  [ ] Reviewed and interpreted by me    EKG: CHIEF COMPLAINT: Pneumonia     Interval Events: No acute event overnight. Patient is seen and examined at the bedside this morning.   -Per nurse, patient kept removing her IV overnight. Otherwise no issues.     REVIEW OF SYSTEMS:  Limited by patient's dementia     OBJECTIVE:  Vital Signs Last 24 Hrs  T(C): 36.8 (12 Jul 2018 06:28), Max: 36.8 (12 Jul 2018 06:28)  T(F): 98.2 (12 Jul 2018 06:28), Max: 98.2 (12 Jul 2018 06:28)  HR: 88 (12 Jul 2018 06:28) (73 - 88)  BP: 150/76 (12 Jul 2018 06:28) (137/59 - 162/64)  BP(mean): --  RR: 18 (12 Jul 2018 06:28) (17 - 18)  SpO2: 96% (12 Jul 2018 06:28) (94% - 97%)    CAPILLARY BLOOD GLUCOSE          PHYSICAL EXAM:  General: Elderly demented patient not in any acute distress, Awake and Alert, able to have brief general conversations with appropriate responses  Head: Normocephalic  Eyes:  Clear conjunctiva   Throat: Oral cavity and pharynx normal.   Respiratory: Bilateral lung clear to auscultation, no crackles, no wheezes, no rhonchi.   Cardiovascular: S1/S2 auscultated, systolic murmur appreciated.   Abdomen: Soft, non-tender, nondistended, no guarding or rebound tenderness. Active bowel sounds in all 4 quadrants.   Musculoskeletal: R Hip decubitus/ trochanteric bursitis. Multiple decub ulcers present on the R heel (unstageable), L heel (stage 1), R hip (unstageable) and L sacrum (stage 1)    LINES:    HOSPITAL MEDICATIONS:  heparin  Injectable 5000 Unit(s) SubCutaneous every 8 hours    piperacillin/tazobactam IVPB. 3.375 Gram(s) IV Intermittent every 8 hours  vancomycin  IVPB 1000 milliGRAM(s) IV Intermittent every 24 hours  vancomycin  IVPB              haloperidol    Injectable 1 milliGRAM(s) IV Push every 6 hours PRN          sodium chloride 0.45%. 1000 milliLiter(s) IV Continuous <Continuous>      collagenase Ointment 1 Application(s) Topical daily        LABS:                        11.5   14.62 )-----------( 349      ( 12 Jul 2018 05:00 )             34.5     Hgb Trend: 11.5<--, 10.9<--, 11.8<--, 12.0<--  07-12    140  |  102  |  10  ----------------------------<  106<H>  3.6   |  26  |  0.68    Ca    9.0      12 Jul 2018 05:00  Phos  2.5     07-12  Mg     1.9     07-12    TPro  5.5<L>  /  Alb  2.7<L>  /  TBili  0.4  /  DBili  x   /  AST  25  /  ALT  16  /  AlkPhos  42  07-11    Creatinine Trend: 0.68<--, 0.75<--, 0.74<--, 0.91<--, 0.60<--, 0.79<--    Vancomycin Level, Trough: 6.4    Venous Blood Gas:  07-10 @ 12:00  7.37/54/26/28/41.4  VBG Lactate: 2.2

## 2018-07-13 ENCOUNTER — TRANSCRIPTION ENCOUNTER (OUTPATIENT)
Age: 83
End: 2018-07-13

## 2018-07-13 LAB
-  CEFAZOLIN: SIGNIFICANT CHANGE UP
-  CIPROFLOXACIN: SIGNIFICANT CHANGE UP
-  CLINDAMYCIN: SIGNIFICANT CHANGE UP
-  COAGULASE NEGATIVE STAPHYLOCOCCUS: SIGNIFICANT CHANGE UP
-  ERYTHROMYCIN: SIGNIFICANT CHANGE UP
-  GENTAMICIN: SIGNIFICANT CHANGE UP
-  LEVOFLOXACIN: SIGNIFICANT CHANGE UP
-  MOXIFLOXACIN(AEROBIC): SIGNIFICANT CHANGE UP
-  OXACILLIN: SIGNIFICANT CHANGE UP
-  PENICILLIN: SIGNIFICANT CHANGE UP
-  RIFAMPIN.: SIGNIFICANT CHANGE UP
-  TETRACYCLINE: SIGNIFICANT CHANGE UP
-  TRIMETHOPRIM/SULFAMETHOXAZOLE: SIGNIFICANT CHANGE UP
-  VANCOMYCIN: SIGNIFICANT CHANGE UP
ALBUMIN SERPL ELPH-MCNC: 3.2 G/DL — LOW (ref 3.3–5)
ALP SERPL-CCNC: 53 U/L — SIGNIFICANT CHANGE UP (ref 40–120)
ALT FLD-CCNC: 16 U/L — SIGNIFICANT CHANGE UP (ref 4–33)
AST SERPL-CCNC: 21 U/L — SIGNIFICANT CHANGE UP (ref 4–32)
BACTERIA BLD CULT: SIGNIFICANT CHANGE UP
BACTERIA BLD CULT: SIGNIFICANT CHANGE UP
BASOPHILS # BLD AUTO: 0.15 K/UL — SIGNIFICANT CHANGE UP (ref 0–0.2)
BASOPHILS NFR BLD AUTO: 1 % — SIGNIFICANT CHANGE UP (ref 0–2)
BILIRUB SERPL-MCNC: 0.4 MG/DL — SIGNIFICANT CHANGE UP (ref 0.2–1.2)
BUN SERPL-MCNC: 11 MG/DL — SIGNIFICANT CHANGE UP (ref 7–23)
CALCIUM SERPL-MCNC: 9 MG/DL — SIGNIFICANT CHANGE UP (ref 8.4–10.5)
CHLORIDE SERPL-SCNC: 98 MMOL/L — SIGNIFICANT CHANGE UP (ref 98–107)
CO2 SERPL-SCNC: 26 MMOL/L — SIGNIFICANT CHANGE UP (ref 22–31)
CREAT SERPL-MCNC: 0.64 MG/DL — SIGNIFICANT CHANGE UP (ref 0.5–1.3)
EOSINOPHIL # BLD AUTO: 0.96 K/UL — HIGH (ref 0–0.5)
EOSINOPHIL NFR BLD AUTO: 6.2 % — HIGH (ref 0–6)
GLUCOSE SERPL-MCNC: 120 MG/DL — HIGH (ref 70–99)
HCT VFR BLD CALC: 34.6 % — SIGNIFICANT CHANGE UP (ref 34.5–45)
HGB BLD-MCNC: 11.5 G/DL — SIGNIFICANT CHANGE UP (ref 11.5–15.5)
IMM GRANULOCYTES # BLD AUTO: 0.18 # — SIGNIFICANT CHANGE UP
IMM GRANULOCYTES NFR BLD AUTO: 1.2 % — SIGNIFICANT CHANGE UP (ref 0–1.5)
LYMPHOCYTES # BLD AUTO: 33.8 % — SIGNIFICANT CHANGE UP (ref 13–44)
LYMPHOCYTES # BLD AUTO: 5.19 K/UL — HIGH (ref 1–3.3)
MAGNESIUM SERPL-MCNC: 1.8 MG/DL — SIGNIFICANT CHANGE UP (ref 1.6–2.6)
MANUAL SMEAR VERIFICATION: SIGNIFICANT CHANGE UP
MCHC RBC-ENTMCNC: 30.3 PG — SIGNIFICANT CHANGE UP (ref 27–34)
MCHC RBC-ENTMCNC: 33.2 % — SIGNIFICANT CHANGE UP (ref 32–36)
MCV RBC AUTO: 91.3 FL — SIGNIFICANT CHANGE UP (ref 80–100)
METHOD TYPE: SIGNIFICANT CHANGE UP
METHOD TYPE: SIGNIFICANT CHANGE UP
MONOCYTES # BLD AUTO: 1.83 K/UL — HIGH (ref 0–0.9)
MONOCYTES NFR BLD AUTO: 11.9 % — SIGNIFICANT CHANGE UP (ref 2–14)
NEUTROPHILS # BLD AUTO: 7.06 K/UL — SIGNIFICANT CHANGE UP (ref 1.8–7.4)
NEUTROPHILS NFR BLD AUTO: 45.9 % — SIGNIFICANT CHANGE UP (ref 43–77)
NRBC # FLD: 0 — SIGNIFICANT CHANGE UP
ORGANISM # SPEC MICROSCOPIC CNT: SIGNIFICANT CHANGE UP
PHOSPHATE SERPL-MCNC: 2.4 MG/DL — LOW (ref 2.5–4.5)
PLATELET # BLD AUTO: 415 K/UL — HIGH (ref 150–400)
PMV BLD: 10 FL — SIGNIFICANT CHANGE UP (ref 7–13)
POTASSIUM SERPL-MCNC: 3.2 MMOL/L — LOW (ref 3.5–5.3)
POTASSIUM SERPL-SCNC: 3.2 MMOL/L — LOW (ref 3.5–5.3)
PROT SERPL-MCNC: 6.4 G/DL — SIGNIFICANT CHANGE UP (ref 6–8.3)
RBC # BLD: 3.79 M/UL — LOW (ref 3.8–5.2)
RBC # FLD: 12.6 % — SIGNIFICANT CHANGE UP (ref 10.3–14.5)
SODIUM SERPL-SCNC: 139 MMOL/L — SIGNIFICANT CHANGE UP (ref 135–145)
SPECIMEN SOURCE: SIGNIFICANT CHANGE UP
SPECIMEN SOURCE: SIGNIFICANT CHANGE UP
WBC # BLD: 15.37 K/UL — HIGH (ref 3.8–10.5)
WBC # FLD AUTO: 15.37 K/UL — HIGH (ref 3.8–10.5)

## 2018-07-13 PROCEDURE — 99222 1ST HOSP IP/OBS MODERATE 55: CPT | Mod: GC

## 2018-07-13 PROCEDURE — 99233 SBSQ HOSP IP/OBS HIGH 50: CPT

## 2018-07-13 RX ORDER — POTASSIUM CHLORIDE 20 MEQ
10 PACKET (EA) ORAL
Qty: 0 | Refills: 0 | Status: COMPLETED | OUTPATIENT
Start: 2018-07-13 | End: 2018-07-13

## 2018-07-13 RX ORDER — POTASSIUM CHLORIDE 20 MEQ
40 PACKET (EA) ORAL ONCE
Qty: 0 | Refills: 0 | Status: COMPLETED | OUTPATIENT
Start: 2018-07-13 | End: 2018-07-13

## 2018-07-13 RX ADMIN — Medication 40 MILLIEQUIVALENT(S): at 08:30

## 2018-07-13 RX ADMIN — PIPERACILLIN AND TAZOBACTAM 25 GRAM(S): 4; .5 INJECTION, POWDER, LYOPHILIZED, FOR SOLUTION INTRAVENOUS at 08:00

## 2018-07-13 RX ADMIN — PIPERACILLIN AND TAZOBACTAM 25 GRAM(S): 4; .5 INJECTION, POWDER, LYOPHILIZED, FOR SOLUTION INTRAVENOUS at 22:08

## 2018-07-13 RX ADMIN — Medication 25 MILLIGRAM(S): at 05:56

## 2018-07-13 RX ADMIN — Medication 100 MILLIEQUIVALENT(S): at 12:58

## 2018-07-13 RX ADMIN — Medication 25 MILLIGRAM(S): at 13:05

## 2018-07-13 RX ADMIN — Medication 250 MILLIGRAM(S): at 06:15

## 2018-07-13 RX ADMIN — Medication 100 MILLIEQUIVALENT(S): at 10:10

## 2018-07-13 RX ADMIN — PIPERACILLIN AND TAZOBACTAM 25 GRAM(S): 4; .5 INJECTION, POWDER, LYOPHILIZED, FOR SOLUTION INTRAVENOUS at 15:14

## 2018-07-13 RX ADMIN — Medication 25 MILLIGRAM(S): at 21:33

## 2018-07-13 RX ADMIN — Medication 100 MILLIEQUIVALENT(S): at 10:59

## 2018-07-13 RX ADMIN — HEPARIN SODIUM 5000 UNIT(S): 5000 INJECTION INTRAVENOUS; SUBCUTANEOUS at 13:00

## 2018-07-13 RX ADMIN — HEPARIN SODIUM 5000 UNIT(S): 5000 INJECTION INTRAVENOUS; SUBCUTANEOUS at 21:33

## 2018-07-13 RX ADMIN — HEPARIN SODIUM 5000 UNIT(S): 5000 INJECTION INTRAVENOUS; SUBCUTANEOUS at 05:56

## 2018-07-13 NOTE — DISCHARGE NOTE ADULT - PATIENT PORTAL LINK FT
You can access the [facility] Patient Portal, offered by [facility], by registering with the following website: [email or web address]

## 2018-07-13 NOTE — DISCHARGE NOTE ADULT - OTHER SIGNIFICANT FINDINGS
< from: Xray Shoulder 2 Views, Right (07.09.18 @ 13:44) >    EXAM:  RAD SHOULDER RT        PROCEDURE DATE:  Jul 9 2018   IMPRESSION:  No fractures, dislocations, or AC separation.    Mild AC and glenohumeral joint osteoarthrosis.     Humerus high riding relative to glenoid which could be related to   capsular laxity or degree of rotator cuff tearing.    Generalized osteopenia otherwise no discrete lytic or blastic lesions.    No periarticular soft tissue calcifications.    Unremarkable visualized right hemithorax.    ------------------------------------------------------------------------------------------------------

## 2018-07-13 NOTE — CONSULT NOTE ADULT - SUBJECTIVE AND OBJECTIVE BOX
HPI:  89y/o Female, bedbound, w/ Hx of SBO, HTN, HLD, advanced dementia (AOx0-1 baseline) presents initially because the son was concerned about a Rt shoulder injury and asymmetry in her shoulder. Pt denied any pain of the Rt shoulder however was also unable to clearly give history due to dementia. Pt responded "no" to questions about CP and abd pain otherwise unable to provide meaningful responses;     In the ED /72, 80, RR 18 and 96% on RA; Given Zosyn x1, 1.5 L of NS (10 Jul 2018 05:34)    Id note-above reviewed. Patient axox1, limited history. Brought in for right shoulder injury admitted for pneumonia whien she was found to have left retrocardiac opacity and leukocytosis. Blood cx + for Staph epi 2/4.     PAST MEDICAL & SURGICAL HISTORY:  Dementia  History of Spinal Stenosis  Lung Nodules  Hepatitis C: possible during transfusion in 1970&#x27;s  Carotid Artery Disease  Tendonitis: left hand: s/p steroid injection 3/10  Back Pain  Hypertension  Hyperlipidemia  S/P small bowel resection  History of Carotid Endarterectomy: right 2008  S/P Dilation and Curettage: 1970&#x27;s menorrhagia  After Cataract, Bilateral  S/P Cholecystectomy: laparoscopic 2 years ago  History of Laminectomy: 1970&#x27;s  S/P Knee Replacement: left knee 1997  right knee 2004      Allergies  Risperdal (Unknown)        ANTIMICROBIALS:  piperacillin/tazobactam IVPB. 3.375 every 8 hours  vancomycin  IVPB 750 every 12 hours      OTHER MEDS: MEDICATIONS  (STANDING):  haloperidol    Injectable 1 every 6 hours PRN  heparin  Injectable 5000 every 8 hours  hydrALAZINE 25 every 8 hours      SOCIAL HISTORY:  [ ] etoh [ ] tobacco [ ] former smoker [ ] IVDU    FAMILY HISTORY:  No pertinent family history in first degree relatives      REVIEW OF SYSTEMS  [  ] ROS unobtainable because:    [x  ] All other systems negative except as noted below:	    Constitutional:  [ ] fever [ ] chills  [ ] weight loss  [ ] weakness  Skin:  [ ] rash [ ] phlebitis	  Eyes: [ ] icterus [ ] pain  [ ] discharge	  ENMT: [ ] sore throat  [ ] thrush [ ] ulcers [ ] exudates  Respiratory: [ ] dyspnea [ ] hemoptysis [ ] cough [ ] sputum	  Cardiovascular:  [ ] chest pain [ ] palpitations [ ] edema	  Gastrointestinal:  [ ] nausea [ ] vomiting [ ] diarrhea [ ] constipation [ ] pain	  Genitourinary:  [ ] dysuria [ ] frequency [ ] hematuria [ ] discharge [ ] flank pain  [ ] incontinence  Musculoskeletal:  [ ] myalgias [ ] arthralgias [ ] arthritis  [ ] back pain  Neurological:  [ ] headache [ ] seizures  [ ] confusion/altered mental status  Psychiatric:  [ ] anxiety [ ] depression	  Hematology/Lymphatics:  [ ] lymphadenopathy  Endocrine:  [ ] adrenal [ ] thyroid  Allergic/Immunologic:	 [ ] transplant [ ] seasonal    Vital Signs Last 24 Hrs  T(F): 98 (07-13-18 @ 14:27), Max: 99.4 (07-11-18 @ 05:30)    Vital Signs Last 24 Hrs  HR: 89 (07-13-18 @ 14:27) (82 - 94)  BP: 117/56 (07-13-18 @ 14:27) (117/56 - 143/33)  RR: 18 (07-13-18 @ 14:27)  SpO2: 98% (07-13-18 @ 14:27) (97% - 98%)  Wt(kg): --    PHYSICAL EXAM:  General: non-toxic axox1  HEAD/EYES: anicteric, PERRL  ENT:  supple  Cardiovascular:   S1, S2  Respiratory:  clear bilaterally  GI:  soft, non-tender, normal bowel sounds  :  no CVA tenderness   Musculoskeletal:  no synovitis  Neurologic:  grossly non-focal  Skin:  right greater trochanter stage 2 1 cm, left heel stage 1, right heel DTI  Lymph: no lymphadenopathy  Psychiatric:  appropriate affect  Vascular:  no phlebitis                                11.5   15.37 )-----------( 415      ( 13 Jul 2018 05:30 )             34.6       07-13    139  |  98  |  11  ----------------------------<  120<H>  3.2<L>   |  26  |  0.64    Ca    9.0      13 Jul 2018 05:30  Phos  2.4     07-13  Mg     1.8     07-13    TPro  6.4  /  Alb  3.2<L>  /  TBili  0.4  /  DBili  x   /  AST  21  /  ALT  16  /  AlkPhos  53  07-13          MICROBIOLOGY:  BLOOD PERIPHERAL  07-12-18 --  --  --      BLOOD PERIPHERAL  07-10-18 --  --  Staphylococcus epidermidis  BLOOD CULTURE PCR      BLOOD PERIPHERAL  06-27-18 --  --  --      URINE CATHETER  06-24-18 --  --  --              v            RADIOLOGY: HPI: 89y/o Female, bedbound, w/ Hx of SBO, HTN, HLD, advanced dementia (AOx0-1 baseline) presents initially because the son was concerned about a Rt shoulder injury and asymmetry in her shoulder. Pt denied any pain of the Rt shoulder however was also unable to clearly give history due to dementia. Pt responded "no" to questions about CP and abd pain otherwise unable to provide meaningful responses;     In the ED /72, 80, RR 18 and 96% on RA; Given Zosyn x1, 1.5 L of NS (10 Jul 2018 05:34)    Id note-above reviewed. Patient axox1, limited history. Brought in for right shoulder injury admitted for pneumonia when she was found to have left retrocardiac opacity and leukocytosis. Blood cx + for Staph epi 2/4. Patient not able to effectively provide history. States no to any ROS questions, not able to give specifics about her condition aside from appearing anxious.    PAST MEDICAL & SURGICAL HISTORY:  Dementia  History of Spinal Stenosis  Lung Nodules  Hepatitis C: possible during transfusion in 1970&#x27;s  Carotid Artery Disease  Tendonitis: left hand: s/p steroid injection 3/10  Back Pain  Hypertension  Hyperlipidemia  S/P small bowel resection  History of Carotid Endarterectomy: right 2008  S/P Dilation and Curettage: 1970&#x27;s menorrhagia  After Cataract, Bilateral  S/P Cholecystectomy: laparoscopic 2 years ago  History of Laminectomy: 1970&#x27;s  S/P Knee Replacement: left knee 1997  right knee 2004    Allergies  Risperdal (Unknown)    ANTIMICROBIALS:  piperacillin/tazobactam IVPB. 3.375 every 8 hours  vancomycin  IVPB 750 every 12 hours    OTHER MEDS: MEDICATIONS  (STANDING):  haloperidol    Injectable 1 every 6 hours PRN  heparin  Injectable 5000 every 8 hours  hydrALAZINE 25 every 8 hours    SOCIAL HISTORY:  No tobacco, no alcohol, no illicit drugs    FAMILY HISTORY:  No pertinent family history in first degree relatives    REVIEW OF SYSTEMS  [X ] ROS unobtainable because:  altered, dementia  [  ] All other systems negative except as noted below:	    Constitutional:  [ ] fever [ ] chills  [ ] weight loss  [ ] weakness  Skin:  [ ] rash [ ] phlebitis	  Eyes: [ ] icterus [ ] pain  [ ] discharge	  ENMT: [ ] sore throat  [ ] thrush [ ] ulcers [ ] exudates  Respiratory: [ ] dyspnea [ ] hemoptysis [ ] cough [ ] sputum	  Cardiovascular:  [ ] chest pain [ ] palpitations [ ] edema	  Gastrointestinal:  [ ] nausea [ ] vomiting [ ] diarrhea [ ] constipation [ ] pain	  Genitourinary:  [ ] dysuria [ ] frequency [ ] hematuria [ ] discharge [ ] flank pain  [ ] incontinence  Musculoskeletal:  [ ] myalgias [ ] arthralgias [ ] arthritis  [ ] back pain  Neurological:  [ ] headache [ ] seizures  [ ] confusion/altered mental status  Psychiatric:  [ ] anxiety [ ] depression	  Hematology/Lymphatics:  [ ] lymphadenopathy  Endocrine:  [ ] adrenal [ ] thyroid  Allergic/Immunologic:	 [ ] transplant [ ] seasonal    Vital Signs Last 24 Hrs  T(F): 98 (07-13-18 @ 14:27), Max: 99.4 (07-11-18 @ 05:30)    Vital Signs Last 24 Hrs  HR: 89 (07-13-18 @ 14:27) (82 - 94)  BP: 117/56 (07-13-18 @ 14:27) (117/56 - 143/33)  RR: 18 (07-13-18 @ 14:27)  SpO2: 98% (07-13-18 @ 14:27) (97% - 98%)    PHYSICAL EXAM:  General: non-toxic axox1  HEAD/EYES: anicteric, PERRL  ENT:  supple  Cardiovascular:   S1, S2  Respiratory:  clear bilaterally  GI:  soft, non-tender, normal bowel sounds  :  no CVA tenderness   Musculoskeletal:  no synovitis  Neurologic:  grossly non-focal  Skin:  right greater trochanter stage 2 1 cm, left heel stage 1, right heel DTI  Lymph: no lymphadenopathy  Psychiatric:  appropriate affect  Vascular:  no phlebitis    Labs:                        11.5   15.37 )-----------( 415      ( 13 Jul 2018 05:30 )             34.6     07-13    139  |  98  |  11  ----------------------------<  120<H>  3.2<L>   |  26  |  0.64    Ca    9.0      13 Jul 2018 05:30  Phos  2.4     07-13  Mg     1.8     07-13    TPro  6.4  /  Alb  3.2<L>  /  TBili  0.4  /  DBili  x   /  AST  21  /  ALT  16  /  AlkPhos  53  07-13    MICROBIOLOGY:  BLOOD PERIPHERAL  07-12-18 NGTD    BLOOD PERIPHERAL  07-10-18 --  --  Staphylococcus epidermidis  BLOOD CULTURE PCR    (otherwise reviewed)    RADIOLOGY:    7/9 CT:    IMPRESSION:     No evidence for bowel obstruction. Pneumobilia, overall progressed from   6/2018.    Incidental note made of soft tissue subcutaneous stranding with fluid   surrounding the right greater trochanteric region with overlying skin   thickening suspicious for developing infection.    Extensive soft tissue edema and swelling of the visualized left upper   extremity.    Right middle lobe and lower lobe tree-in-bud nodular opacities   representing impacted distal airways which may be infectious in etiology.   Correlate clinically.    7/10 XR:    IMPRESSION:       One instance of laryngeal penetration without full retrieval was observed   with nectar thick liquids. No discrete aspiration.    For further information and recommendations, please refer to the speech   pathologist final report which is available for review in the electronic   medical record.

## 2018-07-13 NOTE — DISCHARGE NOTE ADULT - CARE PLAN
Principal Discharge DX:	Aspiration pneumonia of right lower lobe, unspecified aspiration pneumonia type  Goal:	No fever, no pneumonia  Assessment and plan of treatment:	-Continue taking antibiotics as prescribed   -Continue with dysphagia diet; soft foods and thick liquids; ensure patient's head is elevated at 30 degree angle or greater  Secondary Diagnosis:	Decubitus ulcer  Assessment and plan of treatment:	Clean wound and bandage  Secondary Diagnosis:	Hypertension  Assessment and plan of treatment:	Take medications as prescribed Principal Discharge DX:	Aspiration pneumonia of right lower lobe, unspecified aspiration pneumonia type  Goal:	No fever, no pneumonia  Assessment and plan of treatment:	-No need for antibiotics at home; already completed treatment while in the hospital   -Continue with dysphagia diet; soft foods and thick liquids; ensure patient's head is elevated at 30 degree angle or greater  Secondary Diagnosis:	Decubitus ulcer  Assessment and plan of treatment:	Clean wound and bandage  Secondary Diagnosis:	Hypertension  Assessment and plan of treatment:	Take medications as prescribed

## 2018-07-13 NOTE — CONSULT NOTE ADULT - ATTENDING COMMENTS
89 yo Female, bedbound, w/ Hx of SBO, HTN, HLD, advanced dementia (AOx0-1 baseline) presents initially because the son was concerned about a Rt shoulder injury and asymmetry in her shoulder.   Here, patient with leukocytosis, no fevers  Dementia, unclear baseline  BCX with staph epi--suspect contaminant  CT with mild inflammation at R greater trochanter, as well as ? PNA  Suspect leukocytosis related to pneumonia/aspiration  Overall, positive culture finding, aspiration, pneumonia, leukocytosis  - Zosyn 3.375g q 8  - DC vanco  - If febrile or worsening would repeat cultures, then consider adding vancomycin back  - Wound care  - F/U pending blood cultures  - Trend leukocytosis    Emmanuel Victoria MD  Pager [phone]  After 5pm and on weekends call [phone]

## 2018-07-13 NOTE — DISCHARGE NOTE ADULT - CONDITIONS AT DISCHARGE
patient is alert and orientedx1, patient has dementia. patient is being discharged home, patient stable at the time of discharge.

## 2018-07-13 NOTE — PROGRESS NOTE ADULT - SUBJECTIVE AND OBJECTIVE BOX
CHIEF COMPLAINT:    Interval Events: No acute event overnight. Patient is seen and examined at the bedside this morning.     REVIEW OF SYSTEMS:  Constitutional: No fever, or chills. No recent weight loss or weight gain.   HEENT: No dry eyes or eye irritation. No postnasal drip or nasal congestion.  CV: No chest pain, or palpitations. No orthopnea.   Resp: No cough, or sputum production. No shortness of breath or dyspnea on exertion.   GI: No nausea or vomiting. No diarrhea or constipation. No abdominal pain.   : No dysuria, no nocturia or increased urinary frequency.  Musculoskeletal: No back pain, no myalgias  Skin: No rash or itchiness.   Neurological: No headache or dizziness. No syncope, no weakness, numbness.  Psychiatric: Denies depressed mood.   Endocrine: No cold or heat intolerance. No dry skin.  Hematologic/Lymphatic: No anemia or bleeding problem.     OBJECTIVE:  Vital Signs Last 24 Hrs  T(C): 36.7 (13 Jul 2018 05:50), Max: 37.1 (12 Jul 2018 21:24)  T(F): 98 (13 Jul 2018 05:50), Max: 98.7 (12 Jul 2018 21:24)  HR: 82 (13 Jul 2018 05:50) (82 - 111)  BP: 136/82 (13 Jul 2018 05:50) (121/72 - 180/81)  BP(mean): --  RR: 18 (13 Jul 2018 05:50) (18 - 19)  SpO2: 97% (13 Jul 2018 05:50) (97% - 98%)    07-12 @ 07:01  -  07-13 @ 07:00  --------------------------------------------------------  IN: 350 mL / OUT: 0 mL / NET: 350 mL      CAPILLARY BLOOD GLUCOSE          PHYSICAL EXAM:  General: Alert and cooperative. Not in acute stress. Well developed, well nourished.   Head: Normocephalic, no mass and lesions.  Eyes: Intact visual fields. PERRLA, clear conjunctiva. EOMI, no ptosis.   Throat: Oral cavity and pharynx normal. No inflammation, swelling, exudate, or lesions. Teeth and gingiva in good general condition.  Neck: No lymphadenopathy, no masses, no thyromegaly. Carotid pulses 2+. No JVD.   Respiratory: Bilateral lung clear to auscultation, no crackles, no wheezes, no rhonchi.   Cardiovascular: S1/S2 auscultated, no murmur, or gallop. Rhythm is regular. There is no peripheral edema, cyanosis or pallor. Extremities are warm and well perfused. Capillary refill is less than 2 seconds. No carotid bruits.  Abdomen: Soft, non-tender, nondistended, no guarding or rebound tenderness. Active bowel sounds in all 4 quadrants. No hepatosplenomegaly.   Musculoskeletal: Adequately aligned spine. ROM intact spine and extremities. No joint erythema or tenderness. Normal muscular development. Normal gait.   Extremities: No significant deformity or joint abnormality. Peripheral pulses intact. No varicosities. No peripheral edema, atrophy.   Skin: Intact, no rash. Normal color, texture and turgor with no lesions or eruptions.  Neurological: AOAx4. CN2-12 grosslly intact. Strength and sensation symmetric and intact throughout. Reflexes 2+ throughout. Cerebellar testing normal.  Psychiatry: The mental examination revealed the patient was oriented to person, place, and time. The patient was able to demonstrate good judgement and reason, without hallucinations, abnormal affect or abnormal behaviors during the examination. Patient is not suicidal.     LINES:    HOSPITAL MEDICATIONS:  heparin  Injectable 5000 Unit(s) SubCutaneous every 8 hours    piperacillin/tazobactam IVPB. 3.375 Gram(s) IV Intermittent every 8 hours  vancomycin  IVPB 750 milliGRAM(s) IV Intermittent every 12 hours    hydrALAZINE 25 milliGRAM(s) Oral every 8 hours        haloperidol    Injectable 1 milliGRAM(s) IV Push every 6 hours PRN          potassium chloride   Powder 40 milliEquivalent(s) Oral once            LABS:                        11.5   15.37 )-----------( 415      ( 13 Jul 2018 05:30 )             34.6     Hgb Trend: 11.5<--, 11.5<--, 10.9<--, 11.8<--, 12.0<--  07-13    139  |  98  |  11  ----------------------------<  120<H>  3.2<L>   |  26  |  0.64    Ca    9.0      13 Jul 2018 05:30  Phos  2.4     07-13  Mg     1.8     07-13    TPro  6.4  /  Alb  3.2<L>  /  TBili  0.4  /  DBili  x   /  AST  21  /  ALT  16  /  AlkPhos  53  07-13    Creatinine Trend: 0.64<--, 0.68<--, 0.75<--, 0.74<--, 0.91<--, 0.60<--            MICROBIOLOGY:     RADIOLOGY:  [ ] Reviewed and interpreted by me    EKG: CHIEF COMPLAINT: Pneumonia     Interval Events: No acute event overnight. Patient is seen and examined at the bedside this morning.   -able to have basic conversation with appropriate answers   -denied having pain    REVIEW OF SYSTEMS:  Limited by patient's dementia     OBJECTIVE:  Vital Signs Last 24 Hrs  T(C): 36.7 (13 Jul 2018 05:50), Max: 37.1 (12 Jul 2018 21:24)  T(F): 98 (13 Jul 2018 05:50), Max: 98.7 (12 Jul 2018 21:24)  HR: 82 (13 Jul 2018 05:50) (82 - 111)  BP: 136/82 (13 Jul 2018 05:50) (121/72 - 180/81)  BP(mean): --  RR: 18 (13 Jul 2018 05:50) (18 - 19)  SpO2: 97% (13 Jul 2018 05:50) (97% - 98%)    07-12 @ 07:01  -  07-13 @ 07:00  --------------------------------------------------------  IN: 350 mL / OUT: 0 mL / NET: 350 mL            PHYSICAL EXAM:  General: Elderly demented patient not in any acute distress, Awake and Alert, able to have brief general conversations with appropriate responses  Head: Normocephalic  Eyes:  Clear conjunctiva   Throat: Oral cavity and pharynx normal.   Respiratory: Bilateral lung clear to auscultation, no crackles, no wheezes, no rhonchi.   Cardiovascular: S1/S2 auscultated, systolic murmur appreciated.   Abdomen: Soft, non-tender, nondistended, no guarding or rebound tenderness. Active bowel sounds in all 4 quadrants.   Musculoskeletal: R Hip decubitus/ trochanteric bursitis. Multiple decub ulcers present on the R heel (unstageable), L heel (stage 1), R hip (bandaged) and L sacrum (stage 1)    LINES:    HOSPITAL MEDICATIONS:  heparin  Injectable 5000 Unit(s) SubCutaneous every 8 hours    piperacillin/tazobactam IVPB. 3.375 Gram(s) IV Intermittent every 8 hours  vancomycin  IVPB 750 milliGRAM(s) IV Intermittent every 12 hours    hydrALAZINE 25 milliGRAM(s) Oral every 8 hours        haloperidol    Injectable 1 milliGRAM(s) IV Push every 6 hours PRN          potassium chloride   Powder 40 milliEquivalent(s) Oral once            LABS:                        11.5   15.37 )-----------( 415      ( 13 Jul 2018 05:30 )             34.6     Hgb Trend: 11.5<--, 11.5<--, 10.9<--, 11.8<--, 12.0<--  07-13    139  |  98  |  11  ----------------------------<  120<H>  3.2<L>   |  26  |  0.64    Ca    9.0      13 Jul 2018 05:30  Phos  2.4     07-13  Mg     1.8     07-13    TPro  6.4  /  Alb  3.2<L>  /  TBili  0.4  /  DBili  x   /  AST  21  /  ALT  16  /  AlkPhos  53  07-13    Creatinine Trend: 0.64<--, 0.68<--, 0.75<--, 0.74<--, 0.91<--, 0.60<--

## 2018-07-13 NOTE — DISCHARGE NOTE ADULT - HOSPITAL COURSE
89y/o Female, bedbound, w/ Hx of SBO, HTN, HLD, advanced dementia (AOx0-1 baseline) presents initially because the son was concerned about a Rt shoulder injury and asymmetry in her shoulder. Pt denied any pain of the Rt shoulder however was also unable to clearly give history due to dementia. Pt responded "no" to questions about CP and abd pain otherwise unable to provide meaningful responses;     In the ED /72, 80, RR 18 and 96% on RA; Given Zosyn x1, 1.5 L of NS History  89y/o Female, bedbound, w/ Hx of SBO, HTN, HLD, advanced dementia (AOx0-1 baseline) presents initially because the son was concerned about a Rt shoulder injury and asymmetry in her shoulder. Pt denied any pain of the Rt shoulder however was also unable to clearly give history due to dementia. Pt responded "no" to questions about CP and abd pain otherwise unable to provide meaningful responses;     ED Course   In the ED /72, 80, RR 18 and 96% on RA; Given Zosyn x1, 1.5 L of NS      Hospital Course  X-rays were done to evaluate for right shoulder injury and no injury was identified. Labs drawn in the ED and patients clinical presentation were concerning for sepsis, source was likely aspiration pneumonia but there was also an initial concern for infection from a decubitus ulcer at her right greater trochanter. She was treated with 1 dose of vancomycin, 5 days of zosyn before being switched to two days of oral Augmentin. Her wound was evaluated by the wound care team and was cleaned and bandaged as directed. At the time of discharge, the patient was afebrile and hemodynamically stable.

## 2018-07-13 NOTE — DISCHARGE NOTE ADULT - MEDICATION SUMMARY - MEDICATIONS TO TAKE
I will START or STAY ON the medications listed below when I get home from the hospital:    semi electric hospital bed  -- Indication: For Mobility    wheel chair  -- Indication: For  Mobility     rocio lift  -- Indication: For Mobility    valproic acid 500 mg oral delayed release capsule  -- 1 tab(s) by mouth 2 times a day  -- Indication: For Dementia    QUEtiapine 50 mg oral tablet  -- 1 tab(s) by mouth 2 times a day  -- Indication: For Dementia    collagenase 250 units/g topical ointment  -- 1 application on skin once a day, please apply  to affected area  -- Indication: For Decubitus ulcer    hydrALAZINE 25 mg oral tablet  -- 1 tab(s) by mouth every 8 hours  -- Indication: For Hypertension

## 2018-07-13 NOTE — PROGRESS NOTE ADULT - PROBLEM SELECTOR PLAN 8
Extensive Swelling of the LUE noted on the CT A/P; No evidence of cellulitis on exam;  -will r/o DVT or fluid collection   -f/u Doppler of the LUE Extensive Swelling of the LUE noted on the CT A/P; No evidence of cellulitis on exam;  -No DVT on Vascular Ultrasound

## 2018-07-13 NOTE — DISCHARGE NOTE ADULT - MEDICATION SUMMARY - MEDICATIONS TO STOP TAKING
I will STOP taking the medications listed below when I get home from the hospital:    nitrofurantoin macrocrystals-monohydrate 100 mg oral capsule  -- 1 cap(s) by mouth 2 times a day (with meals) MDD:2

## 2018-07-13 NOTE — PROGRESS NOTE ADULT - PROBLEM SELECTOR PLAN 5
holding off PO Seroquel and PO valproic acid due to aspiration risk   starting on low dose haldol IV/IM 1mg PRN  consider psych eval if pt becomes agitated  -f/u EKG holding off PO Seroquel and PO valproic acid due to aspiration risk   starting on low dose haldol IV/IM 1mg PRN  consider psych eval if pt becomes agitated

## 2018-07-13 NOTE — CONSULT NOTE ADULT - ASSESSMENT
89y/o Female, bedbound, w/ Hx of SBO, HTN, HLD, advanced dementia (AOx0-1 baseline) presents initially because the son was concerned about a Rt shoulder injury and asymmetry in her shoulder.  Afebrile since admission but has had a leukocytosis. Has been on IV Zosyn and vanco since admission, blood cx + for Stpah epidermidis-suspect contaminant. CXR with concern for left retrocardiac opacity    Complete course of Zosyn for HAP  Repeat blood cx x 2  Monitor leukocytosis 89y/o Female, bedbound, w/ Hx of SBO, HTN, HLD, advanced dementia (AOx0-1 baseline) presents initially because the son was concerned about a Rt shoulder injury and asymmetry in her shoulder.  Afebrile since admission but has had a leukocytosis. Has been on IV Zosyn and vanco since admission, blood cx + for Stpah epidermidis-suspect contaminant. CXR with concern for left retrocardiac opacity    Complete course of Zosyn for HAP  Dc Zosyn  Repeat blood cx x 2  Monitor leukocytosis 89y/o Female, bedbound, w/ Hx of SBO, HTN, HLD, advanced dementia (AOx0-1 baseline) presents initially because the son was concerned about a Rt shoulder injury and asymmetry in her shoulder.  Afebrile since admission but has had a leukocytosis. Has been on IV Zosyn and vanco since admission, blood cx + for Stpah epidermidis-suspect contaminant. CXR with concern for left retrocardiac opacity    Complete course of Zosyn for HAP  Dc Vanco  Repeat blood cx x 2  Monitor leukocytosis

## 2018-07-13 NOTE — PHYSICAL THERAPY INITIAL EVALUATION ADULT - DISCHARGE DISPOSITION, PT EVAL
Home with no skilled PT needs. Pt. at baseline level of function and is not appropriate for skilled, therapeutic PT intervention. Pt. will be d/c from PT program.

## 2018-07-13 NOTE — PROGRESS NOTE ADULT - PROBLEM SELECTOR PLAN 1
Sepsis 2/2  RML/RLL PNA (likely aspiration) and L > R pleural effusion found on CT A/P, RVP neg, UA wnl, leukocytosis 18, lactate 2.4  -c/w Zosyn q8h and Vanco; Vanc trough on 7/12-->6.4 Vancomycin increased from 1g q24 to 750mg q12  -f/u BCx2, urine legionella-so far gram stain showing gram positive cocci in clusters/coag -ve staph   -F/U repeat blood cultures (ordered 7/12)  -fever trend and monitor for aspiration precautions

## 2018-07-13 NOTE — CHART NOTE - NSCHARTNOTEFT_GEN_A_CORE
Primary medicine team consulting general surgery for sacral decubitus ulcer. Discussed with team that wound care is planning to round on patients for debridements on Monday 7/16. Surgery will follow over the weekend to evaluate the wound if needed. Plan discussed with medicine resident.    Nathen Mark, PGY2  [id number]

## 2018-07-13 NOTE — DISCHARGE NOTE ADULT - HOME CARE AGENCY
[facility] AT [place] [phone]. nurse to visit on day after discharge. Nurse to call prior to visit to arrange. [facility] AT [place] [phone]. Nurse to visit on 07/18/2018 for wound care. Nurse to call prior to visit to arrange.

## 2018-07-13 NOTE — PROGRESS NOTE ADULT - PROBLEM SELECTOR PLAN 6
multiple decub ulcers present on the R heel (unstageable), L heel (stage 1), R hip (unstageable) and L sacrum (stage 1)  -f/u wound care consult  -collagenase twice daily to be applied to wounds multiple decub ulcers present on the R heel (unstageable), L heel (stage 1), R hip (unstageable) and L sacrum (stage 1)  -f/u wound care consult; recommended consulting surgery  -Surgery B team consulted 7/12-will see patient; f/u recs  ---collagenase twice daily to be applied to wounds

## 2018-07-13 NOTE — PHYSICAL THERAPY INITIAL EVALUATION ADULT - ADDITIONAL COMMENTS
Pt. is a poor historian, unable to provide social history or PLOF secondary to cognitive impairment. Per documentation, pt. dependent at baseline, requires assistance with ADLs. Lives in 2nd floor of 2 family house with son. Pt. owns DME of semi-electric bed, total assist lift, and wheelchair. Pt. has HHA 9 hours/day for 7 days/week.     Pt. was left supine in bed post PT Evaluation, NAD, call bell within reach, +bed alarm. KENNETH Tristan made aware of pt. status.

## 2018-07-13 NOTE — PROGRESS NOTE ADULT - PROBLEM SELECTOR PLAN 3
CT A/P significant for pneumobilia, no SBO;  -would monitor for BM and change in abd exams  -pneumobilia present in several CT's - first seen on CT angio chest from May  - likely chronic finding  - monitor abdominal exam - currently benign  - surgery consulted-pneumobilia noted to be chronic, present for >1 year, no intervention indicated at this time.

## 2018-07-13 NOTE — DISCHARGE NOTE ADULT - PLAN OF CARE
No fever, no pneumonia -Continue taking antibiotics as prescribed   -Continue with dysphagia diet; soft foods and thick liquids; ensure patient's head is elevated at 30 degree angle or greater Clean wound and bandage Take medications as prescribed -No need for antibiotics at home; already completed treatment while in the hospital   -Continue with dysphagia diet; soft foods and thick liquids; ensure patient's head is elevated at 30 degree angle or greater

## 2018-07-13 NOTE — PHYSICAL THERAPY INITIAL EVALUATION ADULT - PERTINENT HX OF CURRENT PROBLEM, REHAB EVAL
Pt. admitted for PNA. Sepsis, aspiration pneumonia. Per documentation, X-ray of the R shoulder revealed no fracture. PMH of SBO, HTN, HLD, advanced dementia (AOx0-1 baseline).

## 2018-07-13 NOTE — DISCHARGE NOTE ADULT - COMMUNITY RESOURCES
[facility] [phone] NEGRA Frey x [phone]. requested resumption of services for 07/17/18 for 10am, to meet patient at hospital for transport home. [facility] [phone] NEGRA Frey x [phone]. Mily from [facility] confirmed resumption of services for 07/17/18 at 10am.

## 2018-07-13 NOTE — PHYSICAL THERAPY INITIAL EVALUATION ADULT - GENERAL OBSERVATIONS, REHAB EVAL
Consult received, chart reviewed. Patient received supine in bed, NAD. Patient seen for Evaluation from Physical Therapist.

## 2018-07-14 LAB
BUN SERPL-MCNC: 11 MG/DL — SIGNIFICANT CHANGE UP (ref 7–23)
CALCIUM SERPL-MCNC: 9.3 MG/DL — SIGNIFICANT CHANGE UP (ref 8.4–10.5)
CHLORIDE SERPL-SCNC: 99 MMOL/L — SIGNIFICANT CHANGE UP (ref 98–107)
CO2 SERPL-SCNC: 25 MMOL/L — SIGNIFICANT CHANGE UP (ref 22–31)
CREAT SERPL-MCNC: 0.76 MG/DL — SIGNIFICANT CHANGE UP (ref 0.5–1.3)
GLUCOSE SERPL-MCNC: 101 MG/DL — HIGH (ref 70–99)
HCT VFR BLD CALC: 36.4 % — SIGNIFICANT CHANGE UP (ref 34.5–45)
HGB BLD-MCNC: 11.9 G/DL — SIGNIFICANT CHANGE UP (ref 11.5–15.5)
MAGNESIUM SERPL-MCNC: 1.9 MG/DL — SIGNIFICANT CHANGE UP (ref 1.6–2.6)
MCHC RBC-ENTMCNC: 30.4 PG — SIGNIFICANT CHANGE UP (ref 27–34)
MCHC RBC-ENTMCNC: 32.7 % — SIGNIFICANT CHANGE UP (ref 32–36)
MCV RBC AUTO: 92.9 FL — SIGNIFICANT CHANGE UP (ref 80–100)
NRBC # FLD: 0 — SIGNIFICANT CHANGE UP
PHOSPHATE SERPL-MCNC: 2.9 MG/DL — SIGNIFICANT CHANGE UP (ref 2.5–4.5)
PLATELET # BLD AUTO: 420 K/UL — HIGH (ref 150–400)
PMV BLD: 9.8 FL — SIGNIFICANT CHANGE UP (ref 7–13)
POTASSIUM SERPL-MCNC: 4 MMOL/L — SIGNIFICANT CHANGE UP (ref 3.5–5.3)
POTASSIUM SERPL-SCNC: 4 MMOL/L — SIGNIFICANT CHANGE UP (ref 3.5–5.3)
RBC # BLD: 3.92 M/UL — SIGNIFICANT CHANGE UP (ref 3.8–5.2)
RBC # FLD: 13.1 % — SIGNIFICANT CHANGE UP (ref 10.3–14.5)
SODIUM SERPL-SCNC: 135 MMOL/L — SIGNIFICANT CHANGE UP (ref 135–145)
WBC # BLD: 14.19 K/UL — HIGH (ref 3.8–10.5)
WBC # FLD AUTO: 14.19 K/UL — HIGH (ref 3.8–10.5)

## 2018-07-14 PROCEDURE — 99233 SBSQ HOSP IP/OBS HIGH 50: CPT

## 2018-07-14 RX ADMIN — Medication 1 TABLET(S): at 18:42

## 2018-07-14 RX ADMIN — Medication 25 MILLIGRAM(S): at 23:59

## 2018-07-14 RX ADMIN — Medication 25 MILLIGRAM(S): at 05:58

## 2018-07-14 RX ADMIN — HEPARIN SODIUM 5000 UNIT(S): 5000 INJECTION INTRAVENOUS; SUBCUTANEOUS at 23:59

## 2018-07-14 RX ADMIN — HEPARIN SODIUM 5000 UNIT(S): 5000 INJECTION INTRAVENOUS; SUBCUTANEOUS at 13:21

## 2018-07-14 RX ADMIN — PIPERACILLIN AND TAZOBACTAM 25 GRAM(S): 4; .5 INJECTION, POWDER, LYOPHILIZED, FOR SOLUTION INTRAVENOUS at 14:22

## 2018-07-14 RX ADMIN — HEPARIN SODIUM 5000 UNIT(S): 5000 INJECTION INTRAVENOUS; SUBCUTANEOUS at 05:59

## 2018-07-14 RX ADMIN — PIPERACILLIN AND TAZOBACTAM 25 GRAM(S): 4; .5 INJECTION, POWDER, LYOPHILIZED, FOR SOLUTION INTRAVENOUS at 07:00

## 2018-07-14 RX ADMIN — Medication 25 MILLIGRAM(S): at 13:21

## 2018-07-14 NOTE — PROGRESS NOTE ADULT - PROBLEM SELECTOR PLAN 9
DVT ppx w/ Hep subq  Palliative Care: GOC discussion with the son; MOLST form prior to discharge;

## 2018-07-14 NOTE — PROGRESS NOTE ADULT - PROBLEM SELECTOR PLAN 4
-elevated BUN/Cr ratio >20, start 0.45% 75 cc/hr for 10 hrs  -f/u CMP holding off PO Seroquel and PO valproic acid due to aspiration risk   starting on low dose haldol IV/IM 1mg PRN  consider psych eval if pt becomes agitated

## 2018-07-14 NOTE — PROGRESS NOTE ADULT - PROBLEM SELECTOR PLAN 8
Extensive Swelling of the LUE noted on the CT A/P; No evidence of cellulitis on exam;  -No DVT on Vascular Ultrasound DVT ppx w/ Hep subq  Palliative Care: GOC discussion with the son; MOLST form prior to discharge;

## 2018-07-14 NOTE — PROGRESS NOTE ADULT - PROBLEM SELECTOR PLAN 1
Sepsis 2/2  RML/RLL PNA (likely aspiration) and L > R pleural effusion found on CT A/P, RVP neg, UA wnl, leukocytosis 18, lactate 2.4  -c/w Zosyn q8h and Vanco; Vanc trough on 7/12-->6.4 Vancomycin increased from 1g q24 to 750mg q12  -f/u BCx2, urine legionella-so far gram stain showing gram positive cocci in clusters/coag -ve staph   -F/U repeat blood cultures (ordered 7/12)  -fever trend and monitor for aspiration precautions Sepsis 2/2  RML/RLL PNA (likely aspiration) and L > R pleural effusion found on CT A/P, RVP neg, UA wnl, leukocytosis 18, lactate 2.4  s/p Vanc (7/10-7/13); continue on Zosyn (7/10- )  -Staph epi isolated in initial cultures; ID consulted-likely contaminant, recommended discontinuing vanc   -F/U repeat blood cultures (ordered 7/12)  -fever trend and monitor for aspiration precautions Sepsis 2/2  RML/RLL PNA (likely aspiration) and L > R pleural effusion found on CT A/P, RVP neg, UA wnl, leukocytosis 18, lactate 2.4  s/p Vanc (7/10-7/13); continue on Zosyn (7/10- 7/14); switch to PO augmentin   -Staph epi isolated in initial cultures; ID consulted-likely contaminant, recommended discontinuing vanc   -F/U repeat blood cultures (ordered 7/12)  -fever trend and monitor for aspiration precautions

## 2018-07-14 NOTE — PROGRESS NOTE ADULT - PROBLEM SELECTOR PLAN 5
holding off PO Seroquel and PO valproic acid due to aspiration risk   starting on low dose haldol IV/IM 1mg PRN  consider psych eval if pt becomes agitated multiple decub ulcers present on the R heel (unstageable), L heel (stage 1), R hip (unstageable) and L sacrum (stage 1)  -f/u wound care consult; recommended consulting surgery  -Surgery B team consulted 7/12-will see patient; f/u recs  ---collagenase twice daily to be applied to wounds

## 2018-07-14 NOTE — PROGRESS NOTE ADULT - PROBLEM SELECTOR PLAN 7
X-ray of the R shoulder showed no fracture  -can consider IV Tylenol for potential pain control Extensive Swelling of the LUE noted on the CT A/P; No evidence of cellulitis on exam;  -No DVT on Vascular Ultrasound

## 2018-07-14 NOTE — PROGRESS NOTE ADULT - SUBJECTIVE AND OBJECTIVE BOX
CHIEF COMPLAINT:    Interval Events: No acute event overnight. Patient is seen and examined at the bedside this morning.     REVIEW OF SYSTEMS:  Constitutional: No fever, or chills. No recent weight loss or weight gain.   HEENT: No dry eyes or eye irritation. No postnasal drip or nasal congestion.  CV: No chest pain, or palpitations. No orthopnea.   Resp: No cough, or sputum production. No shortness of breath or dyspnea on exertion.   GI: No nausea or vomiting. No diarrhea or constipation. No abdominal pain.   : No dysuria, no nocturia or increased urinary frequency.  Musculoskeletal: No back pain, no myalgias  Skin: No rash or itchiness.   Neurological: No headache or dizziness. No syncope, no weakness, numbness.  Psychiatric: Denies depressed mood.   Endocrine: No cold or heat intolerance. No dry skin.  Hematologic/Lymphatic: No anemia or bleeding problem.     OBJECTIVE:  Vital Signs Last 24 Hrs  T(C): 36.6 (14 Jul 2018 05:54), Max: 36.8 (13 Jul 2018 21:10)  T(F): 97.8 (14 Jul 2018 05:54), Max: 98.2 (13 Jul 2018 21:10)  HR: 79 (14 Jul 2018 05:54) (79 - 90)  BP: 160/82 (14 Jul 2018 05:54) (117/56 - 160/82)  BP(mean): --  RR: 18 (14 Jul 2018 05:54) (18 - 18)  SpO2: 99% (14 Jul 2018 05:54) (98% - 99%)    07-13 @ 07:01  -  07-14 @ 07:00  --------------------------------------------------------  IN: 400 mL / OUT: 0 mL / NET: 400 mL      CAPILLARY BLOOD GLUCOSE          PHYSICAL EXAM:  General: Alert and cooperative. Not in acute stress. Well developed, well nourished.   Head: Normocephalic, no mass and lesions.  Eyes: Intact visual fields. PERRLA, clear conjunctiva. EOMI, no ptosis.   Throat: Oral cavity and pharynx normal. No inflammation, swelling, exudate, or lesions. Teeth and gingiva in good general condition.  Neck: No lymphadenopathy, no masses, no thyromegaly. Carotid pulses 2+. No JVD.   Respiratory: Bilateral lung clear to auscultation, no crackles, no wheezes, no rhonchi.   Cardiovascular: S1/S2 auscultated, no murmur, or gallop. Rhythm is regular. There is no peripheral edema, cyanosis or pallor. Extremities are warm and well perfused. Capillary refill is less than 2 seconds. No carotid bruits.  Abdomen: Soft, non-tender, nondistended, no guarding or rebound tenderness. Active bowel sounds in all 4 quadrants. No hepatosplenomegaly.   Musculoskeletal: Adequately aligned spine. ROM intact spine and extremities. No joint erythema or tenderness. Normal muscular development. Normal gait.   Extremities: No significant deformity or joint abnormality. Peripheral pulses intact. No varicosities. No peripheral edema, atrophy.   Skin: Intact, no rash. Normal color, texture and turgor with no lesions or eruptions.  Neurological: AOAx4. CN2-12 grosslly intact. Strength and sensation symmetric and intact throughout. Reflexes 2+ throughout. Cerebellar testing normal.  Psychiatry: The mental examination revealed the patient was oriented to person, place, and time. The patient was able to demonstrate good judgement and reason, without hallucinations, abnormal affect or abnormal behaviors during the examination. Patient is not suicidal.     LINES:    HOSPITAL MEDICATIONS:  heparin  Injectable 5000 Unit(s) SubCutaneous every 8 hours    piperacillin/tazobactam IVPB. 3.375 Gram(s) IV Intermittent every 8 hours    hydrALAZINE 25 milliGRAM(s) Oral every 8 hours        haloperidol    Injectable 1 milliGRAM(s) IV Push every 6 hours PRN                    LABS:                        11.9   14.19 )-----------( 420      ( 14 Jul 2018 05:38 )             36.4     Hgb Trend: 11.9<--, 11.5<--, 11.5<--, 10.9<--, 11.8<--  07-14    135  |  99  |  11  ----------------------------<  101<H>  4.0   |  25  |  0.76    Ca    9.3      14 Jul 2018 05:38  Phos  2.9     07-14  Mg     1.9     07-14    TPro  6.4  /  Alb  3.2<L>  /  TBili  0.4  /  DBili  x   /  AST  21  /  ALT  16  /  AlkPhos  53  07-13    Creatinine Trend: 0.76<--, 0.64<--, 0.68<--, 0.75<--, 0.74<--, 0.91<--            MICROBIOLOGY:     RADIOLOGY:  [ ] Reviewed and interpreted by me    EKG: CHIEF COMPLAINT: Pneumonia     Interval Events: No acute event overnight. Patient is seen and examined at the bedside this morning.     REVIEW OF SYSTEMS:  Limited by patient's dementia     OBJECTIVE:  Vital Signs Last 24 Hrs  T(C): 36.6 (14 Jul 2018 05:54), Max: 36.8 (13 Jul 2018 21:10)  T(F): 97.8 (14 Jul 2018 05:54), Max: 98.2 (13 Jul 2018 21:10)  HR: 79 (14 Jul 2018 05:54) (79 - 90)  BP: 160/82 (14 Jul 2018 05:54) (117/56 - 160/82)  BP(mean): --  RR: 18 (14 Jul 2018 05:54) (18 - 18)  SpO2: 99% (14 Jul 2018 05:54) (98% - 99%)    07-13 @ 07:01  -  07-14 @ 07:00  --------------------------------------------------------  IN: 400 mL / OUT: 0 mL / NET: 400 mL      CAPILLARY BLOOD GLUCOSE          PHYSICAL EXAM:  General: Elderly demented patient not in any acute distress, Awake and Alert, able to have brief general conversations with appropriate responses  Head: Normocephalic  Eyes:  Clear conjunctiva   Throat: Oral cavity and pharynx normal.   Respiratory: Bilateral lung clear to auscultation, no crackles, no wheezes, no rhonchi.   Cardiovascular: S1/S2 auscultated, systolic murmur appreciated.   Abdomen: Soft, non-tender, nondistended, no guarding or rebound tenderness. Active bowel sounds in all 4 quadrants.   Musculoskeletal: R Hip decubitus/ trochanteric bursitis. Multiple decub ulcers present on the R heel (unstageable), L heel (stage 1), R hip (unstageable-bandaged) and L sacrum (stage 1)    LINES:    HOSPITAL MEDICATIONS:  heparin  Injectable 5000 Unit(s) SubCutaneous every 8 hours    piperacillin/tazobactam IVPB. 3.375 Gram(s) IV Intermittent every 8 hours    hydrALAZINE 25 milliGRAM(s) Oral every 8 hours        haloperidol    Injectable 1 milliGRAM(s) IV Push every 6 hours PRN                    LABS:                        11.9   14.19 )-----------( 420      ( 14 Jul 2018 05:38 )             36.4     Hgb Trend: 11.9<--, 11.5<--, 11.5<--, 10.9<--, 11.8<--  07-14    135  |  99  |  11  ----------------------------<  101<H>  4.0   |  25  |  0.76    Ca    9.3      14 Jul 2018 05:38  Phos  2.9     07-14  Mg     1.9     07-14    TPro  6.4  /  Alb  3.2<L>  /  TBili  0.4  /  DBili  x   /  AST  21  /  ALT  16  /  AlkPhos  53  07-13    Creatinine Trend: 0.76<--, 0.64<--, 0.68<--, 0.75<--, 0.74<--, 0.91<--

## 2018-07-14 NOTE — PROGRESS NOTE ADULT - PROBLEM SELECTOR PLAN 6
multiple decub ulcers present on the R heel (unstageable), L heel (stage 1), R hip (unstageable) and L sacrum (stage 1)  -f/u wound care consult; recommended consulting surgery  -Surgery B team consulted 7/12-will see patient; f/u recs  ---collagenase twice daily to be applied to wounds X-ray of the R shoulder showed no fracture  -can consider IV Tylenol for potential pain control

## 2018-07-15 LAB
BUN SERPL-MCNC: 16 MG/DL — SIGNIFICANT CHANGE UP (ref 7–23)
CALCIUM SERPL-MCNC: 9 MG/DL — SIGNIFICANT CHANGE UP (ref 8.4–10.5)
CHLORIDE SERPL-SCNC: 102 MMOL/L — SIGNIFICANT CHANGE UP (ref 98–107)
CO2 SERPL-SCNC: 22 MMOL/L — SIGNIFICANT CHANGE UP (ref 22–31)
CREAT SERPL-MCNC: 0.83 MG/DL — SIGNIFICANT CHANGE UP (ref 0.5–1.3)
GLUCOSE SERPL-MCNC: 90 MG/DL — SIGNIFICANT CHANGE UP (ref 70–99)
HCT VFR BLD CALC: 33.4 % — LOW (ref 34.5–45)
HGB BLD-MCNC: 11.3 G/DL — LOW (ref 11.5–15.5)
MCHC RBC-ENTMCNC: 31.6 PG — SIGNIFICANT CHANGE UP (ref 27–34)
MCHC RBC-ENTMCNC: 33.8 % — SIGNIFICANT CHANGE UP (ref 32–36)
MCV RBC AUTO: 93.3 FL — SIGNIFICANT CHANGE UP (ref 80–100)
NRBC # FLD: 0 — SIGNIFICANT CHANGE UP
PLATELET # BLD AUTO: 386 K/UL — SIGNIFICANT CHANGE UP (ref 150–400)
PMV BLD: 10.8 FL — SIGNIFICANT CHANGE UP (ref 7–13)
POTASSIUM SERPL-MCNC: 4 MMOL/L — SIGNIFICANT CHANGE UP (ref 3.5–5.3)
POTASSIUM SERPL-SCNC: 4 MMOL/L — SIGNIFICANT CHANGE UP (ref 3.5–5.3)
RBC # BLD: 3.58 M/UL — LOW (ref 3.8–5.2)
RBC # FLD: 13.5 % — SIGNIFICANT CHANGE UP (ref 10.3–14.5)
SODIUM SERPL-SCNC: 138 MMOL/L — SIGNIFICANT CHANGE UP (ref 135–145)
WBC # BLD: 15.28 K/UL — HIGH (ref 3.8–10.5)
WBC # FLD AUTO: 15.28 K/UL — HIGH (ref 3.8–10.5)

## 2018-07-15 PROCEDURE — 99233 SBSQ HOSP IP/OBS HIGH 50: CPT

## 2018-07-15 RX ORDER — QUETIAPINE FUMARATE 200 MG/1
50 TABLET, FILM COATED ORAL EVERY 12 HOURS
Qty: 0 | Refills: 0 | Status: DISCONTINUED | OUTPATIENT
Start: 2018-07-15 | End: 2018-07-17

## 2018-07-15 RX ORDER — VALPROIC ACID (AS SODIUM SALT) 250 MG/5ML
500 SOLUTION, ORAL ORAL EVERY 12 HOURS
Qty: 0 | Refills: 0 | Status: DISCONTINUED | OUTPATIENT
Start: 2018-07-15 | End: 2018-07-17

## 2018-07-15 RX ADMIN — Medication 1 TABLET(S): at 07:12

## 2018-07-15 RX ADMIN — QUETIAPINE FUMARATE 50 MILLIGRAM(S): 200 TABLET, FILM COATED ORAL at 17:28

## 2018-07-15 RX ADMIN — HEPARIN SODIUM 5000 UNIT(S): 5000 INJECTION INTRAVENOUS; SUBCUTANEOUS at 22:02

## 2018-07-15 RX ADMIN — HEPARIN SODIUM 5000 UNIT(S): 5000 INJECTION INTRAVENOUS; SUBCUTANEOUS at 13:20

## 2018-07-15 RX ADMIN — HEPARIN SODIUM 5000 UNIT(S): 5000 INJECTION INTRAVENOUS; SUBCUTANEOUS at 07:12

## 2018-07-15 RX ADMIN — Medication 25 MILLIGRAM(S): at 22:02

## 2018-07-15 RX ADMIN — Medication 25 MILLIGRAM(S): at 13:20

## 2018-07-15 RX ADMIN — Medication 500 MILLIGRAM(S): at 17:28

## 2018-07-15 NOTE — PROGRESS NOTE ADULT - PROBLEM SELECTOR PLAN 5
multiple decub ulcers present on the R heel (unstageable), L heel (stage 1), R hip (unstageable) and L sacrum (stage 1)  -f/u wound care consult; recommended consulting surgery  -Surgery B team consulted 7/12-will see patient; f/u recs  ---collagenase twice daily to be applied to wounds

## 2018-07-15 NOTE — PROGRESS NOTE ADULT - PROBLEM SELECTOR PLAN 7
Extensive Swelling of the LUE noted on the CT A/P; No evidence of cellulitis on exam;  -No DVT on Vascular Ultrasound

## 2018-07-15 NOTE — CHART NOTE - NSCHARTNOTEFT_GEN_A_CORE
I spoke with the family member at bedside. Explained that the positive blood culture was likely a contaminant and patient has completed treatment with antibiotics. She no longer needs to remain in the hospital and is ready for discharge. We will work with  tomorrow to ensure that all services and equipment necessary for a safe discharge have been reinstated/delivered and patient can then be discharged home. Plan d/w attending Dr. Joseph as well.     Quique Davalos MD  PGY 3   [facility]   [phone]

## 2018-07-15 NOTE — PROGRESS NOTE ADULT - PROBLEM SELECTOR PLAN 1
Sepsis 2/2  RML/RLL PNA (likely aspiration) and L > R pleural effusion found on CT A/P, RVP neg, UA wnl, leukocytosis 18, lactate 2.4  s/p Vanc (7/10-7/13); continue on Zosyn (7/10- 7/14); switch to PO augmentin   -Staph epi isolated in initial cultures; ID consulted-likely contaminant, recommended discontinuing vanc   -F/U repeat blood cultures (ordered 7/12)  -fever trend and monitor for aspiration precautions

## 2018-07-15 NOTE — PROGRESS NOTE ADULT - PROBLEM SELECTOR PLAN 2
Most likely Asp PNA given dementia and prior disposition diet: dysphagia 1  Day 7 of antibiotics today

## 2018-07-15 NOTE — PROGRESS NOTE ADULT - PROBLEM SELECTOR PLAN 4
holding off PO Seroquel and PO valproic acid due to aspiration risk   starting on low dose haldol IV/IM 1mg PRN  consider psych eval if pt becomes agitated

## 2018-07-15 NOTE — PROGRESS NOTE ADULT - PROBLEM SELECTOR PLAN 3
CT A/P significant for pneumobilia, no SBO;  -would monitor for BM and change in abd exams  -pneumobilia present in several CT's - first seen on CT angio chest from May  - surgery consulted-pneumobilia noted to be chronic, present for >1 year, no intervention indicated at this time.

## 2018-07-15 NOTE — PROGRESS NOTE ADULT - SUBJECTIVE AND OBJECTIVE BOX
CHIEF COMPLAINT: Pneumonia     Interval Events: No acute event overnight. Patient is seen and examined at the bedside this morning.     REVIEW OF SYSTEMS:  Limited by patient's dementia     OBJECTIVE:    Vital Signs Last 24 Hrs  T(C): 36.8 (15 Jul 2018 06:34), Max: 37.1 (14 Jul 2018 23:56)  T(F): 98.2 (15 Jul 2018 06:34), Max: 98.7 (14 Jul 2018 23:56)  HR: 83 (15 Jul 2018 06:34) (74 - 88)  BP: 129/81 (15 Jul 2018 06:34) (129/81 - 156/100)  BP(mean): --  RR: 18 (15 Jul 2018 06:34) (18 - 19)  SpO2: 97% (15 Jul 2018 06:34) (96% - 98%)    PHYSICAL EXAM:  General: Elderly demented patient not in any acute distress, Awake and Alert, able to have brief general conversations with appropriate responses  Head: Normocephalic  Eyes:  Clear conjunctiva   Throat: Oral cavity and pharynx normal.   Respiratory: Bilateral lung clear to auscultation, no crackles, no wheezes, no rhonchi.   Cardiovascular: S1/S2 auscultated, systolic murmur appreciated.   Abdomen: Soft, non-tender, nondistended, no guarding or rebound tenderness. Active bowel sounds in all 4 quadrants.   Musculoskeletal: R Hip decubitus/ trochanteric bursitis. Multiple decub ulcers present on the R heel (unstageable), L heel (stage 1), R hip (unstageable-bandaged) and L sacrum (stage 1)    LINES:  MEDICATIONS  (STANDING):  amoxicillin  875 milliGRAM(s)/clavulanate 1 Tablet(s) Oral two times a day  heparin  Injectable 5000 Unit(s) SubCutaneous every 8 hours  hydrALAZINE 25 milliGRAM(s) Oral every 8 hours    MEDICATIONS  (PRN):  haloperidol    Injectable 1 milliGRAM(s) IV Push every 6 hours PRN Agitation        LABS:                                   11.3   15.28 )-----------( 386      ( 15 Jul 2018 05:22 )             33.4     07-15    138  |  102  |  16  ----------------------------<  90  4.0   |  22  |  0.83    Ca    9.0      15 Jul 2018 05:22  Phos  2.9     07-14  Mg     1.9     07-14

## 2018-07-16 LAB
BUN SERPL-MCNC: 15 MG/DL — SIGNIFICANT CHANGE UP (ref 7–23)
CALCIUM SERPL-MCNC: 9.1 MG/DL — SIGNIFICANT CHANGE UP (ref 8.4–10.5)
CHLORIDE SERPL-SCNC: 101 MMOL/L — SIGNIFICANT CHANGE UP (ref 98–107)
CO2 SERPL-SCNC: 23 MMOL/L — SIGNIFICANT CHANGE UP (ref 22–31)
CREAT SERPL-MCNC: 0.84 MG/DL — SIGNIFICANT CHANGE UP (ref 0.5–1.3)
GLUCOSE SERPL-MCNC: 95 MG/DL — SIGNIFICANT CHANGE UP (ref 70–99)
HCT VFR BLD CALC: 32.6 % — LOW (ref 34.5–45)
HGB BLD-MCNC: 10.7 G/DL — LOW (ref 11.5–15.5)
MCHC RBC-ENTMCNC: 30.3 PG — SIGNIFICANT CHANGE UP (ref 27–34)
MCHC RBC-ENTMCNC: 32.8 % — SIGNIFICANT CHANGE UP (ref 32–36)
MCV RBC AUTO: 92.4 FL — SIGNIFICANT CHANGE UP (ref 80–100)
NRBC # FLD: 0 — SIGNIFICANT CHANGE UP
PLATELET # BLD AUTO: 387 K/UL — SIGNIFICANT CHANGE UP (ref 150–400)
PMV BLD: 10 FL — SIGNIFICANT CHANGE UP (ref 7–13)
POTASSIUM SERPL-MCNC: 3.8 MMOL/L — SIGNIFICANT CHANGE UP (ref 3.5–5.3)
POTASSIUM SERPL-SCNC: 3.8 MMOL/L — SIGNIFICANT CHANGE UP (ref 3.5–5.3)
RBC # BLD: 3.53 M/UL — LOW (ref 3.8–5.2)
RBC # FLD: 13.7 % — SIGNIFICANT CHANGE UP (ref 10.3–14.5)
SODIUM SERPL-SCNC: 136 MMOL/L — SIGNIFICANT CHANGE UP (ref 135–145)
WBC # BLD: 13.98 K/UL — HIGH (ref 3.8–10.5)
WBC # FLD AUTO: 13.98 K/UL — HIGH (ref 3.8–10.5)

## 2018-07-16 PROCEDURE — 99232 SBSQ HOSP IP/OBS MODERATE 35: CPT

## 2018-07-16 PROCEDURE — 99232 SBSQ HOSP IP/OBS MODERATE 35: CPT | Mod: GC

## 2018-07-16 RX ORDER — HYDRALAZINE HCL 50 MG
1 TABLET ORAL
Qty: 90 | Refills: 0 | OUTPATIENT
Start: 2018-07-16 | End: 2018-08-14

## 2018-07-16 RX ADMIN — QUETIAPINE FUMARATE 50 MILLIGRAM(S): 200 TABLET, FILM COATED ORAL at 17:58

## 2018-07-16 RX ADMIN — HEPARIN SODIUM 5000 UNIT(S): 5000 INJECTION INTRAVENOUS; SUBCUTANEOUS at 14:05

## 2018-07-16 RX ADMIN — Medication 25 MILLIGRAM(S): at 14:06

## 2018-07-16 RX ADMIN — HEPARIN SODIUM 5000 UNIT(S): 5000 INJECTION INTRAVENOUS; SUBCUTANEOUS at 06:08

## 2018-07-16 RX ADMIN — Medication 500 MILLIGRAM(S): at 17:58

## 2018-07-16 RX ADMIN — Medication 500 MILLIGRAM(S): at 06:08

## 2018-07-16 RX ADMIN — HEPARIN SODIUM 5000 UNIT(S): 5000 INJECTION INTRAVENOUS; SUBCUTANEOUS at 22:31

## 2018-07-16 RX ADMIN — QUETIAPINE FUMARATE 50 MILLIGRAM(S): 200 TABLET, FILM COATED ORAL at 06:08

## 2018-07-16 RX ADMIN — Medication 25 MILLIGRAM(S): at 22:31

## 2018-07-16 RX ADMIN — Medication 25 MILLIGRAM(S): at 06:08

## 2018-07-16 NOTE — PROGRESS NOTE ADULT - SUBJECTIVE AND OBJECTIVE BOX
CC: F/U positive culture finding    Saw/spoke to patient. Patient not able to interact effectively, not answering questions appropriately.    Allergies  Risperdal (Unknown)    ANTIMICROBIALS:  Off    PE:    Vital Signs Last 24 Hrs  T(C): 37 (16 Jul 2018 14:16), Max: 37.2 (15 Jul 2018 21:12)  T(F): 98.6 (16 Jul 2018 14:16), Max: 99 (15 Jul 2018 21:12)  HR: 82 (16 Jul 2018 14:16) (78 - 82)  BP: 121/63 (16 Jul 2018 14:16) (121/63 - 126/62)  RR: 18 (16 Jul 2018 14:16) (18 - 18)  SpO2: 97% (16 Jul 2018 14:16) (97% - 98%)    Gen: AOx0-1, NAD  CV: S1+S2 normal, no murmurs, nontachycardic  Resp: Clear bilat, no resp distress, no crackles/wheezes  Abd: Soft, nontender, +BS  Ext: No LE edema, no wounds    LABS:                        10.7   13.98 )-----------( 387      ( 16 Jul 2018 06:30 )             32.6     07-16    136  |  101  |  15  ----------------------------<  95  3.8   |  23  |  0.84    Ca    9.1      16 Jul 2018 06:30    MICROBIOLOGY:    BLOOD PERIPHERAL  07-12-18 NGTD    BLOOD PERIPHERAL  07-10-18 --  --  Staphylococcus epidermidis  BLOOD CULTURE PCR    (otherwise reviewed)    RADIOLOGY:    No new available

## 2018-07-16 NOTE — DIETITIAN INITIAL EVALUATION ADULT. - PROBLEM SELECTOR PLAN 2
Most likely Asp PNA given dementia and prior disposition diet: dysphagia 1  -f/u S&S recs and MBS  -NPO and no PO meds for now  -Chest PT for now for possible mucus plugging

## 2018-07-16 NOTE — DIETITIAN INITIAL EVALUATION ADULT. - PHYSICAL APPEARANCE
underweight/Unable to perform nutrition focused physical exam as patient confused and complaining of headache at time of visit. Will reattempt as able/appropriate. Patient does appear frail.

## 2018-07-16 NOTE — DIETITIAN INITIAL EVALUATION ADULT. - NS AS NUTRI INTERV STRATEGIES
Although unable to meet criteria for diagnosis of malnutrition at this time due to limited history obtained, patient remains at risk for malnutrition and dietitian remains available for further recommendations as needed

## 2018-07-16 NOTE — DIETITIAN INITIAL EVALUATION ADULT. - PROBLEM SELECTOR PLAN 1
Sepsis 2/2  RML/RLL PNA (likely aspiration) and L > R pleural effusion found on CT A/P, RVP neg, UA wnl, leukocytosis 18, lactate 2.4  -c/w Zosyn q8h and Vanco 1gm qd (renally dosed) to cover possible HCAP;  -f/u BCx2, urine legionella  -fever trend and monitor for aspiration precautions  -NPO for now, f/u MBS and S&S recs

## 2018-07-16 NOTE — DIETITIAN INITIAL EVALUATION ADULT. - PROBLEM SELECTOR PLAN 3
CT A/P significant for pneumobilia, no SBO;  -would monitor for BM and change in abd exams  -Consider surgery c/s

## 2018-07-16 NOTE — PROGRESS NOTE ADULT - PROBLEM SELECTOR PLAN 2
Most likely Asp PNA given dementia and prior disposition diet: dysphagia 1  Day 7 of antibiotics today Most likely Asp PNA given dementia and prior disposition diet: dysphagia 1  s/p 7 days of abx

## 2018-07-16 NOTE — DIETITIAN INITIAL EVALUATION ADULT. - SOURCE
other (specify)/Patient A&Ox1 and unable to participate in interview. No family/caregiver at bedside. Spoke to RN. Information obtained from extensive chart review.

## 2018-07-16 NOTE — DIETITIAN INITIAL EVALUATION ADULT. - OTHER INFO
Initial Dietitian Evaluation 2/2 to extended length of stay. Per chart review patient with medical history of SBO, HTN, HLD, advanced dementia (AOx0-1 baseline) a/w sepsis due to likely RML/RLL aspiration PNA c/b mucus plugging, dehydration in the context of advanced dementia and debility. Patient also found to have multiple decubiti. Unable to obtain detailed information regarding diet/weight history PTA. UBW obtained from past admission at [facility]. NKFA. S/p swallow assessment (VFSS/MBS) with recommendation for Mechanical soft diet with thin liquids. No GI distress (nausea/vomiting/diarrhea/constipation.) RN notes patient eating well in-house. Will continue to monitor PO intake for adequacy.

## 2018-07-16 NOTE — DIETITIAN INITIAL EVALUATION ADULT. - NS AS NUTRI INTERV MEALS SNACK
1. Continue Dysphagia 2 Mechanical Soft, thin liquids diet. 2. Recommend Ensure Enlive 240mls 2x daily (700kcal, 40g protein). 3. Consider multivitamin daily. 4. Please Encourage po intake, assist with meals and menu selections, provide alternatives PRN. 5. Monitor weights, labs, BM's, skin integrity, p.o. intake.

## 2018-07-16 NOTE — PROGRESS NOTE ADULT - PROBLEM SELECTOR PLAN 5
multiple decub ulcers present on the R heel (unstageable), L heel (stage 1), R hip (unstageable) and L sacrum (stage 1)  -f/u wound care consult; recommended consulting surgery  -Surgery B team consulted 7/12-will see patient; f/u recs  ---collagenase twice daily to be applied to wounds multiple decub ulcers present on the R heel (unstageable), L heel (stage 1), R hip (unstageable) and L sacrum (stage 1)  -f/u wound care consult; recommended consulting surgery  -Surgery B team consulted 7/12-to see pts today 7/16  ---collagenase twice daily to be applied to wounds

## 2018-07-16 NOTE — PROGRESS NOTE ADULT - SUBJECTIVE AND OBJECTIVE BOX
CHIEF COMPLAINT:    Interval Events: No acute event overnight. Patient is seen and examined at the bedside this morning.     REVIEW OF SYSTEMS:  Constitutional: No fever, or chills. No recent weight loss or weight gain.   HEENT: No dry eyes or eye irritation. No postnasal drip or nasal congestion.  CV: No chest pain, or palpitations. No orthopnea.   Resp: No cough, or sputum production. No shortness of breath or dyspnea on exertion.   GI: No nausea or vomiting. No diarrhea or constipation. No abdominal pain.   : No dysuria, no nocturia or increased urinary frequency.  Musculoskeletal: No back pain, no myalgias  Skin: No rash or itchiness.   Neurological: No headache or dizziness. No syncope, no weakness, numbness.  Psychiatric: Denies depressed mood.   Endocrine: No cold or heat intolerance. No dry skin.  Hematologic/Lymphatic: No anemia or bleeding problem.     OBJECTIVE:  Vital Signs Last 24 Hrs  T(C): 36.7 (16 Jul 2018 06:07), Max: 37.2 (15 Jul 2018 21:12)  T(F): 98.1 (16 Jul 2018 06:07), Max: 99 (15 Jul 2018 21:12)  HR: 78 (16 Jul 2018 06:07) (78 - 82)  BP: 125/68 (16 Jul 2018 06:07) (125/68 - 129/70)  BP(mean): --  RR: 18 (16 Jul 2018 06:07) (18 - 18)  SpO2: 98% (16 Jul 2018 06:07) (97% - 98%)    CAPILLARY BLOOD GLUCOSE          PHYSICAL EXAM:  General: Alert and cooperative. Not in acute stress. Well developed, well nourished.   Head: Normocephalic, no mass and lesions.  Eyes: Intact visual fields. PERRLA, clear conjunctiva. EOMI, no ptosis.   Throat: Oral cavity and pharynx normal. No inflammation, swelling, exudate, or lesions. Teeth and gingiva in good general condition.  Neck: No lymphadenopathy, no masses, no thyromegaly. Carotid pulses 2+. No JVD.   Respiratory: Bilateral lung clear to auscultation, no crackles, no wheezes, no rhonchi.   Cardiovascular: S1/S2 auscultated, no murmur, or gallop. Rhythm is regular. There is no peripheral edema, cyanosis or pallor. Extremities are warm and well perfused. Capillary refill is less than 2 seconds. No carotid bruits.  Abdomen: Soft, non-tender, nondistended, no guarding or rebound tenderness. Active bowel sounds in all 4 quadrants. No hepatosplenomegaly.   Musculoskeletal: Adequately aligned spine. ROM intact spine and extremities. No joint erythema or tenderness. Normal muscular development. Normal gait.   Extremities: No significant deformity or joint abnormality. Peripheral pulses intact. No varicosities. No peripheral edema, atrophy.   Skin: Intact, no rash. Normal color, texture and turgor with no lesions or eruptions.  Neurological: AOAx4. CN2-12 grosslly intact. Strength and sensation symmetric and intact throughout. Reflexes 2+ throughout. Cerebellar testing normal.  Psychiatry: The mental examination revealed the patient was oriented to person, place, and time. The patient was able to demonstrate good judgement and reason, without hallucinations, abnormal affect or abnormal behaviors during the examination. Patient is not suicidal.     LINES:    HOSPITAL MEDICATIONS:  heparin  Injectable 5000 Unit(s) SubCutaneous every 8 hours      hydrALAZINE 25 milliGRAM(s) Oral every 8 hours        haloperidol    Injectable 1 milliGRAM(s) IV Push every 6 hours PRN  QUEtiapine 50 milliGRAM(s) Oral every 12 hours  valproic  acid Syrup 500 milliGRAM(s) Oral every 12 hours                    LABS:                        10.7   13.98 )-----------( 387      ( 16 Jul 2018 06:30 )             32.6     Hgb Trend: 10.7<--, 11.3<--, 11.9<--, 11.5<--, 11.5<--  07-16    136  |  101  |  15  ----------------------------<  95  3.8   |  23  |  0.84    Ca    9.1      16 Jul 2018 06:30      Creatinine Trend: 0.84<--, 0.83<--, 0.76<--, 0.64<--, 0.68<--, 0.75<--            MICROBIOLOGY:     RADIOLOGY:  [ ] Reviewed and interpreted by me    EKG: CHIEF COMPLAINT: Pneumonia     Interval Events: No acute event overnight. Patient is seen and examined at the bedside this morning.     REVIEW OF SYSTEMS:  Limited by patient's dementia     OBJECTIVE:  Vital Signs Last 24 Hrs  T(C): 36.7 (16 Jul 2018 06:07), Max: 37.2 (15 Jul 2018 21:12)  T(F): 98.1 (16 Jul 2018 06:07), Max: 99 (15 Jul 2018 21:12)  HR: 78 (16 Jul 2018 06:07) (78 - 82)  BP: 125/68 (16 Jul 2018 06:07) (125/68 - 129/70)  BP(mean): --  RR: 18 (16 Jul 2018 06:07) (18 - 18)  SpO2: 98% (16 Jul 2018 06:07) (97% - 98%)    CAPILLARY BLOOD GLUCOSE          PHYSICAL EXAM:  General: Elderly demented patient not in any acute distress, Awake and Alert, able to have brief general conversations with appropriate responses  Head: Normocephalic  Eyes:  Clear conjunctiva   Throat: Oral cavity and pharynx normal.   Respiratory: Bilateral lung clear to auscultation, no crackles, no wheezes, no rhonchi.   Cardiovascular: S1/S2 auscultated, systolic murmur appreciated.   Abdomen: Soft, non-tender, nondistended, no guarding or rebound tenderness. Active bowel sounds in all 4 quadrants.   Musculoskeletal: R Hip decubitus/ trochanteric bursitis. Multiple decub ulcers present on the R heel (unstageable), L heel (stage 1), R hip (unstageable-bandaged) and L sacrum (stage 1)    HOSPITAL MEDICATIONS:  heparin  Injectable 5000 Unit(s) SubCutaneous every 8 hours      hydrALAZINE 25 milliGRAM(s) Oral every 8 hours        haloperidol    Injectable 1 milliGRAM(s) IV Push every 6 hours PRN  QUEtiapine 50 milliGRAM(s) Oral every 12 hours  valproic  acid Syrup 500 milliGRAM(s) Oral every 12 hours                    LABS:                        10.7   13.98 )-----------( 387      ( 16 Jul 2018 06:30 )             32.6     Hgb Trend: 10.7<--, 11.3<--, 11.9<--, 11.5<--, 11.5<--  07-16    136  |  101  |  15  ----------------------------<  95  3.8   |  23  |  0.84    Ca    9.1      16 Jul 2018 06:30      Creatinine Trend: 0.84<--, 0.83<--, 0.76<--, 0.64<--, 0.68<--, 0.75<--

## 2018-07-16 NOTE — CHART NOTE - NSCHARTNOTEFT_GEN_A_CORE
Patient's son contacted this morning. He was informed of plan to discharge tomorrow, pending reinstatement of home care. He understood and accepted the plan.

## 2018-07-16 NOTE — DIETITIAN INITIAL EVALUATION ADULT. - ENERGY NEEDS
Weight: 134.9# (61.2kg) (7/10) Height: 66 inches BMI: 21.8kg/m^2  IBW: 130# (59kg) +/-10%  Skin: right heel susptected DTI, left buttocks suspected DTI, right trochanter unstageable pressure ulcer per flowsheets

## 2018-07-16 NOTE — PROGRESS NOTE ADULT - PROBLEM SELECTOR PLAN 1
Sepsis 2/2  RML/RLL PNA (likely aspiration) and L > R pleural effusion found on CT A/P, RVP neg, UA wnl, leukocytosis 18, lactate 2.4  s/p Vanc (7/10-7/13); continue on Zosyn (7/10- 7/14); switch to PO augmentin   -Staph epi isolated in initial cultures; ID consulted-likely contaminant, recommended discontinuing vanc   -F/U repeat blood cultures (ordered 7/12)  -fever trend and monitor for aspiration precautions Sepsis 2/2  RML/RLL PNA (likely aspiration) and L > R pleural effusion found on CT A/P, RVP neg, UA wnl, leukocytosis 18, lactate 2.4  s/p Vanc (7/10-7/13); continue on Zosyn (7/10- 7/14); switch to PO augmentin (7/14-7/15)  -Staph epi isolated in initial cultures; ID consulted-likely contaminant, recommended discontinuing vanc. Repeat cultures show no organism   -fever trend and monitor for aspiration precautions

## 2018-07-17 VITALS
OXYGEN SATURATION: 100 % | RESPIRATION RATE: 18 BRPM | HEART RATE: 76 BPM | TEMPERATURE: 98 F | SYSTOLIC BLOOD PRESSURE: 108 MMHG | DIASTOLIC BLOOD PRESSURE: 71 MMHG

## 2018-07-17 LAB
BACTERIA BLD CULT: SIGNIFICANT CHANGE UP
BACTERIA BLD CULT: SIGNIFICANT CHANGE UP
BUN SERPL-MCNC: 15 MG/DL — SIGNIFICANT CHANGE UP (ref 7–23)
CALCIUM SERPL-MCNC: 9.2 MG/DL — SIGNIFICANT CHANGE UP (ref 8.4–10.5)
CHLORIDE SERPL-SCNC: 101 MMOL/L — SIGNIFICANT CHANGE UP (ref 98–107)
CO2 SERPL-SCNC: 22 MMOL/L — SIGNIFICANT CHANGE UP (ref 22–31)
CREAT SERPL-MCNC: 0.79 MG/DL — SIGNIFICANT CHANGE UP (ref 0.5–1.3)
GLUCOSE SERPL-MCNC: 86 MG/DL — SIGNIFICANT CHANGE UP (ref 70–99)
HCT VFR BLD CALC: 34 % — LOW (ref 34.5–45)
HGB BLD-MCNC: 11.5 G/DL — SIGNIFICANT CHANGE UP (ref 11.5–15.5)
MCHC RBC-ENTMCNC: 31.8 PG — SIGNIFICANT CHANGE UP (ref 27–34)
MCHC RBC-ENTMCNC: 33.8 % — SIGNIFICANT CHANGE UP (ref 32–36)
MCV RBC AUTO: 93.9 FL — SIGNIFICANT CHANGE UP (ref 80–100)
NRBC # FLD: 0 — SIGNIFICANT CHANGE UP
PLATELET # BLD AUTO: 153 K/UL — SIGNIFICANT CHANGE UP (ref 150–400)
PMV BLD: 12.1 FL — SIGNIFICANT CHANGE UP (ref 7–13)
POTASSIUM SERPL-MCNC: 4.1 MMOL/L — SIGNIFICANT CHANGE UP (ref 3.5–5.3)
POTASSIUM SERPL-SCNC: 4.1 MMOL/L — SIGNIFICANT CHANGE UP (ref 3.5–5.3)
RBC # BLD: 3.62 M/UL — LOW (ref 3.8–5.2)
RBC # FLD: 13.8 % — SIGNIFICANT CHANGE UP (ref 10.3–14.5)
SODIUM SERPL-SCNC: 138 MMOL/L — SIGNIFICANT CHANGE UP (ref 135–145)
WBC # BLD: 11.09 K/UL — HIGH (ref 3.8–10.5)
WBC # FLD AUTO: 11.09 K/UL — HIGH (ref 3.8–10.5)

## 2018-07-17 PROCEDURE — 99223 1ST HOSP IP/OBS HIGH 75: CPT

## 2018-07-17 PROCEDURE — 99239 HOSP IP/OBS DSCHRG MGMT >30: CPT

## 2018-07-17 RX ORDER — HYDRALAZINE HCL 50 MG
1 TABLET ORAL
Qty: 90 | Refills: 0 | OUTPATIENT
Start: 2018-07-17 | End: 2018-08-15

## 2018-07-17 RX ADMIN — QUETIAPINE FUMARATE 50 MILLIGRAM(S): 200 TABLET, FILM COATED ORAL at 06:06

## 2018-07-17 RX ADMIN — Medication 500 MILLIGRAM(S): at 06:06

## 2018-07-17 RX ADMIN — HEPARIN SODIUM 5000 UNIT(S): 5000 INJECTION INTRAVENOUS; SUBCUTANEOUS at 06:06

## 2018-07-17 NOTE — PROGRESS NOTE ADULT - PROBLEM SELECTOR PROBLEM 2
Aspiration pneumonia of right lower lobe, unspecified aspiration pneumonia type

## 2018-07-17 NOTE — PROGRESS NOTE ADULT - PROBLEM SELECTOR PLAN 1
Sepsis 2/2  RML/RLL PNA (likely aspiration) and L > R pleural effusion found on CT A/P, RVP neg, UA wnl, leukocytosis 18, lactate 2.4  s/p Vanc (7/10-7/13); continue on Zosyn (7/10- 7/14); switch to PO augmentin (7/14-7/15)  -Staph epi isolated in initial cultures; ID consulted-likely contaminant, recommended discontinuing vanc. Repeat cultures show no organism   -fever trend and monitor for aspiration precautions

## 2018-07-17 NOTE — PROGRESS NOTE ADULT - SUBJECTIVE AND OBJECTIVE BOX
CHIEF COMPLAINT: Pneumonia     Interval Events: No acute event overnight. Patient is seen and examined at the bedside this morning.     REVIEW OF SYSTEMS:  Limited by patient's dementia       OBJECTIVE:  Vital Signs Last 24 Hrs  T(C): 36.7 (17 Jul 2018 06:01), Max: 37 (16 Jul 2018 14:16)  T(F): 98 (17 Jul 2018 06:01), Max: 98.6 (16 Jul 2018 14:16)  HR: 76 (17 Jul 2018 06:01) (75 - 82)  BP: 108/71 (17 Jul 2018 06:01) (108/71 - 136/67)  BP(mean): --  RR: 18 (17 Jul 2018 06:01) (18 - 18)  SpO2: 100% (17 Jul 2018 06:01) (97% - 100%)    07-16 @ 07:01  -  07-17 @ 07:00  --------------------------------------------------------  IN: 80 mL / OUT: 0 mL / NET: 80 mL      CAPILLARY BLOOD GLUCOSE          PHYSICAL EXAM:  General: Elderly demented patient not in any acute distress, Awake and Alert, able to have brief general conversations with appropriate responses  Head: Normocephalic  Eyes:  Clear conjunctiva   Throat: Oral cavity and pharynx normal.   Respiratory: Bilateral lung clear to auscultation, no crackles, no wheezes, no rhonchi.   Cardiovascular: S1/S2 auscultated, systolic murmur appreciated.   Abdomen: Soft, non-tender, nondistended, no guarding or rebound tenderness. Active bowel sounds in all 4 quadrants.   Musculoskeletal: R Hip decubitus/ trochanteric bursitis. Multiple decub ulcers present on the R heel (unstageable), L heel (stage 1), R hip (unstageable-bandaged) and L sacrum (stage 1)      HOSPITAL MEDICATIONS:  heparin  Injectable 5000 Unit(s) SubCutaneous every 8 hours  hydrALAZINE 25 milliGRAM(s) Oral every 8 hours  haloperidol    Injectable 1 milliGRAM(s) IV Push every 6 hours PRN  QUEtiapine 50 milliGRAM(s) Oral every 12 hours  valproic  acid Syrup 500 milliGRAM(s) Oral every 12 hours          LABS:                        11.5   11.09 )-----------( 153      ( 17 Jul 2018 06:30 )             34.0     Hgb Trend: 11.5<--, 10.7<--, 11.3<--, 11.9<--, 11.5<--  07-17    138  |  101  |  15  ----------------------------<  86  4.1   |  22  |  0.79    Ca    9.2      17 Jul 2018 06:30      Creatinine Trend: 0.79<--, 0.84<--, 0.83<--, 0.76<--, 0.64<--, 0.68<--

## 2018-07-17 NOTE — PROGRESS NOTE ADULT - ASSESSMENT
89y/o F w/ PMH of SBO, HTN, HLD, advanced dementia (AOx0-1 baseline) a/w sepsis due to likely RML/RLL aspiration PNA c/b mucus plugging, dehydration  in the context of advanced dementia and debility; Found to have multiple decubiti; Family reports Rt shoulder pain - no X-ray finding of a fracture;
89 yo Female, bedbound, w/ Hx of SBO, HTN, HLD, advanced dementia (AOx0-1 baseline) presents initially because the son was concerned about a Rt shoulder injury and asymmetry in her shoulder.   Here, patient with leukocytosis, no fevers  Dementia, unclear baseline  BCX with staph epi--suspect contaminant  CT with mild inflammation at R greater trochanter, as well as ? PNA  Suspect leukocytosis related to pneumonia/aspiration  Abx stopped over weekend, short course for aspiration  Overall, positive culture finding, aspiration, pneumonia, leukocytosis  - Continue off abx  - Wound care  - Trend leukocytosis  - Would check surveillance cultures ~7/20 to evaluate for any evidence further bacteremia (1 week after vanco stopped); can be done as outpatient if DC planning  - If febrile would repeat cultures prior to restarting abx  - Will sign off. Please call with further questions or change in status.    Emmanuel Victoria MD  Pager [phone]  After 5pm and on weekends call [phone]
89y/o F w/ PMH of SBO, HTN, HLD, advanced dementia (AOx0-1 baseline) a/w sepsis due to likely RML/RLL aspiration PNA c/b mucus plugging, dehydration  in the context of advanced dementia and debility; Found to have multiple decubiti; Family reports Rt shoulder pain - no X-ray finding of a fracture;
89y/o F w/ PMH of SBO, HTN, HLD, advanced dementia (AOx0-1 baseline) a/w sepsis due to likely RML/RLL aspiration PNA c/b mucus plugging, dehydration  in the context of advanced dementia and debility; Found to have multiple decubiti; Family reports Rt shoulder pain - no X-ray finding of a fracture;
89y/o F w/ PMH of SBO, HTN, HLD, advanced dementia (AOx0-1 baseline) a/w sepsis due to likely RML/RLL aspiration PNA c/b mucus plugging, dehydration  in the context of advanced dementia and debility; Found to have multiple decubiti; Family reports Rt shoulder pain - no X-ray finding of a fracture;

## 2018-07-17 NOTE — PROGRESS NOTE ADULT - PROVIDER SPECIALTY LIST ADULT
Infectious Disease
Internal Medicine
Hospitalist
Internal Medicine

## 2018-07-17 NOTE — PROGRESS NOTE ADULT - PROBLEM SELECTOR PLAN 5
multiple decub ulcers present on the R heel (unstageable), L heel (stage 1), R hip (unstageable) and L sacrum (stage 1)  -f/u wound care consult; recommended consulting surgery  -Surgery B team consulted 7/12-to see pts today 7/16  ---collagenase twice daily to be applied to wounds

## 2018-07-17 NOTE — CONSULT NOTE ADULT - SUBJECTIVE AND OBJECTIVE BOX
Patient is a 89y old  Female who presents with a chief complaint of Pneumonia (13 Jul 2018 12:25) bedbound, w/ Hx of SBO, HTN, HLD, advanced dementia (AOx0-1 baseline) presents initially because the son was concerned about a Rt shoulder injury and asymmetry in her shoulder. Patient unable to provide meaningful responses;   In the ED /72, 80, RR 18 and 96% on RA; Given Zosyn x1, 1.5 L of NS (10 Jul 2018 05:34)  currently afebrile noted to have right trochanter, buttocks and right heel injury    REVIEW OF SYSTEMS-please see history, patient has dementia  Allergies    Risperdal (Unknown)    Intolerances      General:	all others negative  Skin/Breast:  ENMT:	  Respiratory and Thorax:  Cardiovascular:	  Gastrointestinal:	  Genitourinary:	  Musculoskeletal:	  Neurological:	  Endocrine:	    MEDICATIONS  (STANDING):  heparin  Injectable 5000 Unit(s) SubCutaneous every 8 hours  hydrALAZINE 25 milliGRAM(s) Oral every 8 hours  QUEtiapine 50 milliGRAM(s) Oral every 12 hours  valproic  acid Syrup 500 milliGRAM(s) Oral every 12 hours    MEDICATIONS  (PRN):  haloperidol    Injectable 1 milliGRAM(s) IV Push every 6 hours PRN Agitation      FAMILY HISTORY:  No pertinent family history of ulcers in first degree relatives      Social history: non smoker    PAST MEDICAL & SURGICAL HISTORY:  Dementia  History of Spinal Stenosis  Lung Nodules  Hepatitis C: possible during transfusion in 1970&#x27;s  Carotid Artery Disease  Tendonitis: left hand: s/p steroid injection 3/10  Back Pain  Hypertension  Hyperlipidemia  S/P small bowel resection  History of Carotid Endarterectomy: right 2008  S/P Dilation and Curettage: 1970&#x27;s menorrhagia  After Cataract, Bilateral  S/P Cholecystectomy: laparoscopic 2 years ago  History of Laminectomy: 1970&#x27;s  S/P Knee Replacement: left knee 1997  right knee 2004      I&O's Summary    16 Jul 2018 07:01  -  17 Jul 2018 07:00  --------------------------------------------------------  IN: 80 mL / OUT: 0 mL / NET: 80 mL        Vital Signs Last 24 Hrs  T(C): 36.7 (17 Jul 2018 06:01), Max: 36.8 (16 Jul 2018 21:17)  T(F): 98 (17 Jul 2018 06:01), Max: 98.2 (16 Jul 2018 21:17)  HR: 76 (17 Jul 2018 06:01) (75 - 76)  BP: 108/71 (17 Jul 2018 06:01) (108/71 - 136/67)  BP(mean): --  RR: 18 (17 Jul 2018 06:01) (18 - 18)  SpO2: 100% (17 Jul 2018 06:01) (97% - 100%)        PHYSICAL EXAM:wt 61.2, bmi 21.8  Constitutional: nad, arousable, has full words, confused  ENMT: clear eomi, perrla  Back: see skin  Respiratory: clear/ s/p pneum,onia  Cardiovascular:rrr  Gastrointestinal:non tender scars from lap nora  gu incontinent stool and urine  vascular non pal pulses cap refill>3s biphasic doppler  Extremities:bilat knee scars from kr  Skin: left buttuck/sacral area 5x 2.5x.2 right hip stage 2 .5x.7x.2,   Musculoskeletal:    CBC Full  -  ( 17 Jul 2018 06:30 )  WBC Count : 11.09 K/uL  Hemoglobin : 11.5 g/dL  Hematocrit : 34.0 %  Platelet Count - Automated : 153 K/uL  Mean Cell Volume : 93.9 fL  Mean Cell Hemoglobin : 31.8 pg  Mean Cell Hemoglobin Concentration : 33.8 %          07-17    138  |  101  |  15  ----------------------------<  86  4.1   |  22  |  0.79    Ca    9.2      17 Jul 2018 06:30        eGFR if AA76  eGFR if non AA66          Radiology:pneumobilia s/p lap nora  pneumonia left, right atelectasis/ old rib fractures left

## 2018-07-17 NOTE — PROGRESS NOTE ADULT - ATTENDING COMMENTS
Feeling well. Pleasant, following instructions, and answering questions appropriately. Denies pain. Denies other symptoms.    Patient is medically stable for discharge.
Pt seen and examined in AM. Pt stable. d/c planning. Time 40 min
Pt seen and examined in AM. Pt stable. d/c planning.   d/w Dr. Victoria regarding discharge plan

## 2018-07-17 NOTE — CONSULT NOTE ADULT - ASSESSMENT
89y old  Female withsacral/buttock , right hip and right heel stage 2  pressure ulcers  Pneumonia (13 Jul 2018 12:25) bedbound, w/ Hx of SBO, HTN, HLD, advanced dementia (AOx0-1 baseline)   suggest offload ( all wounds on right), heel pads and low air mattress  agree with medihoney topical to trochanter , and to sacral buttock with foam, for heel prefer betadine with karine and wrap daily, z boots.  No sharp debridement at this time needed  dvt prophylaxis  maximize nutrition with protein and calories

## 2018-07-17 NOTE — PROGRESS NOTE ADULT - PROBLEM SELECTOR PROBLEM 3
Pneumobilia

## 2018-07-17 NOTE — PROGRESS NOTE ADULT - PROBLEM SELECTOR PLAN 8
DVT ppx w/ Hep subq  -To be discharged at 11;00 am today    Ori Hernandez PGY1  Internal Medicine [facility]]  pager [phone]

## 2018-07-18 DIAGNOSIS — F03.90 UNSPECIFIED DEMENTIA W/OUT BEHAVIORAL DISTURBANCE: ICD-10-CM

## 2018-07-18 RX ORDER — QUETIAPINE FUMARATE 50 MG/1
50 TABLET ORAL TWICE DAILY
Refills: 0 | Status: ACTIVE | COMMUNITY
Start: 2018-07-18

## 2018-07-18 RX ORDER — OXYCODONE HYDROCHLORIDE AND ACETAMINOPHEN 5; 325 MG/1; MG/1
5-325 TABLET ORAL
Refills: 0 | Status: ACTIVE | COMMUNITY
Start: 2018-07-18

## 2018-07-18 RX ORDER — VALPROIC ACID 250 MG/1
250 CAPSULE, LIQUID FILLED ORAL TWICE DAILY
Refills: 0 | Status: ACTIVE | COMMUNITY
Start: 2018-07-18

## 2018-07-18 RX ORDER — ALPRAZOLAM 0.5 MG/1
0.5 TABLET ORAL TWICE DAILY
Refills: 0 | Status: ACTIVE | COMMUNITY
Start: 2018-07-18

## 2018-12-13 NOTE — ED ADULT NURSE NOTE - DISCHARGE DATE/TIME
19-Sep-2017 23:25 Composite Graft Text: The defect edges were debeveled with a #15 scalpel blade.  Given the location of the defect, shape of the defect, the proximity to free margins and the fact the defect was full thickness a composite graft was deemed most appropriate.  The defect was outline and then transferred to the donor site.  A full thickness graft was then excised from the donor site. The graft was then placed in the primary defect, oriented appropriately and then sutured into place.  The secondary defect was then repaired using a primary closure.

## 2019-01-01 NOTE — ED PROVIDER NOTE - OBJECTIVE STATEMENT
89 yo F with PMHx of dementia, SBO presents to the ED brought in by aide and family member with concern for a questionable right shoulder injury, Son was concerned because for the past 3 days patient right shoulder looks asymmetric, with decreased ROM, tenderness to palpations. Pt recently dced from hospital for SBO, aide reporting no BM for 4 days but patient also has poor PO intake.   Aide reporting no fever, no vomiting. Pt only complaints randomly of chronic back pain. (2) more than 100 beats/min

## 2019-01-09 NOTE — PROGRESS NOTE ADULT - SUBJECTIVE AND OBJECTIVE BOX
Subjective: Patient seen and examined. No new events except as noted.     SUBJECTIVE/ROS:  No chest pain, or sob.       MEDICATIONS:  MEDICATIONS  (STANDING):  cholecalciferol 1000 Unit(s) Oral daily  clobetasol 0.05% Ointment 1 Application(s) Topical two times a day  diVALproex  milliGRAM(s) Oral every 12 hours  heparin  Injectable 5000 Unit(s) SubCutaneous every 8 hours  QUEtiapine 50 milliGRAM(s) Oral two times a day  sodium chloride 0.9%. 1000 milliLiter(s) (75 mL/Hr) IV Continuous <Continuous>      PHYSICAL EXAM:  T(C): 36.6 (10-15-17 @ 07:59), Max: 36.7 (10-14-17 @ 15:32)  HR: 71 (10-15-17 @ 07:59) (71 - 74)  BP: 161/85 (10-15-17 @ 07:59) (102/67 - 161/85)  RR: 18 (10-15-17 @ 07:59) (18 - 18)  SpO2: 97% (10-15-17 @ 07:59) (97% - 97%)  Wt(kg): --  I&O's Summary    14 Oct 2017 07:01  -  15 Oct 2017 07:00  --------------------------------------------------------  IN: 0 mL / OUT: 500 mL / NET: -500 mL          Appearance: Normal	  HEENT:   Normal oral mucosa, PERRL, EOMI	  Cardiovascular: Normal S1 S2,    Murmur:   Neck: JVP normal  Respiratory: Lungs clear to auscultation  Gastrointestinal:  Soft, Non-tender, + BS	  Skin: normal   Neuro: No gross deficits.   Psychiatry:  Mood & affect appropriate  Ext: No edema        LABS:    CARDIAC MARKERS:                  proBNP:   Lipid Profile:   HgA1c:   TSH:           TELEMETRY: 	    ECG:  	  RADIOLOGY:   DIAGNOSTIC TESTING:  Echocardiogram:  Catheterization:  Stress Test:    OTHER: Home

## 2019-02-22 NOTE — PROGRESS NOTE ADULT - CARDIOVASCULAR
Regular rate & rhythm, normal S1, S2; no murmurs, gallops or rubs; no S3, S4
Regular rate & rhythm, normal S1, S2; no murmurs, gallops or rubs; no S3, S4
mild fatigue

## 2019-03-30 ENCOUNTER — INPATIENT (INPATIENT)
Facility: HOSPITAL | Age: 84
LOS: 4 days | Discharge: HOME HEALTH | End: 2019-04-04
Attending: HOSPITALIST | Admitting: HOSPITALIST
Payer: MEDICARE

## 2019-03-30 VITALS
OXYGEN SATURATION: 98 % | DIASTOLIC BLOOD PRESSURE: 73 MMHG | SYSTOLIC BLOOD PRESSURE: 113 MMHG | RESPIRATION RATE: 26 BRPM | TEMPERATURE: 101 F | HEART RATE: 92 BPM

## 2019-03-30 DIAGNOSIS — Z90.49 ACQUIRED ABSENCE OF OTHER SPECIFIED PARTS OF DIGESTIVE TRACT: Chronic | ICD-10-CM

## 2019-03-30 LAB
ALBUMIN SERPL ELPH-MCNC: 2.8 G/DL — LOW (ref 3.3–5)
ALP SERPL-CCNC: 55 U/L — SIGNIFICANT CHANGE UP (ref 40–120)
ALT FLD-CCNC: 38 U/L — HIGH (ref 4–33)
ANION GAP SERPL CALC-SCNC: 12 MMO/L — SIGNIFICANT CHANGE UP (ref 7–14)
AST SERPL-CCNC: 44 U/L — HIGH (ref 4–32)
BASE EXCESS BLDV CALC-SCNC: 5 MMOL/L — SIGNIFICANT CHANGE UP
BASOPHILS # BLD AUTO: 0.06 K/UL — SIGNIFICANT CHANGE UP (ref 0–0.2)
BASOPHILS NFR BLD AUTO: 0.4 % — SIGNIFICANT CHANGE UP (ref 0–2)
BILIRUB SERPL-MCNC: 0.4 MG/DL — SIGNIFICANT CHANGE UP (ref 0.2–1.2)
BLOOD GAS VENOUS - CREATININE: 0.62 MG/DL — SIGNIFICANT CHANGE UP (ref 0.5–1.3)
BUN SERPL-MCNC: 45 MG/DL — HIGH (ref 7–23)
CALCIUM SERPL-MCNC: 8.7 MG/DL — SIGNIFICANT CHANGE UP (ref 8.4–10.5)
CHLORIDE BLDV-SCNC: 112 MMOL/L — HIGH (ref 96–108)
CHLORIDE SERPL-SCNC: 110 MMOL/L — HIGH (ref 98–107)
CO2 SERPL-SCNC: 27 MMOL/L — SIGNIFICANT CHANGE UP (ref 22–31)
CREAT SERPL-MCNC: 0.91 MG/DL — SIGNIFICANT CHANGE UP (ref 0.5–1.3)
EOSINOPHIL # BLD AUTO: 0.02 K/UL — SIGNIFICANT CHANGE UP (ref 0–0.5)
EOSINOPHIL NFR BLD AUTO: 0.1 % — SIGNIFICANT CHANGE UP (ref 0–6)
GAS PNL BLDV: 146 MMOL/L — SIGNIFICANT CHANGE UP (ref 136–146)
GLUCOSE BLDV-MCNC: 142 — HIGH (ref 70–99)
GLUCOSE SERPL-MCNC: 143 MG/DL — HIGH (ref 70–99)
HCO3 BLDV-SCNC: 29 MMOL/L — HIGH (ref 20–27)
HCT VFR BLD CALC: 44.3 % — SIGNIFICANT CHANGE UP (ref 34.5–45)
HCT VFR BLDV CALC: 43.5 % — SIGNIFICANT CHANGE UP (ref 34.5–45)
HGB BLD-MCNC: 14.1 G/DL — SIGNIFICANT CHANGE UP (ref 11.5–15.5)
HGB BLDV-MCNC: 14.2 G/DL — SIGNIFICANT CHANGE UP (ref 11.5–15.5)
IMM GRANULOCYTES NFR BLD AUTO: 1.1 % — SIGNIFICANT CHANGE UP (ref 0–1.5)
LACTATE BLDV-MCNC: 2.4 MMOL/L — HIGH (ref 0.5–2)
LYMPHOCYTES # BLD AUTO: 18.1 % — SIGNIFICANT CHANGE UP (ref 13–44)
LYMPHOCYTES # BLD AUTO: 2.75 K/UL — SIGNIFICANT CHANGE UP (ref 1–3.3)
MAGNESIUM SERPL-MCNC: 2.4 MG/DL — SIGNIFICANT CHANGE UP (ref 1.6–2.6)
MCHC RBC-ENTMCNC: 31.5 PG — SIGNIFICANT CHANGE UP (ref 27–34)
MCHC RBC-ENTMCNC: 31.8 % — LOW (ref 32–36)
MCV RBC AUTO: 98.9 FL — SIGNIFICANT CHANGE UP (ref 80–100)
MONOCYTES # BLD AUTO: 2.05 K/UL — HIGH (ref 0–0.9)
MONOCYTES NFR BLD AUTO: 13.5 % — SIGNIFICANT CHANGE UP (ref 2–14)
NEUTROPHILS # BLD AUTO: 10.18 K/UL — HIGH (ref 1.8–7.4)
NEUTROPHILS NFR BLD AUTO: 66.8 % — SIGNIFICANT CHANGE UP (ref 43–77)
NRBC # FLD: 0 K/UL — SIGNIFICANT CHANGE UP (ref 0–0)
PCO2 BLDV: 38 MMHG — LOW (ref 41–51)
PH BLDV: 7.49 PH — HIGH (ref 7.32–7.43)
PHOSPHATE SERPL-MCNC: 2.4 MG/DL — LOW (ref 2.5–4.5)
PLATELET # BLD AUTO: 190 K/UL — SIGNIFICANT CHANGE UP (ref 150–400)
PMV BLD: 10.9 FL — SIGNIFICANT CHANGE UP (ref 7–13)
PO2 BLDV: 65 MMHG — HIGH (ref 35–40)
POTASSIUM BLDV-SCNC: 3.5 MMOL/L — SIGNIFICANT CHANGE UP (ref 3.4–4.5)
POTASSIUM SERPL-MCNC: 3.7 MMOL/L — SIGNIFICANT CHANGE UP (ref 3.5–5.3)
POTASSIUM SERPL-SCNC: 3.7 MMOL/L — SIGNIFICANT CHANGE UP (ref 3.5–5.3)
PROT SERPL-MCNC: 6.3 G/DL — SIGNIFICANT CHANGE UP (ref 6–8.3)
RBC # BLD: 4.48 M/UL — SIGNIFICANT CHANGE UP (ref 3.8–5.2)
RBC # FLD: 13.2 % — SIGNIFICANT CHANGE UP (ref 10.3–14.5)
SAO2 % BLDV: 90.3 % — HIGH (ref 60–85)
SODIUM SERPL-SCNC: 149 MMOL/L — HIGH (ref 135–145)
VALPROATE SERPL-MCNC: 62.2 UG/ML — SIGNIFICANT CHANGE UP (ref 50–100)
WBC # BLD: 15.23 K/UL — HIGH (ref 3.8–10.5)
WBC # FLD AUTO: 15.23 K/UL — HIGH (ref 3.8–10.5)

## 2019-03-30 PROCEDURE — 71045 X-RAY EXAM CHEST 1 VIEW: CPT | Mod: 26

## 2019-03-30 RX ORDER — PIPERACILLIN AND TAZOBACTAM 4; .5 G/20ML; G/20ML
3.38 INJECTION, POWDER, LYOPHILIZED, FOR SOLUTION INTRAVENOUS ONCE
Qty: 0 | Refills: 0 | Status: COMPLETED | OUTPATIENT
Start: 2019-03-30 | End: 2019-03-30

## 2019-03-30 RX ORDER — ALBUTEROL 90 UG/1
2.5 AEROSOL, METERED ORAL ONCE
Qty: 0 | Refills: 0 | Status: COMPLETED | OUTPATIENT
Start: 2019-03-30 | End: 2019-03-30

## 2019-03-30 RX ORDER — SODIUM CHLORIDE 9 MG/ML
2000 INJECTION INTRAMUSCULAR; INTRAVENOUS; SUBCUTANEOUS ONCE
Qty: 0 | Refills: 0 | Status: COMPLETED | OUTPATIENT
Start: 2019-03-30 | End: 2019-03-30

## 2019-03-30 RX ORDER — VANCOMYCIN HCL 1 G
1000 VIAL (EA) INTRAVENOUS ONCE
Qty: 0 | Refills: 0 | Status: COMPLETED | OUTPATIENT
Start: 2019-03-30 | End: 2019-03-31

## 2019-03-30 RX ORDER — ACETAMINOPHEN 500 MG
1000 TABLET ORAL ONCE
Qty: 0 | Refills: 0 | Status: COMPLETED | OUTPATIENT
Start: 2019-03-30 | End: 2019-03-30

## 2019-03-30 RX ADMIN — Medication 400 MILLIGRAM(S): at 23:10

## 2019-03-30 RX ADMIN — SODIUM CHLORIDE 4000 MILLILITER(S): 9 INJECTION INTRAMUSCULAR; INTRAVENOUS; SUBCUTANEOUS at 23:09

## 2019-03-30 RX ADMIN — PIPERACILLIN AND TAZOBACTAM 200 GRAM(S): 4; .5 INJECTION, POWDER, LYOPHILIZED, FOR SOLUTION INTRAVENOUS at 23:10

## 2019-03-30 RX ADMIN — ALBUTEROL 2.5 MILLIGRAM(S): 90 AEROSOL, METERED ORAL at 23:09

## 2019-03-30 NOTE — ED PROVIDER NOTE - ATTENDING CONTRIBUTION TO CARE
I, Matthew Nicholson MD, personally saw the patient with the resident, and completed the key components of the history and physical exam. I then discussed the management plan with the resident.    pt w/ fever/tachycardia - no signs resp distress.  broad spectrum abx started, fluids started, infectious w/u in progress.

## 2019-03-30 NOTE — ED PROVIDER NOTE - PHYSICAL EXAMINATION
Bharat:  ***GEN - NAD; well appearing;   ***PULMONARY - CTA b/l, symmetric breath sounds. ***CARDIAC -s1s2, RRR, no M,G,R  ***ABDOMEN - ND, NT, soft, no guarding, no rebound, no tiffanie's   ***SKIN - stage 1 ulcer back, ulcer to foot bilateral  ***NEUROLOGIC - alert  ***PSYCH - alert

## 2019-03-30 NOTE — ED PROVIDER NOTE - CLINICAL SUMMARY MEDICAL DECISION MAKING FREE TEXT BOX
89 yo F c PMH of HTN, HLD, advanced dementia p/w 2 hour hx of fever, and lethargy per triage. On exam, T100.5F, RR 26 VS otherwise wnl, pt appears ill is tachypneic with diffuse ronchi. labs, EKG, CXR, UA, BCs pending, IVF and vanc/zosyn for tx. will reassess and attempt to reach pts son 90 yo F c PMH of HTN, HLD, advanced dementia p/w 2 hour hx of fever, and lethargy per triage. On exam, T100.5F, RR 26 VS otherwise wnl, tachypneic with diffuse ronchi. labs, EKG, CXR, UA, BCs pending, IVF and vanc/zosyn for tx. will reassess and attempt to reach pts son

## 2019-03-30 NOTE — ED ADULT TRIAGE NOTE - CHIEF COMPLAINT QUOTE
pt BIBEMS from home, for lethargy and fever x2 hours. pt TMax 101, given 1000mg Tylenol by family prior to EMS arrival. per EMS, son states pt is a DNR/DNI, however no paperwork with patient.  on scene. at baseline- pt awake and sometimes alert. arrives w 20G IV to L hand placed by EMS w NS infusing.

## 2019-03-30 NOTE — ED PROVIDER NOTE - OBJECTIVE STATEMENT
89 yo F c PMH of HTN, HLD, advanced dementia p/w 2 hour hx of fever. per triage note: "pt BIBEMS from home, for lethargy and fever x2 hours. pt TMax 101, given 1000mg Tylenol by family prior to EMS arrival. per EMS, son states pt is a DNR/DNI, however no paperwork with patient.  on scene. at baseline- pt awake and sometimes alert." HHA only person at bedside only started working with pt today and doesn't know pt's presentation or baseline but thinks she is here for fever. son: [phone] is also healthcare proxy; states pt is DNR; DNI status: if pt can be temporarily intubated ok, but no to permanent intubation    89 yo F c PMH of HTN, HLD, advanced dementia p/w 2 hour hx of fever. per triage note: "pt BIBEMS from home, for lethargy and fever x2 hours. pt TMax 101, given 1000mg Tylenol by family prior to EMS arrival. per EMS, son states pt is a DNR/DNI, however no paperwork with patient.  on scene. at baseline- pt awake and sometimes alert." HHA only person at bedside only started working with pt today and doesn't know pt's presentation or baseline but thinks she is here for fever. son: [phone] is also healthcare proxy; states pt is DNR; DNI status: if pt can be temporarily intubated ok, but no to permanent intubation  89 yo F c PMH of HTN, HLD, advanced dementia p/w 2 hour hx of fever. per triage note: "pt BIBEMS from home, for lethargy and fever x2 hours. pt TMax 101, given 1000mg Tylenol by family prior to EMS arrival. per EMS, son states pt is a DNR/DNI, however no paperwork with patient.  on scene. at baseline- pt awake and sometimes alert." HHA only person at bedside only started working with pt today and doesn't know pt's presentation or baseline but thinks she is here for fever.    Bharat: 89 y/o F w/ hx as noted pw lethargy x 2 days and fever x 2 hours.  HHA accompanies pt knows nothing about pt baseline, medical Hx, or current status. Per son, notes lethargy and decreased PO.  No vomiting, diarrhea.

## 2019-03-31 DIAGNOSIS — G93.40 ENCEPHALOPATHY, UNSPECIFIED: ICD-10-CM

## 2019-03-31 DIAGNOSIS — A41.9 SEPSIS, UNSPECIFIED ORGANISM: ICD-10-CM

## 2019-03-31 DIAGNOSIS — F03.90 UNSPECIFIED DEMENTIA WITHOUT BEHAVIORAL DISTURBANCE: ICD-10-CM

## 2019-03-31 DIAGNOSIS — E87.0 HYPEROSMOLALITY AND HYPERNATREMIA: ICD-10-CM

## 2019-03-31 DIAGNOSIS — Z71.89 OTHER SPECIFIED COUNSELING: ICD-10-CM

## 2019-03-31 DIAGNOSIS — Z29.9 ENCOUNTER FOR PROPHYLACTIC MEASURES, UNSPECIFIED: ICD-10-CM

## 2019-03-31 DIAGNOSIS — N39.0 URINARY TRACT INFECTION, SITE NOT SPECIFIED: ICD-10-CM

## 2019-03-31 DIAGNOSIS — E87.8 OTHER DISORDERS OF ELECTROLYTE AND FLUID BALANCE, NOT ELSEWHERE CLASSIFIED: ICD-10-CM

## 2019-03-31 LAB
ALBUMIN SERPL ELPH-MCNC: 2.4 G/DL — LOW (ref 3.3–5)
ALP SERPL-CCNC: 51 U/L — SIGNIFICANT CHANGE UP (ref 40–120)
ALT FLD-CCNC: 34 U/L — HIGH (ref 4–33)
ANION GAP SERPL CALC-SCNC: 12 MMO/L — SIGNIFICANT CHANGE UP (ref 7–14)
ANION GAP SERPL CALC-SCNC: 12 MMO/L — SIGNIFICANT CHANGE UP (ref 7–14)
ANION GAP SERPL CALC-SCNC: 13 MMO/L — SIGNIFICANT CHANGE UP (ref 7–14)
APPEARANCE UR: CLEAR — SIGNIFICANT CHANGE UP
AST SERPL-CCNC: 40 U/L — HIGH (ref 4–32)
B PERT DNA SPEC QL NAA+PROBE: NOT DETECTED — SIGNIFICANT CHANGE UP
BACTERIA # UR AUTO: SIGNIFICANT CHANGE UP
BASE EXCESS BLDA CALC-SCNC: 0.6 MMOL/L — SIGNIFICANT CHANGE UP
BASE EXCESS BLDV CALC-SCNC: 3 MMOL/L — SIGNIFICANT CHANGE UP
BASOPHILS # BLD AUTO: 0.07 K/UL — SIGNIFICANT CHANGE UP (ref 0–0.2)
BASOPHILS # BLD AUTO: 0.08 K/UL — SIGNIFICANT CHANGE UP (ref 0–0.2)
BASOPHILS NFR BLD AUTO: 0.4 % — SIGNIFICANT CHANGE UP (ref 0–2)
BASOPHILS NFR BLD AUTO: 0.5 % — SIGNIFICANT CHANGE UP (ref 0–2)
BILIRUB SERPL-MCNC: 0.4 MG/DL — SIGNIFICANT CHANGE UP (ref 0.2–1.2)
BILIRUB UR-MCNC: NEGATIVE — SIGNIFICANT CHANGE UP
BLOOD GAS VENOUS - CREATININE: 0.74 MG/DL — SIGNIFICANT CHANGE UP (ref 0.5–1.3)
BLOOD UR QL VISUAL: SIGNIFICANT CHANGE UP
BUN SERPL-MCNC: 33 MG/DL — HIGH (ref 7–23)
BUN SERPL-MCNC: 41 MG/DL — HIGH (ref 7–23)
BUN SERPL-MCNC: 42 MG/DL — HIGH (ref 7–23)
C PNEUM DNA SPEC QL NAA+PROBE: NOT DETECTED — SIGNIFICANT CHANGE UP
CALCIUM SERPL-MCNC: 8.2 MG/DL — LOW (ref 8.4–10.5)
CALCIUM SERPL-MCNC: 8.5 MG/DL — SIGNIFICANT CHANGE UP (ref 8.4–10.5)
CALCIUM SERPL-MCNC: 8.6 MG/DL — SIGNIFICANT CHANGE UP (ref 8.4–10.5)
CHLORIDE BLDV-SCNC: 95 MMOL/L — LOW (ref 96–108)
CHLORIDE SERPL-SCNC: 106 MMOL/L — SIGNIFICANT CHANGE UP (ref 98–107)
CHLORIDE SERPL-SCNC: 111 MMOL/L — HIGH (ref 98–107)
CHLORIDE SERPL-SCNC: 111 MMOL/L — HIGH (ref 98–107)
CO2 SERPL-SCNC: 24 MMOL/L — SIGNIFICANT CHANGE UP (ref 22–31)
CO2 SERPL-SCNC: 25 MMOL/L — SIGNIFICANT CHANGE UP (ref 22–31)
CO2 SERPL-SCNC: 27 MMOL/L — SIGNIFICANT CHANGE UP (ref 22–31)
COLOR SPEC: YELLOW — SIGNIFICANT CHANGE UP
CREAT SERPL-MCNC: 0.62 MG/DL — SIGNIFICANT CHANGE UP (ref 0.5–1.3)
CREAT SERPL-MCNC: 0.73 MG/DL — SIGNIFICANT CHANGE UP (ref 0.5–1.3)
CREAT SERPL-MCNC: 0.93 MG/DL — SIGNIFICANT CHANGE UP (ref 0.5–1.3)
EOSINOPHIL # BLD AUTO: 0.02 K/UL — SIGNIFICANT CHANGE UP (ref 0–0.5)
EOSINOPHIL # BLD AUTO: 0.05 K/UL — SIGNIFICANT CHANGE UP (ref 0–0.5)
EOSINOPHIL NFR BLD AUTO: 0.1 % — SIGNIFICANT CHANGE UP (ref 0–6)
EOSINOPHIL NFR BLD AUTO: 0.3 % — SIGNIFICANT CHANGE UP (ref 0–6)
ERYTHROCYTE [SEDIMENTATION RATE] IN BLOOD: 43 MM/HR — HIGH (ref 4–25)
FLUAV H1 2009 PAND RNA SPEC QL NAA+PROBE: NOT DETECTED — SIGNIFICANT CHANGE UP
FLUAV H1 RNA SPEC QL NAA+PROBE: NOT DETECTED — SIGNIFICANT CHANGE UP
FLUAV H3 RNA SPEC QL NAA+PROBE: NOT DETECTED — SIGNIFICANT CHANGE UP
FLUAV SUBTYP SPEC NAA+PROBE: NOT DETECTED — SIGNIFICANT CHANGE UP
FLUBV RNA SPEC QL NAA+PROBE: NOT DETECTED — SIGNIFICANT CHANGE UP
GAS PNL BLDV: 122 MMOL/L — LOW (ref 136–146)
GLUCOSE BLDV-MCNC: 812 — CRITICAL HIGH (ref 70–99)
GLUCOSE SERPL-MCNC: 117 MG/DL — HIGH (ref 70–99)
GLUCOSE SERPL-MCNC: 127 MG/DL — HIGH (ref 70–99)
GLUCOSE SERPL-MCNC: 184 MG/DL — HIGH (ref 70–99)
GLUCOSE UR-MCNC: NEGATIVE — SIGNIFICANT CHANGE UP
HADV DNA SPEC QL NAA+PROBE: NOT DETECTED — SIGNIFICANT CHANGE UP
HCO3 BLDA-SCNC: 25 MMOL/L — SIGNIFICANT CHANGE UP (ref 22–26)
HCO3 BLDV-SCNC: 25 MMOL/L — SIGNIFICANT CHANGE UP (ref 20–27)
HCOV PNL SPEC NAA+PROBE: SIGNIFICANT CHANGE UP
HCT VFR BLD CALC: 41.8 % — SIGNIFICANT CHANGE UP (ref 34.5–45)
HCT VFR BLD CALC: 42.3 % — SIGNIFICANT CHANGE UP (ref 34.5–45)
HCT VFR BLDV CALC: 34.5 % — SIGNIFICANT CHANGE UP (ref 34.5–45)
HGB BLD-MCNC: 13.2 G/DL — SIGNIFICANT CHANGE UP (ref 11.5–15.5)
HGB BLD-MCNC: 13.2 G/DL — SIGNIFICANT CHANGE UP (ref 11.5–15.5)
HGB BLDV-MCNC: 11.2 G/DL — LOW (ref 11.5–15.5)
HMPV RNA SPEC QL NAA+PROBE: NOT DETECTED — SIGNIFICANT CHANGE UP
HPIV1 RNA SPEC QL NAA+PROBE: NOT DETECTED — SIGNIFICANT CHANGE UP
HPIV2 RNA SPEC QL NAA+PROBE: NOT DETECTED — SIGNIFICANT CHANGE UP
HPIV3 RNA SPEC QL NAA+PROBE: NOT DETECTED — SIGNIFICANT CHANGE UP
HPIV4 RNA SPEC QL NAA+PROBE: NOT DETECTED — SIGNIFICANT CHANGE UP
HYALINE CASTS # UR AUTO: SIGNIFICANT CHANGE UP
IMM GRANULOCYTES NFR BLD AUTO: 1.2 % — SIGNIFICANT CHANGE UP (ref 0–1.5)
IMM GRANULOCYTES NFR BLD AUTO: 1.3 % — SIGNIFICANT CHANGE UP (ref 0–1.5)
KETONES UR-MCNC: NEGATIVE — SIGNIFICANT CHANGE UP
LACTATE BLDV-MCNC: 2.3 MMOL/L — HIGH (ref 0.5–2)
LACTATE SERPL-SCNC: 2.1 MMOL/L — HIGH (ref 0.5–2)
LACTATE SERPL-SCNC: 2.4 MMOL/L — HIGH (ref 0.5–2)
LEUKOCYTE ESTERASE UR-ACNC: SIGNIFICANT CHANGE UP
LYMPHOCYTES # BLD AUTO: 18.8 % — SIGNIFICANT CHANGE UP (ref 13–44)
LYMPHOCYTES # BLD AUTO: 21.6 % — SIGNIFICANT CHANGE UP (ref 13–44)
LYMPHOCYTES # BLD AUTO: 3.05 K/UL — SIGNIFICANT CHANGE UP (ref 1–3.3)
LYMPHOCYTES # BLD AUTO: 3.52 K/UL — HIGH (ref 1–3.3)
MAGNESIUM SERPL-MCNC: 2.3 MG/DL — SIGNIFICANT CHANGE UP (ref 1.6–2.6)
MAGNESIUM SERPL-MCNC: 2.3 MG/DL — SIGNIFICANT CHANGE UP (ref 1.6–2.6)
MAGNESIUM SERPL-MCNC: 2.4 MG/DL — SIGNIFICANT CHANGE UP (ref 1.6–2.6)
MCHC RBC-ENTMCNC: 31.2 % — LOW (ref 32–36)
MCHC RBC-ENTMCNC: 31.2 PG — SIGNIFICANT CHANGE UP (ref 27–34)
MCHC RBC-ENTMCNC: 31.5 PG — SIGNIFICANT CHANGE UP (ref 27–34)
MCHC RBC-ENTMCNC: 31.6 % — LOW (ref 32–36)
MCV RBC AUTO: 101 FL — HIGH (ref 80–100)
MCV RBC AUTO: 98.8 FL — SIGNIFICANT CHANGE UP (ref 80–100)
MONOCYTES # BLD AUTO: 2.31 K/UL — HIGH (ref 0–0.9)
MONOCYTES # BLD AUTO: 2.31 K/UL — HIGH (ref 0–0.9)
MONOCYTES NFR BLD AUTO: 14.2 % — HIGH (ref 2–14)
MONOCYTES NFR BLD AUTO: 14.2 % — HIGH (ref 2–14)
NEUTROPHILS # BLD AUTO: 10.19 K/UL — HIGH (ref 1.8–7.4)
NEUTROPHILS # BLD AUTO: 10.57 K/UL — HIGH (ref 1.8–7.4)
NEUTROPHILS NFR BLD AUTO: 62.4 % — SIGNIFICANT CHANGE UP (ref 43–77)
NEUTROPHILS NFR BLD AUTO: 65 % — SIGNIFICANT CHANGE UP (ref 43–77)
NITRITE UR-MCNC: NEGATIVE — SIGNIFICANT CHANGE UP
NRBC # FLD: 0 K/UL — SIGNIFICANT CHANGE UP (ref 0–0)
NRBC # FLD: 0.02 K/UL — SIGNIFICANT CHANGE UP (ref 0–0)
PCO2 BLDA: 35 MMHG — SIGNIFICANT CHANGE UP (ref 32–48)
PCO2 BLDV: 55 MMHG — HIGH (ref 41–51)
PH BLDA: 7.46 PH — HIGH (ref 7.35–7.45)
PH BLDV: 7.34 PH — SIGNIFICANT CHANGE UP (ref 7.32–7.43)
PH UR: 6 — SIGNIFICANT CHANGE UP (ref 5–8)
PHOSPHATE SERPL-MCNC: 2.1 MG/DL — LOW (ref 2.5–4.5)
PHOSPHATE SERPL-MCNC: 2.6 MG/DL — SIGNIFICANT CHANGE UP (ref 2.5–4.5)
PHOSPHATE SERPL-MCNC: 3.2 MG/DL — SIGNIFICANT CHANGE UP (ref 2.5–4.5)
PLATELET # BLD AUTO: 173 K/UL — SIGNIFICANT CHANGE UP (ref 150–400)
PLATELET # BLD AUTO: 178 K/UL — SIGNIFICANT CHANGE UP (ref 150–400)
PMV BLD: 10.9 FL — SIGNIFICANT CHANGE UP (ref 7–13)
PMV BLD: 11.2 FL — SIGNIFICANT CHANGE UP (ref 7–13)
PO2 BLDA: 73 MMHG — LOW (ref 83–108)
PO2 BLDV: 26 MMHG — LOW (ref 35–40)
POTASSIUM BLDV-SCNC: 2.8 MMOL/L — CRITICAL LOW (ref 3.4–4.5)
POTASSIUM SERPL-MCNC: 3.3 MMOL/L — LOW (ref 3.5–5.3)
POTASSIUM SERPL-MCNC: 4.1 MMOL/L — SIGNIFICANT CHANGE UP (ref 3.5–5.3)
POTASSIUM SERPL-MCNC: 4.1 MMOL/L — SIGNIFICANT CHANGE UP (ref 3.5–5.3)
POTASSIUM SERPL-SCNC: 3.3 MMOL/L — LOW (ref 3.5–5.3)
POTASSIUM SERPL-SCNC: 4.1 MMOL/L — SIGNIFICANT CHANGE UP (ref 3.5–5.3)
POTASSIUM SERPL-SCNC: 4.1 MMOL/L — SIGNIFICANT CHANGE UP (ref 3.5–5.3)
PROT SERPL-MCNC: 5.9 G/DL — LOW (ref 6–8.3)
PROT UR-MCNC: 20 — SIGNIFICANT CHANGE UP
RBC # BLD: 4.19 M/UL — SIGNIFICANT CHANGE UP (ref 3.8–5.2)
RBC # BLD: 4.23 M/UL — SIGNIFICANT CHANGE UP (ref 3.8–5.2)
RBC # FLD: 13.2 % — SIGNIFICANT CHANGE UP (ref 10.3–14.5)
RBC # FLD: 13.2 % — SIGNIFICANT CHANGE UP (ref 10.3–14.5)
RBC CASTS # UR COMP ASSIST: SIGNIFICANT CHANGE UP (ref 0–?)
RSV RNA SPEC QL NAA+PROBE: NOT DETECTED — SIGNIFICANT CHANGE UP
RV+EV RNA SPEC QL NAA+PROBE: NOT DETECTED — SIGNIFICANT CHANGE UP
SAO2 % BLDA: 95.2 % — SIGNIFICANT CHANGE UP (ref 95–99)
SAO2 % BLDV: 38.5 % — LOW (ref 60–85)
SODIUM SERPL-SCNC: 145 MMOL/L — SIGNIFICANT CHANGE UP (ref 135–145)
SODIUM SERPL-SCNC: 148 MMOL/L — HIGH (ref 135–145)
SODIUM SERPL-SCNC: 148 MMOL/L — HIGH (ref 135–145)
SP GR SPEC: 1.03 — SIGNIFICANT CHANGE UP (ref 1–1.04)
SPECIMEN SOURCE: SIGNIFICANT CHANGE UP
SPECIMEN SOURCE: SIGNIFICANT CHANGE UP
SQUAMOUS # UR AUTO: SIGNIFICANT CHANGE UP
UROBILINOGEN FLD QL: HIGH
WBC # BLD: 16.25 K/UL — HIGH (ref 3.8–10.5)
WBC # BLD: 16.32 K/UL — HIGH (ref 3.8–10.5)
WBC # FLD AUTO: 16.25 K/UL — HIGH (ref 3.8–10.5)
WBC # FLD AUTO: 16.32 K/UL — HIGH (ref 3.8–10.5)
WBC UR QL: SIGNIFICANT CHANGE UP (ref 0–?)
YEAST BUDDING # UR COMP ASSIST: SIGNIFICANT CHANGE UP

## 2019-03-31 PROCEDURE — 99223 1ST HOSP IP/OBS HIGH 75: CPT | Mod: GC

## 2019-03-31 PROCEDURE — 12345: CPT | Mod: NC,GC

## 2019-03-31 RX ORDER — SODIUM CHLORIDE 9 MG/ML
1000 INJECTION, SOLUTION INTRAVENOUS
Qty: 0 | Refills: 0 | Status: DISCONTINUED | OUTPATIENT
Start: 2019-03-31 | End: 2019-04-01

## 2019-03-31 RX ORDER — SODIUM CHLORIDE 9 MG/ML
1000 INJECTION, SOLUTION INTRAVENOUS
Qty: 0 | Refills: 0 | Status: DISCONTINUED | OUTPATIENT
Start: 2019-03-31 | End: 2019-03-31

## 2019-03-31 RX ORDER — POTASSIUM PHOSPHATE, MONOBASIC POTASSIUM PHOSPHATE, DIBASIC 236; 224 MG/ML; MG/ML
15 INJECTION, SOLUTION INTRAVENOUS ONCE
Qty: 0 | Refills: 0 | Status: COMPLETED | OUTPATIENT
Start: 2019-03-31 | End: 2019-03-31

## 2019-03-31 RX ORDER — PIPERACILLIN AND TAZOBACTAM 4; .5 G/20ML; G/20ML
3.38 INJECTION, POWDER, LYOPHILIZED, FOR SOLUTION INTRAVENOUS EVERY 8 HOURS
Qty: 0 | Refills: 0 | Status: DISCONTINUED | OUTPATIENT
Start: 2019-03-31 | End: 2019-04-04

## 2019-03-31 RX ORDER — ENOXAPARIN SODIUM 100 MG/ML
40 INJECTION SUBCUTANEOUS DAILY
Qty: 0 | Refills: 0 | Status: DISCONTINUED | OUTPATIENT
Start: 2019-03-31 | End: 2019-04-04

## 2019-03-31 RX ORDER — POTASSIUM PHOSPHATE, MONOBASIC POTASSIUM PHOSPHATE, DIBASIC 236; 224 MG/ML; MG/ML
15 INJECTION, SOLUTION INTRAVENOUS ONCE
Qty: 0 | Refills: 0 | Status: COMPLETED | OUTPATIENT
Start: 2019-03-31 | End: 2019-04-01

## 2019-03-31 RX ORDER — POTASSIUM CHLORIDE 20 MEQ
10 PACKET (EA) ORAL
Qty: 0 | Refills: 0 | Status: COMPLETED | OUTPATIENT
Start: 2019-03-31 | End: 2019-03-31

## 2019-03-31 RX ORDER — VANCOMYCIN HCL 1 G
1000 VIAL (EA) INTRAVENOUS EVERY 24 HOURS
Qty: 0 | Refills: 0 | Status: DISCONTINUED | OUTPATIENT
Start: 2019-04-01 | End: 2019-04-02

## 2019-03-31 RX ADMIN — PIPERACILLIN AND TAZOBACTAM 25 GRAM(S): 4; .5 INJECTION, POWDER, LYOPHILIZED, FOR SOLUTION INTRAVENOUS at 10:24

## 2019-03-31 RX ADMIN — SODIUM CHLORIDE 75 MILLILITER(S): 9 INJECTION, SOLUTION INTRAVENOUS at 04:43

## 2019-03-31 RX ADMIN — PIPERACILLIN AND TAZOBACTAM 25 GRAM(S): 4; .5 INJECTION, POWDER, LYOPHILIZED, FOR SOLUTION INTRAVENOUS at 21:58

## 2019-03-31 RX ADMIN — Medication 1000 MILLIGRAM(S): at 01:41

## 2019-03-31 RX ADMIN — SODIUM CHLORIDE 2000 MILLILITER(S): 9 INJECTION INTRAMUSCULAR; INTRAVENOUS; SUBCUTANEOUS at 00:50

## 2019-03-31 RX ADMIN — Medication 250 MILLIGRAM(S): at 00:50

## 2019-03-31 RX ADMIN — SODIUM CHLORIDE 70 MILLILITER(S): 9 INJECTION, SOLUTION INTRAVENOUS at 10:23

## 2019-03-31 RX ADMIN — Medication 1000 MILLIGRAM(S): at 00:00

## 2019-03-31 RX ADMIN — PIPERACILLIN AND TAZOBACTAM 25 GRAM(S): 4; .5 INJECTION, POWDER, LYOPHILIZED, FOR SOLUTION INTRAVENOUS at 14:35

## 2019-03-31 RX ADMIN — PIPERACILLIN AND TAZOBACTAM 3.38 GRAM(S): 4; .5 INJECTION, POWDER, LYOPHILIZED, FOR SOLUTION INTRAVENOUS at 00:50

## 2019-03-31 RX ADMIN — Medication 100 MILLIEQUIVALENT(S): at 06:32

## 2019-03-31 RX ADMIN — POTASSIUM PHOSPHATE, MONOBASIC POTASSIUM PHOSPHATE, DIBASIC 62.5 MILLIMOLE(S): 236; 224 INJECTION, SOLUTION INTRAVENOUS at 04:38

## 2019-03-31 RX ADMIN — ENOXAPARIN SODIUM 40 MILLIGRAM(S): 100 INJECTION SUBCUTANEOUS at 12:36

## 2019-03-31 RX ADMIN — Medication 100 MILLIEQUIVALENT(S): at 05:34

## 2019-03-31 RX ADMIN — Medication 100 MILLIEQUIVALENT(S): at 07:36

## 2019-03-31 NOTE — H&P ADULT - PROBLEM SELECTOR PLAN 7
Diet:: NPO due to mental status  DVT ppx: lovenox subq  Ciara Dexter PGY-2  Pager # [phone]/ [phone] Diet: NPO due to mental status  DVT ppx: lovenox subq  Ciara Dexter PGY-2  Pager # [phone]/ [phone]

## 2019-03-31 NOTE — H&P ADULT - NSHPSOCIALHISTORY_GEN_ALL_CORE
Bedbound, lives with son, has 24 Hr HHA  no toxic habits  baseline mental status is AAOx 0-1  per son pt is DNR. would like a trial of intubation

## 2019-03-31 NOTE — PROGRESS NOTE ADULT - PROBLEM SELECTOR PLAN 3
Advanced dementia with behavioral disturbances  - d/t to obtundation hold valproic acid, xanax prn, also on seroquel  - low suspicion for benzo withdrawal as last dose taken 1 week ago,   - can resume once mental status improves  - serum valproic acid level 62

## 2019-03-31 NOTE — H&P ADULT - PROBLEM SELECTOR PLAN 2
likely due to sepsis vs CO2 narcosis (VBG Pco2 55, suspicious of accuracy of VBG Na 122 with low potassium, low oxygenation) vs dehydration   repeat ABG  treat underlying infection

## 2019-03-31 NOTE — H&P ADULT - PROBLEM SELECTOR PLAN 3
advance dementia with behavioral disturbances  - d/t to obtundation hold valproic acid, xanax prn, seroquel  - can resume once mental status improves advance dementia with behavioral disturbances  - d/t to obtundation hold valproic acid, xanax prn, seroquel  - can resume once mental status improves  - serum valproic acid level 62 advance dementia with behavioral disturbances  - d/t to obtundation hold valproic acid, xanax prn 9low suspicious for benzo withdrawal as last dose  taken 1 week ago, seroquel  - can resume once mental status improves  - serum valproic acid level 62

## 2019-03-31 NOTE — ED ADULT NURSE NOTE - OBJECTIVE STATEMENT
Pt arrives to ED with aide to bedside.  Pt skin hot to touch.  Pt family called 911 due to fever lasting 2 hours at home.  Pt non-verbal at this time, responds to painful stimuli with groans.  Pt arrived with 18g iv placed to left hand area by ems.  Float RN placed 20g to left AC, labs drawn and sent.  Pt medicated as per EMAR.  Pt has un-stageable ulcers to bilateral heels.  Blister to inner aspect of rt heel.  Small 1.5cmx 1.5cm skin tear behind left knee.  Pt has bruising and blanchable erythema to left and right buttock and sacral area.  Pt cleaned and changed by PCA and aide.  Barrier cream placed to buttocks.

## 2019-03-31 NOTE — H&P ADULT - HISTORY OF PRESENT ILLNESS
89 yo F c PMH of HTN, HLD, advanced dementia baseline AAO 0-1 to self, bedbound BIBEMS for lethargy and fever on day of arrival. Spoke with son (HCP) over the phone, per son patient is usually phonating, making mumbling noises however today he noticed that she has been more somnolent. He said a week ago he noticed one of the HHA feeding her while she was supine. For fever T101 family gave her tylenol. Son denied sick contacts or recent travel hx, denied cough, diarrhea. Wears diaper. She has chronic ecchymosis and unstageable ulcer that has been stable per son. He is the HCP and would her to be DNR but not DNI (he would like a trial of intubation but he also discussed that if patient will not have a meaningful recovery, then do not intubate). HHA at bedside is taking care of the patient for the first time tonight, therefore she is not aware of the patient's chronic issue. She is on xanax, PRN, last dose taken 1 week ago, on seroquel 50 mg BID, last dose taken 7 PM PTA.    In the ED Vitals 89 yo F c PMH of HTN, HLD, advanced dementia baseline AAO 0-1 to self, bedbound BIBEMS for lethargy and fever on day of arrival. Spoke with son (HCP) over the phone, per son patient is usually phonating, making mumbling noises however today he noticed that she has been more somnolent. He said a week ago he noticed one of the HHA feeding her while she was supine. For fever T101 family gave her tylenol. Son denied sick contacts or recent travel hx, denied cough, diarrhea. Wears diaper. She has chronic ecchymosis and unstageable ulcer that has been stable per son. He is the HCP and would her to be DNR but not DNI (he would like a trial of intubation but he also discussed that if patient will not have a meaningful recovery, then do not intubate). HHA at bedside is taking care of the patient for the first time tonight, therefore she is not aware of the patient's chronic issue. She is on xanax, PRN, last dose taken 1 week ago, on seroquel 50 mg BID, last dose taken 7 PM PTA.    In the ED Vitals Tmax 100.5 axillary HR92 /73 RR 26 SaO2 100 % on RA  Received IV tylenol 1000 mg x 1, vanc/zosyn, 2L NS IVF, albuterol x 1  Straight cathed in ED

## 2019-03-31 NOTE — H&P ADULT - ASSESSMENT
89 yo F c PMH of HTN, HLD, advanced dementia baseline AAO 0-1 to self, bedbound admitted for severe sepsis secondary to UTI.

## 2019-03-31 NOTE — H&P ADULT - NSICDXPASTMEDICALHX_GEN_ALL_CORE_FT
PAST MEDICAL HISTORY:  Back Pain     Carotid Artery Disease     Dementia     Hepatitis C possible during transfusion in 1970's    History of Spinal Stenosis     Hyperlipidemia     Hypertension     Lung Nodules     Tendonitis left hand: s/p steroid injection 3/10

## 2019-03-31 NOTE — H&P ADULT - PROBLEM SELECTOR PLAN 1
secondary to UTI, CXR no consolidations, no diarrhea, no evidence of cellulitis  - c/w zosyn pending bcx, ucx, ESR  - s/p 2L NS, will start on maintenance fluids, echo in 2017 showed nl LV function  - obtunded due to sepsis  - tylenol for fever secondary to UTI, CXR no consolidations, no diarrhea, no evidence of cellulitis  - c/w vanc/zosyn pending bcx, ucx, ESR  - s/p 2L NS, will start on maintenance fluids, echo in 2017 showed nl LV function  - obtunded due to sepsis  - tylenol for fever

## 2019-03-31 NOTE — H&P ADULT - NSHPPHYSICALEXAM_GEN_ALL_CORE
Vital Signs Last 24 Hrs  T(C): 36.1 (31 Mar 2019 02:07), Max: 38.1 (30 Mar 2019 21:32)  T(F): 97 (31 Mar 2019 02:07), Max: 100.5 (30 Mar 2019 21:32)  HR: 77 (31 Mar 2019 02:07) (77 - 92)  BP: 125/59 (31 Mar 2019 02:07) (113/73 - 125/59)  BP(mean): --  RR: 23 (31 Mar 2019 02:07) (23 - 26)  SpO2: 100% (31 Mar 2019 02:07) (98% - 100%)      General: obtunded, chronically ill appearing  Neurology: A&Ox0 withdraws to pain  Eyes: PERRL  ENT/Neck: No JVD,  Respiratory: CTA B/L, No wheezing, rales, rhonchi  CV: RRR, S1S2, no murmurs, rubs or gallops  Abdominal: Soft, NT, ND +BS,   Extremities: No edema, + peripheral pulses  Skin: unstageable sacral ulcer, ecchymosis to b/l ankle and buttock

## 2019-03-31 NOTE — PROGRESS NOTE ADULT - SUBJECTIVE AND OBJECTIVE BOX
Patient is a 89y old  Female who presents with a chief complaint of lethargy (31 Mar 2019 02:53)      SUBJECTIVE / OVERNIGHT EVENTS:  This AM AxOx0. Appears comfortable.    MEDICATIONS  (STANDING):  dextrose 5% + lactated ringers. 1000 milliLiter(s) (70 mL/Hr) IV Continuous <Continuous>  enoxaparin Injectable 40 milliGRAM(s) SubCutaneous daily  piperacillin/tazobactam IVPB. 3.375 Gram(s) IV Intermittent every 8 hours  vancomycin  IVPB 1000 milliGRAM(s) IV Intermittent every 24 hours    MEDICATIONS  (PRN):      CAPILLARY BLOOD GLUCOSE        I&O's Summary      PHYSICAL EXAM:  GENERAL: NAD, well-developed  HEAD:  Atraumatic, Normocephalic  EYES:  NECK:   CHEST/LUNG: Clear to auscultation bilaterally; No wheezes, rhonchi or gallops  HEART: Regular rate and rhythm; No murmurs, rubs, or gallops  ABDOMEN: Soft, Nontender, Nondistended; Bowel sounds present  EXTREMITIES:  2+ Peripheral Pulses, No clubbing, cyanosis, or edema  PSYCH: Appropriate  NEUROLOGY: non-focal  SKIN: No rashes or lesions    LABS:                        13.2   16.25 )-----------( 173      ( 31 Mar 2019 06:43 )             41.8         148<H>  |  111<H>  |  42<H>  ----------------------------<  117<H>  4.1   |  24  |  0.73    Ca    8.5      31 Mar 2019 06:43  Phos  3.2       Mg     2.4         TPro  5.9<L>  /  Alb  2.4<L>  /  TBili  0.4  /  DBili  x   /  AST  40<H>  /  ALT  34<H>  /  AlkPhos  51            Urinalysis Basic - ( 30 Mar 2019 23:40 )    Color: YELLOW / Appearance: CLEAR / S.027 / pH: 6.0  Gluc: NEGATIVE / Ketone: NEGATIVE  / Bili: NEGATIVE / Urobili: SMALL   Blood: TRACE / Protein: 20 / Nitrite: NEGATIVE   Leuk Esterase: MODERATE / RBC: 5-10 / WBC 15-20   Sq Epi: FEW / Non Sq Epi: x / Bacteria: FEW        RADIOLOGY & ADDITIONAL TESTS:    Imaging Personally Reviewed:    Consultant(s) Notes Reviewed:      Care Discussed with Consultants/Other Providers: Patient is a 89y old  Female who presents with a chief complaint of lethargy (31 Mar 2019 02:53)      SUBJECTIVE / OVERNIGHT EVENTS:  This AM AxOx0. Appears comfortable.    MEDICATIONS  (STANDING):  dextrose 5% + lactated ringers. 1000 milliLiter(s) (70 mL/Hr) IV Continuous <Continuous>  enoxaparin Injectable 40 milliGRAM(s) SubCutaneous daily  piperacillin/tazobactam IVPB. 3.375 Gram(s) IV Intermittent every 8 hours  vancomycin  IVPB 1000 milliGRAM(s) IV Intermittent every 24 hours    MEDICATIONS  (PRN):      CAPILLARY BLOOD GLUCOSE        I&O's Summary      PHYSICAL EXAM:  GENERAL: NAD, well-developed  HEAD:  Atraumatic, Normocephalic  CHEST/LUNG: Clear to auscultation bilaterally; No wheezes, rhonchi   HEART: Regular rate and rhythm; No murmurs, rubs, or gallops  ABDOMEN: Soft, Nontender, Nondistended;   PSYCH: Lethargic  NEUROLOGY: Lethargic, AxOx0  SKIN: No rashes or lesions    LABS:                        13.2   16.25 )-----------( 173      ( 31 Mar 2019 06:43 )             41.8     -    148<H>  |  111<H>  |  42<H>  ----------------------------<  117<H>  4.1   |  24  |  0.73    Ca    8.5      31 Mar 2019 06:43  Phos  3.2       Mg     2.4         TPro  5.9<L>  /  Alb  2.4<L>  /  TBili  0.4  /  DBili  x   /  AST  40<H>  /  ALT  34<H>  /  AlkPhos  51  -          Urinalysis Basic - ( 30 Mar 2019 23:40 )    Color: YELLOW / Appearance: CLEAR / S.027 / pH: 6.0  Gluc: NEGATIVE / Ketone: NEGATIVE  / Bili: NEGATIVE / Urobili: SMALL   Blood: TRACE / Protein: 20 / Nitrite: NEGATIVE   Leuk Esterase: MODERATE / RBC: 5-10 / WBC 15-20   Sq Epi: FEW / Non Sq Epi: x / Bacteria: FEW        RADIOLOGY & ADDITIONAL TESTS:    Imaging Personally Reviewed:    Consultant(s) Notes Reviewed:      Care Discussed with Consultants/Other Providers:

## 2019-03-31 NOTE — ED ADULT NURSE NOTE - NSIMPLEMENTINTERV_GEN_ALL_ED
Implemented All Fall with Harm Risk Interventions:  [place] to call system. Call bell, personal items and telephone within reach. Instruct patient to call for assistance. Room bathroom lighting operational. Non-slip footwear when patient is off stretcher. Physically safe environment: no spills, clutter or unnecessary equipment. Stretcher in lowest position, wheels locked, appropriate side rails in place. Provide visual cue, wrist band, yellow gown, etc. Monitor gait and stability. Monitor for mental status changes and reorient to person, place, and time. Review medications for side effects contributing to fall risk. Reinforce activity limits and safety measures with patient and family. Provide visual clues: red socks.

## 2019-03-31 NOTE — PROGRESS NOTE ADULT - PROBLEM SELECTOR PLAN 1
Likely secondary to UTI vs unstagable sacral ulcer, CXR no consolidations, no diarrhea, no evidence of cellulitis  - c/w vanc/zosyn pending bcx, ucx  - ESR mildly elevated (42)  - s/p 2L NS, will start on maintenance fluids, echo in 2017 showed nl LV function  - obtunded due to sepsis  - tylenol for fever

## 2019-03-31 NOTE — H&P ADULT - PROBLEM SELECTOR PLAN 4
DNR as per HCP Son phone #: [phone]  however would like trial of intubation and pressors  will bring HCP paperwork tomorrow likely in setting of sepsis w/ dehydration  recheck bmp in AM

## 2019-03-31 NOTE — PROGRESS NOTE ADULT - PROBLEM SELECTOR PLAN 4
Likely in setting of sepsis w/ dehydration  - Starting fluids with D5 + LR at 70/hr, pending repeat BMP

## 2019-03-31 NOTE — H&P ADULT - PROBLEM SELECTOR PLAN 6
DNR as per HCP Son phone #: [phone]  however would like trial of intubation and pressors  will bring HCP paperwork tomorrow

## 2019-03-31 NOTE — H&P ADULT - ATTENDING COMMENTS
Pt opens eyes when addressed by name, but does not speak or respond to questions.    On exam in NAD.  Heart RRR.  Lungs CTA B.  Abd s/nt/nd normal BS  labs reviewed  CXR film reviewed showing small reticular opacities  Will continue broad spectrum abx  f/u cultures

## 2019-03-31 NOTE — H&P ADULT - PROBLEM SELECTOR PLAN 5
Diet:: NPO due to mental status  DVT ppx: lovenox subq  Ciara Dexter PGY-2  Pager # [phone]/ [phone] replete lytes as needed

## 2019-03-31 NOTE — H&P ADULT - NSICDXPASTSURGICALHX_GEN_ALL_CORE_FT
PAST SURGICAL HISTORY:  After Cataract, Bilateral     History of Carotid Endarterectomy right 2008    History of Laminectomy 1970's    S/P Cholecystectomy laparoscopic 2 years ago    S/P Dilation and Curettage 1970's menorrhagia    S/P Knee Replacement left knee 1997  right knee 2004    S/P small bowel resection

## 2019-03-31 NOTE — ED ADULT NURSE REASSESSMENT NOTE - NS ED NURSE REASSESS COMMENT FT1
Lab reported critical value for K and Glucose.  Attending ARIE Mehta notified.  Repeat labs drawn and sent.  Also note that the blister to right ankle is fluid filled and approximately 1.5x1cm.  Pt awaiting transport to floor

## 2019-04-01 LAB
ANION GAP SERPL CALC-SCNC: 11 MMO/L — SIGNIFICANT CHANGE UP (ref 7–14)
BACTERIA UR CULT: SIGNIFICANT CHANGE UP
BASOPHILS # BLD AUTO: 0.04 K/UL — SIGNIFICANT CHANGE UP (ref 0–0.2)
BASOPHILS NFR BLD AUTO: 0.3 % — SIGNIFICANT CHANGE UP (ref 0–2)
BUN SERPL-MCNC: 26 MG/DL — HIGH (ref 7–23)
CALCIUM SERPL-MCNC: 8.4 MG/DL — SIGNIFICANT CHANGE UP (ref 8.4–10.5)
CHLORIDE SERPL-SCNC: 112 MMOL/L — HIGH (ref 98–107)
CO2 SERPL-SCNC: 25 MMOL/L — SIGNIFICANT CHANGE UP (ref 22–31)
CREAT SERPL-MCNC: 0.58 MG/DL — SIGNIFICANT CHANGE UP (ref 0.5–1.3)
EOSINOPHIL # BLD AUTO: 0.21 K/UL — SIGNIFICANT CHANGE UP (ref 0–0.5)
EOSINOPHIL NFR BLD AUTO: 1.7 % — SIGNIFICANT CHANGE UP (ref 0–6)
GLUCOSE SERPL-MCNC: 123 MG/DL — HIGH (ref 70–99)
HCT VFR BLD CALC: 37.6 % — SIGNIFICANT CHANGE UP (ref 34.5–45)
HGB BLD-MCNC: 11.7 G/DL — SIGNIFICANT CHANGE UP (ref 11.5–15.5)
IMM GRANULOCYTES NFR BLD AUTO: 1 % — SIGNIFICANT CHANGE UP (ref 0–1.5)
LACTATE SERPL-SCNC: 1.9 MMOL/L — SIGNIFICANT CHANGE UP (ref 0.5–2)
LYMPHOCYTES # BLD AUTO: 19.1 % — SIGNIFICANT CHANGE UP (ref 13–44)
LYMPHOCYTES # BLD AUTO: 2.34 K/UL — SIGNIFICANT CHANGE UP (ref 1–3.3)
MAGNESIUM SERPL-MCNC: 2.1 MG/DL — SIGNIFICANT CHANGE UP (ref 1.6–2.6)
MCHC RBC-ENTMCNC: 31.1 % — LOW (ref 32–36)
MCHC RBC-ENTMCNC: 31.2 PG — SIGNIFICANT CHANGE UP (ref 27–34)
MCV RBC AUTO: 100.3 FL — HIGH (ref 80–100)
METHOD TYPE: SIGNIFICANT CHANGE UP
MONOCYTES # BLD AUTO: 1.4 K/UL — HIGH (ref 0–0.9)
MONOCYTES NFR BLD AUTO: 11.4 % — SIGNIFICANT CHANGE UP (ref 2–14)
NEUTROPHILS # BLD AUTO: 8.17 K/UL — HIGH (ref 1.8–7.4)
NEUTROPHILS NFR BLD AUTO: 66.5 % — SIGNIFICANT CHANGE UP (ref 43–77)
NRBC # FLD: 0 K/UL — SIGNIFICANT CHANGE UP (ref 0–0)
ORGANISM # SPEC MICROSCOPIC CNT: SIGNIFICANT CHANGE UP
ORGANISM # SPEC MICROSCOPIC CNT: SIGNIFICANT CHANGE UP
PHOSPHATE SERPL-MCNC: 2.4 MG/DL — LOW (ref 2.5–4.5)
PLATELET # BLD AUTO: 185 K/UL — SIGNIFICANT CHANGE UP (ref 150–400)
PMV BLD: 10.9 FL — SIGNIFICANT CHANGE UP (ref 7–13)
POTASSIUM SERPL-MCNC: 3.6 MMOL/L — SIGNIFICANT CHANGE UP (ref 3.5–5.3)
POTASSIUM SERPL-SCNC: 3.6 MMOL/L — SIGNIFICANT CHANGE UP (ref 3.5–5.3)
RBC # BLD: 3.75 M/UL — LOW (ref 3.8–5.2)
RBC # FLD: 12.9 % — SIGNIFICANT CHANGE UP (ref 10.3–14.5)
SODIUM SERPL-SCNC: 148 MMOL/L — HIGH (ref 135–145)
SPECIMEN SOURCE: SIGNIFICANT CHANGE UP
TSH SERPL-MCNC: 2.51 UIU/ML — SIGNIFICANT CHANGE UP (ref 0.27–4.2)
WBC # BLD: 12.28 K/UL — HIGH (ref 3.8–10.5)
WBC # FLD AUTO: 12.28 K/UL — HIGH (ref 3.8–10.5)

## 2019-04-01 PROCEDURE — 99233 SBSQ HOSP IP/OBS HIGH 50: CPT | Mod: GC

## 2019-04-01 RX ORDER — POTASSIUM PHOSPHATE, MONOBASIC POTASSIUM PHOSPHATE, DIBASIC 236; 224 MG/ML; MG/ML
15 INJECTION, SOLUTION INTRAVENOUS ONCE
Qty: 0 | Refills: 0 | Status: COMPLETED | OUTPATIENT
Start: 2019-04-01 | End: 2019-04-01

## 2019-04-01 RX ORDER — SODIUM CHLORIDE 9 MG/ML
1000 INJECTION, SOLUTION INTRAVENOUS
Qty: 0 | Refills: 0 | Status: DISCONTINUED | OUTPATIENT
Start: 2019-04-01 | End: 2019-04-04

## 2019-04-01 RX ADMIN — POTASSIUM PHOSPHATE, MONOBASIC POTASSIUM PHOSPHATE, DIBASIC 62.5 MILLIMOLE(S): 236; 224 INJECTION, SOLUTION INTRAVENOUS at 09:11

## 2019-04-01 RX ADMIN — PIPERACILLIN AND TAZOBACTAM 25 GRAM(S): 4; .5 INJECTION, POWDER, LYOPHILIZED, FOR SOLUTION INTRAVENOUS at 06:23

## 2019-04-01 RX ADMIN — ENOXAPARIN SODIUM 40 MILLIGRAM(S): 100 INJECTION SUBCUTANEOUS at 13:02

## 2019-04-01 RX ADMIN — POTASSIUM PHOSPHATE, MONOBASIC POTASSIUM PHOSPHATE, DIBASIC 62.5 MILLIMOLE(S): 236; 224 INJECTION, SOLUTION INTRAVENOUS at 00:14

## 2019-04-01 RX ADMIN — Medication 250 MILLIGRAM(S): at 01:23

## 2019-04-01 RX ADMIN — PIPERACILLIN AND TAZOBACTAM 25 GRAM(S): 4; .5 INJECTION, POWDER, LYOPHILIZED, FOR SOLUTION INTRAVENOUS at 13:02

## 2019-04-01 RX ADMIN — SODIUM CHLORIDE 50 MILLILITER(S): 9 INJECTION, SOLUTION INTRAVENOUS at 09:10

## 2019-04-01 RX ADMIN — PIPERACILLIN AND TAZOBACTAM 25 GRAM(S): 4; .5 INJECTION, POWDER, LYOPHILIZED, FOR SOLUTION INTRAVENOUS at 22:08

## 2019-04-01 NOTE — DIETITIAN INITIAL EVALUATION ADULT. - PROBLEM SELECTOR PLAN 7
Diet: NPO due to mental status  DVT ppx: lovenox subq  Ciara Dexter PGY-2  Pager # [phone]/ [phone]

## 2019-04-01 NOTE — DIETITIAN INITIAL EVALUATION ADULT. - SOURCE
other (specify)/Patient A&Ox 0-1, unable to provide information. No family/caregiver at bedside during time of visit. Spoke to medical team. Information obtained from extensive chart review.

## 2019-04-01 NOTE — DIETITIAN INITIAL EVALUATION ADULT. - ENERGY NEEDS
Height (cm): 170.18 (31 Mar 2019 03:53)  Weight (kg): 63 (31 Mar 2019 03:53)  BMI (kg/m2): 21.8 (31 Mar 2019 03:53)  IBW: 61.3kg +/-10%  Skin: stage 2 pressure injury to buttocks, suspected DTI's to left heel, right lateral foot and right mid foot

## 2019-04-01 NOTE — PROGRESS NOTE ADULT - SUBJECTIVE AND OBJECTIVE BOX
***************************************************************  Mark Hellerman, PGY1  Internal Medicine   pager: [facility]: [phone]; [facility]: [phone]  ***************************************************************    DONNA BARBER  89y  MRN: [id number]    Patient is a 89y old  Female who presents with a chief complaint of lethargy (31 Mar 2019 14:29)    Subjective:   - This AM AxOx0. Appears comfortable.    MEDICATIONS  (STANDING):  dextrose 5% + lactated ringers. 1000 milliLiter(s) (70 mL/Hr) IV Continuous <Continuous>  enoxaparin Injectable 40 milliGRAM(s) SubCutaneous daily  piperacillin/tazobactam IVPB. 3.375 Gram(s) IV Intermittent every 8 hours  vancomycin  IVPB 1000 milliGRAM(s) IV Intermittent every 24 hours    MEDICATIONS  (PRN):    Objective:    Vitals: Vital Signs Last 24 Hrs  T(C): 36.7 (19 @ 06:21), Max: 36.8 (19 @ 21:36)  T(F): 98 (19 @ 06:21), Max: 98.3 (19 @ 21:36)  HR: 69 (19 @ 06:21) (69 - 86)  BP: 139/91 (19 @ 06:21) (118/90 - 139/91)  RR: 18 (19 @ 06:21) (18 - 19)  SpO2: 100% (19 @ 06:21) (98% - 100%)                I&O's Summary  31 Mar 2019 07:01  -  2019 07:00  --------------------------------------------------------  IN: 500 mL / OUT: 0 mL / NET: 500 mL    PHYSICAL EXAM:  GENERAL: NAD, well-developed  HEAD:  Atraumatic, Normocephalic  CHEST/LUNG: Clear to auscultation bilaterally; No wheezes, rhonchi   HEART: Regular rate and rhythm; No murmurs, rubs, or gallops  ABDOMEN: Soft, Nontender, Nondistended;   PSYCH: Lethargic  NEUROLOGY: Lethargic, AxOx0  SKIN: No rashes or lesions    LABS:      148<H>  |  112<H>  |  26<H>  ----------------------------<  123<H>  3.6   |  25  |  0.58      148<H>  |  111<H>  |  33<H>  ----------------------------<  127<H>  4.1   |  25  |  0.62      148<H>  |  111<H>  |  42<H>  ----------------------------<  117<H>  4.1   |  24  |  0.73    Ca    8.4      2019 05:28  Ca    8.6      31 Mar 2019 15:56  Ca    8.5      31 Mar 2019 06:43  Phos  2.4       Mg     2.1         TPro  5.9<L>  /  Alb  2.4<L>  /  TBili  0.4  /  DBili  x   /  AST  40<H>  /  ALT  34<H>  /  AlkPhos  51    TPro  6.3  /  Alb  2.8<L>  /  TBili  0.4  /  DBili  x   /  AST  44<H>  /  ALT  38<H>  /  AlkPhos  55      Urinalysis Basic - ( 30 Mar 2019 23:40 )    Color: YELLOW / Appearance: CLEAR / S.027 / pH: 6.0  Gluc: NEGATIVE / Ketone: NEGATIVE  / Bili: NEGATIVE / Urobili: SMALL   Blood: TRACE / Protein: 20 / Nitrite: NEGATIVE   Leuk Esterase: MODERATE / RBC: 5-10 / WBC 15-20   Sq Epi: FEW / Non Sq Epi: x / Bacteria: FEW      ABG - ( 31 Mar 2019 05:50 )  pH, Arterial: 7.46  pH, Blood: x     /  pCO2: 35    /  pO2: 73    / HCO3: 25    / Base Excess: 0.6   /  SaO2: 95.2                            11.7   12.28 )-----------( 185      ( 2019 05:28 )             37.6                         13.2   16.25 )-----------( 173      ( 31 Mar 2019 06:43 )             41.8                         13.2   16.32 )-----------( 178      ( 31 Mar 2019 02:10 )             42.3     CAPILLARY BLOOD GLUCOSE    RADIOLOGY & ADDITIONAL TESTS:    Imaging Personally Reviewed:  [x ] YES  [ ] NO    Consultants involved in case:   Consultant(s) Notes Reviewed:  [ x] YES  [ ] NO:   Care Discussed with Consultants/Other Providers [x ] YES  [ ] NO ***************************************************************  Mark Hellerman, PGY1  Internal Medicine   pager: [facility]: [phone]; [facility]: [phone]  ***************************************************************    DONNA BARBER  89y  MRN: [id number]    Patient is a 89y old  Female who presents with a chief complaint of lethargy (31 Mar 2019 14:29)    Subjective:   - This AM AxOx0. Mumbling at bedside.     MEDICATIONS  (STANDING):  dextrose 5% + lactated ringers. 1000 milliLiter(s) (70 mL/Hr) IV Continuous <Continuous>  enoxaparin Injectable 40 milliGRAM(s) SubCutaneous daily  piperacillin/tazobactam IVPB. 3.375 Gram(s) IV Intermittent every 8 hours  vancomycin  IVPB 1000 milliGRAM(s) IV Intermittent every 24 hours    MEDICATIONS  (PRN):    Objective:    Vitals: Vital Signs Last 24 Hrs  T(C): 36.7 (19 @ 06:21), Max: 36.8 (19 @ 21:36)  T(F): 98 (19 @ 06:21), Max: 98.3 (19 @ 21:36)  HR: 69 (19 @ 06:21) (69 - 86)  BP: 139/91 (19 @ 06:21) (118/90 - 139/91)  RR: 18 (19 @ 06:21) (18 - 19)  SpO2: 100% (19 @ 06:21) (98% - 100%)                I&O's Summary  31 Mar 2019 07:01  -  2019 07:00  --------------------------------------------------------  IN: 500 mL / OUT: 0 mL / NET: 500 mL    PHYSICAL EXAM:  GENERAL: NAD, well-developed  HEAD:  Atraumatic, Normocephalic  CHEST/LUNG: Clear to auscultation bilaterally; No wheezes, rhonchi   HEART: Regular rate and rhythm; No murmurs, rubs, or gallops  ABDOMEN: Soft, Nontender, Nondistended;   PSYCH: Lethargic  NEUROLOGY: Lethargic, AxOx0  SKIN: No rashes or lesions    LABS:      148<H>  |  112<H>  |  26<H>  ----------------------------<  123<H>  3.6   |  25  |  0.58      148<H>  |  111<H>  |  33<H>  ----------------------------<  127<H>  4.1   |  25  |  0.62      148<H>  |  111<H>  |  42<H>  ----------------------------<  117<H>  4.1   |  24  |  0.73    Ca    8.4      2019 05:28  Ca    8.6      31 Mar 2019 15:56  Ca    8.5      31 Mar 2019 06:43  Phos  2.4       Mg     2.1         TPro  5.9<L>  /  Alb  2.4<L>  /  TBili  0.4  /  DBili  x   /  AST  40<H>  /  ALT  34<H>  /  AlkPhos  51    TPro  6.3  /  Alb  2.8<L>  /  TBili  0.4  /  DBili  x   /  AST  44<H>  /  ALT  38<H>  /  AlkPhos  55      Urinalysis Basic - ( 30 Mar 2019 23:40 )    Color: YELLOW / Appearance: CLEAR / S.027 / pH: 6.0  Gluc: NEGATIVE / Ketone: NEGATIVE  / Bili: NEGATIVE / Urobili: SMALL   Blood: TRACE / Protein: 20 / Nitrite: NEGATIVE   Leuk Esterase: MODERATE / RBC: 5-10 / WBC 15-20   Sq Epi: FEW / Non Sq Epi: x / Bacteria: FEW      ABG - ( 31 Mar 2019 05:50 )  pH, Arterial: 7.46  pH, Blood: x     /  pCO2: 35    /  pO2: 73    / HCO3: 25    / Base Excess: 0.6   /  SaO2: 95.2                            11.7   12.28 )-----------( 185      ( 2019 05:28 )             37.6                         13.2   16.25 )-----------( 173      ( 31 Mar 2019 06:43 )             41.8                         13.2   16.32 )-----------( 178      ( 31 Mar 2019 02:10 )             42.3     CAPILLARY BLOOD GLUCOSE    RADIOLOGY & ADDITIONAL TESTS:    Imaging Personally Reviewed:  [x ] YES  [ ] NO    Consultants involved in case:   Consultant(s) Notes Reviewed:  [ x] YES  [ ] NO:   Care Discussed with Consultants/Other Providers [x ] YES  [ ] NO

## 2019-04-01 NOTE — PROGRESS NOTE ADULT - PROBLEM SELECTOR PLAN 1
Likely secondary unstagable sacral ulcer vs UTI, CXR no consolidations, no diarrhea, no evidence of cellulitis  - c/w vanc/zosyn (3/31 -> ; day #2) pending bcx, ucx  - ESR mildly elevated (42)  - s/p 2L NS, will start on maintenance fluids, echo in 2017 showed nl LV function  - obtunded due to sepsis  - tylenol for fever

## 2019-04-01 NOTE — DIETITIAN INITIAL EVALUATION ADULT. - NS AS NUTRI INTERV MEALS SNACK
Continue NPO. Recommend bedside swallow evaluation by SLP to determine the appropriate food and beverage consistency. In the interim, if patient's mental status improves, consider starting Pureed diet with Nectar Thickened liquids.

## 2019-04-01 NOTE — DIETITIAN INITIAL EVALUATION ADULT. - PROBLEM SELECTOR PLAN 3
advance dementia with behavioral disturbances  - d/t to obtundation hold valproic acid, xanax prn 9low suspicious for benzo withdrawal as last dose  taken 1 week ago, seroquel  - can resume once mental status improves  - serum valproic acid level 62

## 2019-04-01 NOTE — DIETITIAN INITIAL EVALUATION ADULT. - PROBLEM SELECTOR PLAN 1
secondary to UTI, CXR no consolidations, no diarrhea, no evidence of cellulitis  - c/w vanc/zosyn pending bcx, ucx, ESR  - s/p 2L NS, will start on maintenance fluids, echo in 2017 showed nl LV function  - obtunded due to sepsis  - tylenol for fever

## 2019-04-01 NOTE — DIETITIAN INITIAL EVALUATION ADULT. - OTHER INFO
Patient seen for nutrition consult for pressure ulcer stage 2 or greater. Per chart review patient with medical history of HTN, HLD, advanced dementia, admitted for severe sepsis. Unable to fully assess diet, PO intake and weight history prior to admission as patient unable to answer questions 2/2 mental status. Patient with past admission to [facility] in 7/2018 where cineesophagogram was completed (7/10/18) and SLP recommended mechanical soft diet with thin liquids. At this time patient remains NPO due to mental status. Would recommend swallow assessment during this admission in order to determine appropriate diet texture and liquid consistency. Of note patient with stage 2 pressure injury to buttocks per nursing documentation. Based on weight records in EMR patient with possible weight gain since July admission; 134.9# -> current admit weight 138.8#.

## 2019-04-01 NOTE — PROGRESS NOTE ADULT - PROBLEM SELECTOR PLAN 7
Diet: NPO due to mental status  DVT ppx: lovenox subQ    Vera Antoine MD  Internal Medicine, PGY-3  Pager [phone]
Diet: NPO due to mental status  DVT ppx: lovenox subQ

## 2019-04-01 NOTE — DIETITIAN INITIAL EVALUATION ADULT. - NS AS NUTRI INTERV MEDICAL AND FOOD SUPPLEMENTS
Pending SLP recommendations for liquid consistency, consider adding Ensure Enlive supplement to diet and No Carb Prosource (1pkg = 15 gm protein) 1x daily.

## 2019-04-01 NOTE — DIETITIAN INITIAL EVALUATION ADULT. - PERTINENT LABORATORY DATA
04-01 Na 148 mmol/L<H> Glu 123 mg/dL<H> K+ 3.6 mmol/L Cr 0.58 mg/dL BUN 26 mg/dL<H> Phos 2.4 mg/dL<L> Alb n/a   PAB n/a   Hgb 11.7 g/dL Hct 37.6 % HgA1C n/a    Glucose, Serum: 123 mg/dL <H>

## 2019-04-01 NOTE — PROVIDER CONTACT NOTE (CRITICAL VALUE NOTIFICATION) - SITUATION
set of blood cultures aerobic bottle after 33 hrs of incubation, result is gram positive cocci in clusters

## 2019-04-02 LAB
-  COAGULASE NEGATIVE STAPHYLOCOCCUS: SIGNIFICANT CHANGE UP
ANION GAP SERPL CALC-SCNC: 9 MMO/L — SIGNIFICANT CHANGE UP (ref 7–14)
BACTERIA BLD CULT: SIGNIFICANT CHANGE UP
BUN SERPL-MCNC: 17 MG/DL — SIGNIFICANT CHANGE UP (ref 7–23)
CALCIUM SERPL-MCNC: 8.3 MG/DL — LOW (ref 8.4–10.5)
CHLORIDE SERPL-SCNC: 109 MMOL/L — HIGH (ref 98–107)
CO2 SERPL-SCNC: 27 MMOL/L — SIGNIFICANT CHANGE UP (ref 22–31)
CREAT SERPL-MCNC: 0.6 MG/DL — SIGNIFICANT CHANGE UP (ref 0.5–1.3)
GLUCOSE SERPL-MCNC: 118 MG/DL — HIGH (ref 70–99)
HCT VFR BLD CALC: 34.2 % — LOW (ref 34.5–45)
HGB BLD-MCNC: 10.6 G/DL — LOW (ref 11.5–15.5)
MAGNESIUM SERPL-MCNC: 1.9 MG/DL — SIGNIFICANT CHANGE UP (ref 1.6–2.6)
MCHC RBC-ENTMCNC: 31 % — LOW (ref 32–36)
MCHC RBC-ENTMCNC: 31.6 PG — SIGNIFICANT CHANGE UP (ref 27–34)
MCV RBC AUTO: 102.1 FL — HIGH (ref 80–100)
NRBC # FLD: 0 K/UL — SIGNIFICANT CHANGE UP (ref 0–0)
ORGANISM # SPEC MICROSCOPIC CNT: SIGNIFICANT CHANGE UP
PHOSPHATE SERPL-MCNC: 2.5 MG/DL — SIGNIFICANT CHANGE UP (ref 2.5–4.5)
PLATELET # BLD AUTO: 211 K/UL — SIGNIFICANT CHANGE UP (ref 150–400)
PMV BLD: 10.7 FL — SIGNIFICANT CHANGE UP (ref 7–13)
POTASSIUM SERPL-MCNC: 3.1 MMOL/L — LOW (ref 3.5–5.3)
POTASSIUM SERPL-SCNC: 3.1 MMOL/L — LOW (ref 3.5–5.3)
RBC # BLD: 3.35 M/UL — LOW (ref 3.8–5.2)
RBC # FLD: 13.1 % — SIGNIFICANT CHANGE UP (ref 10.3–14.5)
SODIUM SERPL-SCNC: 145 MMOL/L — SIGNIFICANT CHANGE UP (ref 135–145)
VANCOMYCIN TROUGH SERPL-MCNC: 2.3 UG/ML — LOW (ref 10–20)
WBC # BLD: 11.45 K/UL — HIGH (ref 3.8–10.5)
WBC # FLD AUTO: 11.45 K/UL — HIGH (ref 3.8–10.5)

## 2019-04-02 PROCEDURE — 99233 SBSQ HOSP IP/OBS HIGH 50: CPT | Mod: GC

## 2019-04-02 PROCEDURE — 92610 EVALUATE SWALLOWING FUNCTION: CPT | Mod: GN

## 2019-04-02 RX ORDER — VANCOMYCIN HCL 1 G
1000 VIAL (EA) INTRAVENOUS EVERY 12 HOURS
Qty: 0 | Refills: 0 | Status: DISCONTINUED | OUTPATIENT
Start: 2019-04-02 | End: 2019-04-04

## 2019-04-02 RX ORDER — VANCOMYCIN HCL 1 G
1000 VIAL (EA) INTRAVENOUS EVERY 8 HOURS
Qty: 0 | Refills: 0 | Status: DISCONTINUED | OUTPATIENT
Start: 2019-04-02 | End: 2019-04-02

## 2019-04-02 RX ORDER — VANCOMYCIN HCL 1 G
1000 VIAL (EA) INTRAVENOUS ONCE
Qty: 0 | Refills: 0 | Status: COMPLETED | OUTPATIENT
Start: 2019-04-02 | End: 2019-04-02

## 2019-04-02 RX ORDER — POTASSIUM CHLORIDE 20 MEQ
40 PACKET (EA) ORAL EVERY 4 HOURS
Qty: 0 | Refills: 0 | Status: COMPLETED | OUTPATIENT
Start: 2019-04-02 | End: 2019-04-02

## 2019-04-02 RX ADMIN — Medication 40 MILLIEQUIVALENT(S): at 13:45

## 2019-04-02 RX ADMIN — Medication 250 MILLIGRAM(S): at 02:28

## 2019-04-02 RX ADMIN — Medication 40 MILLIEQUIVALENT(S): at 10:34

## 2019-04-02 RX ADMIN — ENOXAPARIN SODIUM 40 MILLIGRAM(S): 100 INJECTION SUBCUTANEOUS at 13:46

## 2019-04-02 RX ADMIN — Medication 250 MILLIGRAM(S): at 13:45

## 2019-04-02 RX ADMIN — PIPERACILLIN AND TAZOBACTAM 25 GRAM(S): 4; .5 INJECTION, POWDER, LYOPHILIZED, FOR SOLUTION INTRAVENOUS at 05:36

## 2019-04-02 RX ADMIN — PIPERACILLIN AND TAZOBACTAM 25 GRAM(S): 4; .5 INJECTION, POWDER, LYOPHILIZED, FOR SOLUTION INTRAVENOUS at 22:15

## 2019-04-02 RX ADMIN — PIPERACILLIN AND TAZOBACTAM 25 GRAM(S): 4; .5 INJECTION, POWDER, LYOPHILIZED, FOR SOLUTION INTRAVENOUS at 13:51

## 2019-04-02 NOTE — SWALLOW BEDSIDE ASSESSMENT ADULT - SWALLOW EVAL: DIAGNOSIS
Patient presents with OroPharyngeal Stage Dysphagia characterized by reduced oral containment with anterior loss/spillage for teaspoon/cup sip presentation for thin liquids due to reduced lip seal/closure coordination; adequate stripping from teaspoon presentation, adequate bolus manipulation and transport. There is laryngeal elevation upon palpation, initiation of the pharyngeal swallow. There were no overt signs of impaired airway protection for puree and nectar thick liquid trials. Patient presents with OroPharyngeal Stage Dysphagia characterized by reduced oral containment with anterior loss/spillage for teaspoon/cup sip presentation for thin liquids due to reduced lip seal/closure coordination; adequate stripping from teaspoon presentation, adequate bolus manipulation and transport for puree trials. There is laryngeal elevation upon palpation, initiation of the pharyngeal swallow. There were no overt signs of impaired airway protection for puree and nectar thick liquid trials.

## 2019-04-02 NOTE — PROGRESS NOTE ADULT - PROBLEM SELECTOR PLAN 1
Resolved. Likely secondary to UTI vs unstagable sacral ulcer, CXR no consolidations, no diarrhea, no evidence of cellulitis  - C/w vanc/zosyn (3/31-> ; day#3)

## 2019-04-02 NOTE — CONSULT NOTE ADULT - SUBJECTIVE AND OBJECTIVE BOX
Podiatry pager #: [phone] ([place])/ [phone] ([facility])    Patient is a 89y old  Female who presents with a chief complaint of lethargy (02 Apr 2019 09:18)      HPI:  89 yo F c PMH of HTN, HLD, advanced dementia baseline AAO 0-1 to self, bedbound BIBEMS for lethargy and fever on day of arrival. Spoke with son (HCP) over the phone, per son patient is usually phonating, making mumbling noises however today he noticed that she has been more somnolent. He said a week ago he noticed one of the HHA feeding her while she was supine. For fever T101 family gave her tylenol. Son denied sick contacts or recent travel hx, denied cough, diarrhea. Wears diaper. She has chronic ecchymosis and unstageable ulcer that has been stable per son. He is the HCP and would her to be DNR but not DNI (he would like a trial of intubation but he also discussed that if patient will not have a meaningful recovery, then do not intubate). HHA at bedside is taking care of the patient for the first time tonight, therefore she is not aware of the patient's chronic issue. She is on xanax, PRN, last dose taken 1 week ago, on seroquel 50 mg BID, last dose taken 7 PM PTA.    In the ED Vitals Tmax 100.5 axillary HR92 /73 RR 26 SaO2 100 % on RA  Received IV tylenol 1000 mg x 1, vanc/zosyn, 2L NS IVF, albuterol x 1  Straight cathed in ED (31 Mar 2019 02:53)      PAST MEDICAL & SURGICAL HISTORY:  Dementia  History of Spinal Stenosis  Lung Nodules  Hepatitis C: possible during transfusion in 1970&#x27;s  Carotid Artery Disease  Tendonitis: left hand: s/p steroid injection 3/10  Back Pain  Hypertension  Hyperlipidemia  S/P small bowel resection  History of Carotid Endarterectomy: right 2008  S/P Dilation and Curettage: 1970&#x27;s menorrhagia  After Cataract, Bilateral  S/P Cholecystectomy: laparoscopic 2 years ago  History of Laminectomy: 1970&#x27;s  S/P Knee Replacement: left knee 1997  right knee 2004      MEDICATIONS  (STANDING):  dextrose 5%. 1000 milliLiter(s) (50 mL/Hr) IV Continuous <Continuous>  enoxaparin Injectable 40 milliGRAM(s) SubCutaneous daily  piperacillin/tazobactam IVPB. 3.375 Gram(s) IV Intermittent every 8 hours  vancomycin  IVPB 1000 milliGRAM(s) IV Intermittent every 12 hours    MEDICATIONS  (PRN):      Allergies    Risperdal (Unknown)    Intolerances        VITALS:    Vital Signs Last 24 Hrs  T(C): 36.4 (02 Apr 2019 14:39), Max: 36.9 (02 Apr 2019 05:39)  T(F): 97.5 (02 Apr 2019 14:39), Max: 98.4 (02 Apr 2019 05:39)  HR: 71 (02 Apr 2019 14:39) (71 - 94)  BP: 148/70 (02 Apr 2019 14:39) (131/64 - 148/70)  BP(mean): --  RR: 17 (02 Apr 2019 14:39) (17 - 18)  SpO2: 97% (02 Apr 2019 14:39) (97% - 99%)    LABS:                          10.6   11.45 )-----------( 211      ( 02 Apr 2019 06:23 )             34.2       04-02    145  |  109<H>  |  17  ----------------------------<  118<H>  3.1<L>   |  27  |  0.60    Ca    8.3<L>      02 Apr 2019 06:23  Phos  2.5     04-02  Mg     1.9     04-02        CAPILLARY BLOOD GLUCOSE              LOWER EXTREMITY PHYSICAL EXAM:    Vascular: DP/PT 1/4 B/L, CFT <3 seconds B/L, Temperature gradient warm to cool B/L  Neuro: Epicritic sensation diminished to the level of ankle B/L  Musculoskeletal/Ortho: bedbound, contracted lower extremities  Skin: Deep tissue injuries to the lateral aspect of the left foot at the lateral heel, 5th met base, and 5th MTPJ (2/2 pressure from being bedbound). No open wounds, no signs of infection.

## 2019-04-02 NOTE — PROGRESS NOTE ADULT - PROBLEM SELECTOR PLAN 6
DNR as per HCP Son phone #: [phone]  however would like trial of intubation and pressors  will bring HCP paperwork tomorrow
Diet: NPO due to mental status  DVT ppx: lovenox subQ
DNR as per HCP Son phone #: [phone]  however would like trial of intubation and pressors  will bring HCP paperwork tomorrow

## 2019-04-02 NOTE — PROGRESS NOTE ADULT - SUBJECTIVE AND OBJECTIVE BOX
***************************************************************  Mark Hellerman, PGY1  Internal Medicine   pager: [facility]: [phone]; [facility]: [phone]  ***************************************************************    DONNA BARBER  89y  MRN: [id number]    Patient is a 89y old  Female who presents with a chief complaint of lethargy (01 Apr 2019 07:38)    Subjective:   - Vanc trough noted at 2.3 overnight, dose increased to 1g q8h  - AOx0 (per son this is her baseline)   - Mumbling at bedside this AM     MEDICATIONS  (STANDING):  dextrose 5%. 1000 milliLiter(s) (50 mL/Hr) IV Continuous <Continuous>  enoxaparin Injectable 40 milliGRAM(s) SubCutaneous daily  piperacillin/tazobactam IVPB. 3.375 Gram(s) IV Intermittent every 8 hours  potassium chloride    Tablet ER 40 milliEquivalent(s) Oral every 4 hours  vancomycin  IVPB 1000 milliGRAM(s) IV Intermittent every 12 hours    MEDICATIONS  (PRN):      Objective:    Vitals: Vital Signs Last 24 Hrs  T(C): 36.9 (04-02-19 @ 05:39), Max: 36.9 (04-02-19 @ 05:39)  T(F): 98.4 (04-02-19 @ 05:39), Max: 98.4 (04-02-19 @ 05:39)  HR: 78 (04-02-19 @ 05:39) (65 - 94)  BP: 131/64 (04-02-19 @ 05:39) (131/64 - 144/85)  RR: 18 (04-02-19 @ 05:39) (18 - 18)  SpO2: 99% (04-02-19 @ 05:39) (99% - 100%)            I&O's Summary      PHYSICAL EXAM:  GENERAL: NAD, well-developed  HEAD:  Atraumatic, Normocephalic  CHEST/LUNG: Clear to auscultation bilaterally; No wheezes, rhonchi   HEART: Regular rate and rhythm; No murmurs, rubs, or gallops  ABDOMEN: Soft, Nontender, Nondistended;   PSYCH: Lethargic  NEUROLOGY: Lethargic, AxOx0  SKIN: No rashes or lesions    LABS:  04-02    145  |  109<H>  |  17  ----------------------------<  118<H>  3.1<L>   |  27  |  0.60  04-01    148<H>  |  112<H>  |  26<H>  ----------------------------<  123<H>  3.6   |  25  |  0.58  03-31    148<H>  |  111<H>  |  33<H>  ----------------------------<  127<H>  4.1   |  25  |  0.62    Ca    8.3<L>      02 Apr 2019 06:23  Ca    8.4      01 Apr 2019 05:28  Ca    8.6      31 Mar 2019 15:56  Phos  2.5     04-02  Mg     1.9     04-02    TPro  5.9<L>  /  Alb  2.4<L>  /  TBili  0.4  /  DBili  x   /  AST  40<H>  /  ALT  34<H>  /  AlkPhos  51  03-31  TPro  6.3  /  Alb  2.8<L>  /  TBili  0.4  /  DBili  x   /  AST  44<H>  /  ALT  38<H>  /  AlkPhos  55  03-30                                              10.6   11.45 )-----------( 211      ( 02 Apr 2019 06:23 )             34.2                         11.7   12.28 )-----------( 185      ( 01 Apr 2019 05:28 )             37.6                         13.2   16.25 )-----------( 173      ( 31 Mar 2019 06:43 )             41.8     CAPILLARY BLOOD GLUCOSE          RADIOLOGY & ADDITIONAL TESTS:    Imaging Personally Reviewed:  [x ] YES  [ ] NO    Consultants involved in case:   Consultant(s) Notes Reviewed:  [ x] YES  [ ] NO:   Care Discussed with Consultants/Other Providers [x ] YES  [ ] NO

## 2019-04-02 NOTE — SWALLOW BEDSIDE ASSESSMENT ADULT - SWALLOW EVAL: RECOMMENDED FEEDING/EATING TECHNIQUES
maintain upright posture during/after eating for 30 mins/oral hygiene/position upright (90 degrees)/Feed upright position as tolerated; Feed when alert; feed slowly, allow patient to swallow prior to next presentation, provide small single cup sip of nectar thick liquids; alternate with a nectar thick liquid wash after every 2 to 3 puree intake

## 2019-04-02 NOTE — SWALLOW BEDSIDE ASSESSMENT ADULT - COMMENTS
Dx: Severe sepsis 2/2 UTI vs sacral ulcer    89 yo F c PMH of HTN, HLD, advanced dementia baseline AAO 0-1 to self, bedbound admitted for severe sepsis secondary to UTI.    Patient is seen at bedside. Patient is awaken to alert state once patient's bed is elevated to 70 degrees; patient opened eyes and began mumbling sounds and no words.  Patient did not follow simple commands for oral exam. Attempted to exam the oral cavity; however; patient's lips remained sealed despite attempt for viewing.    Of Note: Patient is known to this service from previous admissions.  Patient had a Cinesophagram completed back on July 10, 2018 (See Report) with recommendations for mechanical soft with thin liquids at that time.

## 2019-04-02 NOTE — CONSULT NOTE ADULT - ASSESSMENT
89 y/o F w/ L foot deep tissue injuries  -Pt seen and evaluated  -Vitals stable, afebrile, WBC 11.6 (2/2 sepsis from UTI vs sacral ulcer)  -Deep tissue injuries to the lateral aspect of the left foot at the lateral heel, 5th met base, and 5th MTPJ (2/2 pressure from being bedbound). No open wounds, no signs of infection.  -No pod surg intervention  -No wound care needed  -Keep heels offloaded w/ Z-flow boots  -Discussed w/ attending

## 2019-04-03 ENCOUNTER — TRANSCRIPTION ENCOUNTER (OUTPATIENT)
Age: 84
End: 2019-04-03

## 2019-04-03 DIAGNOSIS — D64.9 ANEMIA, UNSPECIFIED: ICD-10-CM

## 2019-04-03 LAB
ANION GAP SERPL CALC-SCNC: 9 MMO/L — SIGNIFICANT CHANGE UP (ref 7–14)
BUN SERPL-MCNC: 15 MG/DL — SIGNIFICANT CHANGE UP (ref 7–23)
CALCIUM SERPL-MCNC: 8.1 MG/DL — LOW (ref 8.4–10.5)
CHLORIDE SERPL-SCNC: 107 MMOL/L — SIGNIFICANT CHANGE UP (ref 98–107)
CO2 SERPL-SCNC: 26 MMOL/L — SIGNIFICANT CHANGE UP (ref 22–31)
CREAT SERPL-MCNC: 0.68 MG/DL — SIGNIFICANT CHANGE UP (ref 0.5–1.3)
FOLATE SERPL-MCNC: > 20 NG/ML — HIGH (ref 4.7–20)
GLUCOSE SERPL-MCNC: 113 MG/DL — HIGH (ref 70–99)
HAPTOGLOB SERPL-MCNC: 325 MG/DL — HIGH (ref 34–200)
HCT VFR BLD CALC: 32.3 % — LOW (ref 34.5–45)
HGB BLD-MCNC: 10.4 G/DL — LOW (ref 11.5–15.5)
LDH SERPL L TO P-CCNC: 308 U/L — HIGH (ref 135–225)
MAGNESIUM SERPL-MCNC: 1.8 MG/DL — SIGNIFICANT CHANGE UP (ref 1.6–2.6)
MCHC RBC-ENTMCNC: 31.9 PG — SIGNIFICANT CHANGE UP (ref 27–34)
MCHC RBC-ENTMCNC: 32.2 % — SIGNIFICANT CHANGE UP (ref 32–36)
MCV RBC AUTO: 99.1 FL — SIGNIFICANT CHANGE UP (ref 80–100)
NRBC # FLD: 0.02 K/UL — SIGNIFICANT CHANGE UP (ref 0–0)
PHOSPHATE SERPL-MCNC: 2.5 MG/DL — SIGNIFICANT CHANGE UP (ref 2.5–4.5)
PLATELET # BLD AUTO: 233 K/UL — SIGNIFICANT CHANGE UP (ref 150–400)
PMV BLD: 10.8 FL — SIGNIFICANT CHANGE UP (ref 7–13)
POTASSIUM SERPL-MCNC: 3.1 MMOL/L — LOW (ref 3.5–5.3)
POTASSIUM SERPL-SCNC: 3.1 MMOL/L — LOW (ref 3.5–5.3)
RBC # BLD: 3.26 M/UL — LOW (ref 3.8–5.2)
RBC # FLD: 13.2 % — SIGNIFICANT CHANGE UP (ref 10.3–14.5)
RETICS #: 65 K/UL — SIGNIFICANT CHANGE UP (ref 25–125)
RETICS/RBC NFR: 2 % — SIGNIFICANT CHANGE UP (ref 0.5–2.5)
SODIUM SERPL-SCNC: 142 MMOL/L — SIGNIFICANT CHANGE UP (ref 135–145)
SPECIMEN SOURCE: SIGNIFICANT CHANGE UP
SPECIMEN SOURCE: SIGNIFICANT CHANGE UP
VIT B12 SERPL-MCNC: 567 PG/ML — SIGNIFICANT CHANGE UP (ref 200–900)
WBC # BLD: 13.62 K/UL — HIGH (ref 3.8–10.5)
WBC # FLD AUTO: 13.62 K/UL — HIGH (ref 3.8–10.5)

## 2019-04-03 PROCEDURE — 99233 SBSQ HOSP IP/OBS HIGH 50: CPT | Mod: GC

## 2019-04-03 RX ORDER — POTASSIUM CHLORIDE 20 MEQ
40 PACKET (EA) ORAL EVERY 4 HOURS
Qty: 0 | Refills: 0 | Status: COMPLETED | OUTPATIENT
Start: 2019-04-03 | End: 2019-04-03

## 2019-04-03 RX ORDER — VALPROIC ACID (AS SODIUM SALT) 250 MG/5ML
1 SOLUTION, ORAL ORAL
Qty: 0 | Refills: 0 | COMMUNITY

## 2019-04-03 RX ORDER — QUETIAPINE FUMARATE 200 MG/1
50 TABLET, FILM COATED ORAL
Qty: 0 | Refills: 0 | Status: DISCONTINUED | OUTPATIENT
Start: 2019-04-03 | End: 2019-04-04

## 2019-04-03 RX ADMIN — Medication 250 MILLIGRAM(S): at 13:06

## 2019-04-03 RX ADMIN — PIPERACILLIN AND TAZOBACTAM 25 GRAM(S): 4; .5 INJECTION, POWDER, LYOPHILIZED, FOR SOLUTION INTRAVENOUS at 13:06

## 2019-04-03 RX ADMIN — Medication 250 MILLIGRAM(S): at 02:14

## 2019-04-03 RX ADMIN — Medication 40 MILLIEQUIVALENT(S): at 09:03

## 2019-04-03 RX ADMIN — PIPERACILLIN AND TAZOBACTAM 25 GRAM(S): 4; .5 INJECTION, POWDER, LYOPHILIZED, FOR SOLUTION INTRAVENOUS at 05:41

## 2019-04-03 RX ADMIN — PIPERACILLIN AND TAZOBACTAM 25 GRAM(S): 4; .5 INJECTION, POWDER, LYOPHILIZED, FOR SOLUTION INTRAVENOUS at 22:43

## 2019-04-03 RX ADMIN — ENOXAPARIN SODIUM 40 MILLIGRAM(S): 100 INJECTION SUBCUTANEOUS at 11:44

## 2019-04-03 NOTE — PROGRESS NOTE ADULT - PROBLEM SELECTOR PLAN 3
DNR as per HCP Son phone #: [phone]  however would like trial of intubation and pressors  HCP paperwork in chart Macrocytic anemia, last B12 and folate wnl, hgb slowly but steadily downtrending.   Etiology unclear, per RN no bloody bowel movements   Will f/u hemolysis labs including haptoglobin, LDH, reticulocyte count

## 2019-04-03 NOTE — ADVANCED PRACTICE NURSE CONSULT - RECOMMEDATIONS
Serum pre-albumin with next blood draws.  Wound recommend reconsult with Podiatry surgery to drain fluid filled blister of Left heel and nail care management of Left great toe (risk of to adjacent toe, in setting of arterial insufficiency).    Topical Recommendations:  Left buttock- Cleanse wound and periwound skin with SAF-clens, pat dry. Apply Liquid barrier film to periwound skin. Cover with silicone foam with border. Change daily.     Left popliteal, right lateral knee- Cleanse wounds and periwound skin with SAF-clens, pat dry. Apply Liquid barrier film to periwound skin. Cover with silicone foam with border, change every other day.    Bilateral feet- Apply liquid barrier film daily to bilateral heels, right lateral foot wounds, left medial great toe, left second medial toe, left lateral fifth toe, left lateral mid-foot, and right medial malleolus. Allow to dry. Cover with abdominal pads, secure with kerlix and paper tape (protect from friction and sheer).    Continue low air loss bed therapy, continue heel elevation with Z-flex fluidized positioning boots, continue to turn & reposition q2h with Z-hugo fluidized positioning device, soft pillow between bony prominences, continue incontinence management with ponce moisture barrier cream & single breathable pad, continue measures to decrease friction/shear/pressure.     Continue with nutritional support as per dietary/orders.    Findings and plan discussed with primary team and RN. All questions and concerns addressed.    Please contact Wound Care Service Line if we can be of further assistance (ext [phone]). Serum pre-albumin with next blood draws.  Would recommend reconsult with Podiatry surgery to drain fluid filled blister of Left heel and nail care management of Left great toe (risk of to adjacent toe, in setting of arterial insufficiency).  Recommend follow up care at [facility] ([phone]. Address: [address] [place] [address]) or outpatient Podiatrist upon discharge.     Topical Recommendations:  Left buttock- Cleanse wound and periwound skin with SAF-clens, pat dry. Apply Liquid barrier film to periwound skin. Cover with silicone foam with border. Change daily.     Left popliteal, right lateral knee- Cleanse wounds and periwound skin with SAF-clens, pat dry. Apply Liquid barrier film to periwound skin. Cover with silicone foam with border, change every other day.    Bilateral feet- Apply liquid barrier film daily to bilateral heels, right lateral foot wounds, left medial great toe, left second medial toe, left lateral fifth toe, left lateral mid-foot, and right medial malleolus. Allow to dry. Cover with abdominal pads, secure with kerlix and paper tape (protect from friction and sheer).    Continue low air loss bed therapy, continue heel elevation with Z-flex fluidized positioning boots, continue to turn & reposition q2h with Z-hugo fluidized positioning device, soft pillow between bony prominences, continue incontinence management with ponce moisture barrier cream & single breathable pad, continue measures to decrease friction/shear/pressure.     Continue with nutritional support as per dietary/orders.    Findings and plan discussed with primary team and RN. All questions and concerns addressed.    Please contact Wound Care Service Line if we can be of further assistance (ext [phone]).

## 2019-04-03 NOTE — DISCHARGE NOTE NURSING/CASE MANAGEMENT/SOCIAL WORK - NSDCDPATPORTLINK_GEN_ALL_CORE
You can access the [facility] Patient Portal, offered by [facility], by registering with the following website: [email or web address]

## 2019-04-03 NOTE — DISCHARGE NOTE NURSING/CASE MANAGEMENT/SOCIAL WORK - NSDCCRTYPESERV_GEN_ALL_CORE_FT
Transportation confirmed with Bridgette @ [facility] - 8:30am pickup   as per Cecilio @ [facility] ( x [phone]) Auth #[id number] [facility] () - 3:30pm pickup via ambulance with stretcher. Trip # [id number].

## 2019-04-03 NOTE — PHYSICAL THERAPY INITIAL EVALUATION ADULT - ADDITIONAL COMMENTS
Unable to obtain history due to patient confused and unable to follow commands. As per multiple previous physical therapy assessments patient lives at home with total mechanical lift and semi-electric bed. Patient has home health aides at home and is completely dependent with all ADLs

## 2019-04-03 NOTE — CHART NOTE - NSCHARTNOTEFT_GEN_A_CORE
Source: Patient [x] Patient responded "hello" to name during visit. However, during rest of visit patient mumbling incoherently.  No family/caregiver at bedside. Spoke to RN. Medical record reviewed. Patient seen for length of stay nutrition follow-up.     Patient s/p swallow assessment on 3/29 and speech pathologist recommended to continue Pureed diet with nectar thick liquids. RN reports patient is tolerating diet and consuming 100% of meals. No GI distress (nausea/vomiting/diarrhea/constipation) noted at this time.     Diet : Dysphagia 1 Pureed Nectar Consistency Fluids.  PO intake:  < 50% [ ] 50-75% [ ]   % [x]  other :    Current Weight: 63.4kg (4/02), 63kg (3/31)  % Weight Change: weight remains relatively stable    Pertinent Medications: MEDICATIONS  (STANDING):  dextrose 5%. 1000 milliLiter(s) (50 mL/Hr) IV Continuous <Continuous>  enoxaparin Injectable 40 milliGRAM(s) SubCutaneous daily  piperacillin/tazobactam IVPB. 3.375 Gram(s) IV Intermittent every 8 hours  potassium chloride    Tablet ER 40 milliEquivalent(s) Oral every 4 hours  vancomycin  IVPB 1000 milliGRAM(s) IV Intermittent every 12 hours    MEDICATIONS  (PRN):  QUEtiapine 50 milliGRAM(s) Oral two times a day PRN aggitation    Pertinent Labs:  04-03 Na142 mmol/L Glu 113 mg/dL<H> K+ 3.1 mmol/L<L> Cr  0.68 mg/dL BUN 15 mg/dL 04-03 Phos 2.5 mg/dL 03-31 Alb 2.4 g/dL<L>    Skin: stage 2 pressure injury to left buttock, stage 2 to left heel, suspected DTI's to right heel, right mid foot and right distal to 5th toe per nursing documentation    Estimated Needs:   [x] no change since previous assessment  [ ] recalculated:     Previous Nutrition Diagnosis:   [x] Inadequate oral intake  Nutrition Diagnosis is [ ] ongoing  [x] resolved [ ] not applicable     New Nutrition Diagnosis: [ ] not applicable  [x] Chewing/swallowing difficulty related to motor/mechanical causes As evidenced by swallow assessment evaluation (3/29) and need for therapeutic pureed diet with nectar thickened liquids.    Interventions:   1. Continue Dysphagia 1 Pureed Nectar Consistency Fluids.  2. Recommend No Carb Prosource (1pkg = 15 gm protein) 1x daily to increase protein intake.   3. Please Encourage po intake, assist with meals and menu selections, provide alternatives PRN.  4. Maintain aspiration precautions.     Monitoring and Evaluation:   1. Monitor weights, labs, BM's, skin integrity, p.o. intake.  2. Patient to meet > 75% estimated pro/kcal needs.   3. Wound healing.   RD to remain available, Mallory Bee RD, CDN Pager #[phone]

## 2019-04-03 NOTE — PROGRESS NOTE ADULT - SUBJECTIVE AND OBJECTIVE BOX
Podiatry pager #: [phone] ([place])/ [phone] ([facility])    Patient is a 89y old  Female who presents with a chief complaint of lethargy (02 Apr 2019 09:18)      HPI:  89 yo F c PMH of HTN, HLD, advanced dementia baseline AAO 0-1 to self, bedbound BIBEMS for lethargy and fever on day of arrival. Spoke with son (HCP) over the phone, per son patient is usually phonating, making mumbling noises however today he noticed that she has been more somnolent. He said a week ago he noticed one of the HHA feeding her while she was supine. For fever T101 family gave her tylenol. Son denied sick contacts or recent travel hx, denied cough, diarrhea. Wears diaper. She has chronic ecchymosis and unstageable ulcer that has been stable per son. He is the HCP and would her to be DNR but not DNI (he would like a trial of intubation but he also discussed that if patient will not have a meaningful recovery, then do not intubate). HHA at bedside is taking care of the patient for the first time tonight, therefore she is not aware of the patient's chronic issue. She is on xanax, PRN, last dose taken 1 week ago, on seroquel 50 mg BID, last dose taken 7 PM PTA.    In the ED Vitals Tmax 100.5 axillary HR92 /73 RR 26 SaO2 100 % on RA  Received IV tylenol 1000 mg x 1, vanc/zosyn, 2L NS IVF, albuterol x 1  Straight cathed in ED (31 Mar 2019 02:53)      PAST MEDICAL & SURGICAL HISTORY:  Dementia  History of Spinal Stenosis  Lung Nodules  Hepatitis C: possible during transfusion in 1970&#x27;s  Carotid Artery Disease  Tendonitis: left hand: s/p steroid injection 3/10  Back Pain  Hypertension  Hyperlipidemia  S/P small bowel resection  History of Carotid Endarterectomy: right 2008  S/P Dilation and Curettage: 1970&#x27;s menorrhagia  After Cataract, Bilateral  S/P Cholecystectomy: laparoscopic 2 years ago  History of Laminectomy: 1970&#x27;s  S/P Knee Replacement: left knee 1997  right knee 2004      MEDICATIONS  (STANDING):  dextrose 5%. 1000 milliLiter(s) (50 mL/Hr) IV Continuous <Continuous>  enoxaparin Injectable 40 milliGRAM(s) SubCutaneous daily  piperacillin/tazobactam IVPB. 3.375 Gram(s) IV Intermittent every 8 hours  vancomycin  IVPB 1000 milliGRAM(s) IV Intermittent every 12 hours    MEDICATIONS  (PRN):      Allergies    Risperdal (Unknown)    Intolerances        VITALS:    Vital Signs Last 24 Hrs  T(C): 36.4 (02 Apr 2019 14:39), Max: 36.9 (02 Apr 2019 05:39)  T(F): 97.5 (02 Apr 2019 14:39), Max: 98.4 (02 Apr 2019 05:39)  HR: 71 (02 Apr 2019 14:39) (71 - 94)  BP: 148/70 (02 Apr 2019 14:39) (131/64 - 148/70)  BP(mean): --  RR: 17 (02 Apr 2019 14:39) (17 - 18)  SpO2: 97% (02 Apr 2019 14:39) (97% - 99%)    LABS:                          10.6   11.45 )-----------( 211      ( 02 Apr 2019 06:23 )             34.2       04-02    145  |  109<H>  |  17  ----------------------------<  118<H>  3.1<L>   |  27  |  0.60    Ca    8.3<L>      02 Apr 2019 06:23  Phos  2.5     04-02  Mg     1.9     04-02        CAPILLARY BLOOD GLUCOSE              LOWER EXTREMITY PHYSICAL EXAM:    Vascular: DP/PT 1/4 B/L, CFT <3 seconds B/L, Temperature gradient warm to cool B/L  Neuro: Epicritic sensation diminished to the level of ankle B/L  Musculoskeletal/Ortho: bedbound, contracted lower extremities  Skin: Deep tissue injuries to the lateral aspect of the left foot at the lateral heel, 5th met base, and 5th MTPJ (2/2 pressure from being bedbound). No open wounds, no signs of infection.

## 2019-04-03 NOTE — DISCHARGE NOTE NURSING/CASE MANAGEMENT/SOCIAL WORK - NSSCNAMETXT_GEN_ALL_CORE
[facility]. Nurse to visit on the day following discharge. Other appropriate services to be arranged thereafter.   Please contact the home care agency at the above phone number if you have not heard from them by approximately 12 noon on the day after your hospital discharge.

## 2019-04-03 NOTE — PHYSICAL THERAPY INITIAL EVALUATION ADULT - PERTINENT HX OF CURRENT PROBLEM, REHAB EVAL
89 yo F c PMH of HTN, HLD, advanced dementia baseline AAO 0-1 to self, bedbound BIBEMS for lethargy and fever on day of arrival.

## 2019-04-03 NOTE — DISCHARGE NOTE NURSING/CASE MANAGEMENT/SOCIAL WORK - NSDCFUADDAPPT_GEN_ALL_CORE_FT
As a reminder, please CALL [facility] () to coordinate IN-HOUSE VISIT for reinforced teaching for ADELE BRYAN

## 2019-04-03 NOTE — DISCHARGE NOTE PROVIDER - NSDCCPCAREPLAN_GEN_ALL_CORE_FT
PRINCIPAL DISCHARGE DIAGNOSIS  Diagnosis: Sepsis  Assessment and Plan of Treatment:       SECONDARY DISCHARGE DIAGNOSES  Diagnosis: Encounter for wound care  Assessment and Plan of Treatment: Recommend follow up care at [facility] ([phone]. Address: [address] [place] [address]) or outpatient Podiatrist upon discharge.   Topical Recommendations:  Left buttock- Cleanse wound and periwound skin with SAF-clens, pat dry. Apply Liquid barrier film to periwound skin. Cover with silicone foam with border. Change daily.   Left popliteal, right lateral knee- Cleanse wounds and periwound skin with SAF-clens, pat dry. Apply Liquid barrier film to periwound skin. Cover with silicone foam with border, change every other day.  Bilateral feet- Apply liquid barrier film daily to bilateral heels, right lateral foot wounds, left medial great toe, left second medial toe, left lateral fifth toe, left lateral mid-foot, and right medial malleolus. Allow to dry. Cover with abdominal pads, secure with kerlix and paper tape (protect from friction and sheer).  Continue low air loss bed therapy, continue heel elevation with Z-flex fluidized positioning boots, continue to turn & reposition q2h with Z-hugo fluidized positioning device, soft pillow between bony prominences, continue incontinence management with ponce moisture barrier cream & single breathable pad, continue measures to decrease friction/shear/pressure.   Continue with nutritional support as per dietary/orders.      Diagnosis: Sepsis  Assessment and Plan of Treatment: PRINCIPAL DISCHARGE DIAGNOSIS  Diagnosis: Sepsis  Assessment and Plan of Treatment: Sepsis is a serious condition that occurs when the body overreacts to an infection. It is also called systemic inflammatory response syndrome (SIRS) with infection. An infection is usually caused by bacteria that attack the body. The body's defense system normally fights off infection within the affected body part. With sepsis, the body overreacts and causes symptoms to occur throughout the body. This leads to uncontrolled and widespread inflammation and clotting in small blood vessels. Blood flow to different body parts decreases and may lead to organ failure. We were unable to identify the source of your infection, but you improved on broad spectrum antibotics.  Seek care immediately or call 911 if: you have increased swelling in your legs, feet, or abdomen, you become short of breath or you cough up blood, you have a fast heart rate and your chest hurts, you feel so dizzy that you have trouble standing up, or your lips or fingernails are blue.      SECONDARY DISCHARGE DIAGNOSES  Diagnosis: Encounter for wound care  Assessment and Plan of Treatment: Recommend follow up care at [facility] ([phone]. Address: [address] [place] [address]) or outpatient Podiatrist upon discharge.   Topical Recommendations:  Left buttock- Cleanse wound and periwound skin with SAF-clens, pat dry. Apply Liquid barrier film to periwound skin. Cover with silicone foam with border. Change daily.   Left popliteal, right lateral knee- Cleanse wounds and periwound skin with SAF-clens, pat dry. Apply Liquid barrier film to periwound skin. Cover with silicone foam with border, change every other day.  Bilateral feet- Apply liquid barrier film daily to bilateral heels, right lateral foot wounds, left medial great toe, left second medial toe, left lateral fifth toe, left lateral mid-foot, and right medial malleolus. Allow to dry. Cover with abdominal pads, secure with kerlix and paper tape (protect from friction and sheer).  Continue low air loss bed therapy, continue heel elevation with Z-flex fluidized positioning boots, continue to turn & reposition q2h with Z-hugo fluidized positioning device, soft pillow between bony prominences, continue incontinence management with ponce moisture barrier cream & single breathable pad, continue measures to decrease friction/shear/pressure.   Continue with nutritional support as per dietary/orders.      Diagnosis: Sepsis  Assessment and Plan of Treatment:

## 2019-04-03 NOTE — PROGRESS NOTE ADULT - PROBLEM SELECTOR PLAN 5
DNR as per HCP Son phone #: [phone]  however would like trial of intubation and pressors  HCP paperwork in chart
Replete lytes as needed
Replete lytes as needed
Diet: NPO due to mental status  DVT ppx: lovenox subQ

## 2019-04-03 NOTE — DISCHARGE NOTE PROVIDER - HOSPITAL COURSE
HPI:        89 yo F PMHx HTN, HLD, advanced dementia baseline (AAO 0-1 to self), bedbound BIBEMS for lethargy and fever on day of arrival. Spoke with son (HCP) over the phone, per son patient is usually phonating, making mumbling noises however today he noticed that she has been more somnolent. He said a week ago he noticed one of the HHA feeding her while she was supine. For fever T101 family gave her tylenol. Son denied sick contacts or recent travel hx, denied cough, diarrhea. Wears diaper. She has chronic ecchymosis and unstageable ulcer that has been stable per son. He is the HCP and would her to be DNR but not DNI (he would like a trial of intubation but he also discussed that if patient will not have a meaningful recovery, then do not intubate). HHA at bedside is taking care of the patient for the first time tonight, therefore she is not aware of the patient's chronic issue. She is on xanax, PRN, last dose taken 1 week ago, on seroquel 50 mg BID, last dose taken 7 PM PTA.        In the ED Vitals Tmax 100.5 axillary HR92 /73 RR 26 SaO2 100 % on RA. Received IV tylenol 1000 mg x 1, vanc/zosyn, 2L NS IVF, albuterol x 1. Straight cathed in ED        Hospital Course:    Patient was admitted for sepsis 2/2 unclear etiology. She was started on broad spectrum antibiotics (vancomycin and zosyn) with improvement of mental status to her baseline. Her urine culture was negative. Her initial blood cultures gre coagulase negative staph, which was likely a contaminant (repeat were negative). Initial CXR was negative, however, she was maintained on a dysphagia diet. Speech and swallow recommended continuing puree and nectar thick liquids (maintaining upright posture and alternation with nectar thick liquids). She was evaluated by wound care who recommended further assessment with podiatry (see instructions for follow-up care plan).

## 2019-04-03 NOTE — DISCHARGE NOTE PROVIDER - CARE PROVIDER_API CALL
Wound care,   [facility] ([phone]. Address: [address] [place] [address])  Phone: (   )    -  Fax: (   )    -  Follow Up Time:

## 2019-04-03 NOTE — PROGRESS NOTE ADULT - SUBJECTIVE AND OBJECTIVE BOX
***************************************************************  Mark Hellerman, PGY1  Internal Medicine   pager: [facility]: [phone]; [facility]: [phone]  ***************************************************************    DONNA BARBER  89y  MRN: [id number]    Patient is a 89y old  Female who presents with a chief complaint of lethargy (02 Apr 2019 19:34)    Subjective:   - No events ON.   - This AM responded with "hello" but then when on to incoherent mumbling    - Seen by podiatry yesterday for DTIs, no need for pod surg intervention or wound care    MEDICATIONS  (STANDING):  dextrose 5%. 1000 milliLiter(s) (50 mL/Hr) IV Continuous <Continuous>  enoxaparin Injectable 40 milliGRAM(s) SubCutaneous daily  piperacillin/tazobactam IVPB. 3.375 Gram(s) IV Intermittent every 8 hours  vancomycin  IVPB 1000 milliGRAM(s) IV Intermittent every 12 hours    MEDICATIONS  (PRN):      Objective:    Vitals: Vital Signs Last 24 Hrs  T(C): 36.7 (04-03-19 @ 05:42), Max: 36.7 (04-03-19 @ 05:42)  T(F): 98 (04-03-19 @ 05:42), Max: 98 (04-03-19 @ 05:42)  HR: 73 (04-03-19 @ 05:42) (71 - 84)  BP: 143/63 (04-03-19 @ 05:42) (141/68 - 148/70)  RR: 16 (04-03-19 @ 05:42) (16 - 18)  SpO2: 96% (04-03-19 @ 05:42) (96% - 97%)             I&O's Summary    02 Apr 2019 07:01  -  03 Apr 2019 07:00  --------------------------------------------------------  IN: 350 mL / OUT: 0 mL / NET: 350 mL        PHYSICAL EXAM:  GENERAL: NAD  HEAD:  Atraumatic, Normocephalic  CHEST/LUNG: Clear to auscultation bilaterally; No wheezes, rhonchi   HEART: Regular rate and rhythm; No murmurs, rubs, or gallops  ABDOMEN: Soft, Nontender, Nondistended;   PSYCH: Lethargic, mumbling   NEUROLOGY: Lethargic, AxOx0  SKIN: No rashes or lesions    LABS:  04-02    145  |  109<H>  |  17  ----------------------------<  118<H>  3.1<L>   |  27  |  0.60  04-01    148<H>  |  112<H>  |  26<H>  ----------------------------<  123<H>  3.6   |  25  |  0.58  03-31    148<H>  |  111<H>  |  33<H>  ----------------------------<  127<H>  4.1   |  25  |  0.62    Ca    8.3<L>      02 Apr 2019 06:23  Ca    8.4      01 Apr 2019 05:28  Ca    8.6      31 Mar 2019 15:56  Phos  2.5     04-02  Mg     1.9     04-02                                                10.4   13.62 )-----------( 233      ( 03 Apr 2019 05:40 )             32.3                         10.6   11.45 )-----------( 211      ( 02 Apr 2019 06:23 )             34.2                         11.7   12.28 )-----------( 185      ( 01 Apr 2019 05:28 )             37.6     CAPILLARY BLOOD GLUCOSE          RADIOLOGY & ADDITIONAL TESTS:    Imaging Personally Reviewed:  [x ] YES  [ ] NO    Consultants involved in case:   Consultant(s) Notes Reviewed:  [ x] YES  [ ] NO:   Care Discussed with Consultants/Other Providers [x ] YES  [ ] NO ***************************************************************  Mark Hellerman, PGY1  Internal Medicine   pager: [facility]: [phone]; [facility]: [phone]  ***************************************************************    DONNA BARBER  89y  MRN: [id number]    Patient is a 89y old  Female who presents with a chief complaint of lethargy (02 Apr 2019 19:34)    Subjective:   - No events ON.   - This AM responded with "hello" but then when on to incoherent mumbling    - Seen by podiatry yesterday for DTIs, no need for pod surg intervention or wound care    MEDICATIONS  (STANDING):  dextrose 5%. 1000 milliLiter(s) (50 mL/Hr) IV Continuous <Continuous>  enoxaparin Injectable 40 milliGRAM(s) SubCutaneous daily  piperacillin/tazobactam IVPB. 3.375 Gram(s) IV Intermittent every 8 hours  vancomycin  IVPB 1000 milliGRAM(s) IV Intermittent every 12 hours    MEDICATIONS  (PRN):      Objective:    Vitals: Vital Signs Last 24 Hrs  T(C): 36.7 (04-03-19 @ 05:42), Max: 36.7 (04-03-19 @ 05:42)  T(F): 98 (04-03-19 @ 05:42), Max: 98 (04-03-19 @ 05:42)  HR: 73 (04-03-19 @ 05:42) (71 - 84)  BP: 143/63 (04-03-19 @ 05:42) (141/68 - 148/70)  RR: 16 (04-03-19 @ 05:42) (16 - 18)  SpO2: 96% (04-03-19 @ 05:42) (96% - 97%)             I&O's Summary    02 Apr 2019 07:01  -  03 Apr 2019 07:00  --------------------------------------------------------  IN: 350 mL / OUT: 0 mL / NET: 350 mL        PHYSICAL EXAM:  GENERAL: NAD  HEAD:  Atraumatic, Normocephalic  CHEST/LUNG: Clear to auscultation bilaterally; No wheezes, rhonchi   HEART: Regular rate and rhythm; No murmurs, rubs, or gallops  ABDOMEN: Soft, Nontender, Nondistended;   PSYCH: Lethargic, mumbling   NEUROLOGY: Lethargic, AxOx0  Skin: Deep tissue injuries to the lateral aspect of the left foot at the lateral heel, 5th met base, and 5th MTPJ (2/2 pressure from being bedbound). No open wounds, no signs of infection.    LABS:  04-02    145  |  109<H>  |  17  ----------------------------<  118<H>  3.1<L>   |  27  |  0.60  04-01    148<H>  |  112<H>  |  26<H>  ----------------------------<  123<H>  3.6   |  25  |  0.58  03-31    148<H>  |  111<H>  |  33<H>  ----------------------------<  127<H>  4.1   |  25  |  0.62    Ca    8.3<L>      02 Apr 2019 06:23  Ca    8.4      01 Apr 2019 05:28  Ca    8.6      31 Mar 2019 15:56  Phos  2.5     04-02  Mg     1.9     04-02                                                10.4   13.62 )-----------( 233      ( 03 Apr 2019 05:40 )             32.3                         10.6   11.45 )-----------( 211      ( 02 Apr 2019 06:23 )             34.2                         11.7   12.28 )-----------( 185      ( 01 Apr 2019 05:28 )             37.6     CAPILLARY BLOOD GLUCOSE          RADIOLOGY & ADDITIONAL TESTS:    Imaging Personally Reviewed:  [x ] YES  [ ] NO    Consultants involved in case:   Consultant(s) Notes Reviewed:  [ x] YES  [ ] NO:   Care Discussed with Consultants/Other Providers [x ] YES  [ ] NO

## 2019-04-03 NOTE — PROGRESS NOTE ADULT - PROBLEM SELECTOR PLAN 4
Diet: NPO due to mental status  DVT ppx: lovenox subQ DNR as per HCP Son phone #: [phone]  however would like trial of intubation and pressors  HCP paperwork in chart

## 2019-04-03 NOTE — DISCHARGE NOTE NURSING/CASE MANAGEMENT/SOCIAL WORK - NSDCDMETYPESERV_GEN_ALL_CORE_FT
Call to coordinate in-house visit for reinforced teaching for ADELE BRYAN PLEASE CALL to coordinate IN-HOUSE VISIT for reinforced teaching for ADELE BRYAN

## 2019-04-03 NOTE — DISCHARGE NOTE PROVIDER - PROVIDER TOKENS
FREE:[LAST:[Wound care],PHONE:[(   )    -],FAX:[(   )    -],ADDRESS:[[facility] ([phone]. Address: [address] [place] [address]]]

## 2019-04-03 NOTE — DISCHARGE NOTE PROVIDER - NSDCFUADDINST_GEN_ALL_CORE_FT
Please follow-up with wound care   Please follow-up with your primary care physician or house calls doctor

## 2019-04-03 NOTE — PROGRESS NOTE ADULT - PROBLEM SELECTOR PLAN 1
Resolved. Unclear source possible 2/2  UTI vs unstagable sacral ulcer, CXR no consolidations, no diarrhea, no evidence of cellulitis, UCx negative  - C/w vanc/zosyn (3/31-> ; day#4) Resolved. Unclear source possible 2/2  UTI vs unstagable sacral ulcer, CXR no consolidations, no diarrhea, no evidence of cellulitis, UCx negative  - Leukocytosis uptrending from 11 -> 13  - C/w vanc/zosyn (3/31-> ; day#4)

## 2019-04-03 NOTE — DISCHARGE NOTE PROVIDER - NSDCFUADDAPPT_GEN_ALL_CORE_FT
As a reminder, please CALL [facility] () to coordinate IN-HOUSE VISIT for reinforced teaching for ADELE BRYAN

## 2019-04-03 NOTE — DISCHARGE NOTE NURSING/CASE MANAGEMENT/SOCIAL WORK - NSDCPNINST_GEN_ALL_CORE
If any complaints of nausea, vomiting, fever or change in mental status call primary MD or return to emergency room

## 2019-04-03 NOTE — ADVANCED PRACTICE NURSE CONSULT - ASSESSMENT
General: A&O x 1, able to state name, disoriented to time, place and situation. Pt continuously repeats "no, no, no," when asked what is wrong, pt unable to express needs. Emotional support and encouragement provided during assessment. Pt was calm during assessment with encouragement.  Bedbound, incontinent of urine and stool (actively incontinent of semi-formed stool during assessment, perineal care provided). Thin-fragile skin, warm, dry with increased moisture in intertriginous folds, adequate skin turgor, scattered areas of hyperpigmentation and hypopigmentation, scattered areas of ecchymosis on bilateral upper extremities. Right trochanter previous site of unstagable pressure injury now presenting as hypopigmentation with soft tissue contraction, no skin impairment noted.     Left buttock- stage 2 pressure injury- [id number], irregular borders, 100% pink-moist dermis. Scant serous drainage, no odor. Periwound skin intact, no erythema, no increased warmth, no edema, no induration noted. Goals of care: maintain moist environment to promote wound healing, protect from friction and sheer.     Left popliteal- frictional injury- 0jgk2jgc4.2cm- 100% pink-moist dermis, scant serous drainage. Periwound skin intact, no erythema, no increased warmth, no edema, no induration noted. Goals of care: maintain moist environment to promote wound healing, protect from friction and sheer.     Right lateral knee- frictional injury- 1cmx0.5cmx0.2cm- 100% pink-moist dermis, scant serous drainage. Periwound skin intact, no erythema, no increased warmth, no edema, no induration noted. Goals of care: maintain moist environment to promote wound healing, protect from friction and sheer.     Vascular: Bilateral lower extremities mild rigidity and mild contraction at the knee of the left lower leg. Thickened-yellow overgrown toenails, left great toe with severely thickened overgrown toenail. Multiple wounds present on bilateral feet. No temperature changes noted. No edema noted. Capillary refill >3 seconds. +1 DP/PT pulses, with faint biphasic doppler sounds. (+) pallor on elevation and dependant rubbor. Left medial great toe, left second medial toe (area where elongated great toenail is overlying second toe), left lateral fifth toe, left lateral mid-foot with blanchable erythema. Right medial malleolus with 100% re-absorbed fluid filled blister measuring 1.5cmx1.2cmx0, right medial great toe with blanchable erythema.    Left heel- stage 2 pressure injury complicated by arterial insufficiency- 5cmx4.8qjv2xz, 100% fluid filled blister, periwound skin intact. Goals of care: protect from friction and sheer, monitor for changes in tissue type, promote serous reabsorption, protect periwound skin, continue to offload pressure.    Right medial heel- suspected deep tissue pressure injury complicated by arterial insufficiency- 3cmx3.5cmx0, 100% well demarcated purple-maroon discoloration, (+) bogginess upon palpation. Periwound skin with blanchable erythema extending circumferentially ranging from 0.5cm-2.2 cm extending from wound edges, with 2.2cm from 9-12 o'clock. No induration, no increased warmth, no edema noted. Goals of care: monitor for tissue type changes, protect from friction and sheer, continue to offload pressure.    Right lateral heel- suspected deep tissue pressure injury complicated by arterial insufficiency- 3.1cmx2.1pum9vz- 100% well demarcated purple-maroon discoloration, (+) bogginess upon palpation. Periwound skin with blanchable erythema extending circumferentially 0.5cm from wound edges. No induration, no increased warmth, no edema noted. Goals of care: monitor for tissue type changes, protect from friction and sheer, continue to offload pressure.    Right lateral mid-foot- suspected deep tissue pressure injury complicated by arterial insufficiency- 2.3cmx1.4ygz3fa- 100% well demarcated purple-maroon discoloration, (+) bogginess upon palpation. Periwound skin with blanchable erythema extending circumferentially 0.5cm from wound edges. No induration, no increased warmth, no edema noted. Goals of care: monitor for tissue type changes, protect from friction and sheer, continue to offload pressure.    Right lateral 5th metatarsal head- suspected deep tissue pressure injury complicated by arterial insufficiency- 2.5cmx1.5cmx0- 100% well demarcated purple-maroon discoloration, (+) bogginess upon palpation. Periwound skin with blanchable erythema extending circumferentially 0.5cm from wound edges. No induration, no increased warmth, no edema noted. Goals of care: monitor for tissue type changes, protect from friction and sheer, continue to offload pressure.    Incontinence associated dermatitis- (+) erythema of perineum, bilateral upper inner buttocks.

## 2019-04-03 NOTE — ADVANCED PRACTICE NURSE CONSULT - REASON FOR CONSULT
Patient known to [facility] service line from previous admission (7/12/2018), patient seen today on skin care rounds after wound care referral received for assessment of skin impairment and recommendations of topical management. Chart reviewed: ESR 43, WBC 13.62 k/uL, H/H 10.4/32.2, Plt 233, Serum albumin 2.4g/dL, Serum protein 5.9g/dL,  Jim 8, BMI 21.8kg/m2. Patient H/O HTN, HLD, advanced dementia baseline AAO 0-1 to self, bedbound BIBEMS for lethargy and fever on day of arrival. As per H & P son reported that patient is usually phonating, making mumbling noises however day of admission he noticed that she was more somnolent. He said a week ago he noticed one of the HHA feeding her while she was supine. For fever T101 family gave her tylenol. Son denied sick contacts or recent travel hx, denied cough, diarrhea. Wears diaper. She has chronic ecchymosis and unstageable ulcer that has been stable per son's report in H & P. He is the HCP and would her to be DNR but not DNI (he would like a trial of intubation but he also discussed that if patient will not have a meaningful recovery, then do not intubate). Patient seen by Podiatry surgery for right foot DTI, no surgical interventions required, as per consult note.

## 2019-04-04 VITALS
OXYGEN SATURATION: 98 % | TEMPERATURE: 98 F | SYSTOLIC BLOOD PRESSURE: 121 MMHG | DIASTOLIC BLOOD PRESSURE: 54 MMHG | HEART RATE: 73 BPM | RESPIRATION RATE: 18 BRPM

## 2019-04-04 LAB
ANION GAP SERPL CALC-SCNC: 9 MMO/L — SIGNIFICANT CHANGE UP (ref 7–14)
BACTERIA BLD CULT: SIGNIFICANT CHANGE UP
BUN SERPL-MCNC: 16 MG/DL — SIGNIFICANT CHANGE UP (ref 7–23)
CALCIUM SERPL-MCNC: 8.1 MG/DL — LOW (ref 8.4–10.5)
CHLORIDE SERPL-SCNC: 108 MMOL/L — HIGH (ref 98–107)
CO2 SERPL-SCNC: 26 MMOL/L — SIGNIFICANT CHANGE UP (ref 22–31)
CREAT SERPL-MCNC: 0.63 MG/DL — SIGNIFICANT CHANGE UP (ref 0.5–1.3)
FERRITIN SERPL-MCNC: 400.5 NG/ML — HIGH (ref 15–150)
GLUCOSE SERPL-MCNC: 108 MG/DL — HIGH (ref 70–99)
HCT VFR BLD CALC: 29.9 % — LOW (ref 34.5–45)
HCT VFR BLD CALC: 32.3 % — LOW (ref 34.5–45)
HGB BLD-MCNC: 10.5 G/DL — LOW (ref 11.5–15.5)
HGB BLD-MCNC: 9.6 G/DL — LOW (ref 11.5–15.5)
IRON SATN MFR SERPL: 35 UG/DL — SIGNIFICANT CHANGE UP (ref 30–160)
MAGNESIUM SERPL-MCNC: 1.8 MG/DL — SIGNIFICANT CHANGE UP (ref 1.6–2.6)
MCHC RBC-ENTMCNC: 31.4 PG — SIGNIFICANT CHANGE UP (ref 27–34)
MCHC RBC-ENTMCNC: 31.5 PG — SIGNIFICANT CHANGE UP (ref 27–34)
MCHC RBC-ENTMCNC: 32.1 % — SIGNIFICANT CHANGE UP (ref 32–36)
MCHC RBC-ENTMCNC: 32.5 % — SIGNIFICANT CHANGE UP (ref 32–36)
MCV RBC AUTO: 97 FL — SIGNIFICANT CHANGE UP (ref 80–100)
MCV RBC AUTO: 97.7 FL — SIGNIFICANT CHANGE UP (ref 80–100)
NRBC # FLD: 0 K/UL — SIGNIFICANT CHANGE UP (ref 0–0)
NRBC # FLD: 0.02 K/UL — SIGNIFICANT CHANGE UP (ref 0–0)
PHOSPHATE SERPL-MCNC: 2.8 MG/DL — SIGNIFICANT CHANGE UP (ref 2.5–4.5)
PLATELET # BLD AUTO: 234 K/UL — SIGNIFICANT CHANGE UP (ref 150–400)
PLATELET # BLD AUTO: 263 K/UL — SIGNIFICANT CHANGE UP (ref 150–400)
PMV BLD: 10.3 FL — SIGNIFICANT CHANGE UP (ref 7–13)
PMV BLD: 10.5 FL — SIGNIFICANT CHANGE UP (ref 7–13)
POTASSIUM SERPL-MCNC: 3.1 MMOL/L — LOW (ref 3.5–5.3)
POTASSIUM SERPL-SCNC: 3.1 MMOL/L — LOW (ref 3.5–5.3)
PREALB SERPL-MCNC: 12 MG/DL — LOW (ref 20–40)
RBC # BLD: 3.06 M/UL — LOW (ref 3.8–5.2)
RBC # BLD: 3.33 M/UL — LOW (ref 3.8–5.2)
RBC # FLD: 13.2 % — SIGNIFICANT CHANGE UP (ref 10.3–14.5)
RBC # FLD: 13.2 % — SIGNIFICANT CHANGE UP (ref 10.3–14.5)
SODIUM SERPL-SCNC: 143 MMOL/L — SIGNIFICANT CHANGE UP (ref 135–145)
VANCOMYCIN TROUGH SERPL-MCNC: 17 UG/ML — SIGNIFICANT CHANGE UP (ref 10–20)
WBC # BLD: 12.7 K/UL — HIGH (ref 3.8–10.5)
WBC # BLD: 14.6 K/UL — HIGH (ref 3.8–10.5)
WBC # FLD AUTO: 12.7 K/UL — HIGH (ref 3.8–10.5)
WBC # FLD AUTO: 14.6 K/UL — HIGH (ref 3.8–10.5)

## 2019-04-04 PROCEDURE — 99239 HOSP IP/OBS DSCHRG MGMT >30: CPT

## 2019-04-04 RX ORDER — POTASSIUM CHLORIDE 20 MEQ
40 PACKET (EA) ORAL EVERY 4 HOURS
Qty: 0 | Refills: 0 | Status: COMPLETED | OUTPATIENT
Start: 2019-04-04 | End: 2019-04-04

## 2019-04-04 RX ORDER — POTASSIUM CHLORIDE 20 MEQ
40 PACKET (EA) ORAL EVERY 4 HOURS
Qty: 0 | Refills: 0 | Status: DISCONTINUED | OUTPATIENT
Start: 2019-04-04 | End: 2019-04-04

## 2019-04-04 RX ADMIN — Medication 40 MILLIEQUIVALENT(S): at 08:34

## 2019-04-04 RX ADMIN — PIPERACILLIN AND TAZOBACTAM 25 GRAM(S): 4; .5 INJECTION, POWDER, LYOPHILIZED, FOR SOLUTION INTRAVENOUS at 06:31

## 2019-04-04 RX ADMIN — Medication 40 MILLIEQUIVALENT(S): at 13:15

## 2019-04-04 RX ADMIN — PIPERACILLIN AND TAZOBACTAM 25 GRAM(S): 4; .5 INJECTION, POWDER, LYOPHILIZED, FOR SOLUTION INTRAVENOUS at 13:15

## 2019-04-04 RX ADMIN — ENOXAPARIN SODIUM 40 MILLIGRAM(S): 100 INJECTION SUBCUTANEOUS at 11:11

## 2019-04-04 RX ADMIN — Medication 250 MILLIGRAM(S): at 13:15

## 2019-04-04 RX ADMIN — Medication 250 MILLIGRAM(S): at 03:43

## 2019-04-04 NOTE — PROGRESS NOTE ADULT - PROBLEM SELECTOR PLAN 1
Resolved. Unclear source CXR clear, Ucx negative, wounds do not appear infected.    - S/p vanc/zosyn (3/31-> ; )

## 2019-04-04 NOTE — PROGRESS NOTE ADULT - SUBJECTIVE AND OBJECTIVE BOX
***************************************************************  Mark Hellerman, PGY1  Internal Medicine   pager: [facility]: [phone]; [facility]: [phone]  ***************************************************************    DONNA BARBER  89y  MRN: [id number]    Patient is a 89y old  Female who presents with a chief complaint of lethargy (03 Apr 2019 14:32)    Subjective:   - No events ON.   - More alert on exam this AM, responds "hello, I'm good" when greeted this AM however devolved into incomprehensible mumbling     MEDICATIONS  (STANDING):  dextrose 5%. 1000 milliLiter(s) (50 mL/Hr) IV Continuous <Continuous>  enoxaparin Injectable 40 milliGRAM(s) SubCutaneous daily  piperacillin/tazobactam IVPB. 3.375 Gram(s) IV Intermittent every 8 hours  potassium chloride   Powder 40 milliEquivalent(s) Oral every 4 hours  vancomycin  IVPB 1000 milliGRAM(s) IV Intermittent every 12 hours    MEDICATIONS  (PRN):  QUEtiapine 50 milliGRAM(s) Oral two times a day PRN aggitation      Objective:    Vitals: Vital Signs Last 24 Hrs  T(C): 36.8 (04-04-19 @ 06:30), Max: 37.6 (04-03-19 @ 13:08)  T(F): 98.3 (04-04-19 @ 06:30), Max: 99.7 (04-03-19 @ 13:08)  HR: 71 (04-04-19 @ 06:30) (70 - 71)  BP: 134/64 (04-04-19 @ 06:30) (120/66 - 164/82)  RR: 18 (04-04-19 @ 06:30) (17 - 18)  SpO2: 100% (04-04-19 @ 06:30) (99% - 100%)                I&O's Summary    03 Apr 2019 07:01  -  04 Apr 2019 07:00  --------------------------------------------------------  IN: 600 mL / OUT: 0 mL / NET: 600 mL        PHYSICAL EXAM:  GENERAL: NAD  HEAD:  Atraumatic, Normocephalic  CHEST/LUNG: Clear to auscultation bilaterally; No wheezes, rhonchi   HEART: Regular rate and rhythm; No murmurs, rubs, or gallops  ABDOMEN: Soft, Nontender, Nondistended;   PSYCH: Lethargic, mumbling   NEUROLOGY: Lethargic, AxOx0  Skin: Feet/b/l wrapped in kirlex     LABS:  04-04    143  |  108<H>  |  16  ----------------------------<  108<H>  3.1<L>   |  26  |  0.63  04-03    142  |  107  |  15  ----------------------------<  113<H>  3.1<L>   |  26  |  0.68  04-02    145  |  109<H>  |  17  ----------------------------<  118<H>  3.1<L>   |  27  |  0.60    Ca    8.1<L>      04 Apr 2019 02:32  Ca    8.1<L>      03 Apr 2019 05:40  Ca    8.3<L>      02 Apr 2019 06:23  Phos  2.8     04-04  Mg     1.8     04-04                                                9.6    12.70 )-----------( 234      ( 04 Apr 2019 02:32 )             29.9                         10.4   13.62 )-----------( 233      ( 03 Apr 2019 05:40 )             32.3                         10.6   11.45 )-----------( 211      ( 02 Apr 2019 06:23 )             34.2     CAPILLARY BLOOD GLUCOSE          RADIOLOGY & ADDITIONAL TESTS:    Imaging Personally Reviewed:  [x ] YES  [ ] NO    Consultants involved in case:   Consultant(s) Notes Reviewed:  [ x] YES  [ ] NO:   Care Discussed with Consultants/Other Providers [x ] YES  [ ] NO

## 2019-04-04 NOTE — PROGRESS NOTE ADULT - SUBJECTIVE AND OBJECTIVE BOX
Podiatry pager #: [phone] ([place])/ [phone] ([facility])    Patient is a 89y old  Female who presents with a chief complaint of lethargy (02 Apr 2019 09:18)      HPI:  89 yo F c PMH of HTN, HLD, advanced dementia baseline AAO 0-1 to self, bedbound BIBEMS for lethargy and fever on day of arrival. Spoke with son (HCP) over the phone, per son patient is usually phonating, making mumbling noises however today he noticed that she has been more somnolent. He said a week ago he noticed one of the HHA feeding her while she was supine. For fever T101 family gave her tylenol. Son denied sick contacts or recent travel hx, denied cough, diarrhea. Wears diaper. She has chronic ecchymosis and unstageable ulcer that has been stable per son. He is the HCP and would her to be DNR but not DNI (he would like a trial of intubation but he also discussed that if patient will not have a meaningful recovery, then do not intubate). HHA at bedside is taking care of the patient for the first time tonight, therefore she is not aware of the patient's chronic issue. She is on xanax, PRN, last dose taken 1 week ago, on seroquel 50 mg BID, last dose taken 7 PM PTA.    In the ED Vitals Tmax 100.5 axillary HR92 /73 RR 26 SaO2 100 % on RA  Received IV tylenol 1000 mg x 1, vanc/zosyn, 2L NS IVF, albuterol x 1  Straight cathed in ED (31 Mar 2019 02:53)      PAST MEDICAL & SURGICAL HISTORY:  Dementia  History of Spinal Stenosis  Lung Nodules  Hepatitis C: possible during transfusion in 1970&#x27;s  Carotid Artery Disease  Tendonitis: left hand: s/p steroid injection 3/10  Back Pain  Hypertension  Hyperlipidemia  S/P small bowel resection  History of Carotid Endarterectomy: right 2008  S/P Dilation and Curettage: 1970&#x27;s menorrhagia  After Cataract, Bilateral  S/P Cholecystectomy: laparoscopic 2 years ago  History of Laminectomy: 1970&#x27;s  S/P Knee Replacement: left knee 1997  right knee 2004      MEDICATIONS  (STANDING):  dextrose 5%. 1000 milliLiter(s) (50 mL/Hr) IV Continuous <Continuous>  enoxaparin Injectable 40 milliGRAM(s) SubCutaneous daily  piperacillin/tazobactam IVPB. 3.375 Gram(s) IV Intermittent every 8 hours  vancomycin  IVPB 1000 milliGRAM(s) IV Intermittent every 12 hours    MEDICATIONS  (PRN):      Allergies    Risperdal (Unknown)    Intolerances        VITALS:    Vital Signs Last 24 Hrs  T(C): 36.4 (02 Apr 2019 14:39), Max: 36.9 (02 Apr 2019 05:39)  T(F): 97.5 (02 Apr 2019 14:39), Max: 98.4 (02 Apr 2019 05:39)  HR: 71 (02 Apr 2019 14:39) (71 - 94)  BP: 148/70 (02 Apr 2019 14:39) (131/64 - 148/70)  BP(mean): --  RR: 17 (02 Apr 2019 14:39) (17 - 18)  SpO2: 97% (02 Apr 2019 14:39) (97% - 99%)    LABS:                          10.6   11.45 )-----------( 211      ( 02 Apr 2019 06:23 )             34.2       04-02    145  |  109<H>  |  17  ----------------------------<  118<H>  3.1<L>   |  27  |  0.60    Ca    8.3<L>      02 Apr 2019 06:23  Phos  2.5     04-02  Mg     1.9     04-02        CAPILLARY BLOOD GLUCOSE              LOWER EXTREMITY PHYSICAL EXAM:    Vascular: DP/PT 1/4 B/L, CFT <3 seconds B/L, Temperature gradient warm to cool B/L  Neuro: Epicritic sensation diminished to the level of ankle B/L  Musculoskeletal/Ortho: bedbound, contracted lower extremities  Skin: Deep tissue injuries to the lateral aspect of the left foot at the lateral heel, 5th met base, and 5th MTPJ (2/2 pressure from being bedbound). No open wounds, no signs of infection.

## 2019-04-04 NOTE — PROGRESS NOTE ADULT - ASSESSMENT
89 y/o F w/ L foot deep tissue injuries, right heel blister    -Vitals stable, afebrile, WBC 11.6 (2/2 sepsis from UTI vs sacral ulcer)  -Deep tissue injuries to the lateral aspect of the left foot at the lateral heel, 5th met base, and 5th MTPJ (2/2 pressure from being bedbound). No open wounds, no signs of infection. no need for dressing on the left  -Right heel blister [id number] sharply to level of dermis to and including dermis.  No local signs of infection.  Blister 2/2 pressure.   -Wound care on left: Apply SSD and DSD daily  -Keep heels offloaded w/ Z-flow boots at all times  -No pod surg intervention, stable for DC pending med stability.
89 yo F PMHx HTN, HLD, advanced dementia baseline AAO 0-1 (to self), bedbound admitted for severe sepsis secondary to UTI vs sacral decubital ulcer now resolved pending dispo home today
89 yo F PMHx HTN, HLD, advanced dementia baseline AAO 0-1 (to self), bedbound admitted for severe sepsis secondary to UTI vs sacral decubital ulcer.
89 y/o F w/ L foot deep tissue injuries    -Vitals stable, afebrile, WBC 11.6 (2/2 sepsis from UTI vs sacral ulcer)  -Deep tissue injuries to the lateral aspect of the left foot at the lateral heel, 5th met base, and 5th MTPJ (2/2 pressure from being bedbound). No open wounds, no signs of infection.  -No pod surg intervention  -No wound care needed  -Keep heels offloaded w/ Z-flow boots
89 yo F PMHx HTN, HLD, advanced dementia baseline AAO 0-1 (to self), bedbound admitted for severe sepsis secondary to UTI vs sacral decubital ulcer.
89 yo F PMHx HTN, HLD, advanced dementia baseline AAO 0-1 (to self), bedbound admitted for severe sepsis secondary to UTI vs sacral decubital ulcer.
89 yo F PMHx HTN, HLD, advanced dementia baseline AAO 0-1 (to self), bedbound admitted for severe sepsis secondary to UTI vs sacral decubital ulcer.

## 2019-04-04 NOTE — PROGRESS NOTE ADULT - ATTENDING COMMENTS
89F HTN, HLD, advanced dementia, bedbound admitted for severe sepsis secondary to UTI vs sacral decubital ulcer.  --clinically improved, mental status at baseline,  --completed  --appreciate wound care, podiatry care  stable for d/c home  d/c time 40 minutes coordinating care
89F HTN, HLD, advanced dementia, bedbound admitted for severe sepsis secondary to UTI vs sacral decubital ulcer.  --f/u cx  --continue broad spectrum abx  --f/u wound care recs, swallow eval
89F HTN, HLD, advanced dementia, bedbound admitted for severe sepsis secondary to UTI vs sacral decubital ulcer.  --clinically improved, mental status at baseline,  --completes abx today  --appreciate wound care, podiatry recs  stable for d/c home, services to be reinstated and anticipate early discharge tomorrow
89F HTN, HLD, advanced dementia, bedbound admitted for severe sepsis secondary to UTI vs sacral decubital ulcer.  --f/u cx  --continue broad spectrum abx  --f/u wound care recs
c/w empiric abx pending Cx id and speciation  f/u Cx  Trend Na

## 2019-04-04 NOTE — PROGRESS NOTE ADULT - PROBLEM SELECTOR PLAN 2
Advanced dementia with behavioral disturbances  - D/t to obtundation hold valproic acid, xanax prn, also on seroquel  - Low suspicion for benzo withdrawal as last dose taken 1 week ago,
Advanced dementia with behavioral disturbances  - D/t to obtundation hold valproic acid, xanax prn, also on seroquel  - Low suspicion for benzo withdrawal as last dose taken 1 week ago,   - Can resume once mental status improves
Advanced dementia with behavioral disturbances  - D/t to obtundation hold valproic acid, xanax prn, also on seroquel  - Low suspicion for benzo withdrawal as last dose taken 1 week ago,   - Plan to resume seroquel today
Likely metabolic encephalopathy due to sepsis, does not appear to be retaining O2 on ABG  - Continue treat underlying infection
Likely metabolic encephalopathy due to sepsis, does not appear to be retaining O2 on ABG  - Continue treat underlying infection

## 2019-04-04 NOTE — PROGRESS NOTE ADULT - PROBLEM SELECTOR PLAN 3
DNR as per HCP Son phone #: [phone]  however would like trial of intubation and pressors  HCP paperwork in chart

## 2019-04-07 LAB
BACTERIA BLD CULT: SIGNIFICANT CHANGE UP
BACTERIA BLD CULT: SIGNIFICANT CHANGE UP

## 2019-09-07 NOTE — DIETITIAN INITIAL EVALUATION ADULT. - ENERGY NEEDS
ht:5'4", wt:135.8 pounds, BMI:23.3 kg/m2, IBW:120 pounds +/- 10%     pt admitted c frequent fall, likely mechanical per MD; pt found to have UTI; noted pt c bipolar disorder and dementia, being followed by Psych. Noted pt currently on constant observation. Per MD, pt c vitamin D deficiency, on vitamin D3 supplementation. foreign body throat

## 2019-12-01 ENCOUNTER — OUTPATIENT (OUTPATIENT)
Dept: OUTPATIENT SERVICES | Facility: HOSPITAL | Age: 84
LOS: 1 days | End: 2019-12-01
Payer: MEDICARE

## 2019-12-01 DIAGNOSIS — Z90.49 ACQUIRED ABSENCE OF OTHER SPECIFIED PARTS OF DIGESTIVE TRACT: Chronic | ICD-10-CM

## 2019-12-01 PROCEDURE — G9001: CPT

## 2019-12-02 NOTE — BEHAVIORAL HEALTH ASSESSMENT NOTE - MUSCLE TONE / STRENGTH
[facility] calling stating that Strep culture was positive     Any questions  [phone] opt 4  
Generic voicemail with no patient information left on unidentified mailbox requesting call back to receive message below. Call back number provided.    ----- Message from Margie Galvan MD sent at 11/30/2019  9:38 AM CST -----  Please call parents:  Strep is positive on culture.  Continue with amox.  His toothbrush needs to be thrown out.  No sharing glasses, etc.    
Spoke with patient's dad. Verbalizes understanding. No further questions or concerns.    Shira Gordillo RN     
Normal muscle tone/strength

## 2019-12-11 ENCOUNTER — INPATIENT (INPATIENT)
Facility: HOSPITAL | Age: 84
LOS: 5 days | Discharge: HOME HEALTH | End: 2019-12-17
Attending: SPECIALIST | Admitting: SPECIALIST
Payer: MEDICARE

## 2019-12-11 VITALS
RESPIRATION RATE: 20 BRPM | OXYGEN SATURATION: 96 % | TEMPERATURE: 99 F | DIASTOLIC BLOOD PRESSURE: 66 MMHG | SYSTOLIC BLOOD PRESSURE: 139 MMHG | HEART RATE: 92 BPM

## 2019-12-11 DIAGNOSIS — K56.609 UNSPECIFIED INTESTINAL OBSTRUCTION, UNSPECIFIED AS TO PARTIAL VERSUS COMPLETE OBSTRUCTION: ICD-10-CM

## 2019-12-11 DIAGNOSIS — Z90.49 ACQUIRED ABSENCE OF OTHER SPECIFIED PARTS OF DIGESTIVE TRACT: Chronic | ICD-10-CM

## 2019-12-11 LAB
ALBUMIN SERPL ELPH-MCNC: 4 G/DL — SIGNIFICANT CHANGE UP (ref 3.3–5)
ALP SERPL-CCNC: 74 U/L — SIGNIFICANT CHANGE UP (ref 40–120)
ALT FLD-CCNC: 11 U/L — SIGNIFICANT CHANGE UP (ref 4–33)
ANION GAP SERPL CALC-SCNC: 17 MMO/L — HIGH (ref 7–14)
AST SERPL-CCNC: 31 U/L — SIGNIFICANT CHANGE UP (ref 4–32)
BASE EXCESS BLDV CALC-SCNC: 3.5 MMOL/L — SIGNIFICANT CHANGE UP
BASE EXCESS BLDV CALC-SCNC: 7.8 MMOL/L — SIGNIFICANT CHANGE UP
BASOPHILS # BLD AUTO: 0.03 K/UL — SIGNIFICANT CHANGE UP (ref 0–0.2)
BASOPHILS NFR BLD AUTO: 0.2 % — SIGNIFICANT CHANGE UP (ref 0–2)
BILIRUB SERPL-MCNC: 0.8 MG/DL — SIGNIFICANT CHANGE UP (ref 0.2–1.2)
BLOOD GAS VENOUS - CREATININE: 0.79 MG/DL — SIGNIFICANT CHANGE UP (ref 0.5–1.3)
BLOOD GAS VENOUS - CREATININE: 0.79 MG/DL — SIGNIFICANT CHANGE UP (ref 0.5–1.3)
BUN SERPL-MCNC: 34 MG/DL — HIGH (ref 7–23)
CALCIUM SERPL-MCNC: 9.8 MG/DL — SIGNIFICANT CHANGE UP (ref 8.4–10.5)
CHLORIDE BLDV-SCNC: 106 MMOL/L — SIGNIFICANT CHANGE UP (ref 96–108)
CHLORIDE BLDV-SCNC: 109 MMOL/L — HIGH (ref 96–108)
CHLORIDE SERPL-SCNC: 102 MMOL/L — SIGNIFICANT CHANGE UP (ref 98–107)
CO2 SERPL-SCNC: 23 MMOL/L — SIGNIFICANT CHANGE UP (ref 22–31)
CREAT SERPL-MCNC: 0.77 MG/DL — SIGNIFICANT CHANGE UP (ref 0.5–1.3)
EOSINOPHIL # BLD AUTO: 0 K/UL — SIGNIFICANT CHANGE UP (ref 0–0.5)
EOSINOPHIL NFR BLD AUTO: 0 % — SIGNIFICANT CHANGE UP (ref 0–6)
GAS PNL BLDV: 137 MMOL/L — SIGNIFICANT CHANGE UP (ref 136–146)
GAS PNL BLDV: 140 MMOL/L — SIGNIFICANT CHANGE UP (ref 136–146)
GLUCOSE BLDV-MCNC: 119 MG/DL — HIGH (ref 70–99)
GLUCOSE BLDV-MCNC: 123 MG/DL — HIGH (ref 70–99)
GLUCOSE SERPL-MCNC: 137 MG/DL — HIGH (ref 70–99)
HCO3 BLDV-SCNC: 27 MMOL/L — SIGNIFICANT CHANGE UP (ref 20–27)
HCO3 BLDV-SCNC: 29 MMOL/L — HIGH (ref 20–27)
HCT VFR BLD CALC: 47.2 % — HIGH (ref 34.5–45)
HCT VFR BLDV CALC: 41 % — SIGNIFICANT CHANGE UP (ref 34.5–45)
HCT VFR BLDV CALC: 46.6 % — HIGH (ref 34.5–45)
HGB BLD-MCNC: 15.7 G/DL — HIGH (ref 11.5–15.5)
HGB BLDV-MCNC: 13.3 G/DL — SIGNIFICANT CHANGE UP (ref 11.5–15.5)
HGB BLDV-MCNC: 15.2 G/DL — SIGNIFICANT CHANGE UP (ref 11.5–15.5)
IMM GRANULOCYTES NFR BLD AUTO: 0.5 % — SIGNIFICANT CHANGE UP (ref 0–1.5)
LACTATE BLDV-MCNC: 1.9 MMOL/L — SIGNIFICANT CHANGE UP (ref 0.5–2)
LACTATE BLDV-MCNC: 3.3 MMOL/L — HIGH (ref 0.5–2)
LYMPHOCYTES # BLD AUTO: 1.75 K/UL — SIGNIFICANT CHANGE UP (ref 1–3.3)
LYMPHOCYTES # BLD AUTO: 10.7 % — LOW (ref 13–44)
MCHC RBC-ENTMCNC: 30.6 PG — SIGNIFICANT CHANGE UP (ref 27–34)
MCHC RBC-ENTMCNC: 33.3 % — SIGNIFICANT CHANGE UP (ref 32–36)
MCV RBC AUTO: 92 FL — SIGNIFICANT CHANGE UP (ref 80–100)
MONOCYTES # BLD AUTO: 0.88 K/UL — SIGNIFICANT CHANGE UP (ref 0–0.9)
MONOCYTES NFR BLD AUTO: 5.4 % — SIGNIFICANT CHANGE UP (ref 2–14)
NEUTROPHILS # BLD AUTO: 13.55 K/UL — HIGH (ref 1.8–7.4)
NEUTROPHILS NFR BLD AUTO: 83.2 % — HIGH (ref 43–77)
NRBC # FLD: 0 K/UL — SIGNIFICANT CHANGE UP (ref 0–0)
PCO2 BLDV: 43 MMHG — SIGNIFICANT CHANGE UP (ref 41–51)
PCO2 BLDV: 52 MMHG — HIGH (ref 41–51)
PH BLDV: 7.42 PH — SIGNIFICANT CHANGE UP (ref 7.32–7.43)
PH BLDV: 7.43 PH — SIGNIFICANT CHANGE UP (ref 7.32–7.43)
PLATELET # BLD AUTO: 221 K/UL — SIGNIFICANT CHANGE UP (ref 150–400)
PMV BLD: 11.2 FL — SIGNIFICANT CHANGE UP (ref 7–13)
PO2 BLDV: 55 MMHG — HIGH (ref 35–40)
PO2 BLDV: < 24 MMHG — LOW (ref 35–40)
POTASSIUM BLDV-SCNC: 3.3 MMOL/L — LOW (ref 3.4–4.5)
POTASSIUM BLDV-SCNC: 3.5 MMOL/L — SIGNIFICANT CHANGE UP (ref 3.4–4.5)
POTASSIUM SERPL-MCNC: 5.2 MMOL/L — SIGNIFICANT CHANGE UP (ref 3.5–5.3)
POTASSIUM SERPL-SCNC: 5.2 MMOL/L — SIGNIFICANT CHANGE UP (ref 3.5–5.3)
PROT SERPL-MCNC: 7.6 G/DL — SIGNIFICANT CHANGE UP (ref 6–8.3)
RBC # BLD: 5.13 M/UL — SIGNIFICANT CHANGE UP (ref 3.8–5.2)
RBC # FLD: 13.4 % — SIGNIFICANT CHANGE UP (ref 10.3–14.5)
SAO2 % BLDV: 18.6 % — LOW (ref 60–85)
SAO2 % BLDV: 87.1 % — HIGH (ref 60–85)
SODIUM SERPL-SCNC: 142 MMOL/L — SIGNIFICANT CHANGE UP (ref 135–145)
WBC # BLD: 16.29 K/UL — HIGH (ref 3.8–10.5)
WBC # FLD AUTO: 16.29 K/UL — HIGH (ref 3.8–10.5)

## 2019-12-11 PROCEDURE — 74177 CT ABD & PELVIS W/CONTRAST: CPT | Mod: 26

## 2019-12-11 PROCEDURE — 70450 CT HEAD/BRAIN W/O DYE: CPT | Mod: 26

## 2019-12-11 RX ORDER — HALOPERIDOL DECANOATE 100 MG/ML
2.5 INJECTION INTRAMUSCULAR ONCE
Refills: 0 | Status: COMPLETED | OUTPATIENT
Start: 2019-12-11 | End: 2019-12-11

## 2019-12-11 RX ORDER — PIPERACILLIN AND TAZOBACTAM 4; .5 G/20ML; G/20ML
3.38 INJECTION, POWDER, LYOPHILIZED, FOR SOLUTION INTRAVENOUS ONCE
Refills: 0 | Status: COMPLETED | OUTPATIENT
Start: 2019-12-11 | End: 2019-12-11

## 2019-12-11 RX ORDER — MIDAZOLAM HYDROCHLORIDE 1 MG/ML
2 INJECTION, SOLUTION INTRAMUSCULAR; INTRAVENOUS ONCE
Refills: 0 | Status: DISCONTINUED | OUTPATIENT
Start: 2019-12-11 | End: 2019-12-11

## 2019-12-11 RX ORDER — QUETIAPINE FUMARATE 200 MG/1
50 TABLET, FILM COATED ORAL ONCE
Refills: 0 | Status: DISCONTINUED | OUTPATIENT
Start: 2019-12-11 | End: 2019-12-11

## 2019-12-11 RX ORDER — QUETIAPINE FUMARATE 200 MG/1
50 TABLET, FILM COATED ORAL
Refills: 0 | Status: DISCONTINUED | OUTPATIENT
Start: 2019-12-11 | End: 2019-12-12

## 2019-12-11 RX ORDER — SODIUM CHLORIDE 9 MG/ML
1000 INJECTION, SOLUTION INTRAVENOUS ONCE
Refills: 0 | Status: COMPLETED | OUTPATIENT
Start: 2019-12-11 | End: 2019-12-11

## 2019-12-11 RX ORDER — MIDAZOLAM HYDROCHLORIDE 1 MG/ML
1 INJECTION, SOLUTION INTRAMUSCULAR; INTRAVENOUS ONCE
Refills: 0 | Status: DISCONTINUED | OUTPATIENT
Start: 2019-12-11 | End: 2019-12-11

## 2019-12-11 RX ADMIN — MIDAZOLAM HYDROCHLORIDE 2 MILLIGRAM(S): 1 INJECTION, SOLUTION INTRAMUSCULAR; INTRAVENOUS at 18:20

## 2019-12-11 RX ADMIN — PIPERACILLIN AND TAZOBACTAM 200 GRAM(S): 4; .5 INJECTION, POWDER, LYOPHILIZED, FOR SOLUTION INTRAVENOUS at 19:42

## 2019-12-11 RX ADMIN — SODIUM CHLORIDE 1000 MILLILITER(S): 9 INJECTION, SOLUTION INTRAVENOUS at 17:02

## 2019-12-11 RX ADMIN — HALOPERIDOL DECANOATE 2.5 MILLIGRAM(S): 100 INJECTION INTRAMUSCULAR at 18:01

## 2019-12-11 RX ADMIN — PIPERACILLIN AND TAZOBACTAM 3.38 GRAM(S): 4; .5 INJECTION, POWDER, LYOPHILIZED, FOR SOLUTION INTRAVENOUS at 20:14

## 2019-12-11 RX ADMIN — MIDAZOLAM HYDROCHLORIDE 1 MILLIGRAM(S): 1 INJECTION, SOLUTION INTRAMUSCULAR; INTRAVENOUS at 18:01

## 2019-12-11 NOTE — H&P ADULT - ASSESSMENT
89 y.o female with past medical history of dementia and PSHx of small bowel resection presents with nausea and vomiting since last night. CT was done showing a small bowel obstruction.     plan:  Admit pt to surgical floor  IV fluids    NG tube placement if emesis    NPO  Pain control Ruthann is a 89 Year-Old Lady with past medical history of dementia and PSHx of small bowel resection '15 presents with one day of nausea and vomiting since last night, found to have a Small bowel obstruction,     Plan:  - Admit to Surgery, Dr. Vargas.   - Nil Per Os, IV Fluids.   - NG tube placement if emesis    - No pain meds before exam.   - Discussed with Attending Surgeon.     B Team Surgery Pager #[phone]

## 2019-12-11 NOTE — ED PROVIDER NOTE - CLINICAL SUMMARY MEDICAL DECISION MAKING FREE TEXT BOX
Vomiting since yesterday with history of SBOs, found to have recurrent SBO on CT scan. Noted leukocytosis and elevated lactate which are likely from stress response to obstruction, do not suspect severe sepsis or septic shock, however pt was given zosyn empirically. No clear source of infection identified. General surgery consulted for admission. Remains hemodynamically stable throughout.

## 2019-12-11 NOTE — ED ADULT TRIAGE NOTE - CHIEF COMPLAINT QUOTE
pt from home for Vomiting started last night, pt with dementia, not able to tell us, on Oxycodone for body aches,

## 2019-12-11 NOTE — ED PROVIDER NOTE - PROGRESS NOTE DETAILS
Juan Carlos: Goals of care discussed with son Zac Herbert, pt is DNR, would agree to trial of intubation if there was chance for a meaningful recovery. Parisa Lara MD: pt seen by surgery for SBO. will admit to [facility]

## 2019-12-11 NOTE — ED ADULT NURSE NOTE - OBJECTIVE STATEMENT
Patient received to room 1, history of dementia, Patient received to room 1, history of dementia. Per aide, pt ate a sandwich yesterday and began throwing up twenty minutes later. Pt. was complaining of HA earlier in the day. Per aide, pt had a few episodes of diarrhea. Abdomen soft, nontender, nondistended. Breathing even and unlabored. 20 G IV placed to L AC by KENNETH Hendrix, labs drawn and sent. VS as noted. Awaiting further orders, will continue to monitor.

## 2019-12-11 NOTE — H&P ADULT - NSHPLABSRESULTS_GEN_ALL_CORE
CT scan: small bowel obstruction Labs:                       15.7   16.29 )-----------( 221      ( 11 Dec 2019 15:16 )             47.2   12-11    142  |  102  |  34<H>  ----------------------------<  137<H>  5.2   |  23  |  0.77    Ca    9.8      11 Dec 2019 15:16    TPro  7.6  /  Alb  4.0  /  TBili  0.8  /  DBili  x   /  AST  31  /  ALT  11  /  AlkPhos  74  12-11    < from: CT Abdomen and Pelvis w/ IV Cont (12.11.19 @ 18:44) >    FINDINGS:    LOWER CHEST: Coronary artery, aortic valve and mitral annular   calcifications. Bibasilar linear atelectasis, left greater than right.    LIVER: Within normal limits.  BILE DUCTS: Unchanged dilated common bile duct measuring up to 1.4 cm,   moderate intrahepatic biliary duct dilatation and pneumobilia.  GALLBLADDER: Cholecystectomy.  SPLEEN: Within normal limits.  PANCREAS: Within normal limits.  ADRENALS: Within normal limits.  KIDNEYS/URETERS: Within normal limits.    BLADDER: Within normal limits.  REPRODUCTIVE ORGANS: Uterus and adnexa within normal limits.    BOWEL: Multiple dilated loops of small bowel with probable transition in   the right lower quadrant anteriorly. Appendix is normal. Colonic   diverticulosis.  PERITONEUM: No ascites. No pneumoperitoneum.  VESSELS: Atherosclerotic changes.  RETROPERITONEUM/LYMPH NODES: No lymphadenopathy.    ABDOMINAL WALL: Within normal limits.  BONES: Degenerative changes with unchanged anterolisthesis of L2 on L3.    IMPRESSION:     Small bowel obstruction with acute transition point in the lower mid   abdomen.

## 2019-12-11 NOTE — ED ADULT NURSE REASSESSMENT NOTE - INTERVENTIONS DEFINITIONS
[place] to call system/Stretcher in lowest position, wheels locked, appropriate side rails in place/Monitor for mental status changes and reorient to person, place, and time/Physically safe environment: no spills, clutter or unnecessary equipment

## 2019-12-11 NOTE — H&P ADULT - HISTORY OF PRESENT ILLNESS
89 y.o female with PMHx of dementia and PSHx of small bowel resection presents with nausea and vomiting since last night per her home health aid. Pt has several episodes of non bilious and non bloody emesis last night immediately after eating and this morning as well. HHA states the pt was not able to keep down liquids and immediately started coughing and choking after she drank water last night. Pt has not been able to eat anything today. The HHA states the pt had an episode of non bloody diarrhea this morning. Pt was sleeping and mumbling incoherent words. Pt had CT scan which showed small bowel obstruction. 89 y.o female with PMHx of dementia and PSHx of Exploratory Laparotomy, small bowel resection for closed loop Small bowel obstruction, who presents with nausea and vomiting since last night per her home health aid. Pt has several episodes of non bilious and non bloody emesis last night immediately after eating and this morning as well. HHA states the pt was not able to keep down liquids and immediately started coughing and choking after she drank water last night. Pt has not been able to eat anything today. The HHA states the pt had an episode of non bloody diarrhea this morning. Pt was sleeping and mumbling incoherent words. Pt had CT scan which showed small bowel obstruction.

## 2019-12-11 NOTE — ED ADULT NURSE NOTE - NSIMPLEMENTINTERV_GEN_ALL_ED
Implemented All Fall with Harm Risk Interventions:  [place] to call system. Call bell, personal items and telephone within reach. Instruct patient to call for assistance. Room bathroom lighting operational. Non-slip footwear when patient is off stretcher. Physically safe environment: no spills, clutter or unnecessary equipment. Stretcher in lowest position, wheels locked, appropriate side rails in place. Provide visual cue, wrist band, yellow gown, etc. Monitor gait and stability. Monitor for mental status changes and reorient to person, place, and time. Review medications for side effects contributing to fall risk. Reinforce activity limits and safety measures with patient and family. Provide visual clues: red socks.

## 2019-12-11 NOTE — ED ADULT NURSE REASSESSMENT NOTE - NS ED NURSE REASSESS COMMENT FT1
Received report from Tianna COOPER. Pt a&ox0, nonambulatory, resting comfortably in bed. Respirations even and unlabored. Side rails up, bed in lowest position. Abdomen nondistended. IV wrapped, clean and intact. Will continue to monitor.

## 2019-12-11 NOTE — H&P ADULT - NSHPPHYSICALEXAM_GEN_ALL_CORE
Appearance: sleeping in hospital bed, no apparent distress    Abdomen: soft, non distended, tender to palpation +guarding  Skin: normal color, warm, dry Appearance: sleeping in hospital bed, no apparent distress    Abdomen: soft, non distended, tender to palpation.   Skin: normal color, warm, dry

## 2019-12-11 NOTE — ED PROVIDER NOTE - OBJECTIVE STATEMENT
89yof w/ dementia, chronic pain has been vomiting since yesterday according to her HHA. She has had several episodes of non-bloody non-bilious emesis, sudden onset yesterday about 20 minutes post prandial. Still has not been able to take PO today. HHA reports loose non-bloody stool this morning. Pt mostly mumbles incoherently, can sometimes offer complaints. Yesterday complained of headache.

## 2019-12-11 NOTE — ED ADULT NURSE REASSESSMENT NOTE - NS ED NURSE REASSESS COMMENT FT1
pt resting, respirations even and non labored. pt covered with sheets and blankets. pt within view of nursing station 2/2 aide not present and pt A&Ox0 and fall risk with harm.

## 2019-12-12 LAB
ANION GAP SERPL CALC-SCNC: 15 MMO/L — HIGH (ref 7–14)
BUN SERPL-MCNC: 31 MG/DL — HIGH (ref 7–23)
CALCIUM SERPL-MCNC: 9.5 MG/DL — SIGNIFICANT CHANGE UP (ref 8.4–10.5)
CHLORIDE SERPL-SCNC: 105 MMOL/L — SIGNIFICANT CHANGE UP (ref 98–107)
CO2 SERPL-SCNC: 24 MMOL/L — SIGNIFICANT CHANGE UP (ref 22–31)
CREAT SERPL-MCNC: 0.66 MG/DL — SIGNIFICANT CHANGE UP (ref 0.5–1.3)
GLUCOSE SERPL-MCNC: 97 MG/DL — SIGNIFICANT CHANGE UP (ref 70–99)
HCT VFR BLD CALC: 40.4 % — SIGNIFICANT CHANGE UP (ref 34.5–45)
HGB BLD-MCNC: 13.1 G/DL — SIGNIFICANT CHANGE UP (ref 11.5–15.5)
MAGNESIUM SERPL-MCNC: 1.9 MG/DL — SIGNIFICANT CHANGE UP (ref 1.6–2.6)
MCHC RBC-ENTMCNC: 30.3 PG — SIGNIFICANT CHANGE UP (ref 27–34)
MCHC RBC-ENTMCNC: 32.4 % — SIGNIFICANT CHANGE UP (ref 32–36)
MCV RBC AUTO: 93.3 FL — SIGNIFICANT CHANGE UP (ref 80–100)
NRBC # FLD: 0 K/UL — SIGNIFICANT CHANGE UP (ref 0–0)
PHOSPHATE SERPL-MCNC: 2.2 MG/DL — LOW (ref 2.5–4.5)
PLATELET # BLD AUTO: 149 K/UL — LOW (ref 150–400)
PMV BLD: 11.8 FL — SIGNIFICANT CHANGE UP (ref 7–13)
POTASSIUM SERPL-MCNC: 3.6 MMOL/L — SIGNIFICANT CHANGE UP (ref 3.5–5.3)
POTASSIUM SERPL-SCNC: 3.6 MMOL/L — SIGNIFICANT CHANGE UP (ref 3.5–5.3)
RBC # BLD: 4.33 M/UL — SIGNIFICANT CHANGE UP (ref 3.8–5.2)
RBC # FLD: 13.6 % — SIGNIFICANT CHANGE UP (ref 10.3–14.5)
SODIUM SERPL-SCNC: 144 MMOL/L — SIGNIFICANT CHANGE UP (ref 135–145)
SPECIMEN SOURCE: SIGNIFICANT CHANGE UP
SPECIMEN SOURCE: SIGNIFICANT CHANGE UP
WBC # BLD: 14.9 K/UL — HIGH (ref 3.8–10.5)
WBC # FLD AUTO: 14.9 K/UL — HIGH (ref 3.8–10.5)

## 2019-12-12 RX ORDER — POTASSIUM PHOSPHATE, MONOBASIC POTASSIUM PHOSPHATE, DIBASIC 236; 224 MG/ML; MG/ML
15 INJECTION, SOLUTION INTRAVENOUS ONCE
Refills: 0 | Status: COMPLETED | OUTPATIENT
Start: 2019-12-12 | End: 2019-12-12

## 2019-12-12 RX ORDER — HEPARIN SODIUM 5000 [USP'U]/ML
5000 INJECTION INTRAVENOUS; SUBCUTANEOUS EVERY 8 HOURS
Refills: 0 | Status: DISCONTINUED | OUTPATIENT
Start: 2019-12-12 | End: 2019-12-17

## 2019-12-12 RX ORDER — MAGNESIUM SULFATE 500 MG/ML
2 VIAL (ML) INJECTION ONCE
Refills: 0 | Status: COMPLETED | OUTPATIENT
Start: 2019-12-12 | End: 2019-12-12

## 2019-12-12 RX ORDER — QUETIAPINE FUMARATE 200 MG/1
50 TABLET, FILM COATED ORAL EVERY 12 HOURS
Refills: 0 | Status: DISCONTINUED | OUTPATIENT
Start: 2019-12-12 | End: 2019-12-17

## 2019-12-12 RX ORDER — SODIUM CHLORIDE 9 MG/ML
1000 INJECTION, SOLUTION INTRAVENOUS
Refills: 0 | Status: DISCONTINUED | OUTPATIENT
Start: 2019-12-12 | End: 2019-12-13

## 2019-12-12 RX ORDER — POTASSIUM CHLORIDE 20 MEQ
10 PACKET (EA) ORAL
Refills: 0 | Status: COMPLETED | OUTPATIENT
Start: 2019-12-12 | End: 2019-12-12

## 2019-12-12 RX ADMIN — Medication 100 MILLIEQUIVALENT(S): at 09:07

## 2019-12-12 RX ADMIN — SODIUM CHLORIDE 75 MILLILITER(S): 9 INJECTION, SOLUTION INTRAVENOUS at 04:50

## 2019-12-12 RX ADMIN — QUETIAPINE FUMARATE 50 MILLIGRAM(S): 200 TABLET, FILM COATED ORAL at 08:35

## 2019-12-12 RX ADMIN — Medication 100 MILLIEQUIVALENT(S): at 13:14

## 2019-12-12 RX ADMIN — QUETIAPINE FUMARATE 50 MILLIGRAM(S): 200 TABLET, FILM COATED ORAL at 18:25

## 2019-12-12 RX ADMIN — SODIUM CHLORIDE 75 MILLILITER(S): 9 INJECTION, SOLUTION INTRAVENOUS at 18:03

## 2019-12-12 RX ADMIN — Medication 100 MILLIEQUIVALENT(S): at 14:20

## 2019-12-12 RX ADMIN — HEPARIN SODIUM 5000 UNIT(S): 5000 INJECTION INTRAVENOUS; SUBCUTANEOUS at 21:12

## 2019-12-12 RX ADMIN — POTASSIUM PHOSPHATE, MONOBASIC POTASSIUM PHOSPHATE, DIBASIC 62.5 MILLIMOLE(S): 236; 224 INJECTION, SOLUTION INTRAVENOUS at 18:04

## 2019-12-12 RX ADMIN — Medication 50 GRAM(S): at 13:14

## 2019-12-12 RX ADMIN — HEPARIN SODIUM 5000 UNIT(S): 5000 INJECTION INTRAVENOUS; SUBCUTANEOUS at 13:18

## 2019-12-12 NOTE — PHYSICAL THERAPY INITIAL EVALUATION ADULT - DISCHARGE DISPOSITION, PT EVAL
No skilled PT needs. Pt. appears at baseline level of function, pt. will be discharged from PT program at this time.

## 2019-12-12 NOTE — PATIENT PROFILE ADULT - NSPROPTRIGHTCAREGIVER_GEN_A_NUR
Normal rate, regular rhythm.  Heart sounds S1, S2.  No murmurs, rubs or gallops.  + 2 edema bilateral lower legs
declines

## 2019-12-12 NOTE — PHYSICAL THERAPY INITIAL EVALUATION ADULT - RANGE OF MOTION EXAMINATION, REHAB EVAL
except bilateral knee contracture noted/bilateral upper extremity ROM was WFL (within functional limits)/bilateral lower extremity ROM was WFL (within functional limits)

## 2019-12-12 NOTE — PHYSICAL THERAPY INITIAL EVALUATION ADULT - ADDITIONAL COMMENTS
Pt. unable to provide information on social history or previous level of function secondary to cognitive status. Per documentation, pt. lives in a house. Pt. requires assistance and has HHA. Pt. owns DME of semi electric bed, total assist lift, and wheelchair.    Pt. was left supine in bed post PT Evaluation, no apparent distress, all lines intact, call lord within reach. KENNETH Earl made aware of pt. status.

## 2019-12-12 NOTE — PATIENT PROFILE ADULT - NSPROMUTANXFEARFT_GEN_A_NUR
family has a poor outlook towards her health who has been in the system since  who almost  three times due to hospital inadequacies .

## 2019-12-12 NOTE — PROGRESS NOTE ADULT - SUBJECTIVE AND OBJECTIVE BOX
B Team Surgery Progress Note    Subjective: seen on rounds, no issues overnight, admitted to the floor and more comfortable this morning    Exam:    Neuro: Awake  GA: well-appearing  Pulm: breathing comfortably  GI: non-distended, soft, minimally ttp      Vital Signs Last 24 Hrs  T(C): 36.2 (12 Dec 2019 08:40), Max: 37.2 (11 Dec 2019 12:52)  T(F): 97.2 (12 Dec 2019 08:40), Max: 98.9 (11 Dec 2019 12:52)  HR: 75 (12 Dec 2019 08:40) (75 - 95)  BP: 126/73 (12 Dec 2019 08:40) (109/60 - 140/70)  BP(mean): --  RR: 19 (12 Dec 2019 08:40) (18 - 20)  SpO2: 97% (11 Dec 2019 23:37) (96% - 97%)    I&O's Detail  MEDICATIONS  (STANDING):  heparin  Injectable 5000 Unit(s) SubCutaneous every 8 hours  lactated ringers. 1000 milliLiter(s) (75 mL/Hr) IV Continuous <Continuous>  magnesium sulfate  IVPB 2 Gram(s) IV Intermittent once  potassium chloride  10 mEq/100 mL IVPB 10 milliEquivalent(s) IV Intermittent every 1 hour  potassium phosphate IVPB 15 milliMole(s) IV Intermittent once  QUEtiapine 50 milliGRAM(s) Oral two times a day    MEDICATIONS  (PRN):      LABS:                        13.1   14.90 )-----------( 149      ( 12 Dec 2019 05:30 )             40.4     12-12    144  |  105  |  31<H>  ----------------------------<  97  3.6   |  24  |  0.66    Ca    9.5      12 Dec 2019 05:30  Phos  2.2     12-12  Mg     1.9     12-12    TPro  7.6  /  Alb  4.0  /  TBili  0.8  /  DBili  x   /  AST  31  /  ALT  11  /  AlkPhos  74  12-11      LIVER FUNCTIONS - ( 11 Dec 2019 15:16 )  Alb: 4.0 g/dL / Pro: 7.6 g/dL / ALK PHOS: 74 u/L / ALT: 11 u/L / AST: 31 u/L / GGT: x

## 2019-12-12 NOTE — ED ADULT NURSE REASSESSMENT NOTE - NS ED NURSE REASSESS COMMENT FT1
pt repeatedly chewing and sucking on hands and fingers. no broken skin noted. pt A&Ox0, pt redirected multiple times, repositioned in bed. pt continually redirected. surgery B team paged. pt repeatedly chewing and sucking on hands and fingers. no broken skin noted. pt A&Ox0, pt redirected multiple times, repositioned in bed. pt in view of nursing station. side rails up and safety maintained. pt continually redirected. surgery B team paged.

## 2019-12-12 NOTE — PATIENT PROFILE ADULT - NSPROMUTINFOINDIVIDFT_GEN_A_NUR
Pt. will remain free from falls and injury. Pt. will maintain tolerable pain level. Pt. will remain free from respiratory distress.

## 2019-12-12 NOTE — PROGRESS NOTE ADULT - ASSESSMENT
Pt is a 89F with past medical history of dementia and PSHx of small bowel resection '15; presents with one day of nausea and vomiting, found to have SBO    Plan:  - NPO/IVF  - holding off on NGT placement since patient is unlikely to tolerate  - No pain meds before exam    B Team Surgery Pager #[phone]

## 2019-12-13 DIAGNOSIS — Z71.89 OTHER SPECIFIED COUNSELING: ICD-10-CM

## 2019-12-13 RX ORDER — SODIUM CHLORIDE 9 MG/ML
1000 INJECTION, SOLUTION INTRAVENOUS
Refills: 0 | Status: DISCONTINUED | OUTPATIENT
Start: 2019-12-13 | End: 2019-12-15

## 2019-12-13 RX ORDER — SODIUM CHLORIDE 9 MG/ML
1000 INJECTION, SOLUTION INTRAVENOUS
Refills: 0 | Status: DISCONTINUED | OUTPATIENT
Start: 2019-12-13 | End: 2019-12-13

## 2019-12-13 RX ADMIN — HEPARIN SODIUM 5000 UNIT(S): 5000 INJECTION INTRAVENOUS; SUBCUTANEOUS at 22:11

## 2019-12-13 RX ADMIN — QUETIAPINE FUMARATE 50 MILLIGRAM(S): 200 TABLET, FILM COATED ORAL at 17:55

## 2019-12-13 RX ADMIN — QUETIAPINE FUMARATE 50 MILLIGRAM(S): 200 TABLET, FILM COATED ORAL at 07:30

## 2019-12-13 RX ADMIN — HEPARIN SODIUM 5000 UNIT(S): 5000 INJECTION INTRAVENOUS; SUBCUTANEOUS at 06:02

## 2019-12-13 NOTE — PROGRESS NOTE ADULT - ASSESSMENT
Pt is a 89F with past medical history of dementia and PSHx of small bowel resection '15; presents with one day of nausea and vomiting, found to have SBO    Plan:  - NPO/IVF  - holding off on NGT placement since patient is unlikely to tolerate, continues to appear comfortable  - No pain meds before exam  - PT evaluated and state pt has no skilled needs  - DVT ppx    B Team Surgery Pager #[phone]

## 2019-12-13 NOTE — PROGRESS NOTE ADULT - SUBJECTIVE AND OBJECTIVE BOX
B Team Surgery Progress Note    Subjective: seen on rounds, no issues overnight, appears comfortable and sleepy this AM, no vomiting or BM overnight    Exam:    Neuro: Awake  GA: well-appearing  Pulm: breathing comfortably  GI: non-distended, soft, no ttp    Vital Signs Last 24 Hrs  T(C): 36.3 (13 Dec 2019 05:55), Max: 36.8 (12 Dec 2019 17:48)  T(F): 97.4 (13 Dec 2019 05:55), Max: 98.2 (12 Dec 2019 17:48)  HR: 65 (13 Dec 2019 05:55) (61 - 76)  BP: 138/53 (13 Dec 2019 05:55) (119/67 - 138/53)  BP(mean): --  RR: 16 (13 Dec 2019 05:55) (16 - 19)  SpO2: 98% (13 Dec 2019 05:55) (96% - 98%)    I&O's Detail    12 Dec 2019 07:01  -  13 Dec 2019 07:00  --------------------------------------------------------  IN:    lactated ringers.: 1125 mL  Total IN: 1125 mL    OUT:  Total OUT: 0 mL    Total NET: 1125 mL      MEDICATIONS  (STANDING):  heparin  Injectable 5000 Unit(s) SubCutaneous every 8 hours  lactated ringers. 1000 milliLiter(s) (75 mL/Hr) IV Continuous <Continuous>  QUEtiapine 50 milliGRAM(s) Oral every 12 hours    MEDICATIONS  (PRN):      LABS:                        13.1   14.90 )-----------( 149      ( 12 Dec 2019 05:30 )             40.4     12-12    144  |  105  |  31<H>  ----------------------------<  97  3.6   |  24  |  0.66    Ca    9.5      12 Dec 2019 05:30  Phos  2.2     12-12  Mg     1.9     12-12    TPro  7.6  /  Alb  4.0  /  TBili  0.8  /  DBili  x   /  AST  31  /  ALT  11  /  AlkPhos  74  12-11      LIVER FUNCTIONS - ( 11 Dec 2019 15:16 )  Alb: 4.0 g/dL / Pro: 7.6 g/dL / ALK PHOS: 74 u/L / ALT: 11 u/L / AST: 31 u/L / GGT: x

## 2019-12-14 LAB
ANION GAP SERPL CALC-SCNC: 15 MMO/L — HIGH (ref 7–14)
BUN SERPL-MCNC: 13 MG/DL — SIGNIFICANT CHANGE UP (ref 7–23)
CALCIUM SERPL-MCNC: 8.9 MG/DL — SIGNIFICANT CHANGE UP (ref 8.4–10.5)
CHLORIDE SERPL-SCNC: 100 MMOL/L — SIGNIFICANT CHANGE UP (ref 98–107)
CO2 SERPL-SCNC: 23 MMOL/L — SIGNIFICANT CHANGE UP (ref 22–31)
CREAT SERPL-MCNC: 0.47 MG/DL — LOW (ref 0.5–1.3)
GLUCOSE BLDC GLUCOMTR-MCNC: 88 MG/DL — SIGNIFICANT CHANGE UP (ref 70–99)
GLUCOSE SERPL-MCNC: 84 MG/DL — SIGNIFICANT CHANGE UP (ref 70–99)
HCT VFR BLD CALC: 41.1 % — SIGNIFICANT CHANGE UP (ref 34.5–45)
HGB BLD-MCNC: 13.6 G/DL — SIGNIFICANT CHANGE UP (ref 11.5–15.5)
MAGNESIUM SERPL-MCNC: 1.8 MG/DL — SIGNIFICANT CHANGE UP (ref 1.6–2.6)
MCHC RBC-ENTMCNC: 29.6 PG — SIGNIFICANT CHANGE UP (ref 27–34)
MCHC RBC-ENTMCNC: 33.1 % — SIGNIFICANT CHANGE UP (ref 32–36)
MCV RBC AUTO: 89.3 FL — SIGNIFICANT CHANGE UP (ref 80–100)
NRBC # FLD: 0 K/UL — SIGNIFICANT CHANGE UP (ref 0–0)
PHOSPHATE SERPL-MCNC: 1.9 MG/DL — LOW (ref 2.5–4.5)
PLATELET # BLD AUTO: 174 K/UL — SIGNIFICANT CHANGE UP (ref 150–400)
PMV BLD: 11.3 FL — SIGNIFICANT CHANGE UP (ref 7–13)
POTASSIUM SERPL-MCNC: 3 MMOL/L — LOW (ref 3.5–5.3)
POTASSIUM SERPL-SCNC: 3 MMOL/L — LOW (ref 3.5–5.3)
RBC # BLD: 4.6 M/UL — SIGNIFICANT CHANGE UP (ref 3.8–5.2)
RBC # FLD: 12.5 % — SIGNIFICANT CHANGE UP (ref 10.3–14.5)
SODIUM SERPL-SCNC: 138 MMOL/L — SIGNIFICANT CHANGE UP (ref 135–145)
WBC # BLD: 6.92 K/UL — SIGNIFICANT CHANGE UP (ref 3.8–10.5)
WBC # FLD AUTO: 6.92 K/UL — SIGNIFICANT CHANGE UP (ref 3.8–10.5)

## 2019-12-14 RX ORDER — POTASSIUM CHLORIDE 20 MEQ
10 PACKET (EA) ORAL
Refills: 0 | Status: COMPLETED | OUTPATIENT
Start: 2019-12-14 | End: 2019-12-14

## 2019-12-14 RX ORDER — INFLUENZA VIRUS VACCINE 15; 15; 15; 15 UG/.5ML; UG/.5ML; UG/.5ML; UG/.5ML
0.5 SUSPENSION INTRAMUSCULAR ONCE
Refills: 0 | Status: DISCONTINUED | OUTPATIENT
Start: 2019-12-14 | End: 2019-12-17

## 2019-12-14 RX ORDER — POTASSIUM PHOSPHATE, MONOBASIC POTASSIUM PHOSPHATE, DIBASIC 236; 224 MG/ML; MG/ML
30 INJECTION, SOLUTION INTRAVENOUS ONCE
Refills: 0 | Status: COMPLETED | OUTPATIENT
Start: 2019-12-14 | End: 2019-12-14

## 2019-12-14 RX ADMIN — QUETIAPINE FUMARATE 50 MILLIGRAM(S): 200 TABLET, FILM COATED ORAL at 05:55

## 2019-12-14 RX ADMIN — QUETIAPINE FUMARATE 50 MILLIGRAM(S): 200 TABLET, FILM COATED ORAL at 17:40

## 2019-12-14 RX ADMIN — HEPARIN SODIUM 5000 UNIT(S): 5000 INJECTION INTRAVENOUS; SUBCUTANEOUS at 14:00

## 2019-12-14 RX ADMIN — SODIUM CHLORIDE 75 MILLILITER(S): 9 INJECTION, SOLUTION INTRAVENOUS at 14:05

## 2019-12-14 RX ADMIN — SODIUM CHLORIDE 75 MILLILITER(S): 9 INJECTION, SOLUTION INTRAVENOUS at 05:56

## 2019-12-14 RX ADMIN — HEPARIN SODIUM 5000 UNIT(S): 5000 INJECTION INTRAVENOUS; SUBCUTANEOUS at 05:55

## 2019-12-14 RX ADMIN — Medication 100 MILLIEQUIVALENT(S): at 19:09

## 2019-12-14 RX ADMIN — POTASSIUM PHOSPHATE, MONOBASIC POTASSIUM PHOSPHATE, DIBASIC 83.33 MILLIMOLE(S): 236; 224 INJECTION, SOLUTION INTRAVENOUS at 22:06

## 2019-12-14 RX ADMIN — SODIUM CHLORIDE 75 MILLILITER(S): 9 INJECTION, SOLUTION INTRAVENOUS at 08:30

## 2019-12-14 RX ADMIN — HEPARIN SODIUM 5000 UNIT(S): 5000 INJECTION INTRAVENOUS; SUBCUTANEOUS at 22:04

## 2019-12-14 RX ADMIN — Medication 100 MILLIEQUIVALENT(S): at 17:40

## 2019-12-14 RX ADMIN — SODIUM CHLORIDE 75 MILLILITER(S): 9 INJECTION, SOLUTION INTRAVENOUS at 19:09

## 2019-12-14 RX ADMIN — Medication 100 MILLIEQUIVALENT(S): at 20:01

## 2019-12-14 NOTE — PROGRESS NOTE ADULT - ASSESSMENT
Assessment	  Pt is a 89F with past medical history of dementia and PSHx of small bowel resection '15; presents with one day of nausea and vomiting, found to have SBO    Plan:  - CLD/IVF  - holding off on NGT placement since patient is unlikely to tolerate, continues to appear comfortable  - No pain meds before exam  - PT evaluated and state pt has no skilled needs  - DVT ppx    B Team Surgery Pager #[phone]

## 2019-12-14 NOTE — PROGRESS NOTE ADULT - SUBJECTIVE AND OBJECTIVE BOX
Surgery Progress Note  Patient is a 89y old  Female who presents with a chief complaint of Small bowel obstruction (13 Dec 2019 08:08)      SUBJECTIVE: Patient seen and examined at bedside with surgical team.   Patient is responsive but not consistently intelligible  Patient is having BMs.      Vital Signs Last 24 Hrs  T(C): 36.2 (14 Dec 2019 05:53), Max: 37.4 (13 Dec 2019 17:40)  T(F): 97.2 (14 Dec 2019 05:53), Max: 99.3 (13 Dec 2019 17:40)  HR: 68 (14 Dec 2019 05:53) (60 - 80)  BP: 149/69 (14 Dec 2019 05:53) (119/96 - 180/90)  BP(mean): 101 (13 Dec 2019 17:59) (101 - 101)  RR: 16 (14 Dec 2019 05:53) (16 - 18)  SpO2: 97% (14 Dec 2019 05:53) (97% - 98%)    Physical Exam  Constitutional: NAD  Respiratory: breathing comfortably on RA  Abd: soft, ND, right sided abdominal tenderness   Ext: Moving all 4 ext spontaneously     I&O's Detail    13 Dec 2019 07:01  -  14 Dec 2019 07:00  --------------------------------------------------------  IN:    dextrose 5% + sodium chloride 0.45%: 225 mL    lactated ringers.: 600 mL  Total IN: 825 mL    OUT:  Total OUT: 0 mL    Total NET: 825 mL      MEDICATIONS  (STANDING):  dextrose 5% + sodium chloride 0.45% 1000 milliLiter(s) (75 mL/Hr) IV Continuous <Continuous>  heparin  Injectable 5000 Unit(s) SubCutaneous every 8 hours  QUEtiapine 50 milliGRAM(s) Oral every 12 hours    MEDICATIONS  (PRN):      LABS:                        13.6   6.92  )-----------( 174      ( 14 Dec 2019 07:09 )             41.1     12-14    138  |  100  |  13  ----------------------------<  84  3.0<L>   |  23  |  0.47<L>    Ca    8.9      14 Dec 2019 07:09  Phos  1.9     12-14  Mg     1.8     12-14

## 2019-12-15 LAB
ANION GAP SERPL CALC-SCNC: 13 MMO/L — SIGNIFICANT CHANGE UP (ref 7–14)
BUN SERPL-MCNC: 5 MG/DL — LOW (ref 7–23)
CALCIUM SERPL-MCNC: 9.3 MG/DL — SIGNIFICANT CHANGE UP (ref 8.4–10.5)
CHLORIDE SERPL-SCNC: 103 MMOL/L — SIGNIFICANT CHANGE UP (ref 98–107)
CO2 SERPL-SCNC: 24 MMOL/L — SIGNIFICANT CHANGE UP (ref 22–31)
CREAT SERPL-MCNC: 0.52 MG/DL — SIGNIFICANT CHANGE UP (ref 0.5–1.3)
GLUCOSE SERPL-MCNC: 86 MG/DL — SIGNIFICANT CHANGE UP (ref 70–99)
HCT VFR BLD CALC: 42.8 % — SIGNIFICANT CHANGE UP (ref 34.5–45)
HGB BLD-MCNC: 14.3 G/DL — SIGNIFICANT CHANGE UP (ref 11.5–15.5)
MAGNESIUM SERPL-MCNC: 1.8 MG/DL — SIGNIFICANT CHANGE UP (ref 1.6–2.6)
MCHC RBC-ENTMCNC: 30.6 PG — SIGNIFICANT CHANGE UP (ref 27–34)
MCHC RBC-ENTMCNC: 33.4 % — SIGNIFICANT CHANGE UP (ref 32–36)
MCV RBC AUTO: 91.5 FL — SIGNIFICANT CHANGE UP (ref 80–100)
NRBC # FLD: 0 K/UL — SIGNIFICANT CHANGE UP (ref 0–0)
PHOSPHATE SERPL-MCNC: 2.9 MG/DL — SIGNIFICANT CHANGE UP (ref 2.5–4.5)
PLATELET # BLD AUTO: 192 K/UL — SIGNIFICANT CHANGE UP (ref 150–400)
PMV BLD: 11.6 FL — SIGNIFICANT CHANGE UP (ref 7–13)
POTASSIUM SERPL-MCNC: 3.6 MMOL/L — SIGNIFICANT CHANGE UP (ref 3.5–5.3)
POTASSIUM SERPL-SCNC: 3.6 MMOL/L — SIGNIFICANT CHANGE UP (ref 3.5–5.3)
RBC # BLD: 4.68 M/UL — SIGNIFICANT CHANGE UP (ref 3.8–5.2)
RBC # FLD: 12.8 % — SIGNIFICANT CHANGE UP (ref 10.3–14.5)
SODIUM SERPL-SCNC: 140 MMOL/L — SIGNIFICANT CHANGE UP (ref 135–145)
WBC # BLD: 6.61 K/UL — SIGNIFICANT CHANGE UP (ref 3.8–10.5)
WBC # FLD AUTO: 6.61 K/UL — SIGNIFICANT CHANGE UP (ref 3.8–10.5)

## 2019-12-15 RX ORDER — POTASSIUM CHLORIDE 20 MEQ
40 PACKET (EA) ORAL ONCE
Refills: 0 | Status: COMPLETED | OUTPATIENT
Start: 2019-12-15 | End: 2019-12-15

## 2019-12-15 RX ORDER — MAGNESIUM SULFATE 500 MG/ML
2 VIAL (ML) INJECTION ONCE
Refills: 0 | Status: COMPLETED | OUTPATIENT
Start: 2019-12-15 | End: 2019-12-15

## 2019-12-15 RX ADMIN — Medication 40 MILLIEQUIVALENT(S): at 18:01

## 2019-12-15 RX ADMIN — SODIUM CHLORIDE 75 MILLILITER(S): 9 INJECTION, SOLUTION INTRAVENOUS at 14:10

## 2019-12-15 RX ADMIN — SODIUM CHLORIDE 75 MILLILITER(S): 9 INJECTION, SOLUTION INTRAVENOUS at 06:07

## 2019-12-15 RX ADMIN — Medication 50 GRAM(S): at 18:01

## 2019-12-15 RX ADMIN — QUETIAPINE FUMARATE 50 MILLIGRAM(S): 200 TABLET, FILM COATED ORAL at 18:01

## 2019-12-15 RX ADMIN — QUETIAPINE FUMARATE 50 MILLIGRAM(S): 200 TABLET, FILM COATED ORAL at 06:05

## 2019-12-15 RX ADMIN — HEPARIN SODIUM 5000 UNIT(S): 5000 INJECTION INTRAVENOUS; SUBCUTANEOUS at 21:46

## 2019-12-15 RX ADMIN — HEPARIN SODIUM 5000 UNIT(S): 5000 INJECTION INTRAVENOUS; SUBCUTANEOUS at 14:09

## 2019-12-15 RX ADMIN — SODIUM CHLORIDE 75 MILLILITER(S): 9 INJECTION, SOLUTION INTRAVENOUS at 18:10

## 2019-12-15 RX ADMIN — HEPARIN SODIUM 5000 UNIT(S): 5000 INJECTION INTRAVENOUS; SUBCUTANEOUS at 06:05

## 2019-12-15 NOTE — PROGRESS NOTE ADULT - SUBJECTIVE AND OBJECTIVE BOX
Surgery Progress Note  Patient is a 89y old  Female who presents with a chief complaint of Small bowel obstruction (14 Dec 2019 10:10)      SUBJECTIVE: Patient seen and examined at bedside with surgical team.   Patient speaks, but her words are not intelligible.  Tried to get out of bed repeatedly yesterday.   Tolerating CLD. No vomiting and no obvious signs of abdominal pain.      Vital Signs Last 24 Hrs  T(C): 36.4 (15 Dec 2019 02:00), Max: 36.8 (14 Dec 2019 13:52)  T(F): 97.5 (15 Dec 2019 02:00), Max: 98.2 (14 Dec 2019 13:52)  HR: 60 (15 Dec 2019 02:00) (60 - 90)  BP: 148/80 (15 Dec 2019 02:00) (127/107 - 152/68)  BP(mean): --  RR: 16 (15 Dec 2019 02:00) (16 - 17)  SpO2: 97% (15 Dec 2019 02:00) (92% - 98%)    Physical Exam  Constitutional: NAD  Respiratory: breathing comfortably on RA  Abd: soft, NT, ND   Ext: moving all 4 ext spontaneously     I&O's Detail    13 Dec 2019 07:01  -  14 Dec 2019 07:00  --------------------------------------------------------  IN:    dextrose 5% + sodium chloride 0.45%: 225 mL    lactated ringers.: 600 mL  Total IN: 825 mL    OUT:  Total OUT: 0 mL    Total NET: 825 mL      14 Dec 2019 07:01  -  15 Dec 2019 04:19  --------------------------------------------------------  IN:    dextrose 5% + sodium chloride 0.45%: 600 mL    IV PiggyBack: 300 mL    Oral Fluid: 480 mL  Total IN: 1380 mL    OUT:  Total OUT: 0 mL    Total NET: 1380 mL      MEDICATIONS  (STANDING):  dextrose 5% + sodium chloride 0.45% 1000 milliLiter(s) (75 mL/Hr) IV Continuous <Continuous>  heparin  Injectable 5000 Unit(s) SubCutaneous every 8 hours  influenza   Vaccine 0.5 milliLiter(s) IntraMuscular once  QUEtiapine 50 milliGRAM(s) Oral every 12 hours    MEDICATIONS  (PRN):      LABS:                        13.6   6.92  )-----------( 174      ( 14 Dec 2019 07:09 )             41.1     12-14    138  |  100  |  13  ----------------------------<  84  3.0<L>   |  23  |  0.47<L>    Ca    8.9      14 Dec 2019 07:09  Phos  1.9     12-14  Mg     1.8     12-14

## 2019-12-15 NOTE — PROGRESS NOTE ADULT - ASSESSMENT
Assessment	  Pt is a 89F with past medical history of dementia and PSHx of small bowel resection '15; presents with one day of nausea and vomiting, found to have SBO    Plan:  - LRD - IV lock   - No pain meds before exam  - PT evaluated and state pt has no skilled needs  - DVT ppx    B Team Surgery Pager #[phone]

## 2019-12-16 ENCOUNTER — TRANSCRIPTION ENCOUNTER (OUTPATIENT)
Age: 84
End: 2019-12-16

## 2019-12-16 LAB
ANION GAP SERPL CALC-SCNC: 9 MMO/L — SIGNIFICANT CHANGE UP (ref 7–14)
BACTERIA BLD CULT: SIGNIFICANT CHANGE UP
BACTERIA BLD CULT: SIGNIFICANT CHANGE UP
BUN SERPL-MCNC: 3 MG/DL — LOW (ref 7–23)
CALCIUM SERPL-MCNC: 9.1 MG/DL — SIGNIFICANT CHANGE UP (ref 8.4–10.5)
CHLORIDE SERPL-SCNC: 106 MMOL/L — SIGNIFICANT CHANGE UP (ref 98–107)
CO2 SERPL-SCNC: 24 MMOL/L — SIGNIFICANT CHANGE UP (ref 22–31)
CREAT SERPL-MCNC: 0.61 MG/DL — SIGNIFICANT CHANGE UP (ref 0.5–1.3)
GLUCOSE SERPL-MCNC: 83 MG/DL — SIGNIFICANT CHANGE UP (ref 70–99)
HCT VFR BLD CALC: 42.3 % — SIGNIFICANT CHANGE UP (ref 34.5–45)
HGB BLD-MCNC: 13.6 G/DL — SIGNIFICANT CHANGE UP (ref 11.5–15.5)
MAGNESIUM SERPL-MCNC: 2.3 MG/DL — SIGNIFICANT CHANGE UP (ref 1.6–2.6)
MCHC RBC-ENTMCNC: 29.6 PG — SIGNIFICANT CHANGE UP (ref 27–34)
MCHC RBC-ENTMCNC: 32.2 % — SIGNIFICANT CHANGE UP (ref 32–36)
MCV RBC AUTO: 92.2 FL — SIGNIFICANT CHANGE UP (ref 80–100)
NRBC # FLD: 0 K/UL — SIGNIFICANT CHANGE UP (ref 0–0)
PHOSPHATE SERPL-MCNC: 2.6 MG/DL — SIGNIFICANT CHANGE UP (ref 2.5–4.5)
PLATELET # BLD AUTO: 192 K/UL — SIGNIFICANT CHANGE UP (ref 150–400)
PMV BLD: 11.6 FL — SIGNIFICANT CHANGE UP (ref 7–13)
POTASSIUM SERPL-MCNC: 4.3 MMOL/L — SIGNIFICANT CHANGE UP (ref 3.5–5.3)
POTASSIUM SERPL-SCNC: 4.3 MMOL/L — SIGNIFICANT CHANGE UP (ref 3.5–5.3)
RBC # BLD: 4.59 M/UL — SIGNIFICANT CHANGE UP (ref 3.8–5.2)
RBC # FLD: 13.1 % — SIGNIFICANT CHANGE UP (ref 10.3–14.5)
SODIUM SERPL-SCNC: 139 MMOL/L — SIGNIFICANT CHANGE UP (ref 135–145)
WBC # BLD: 7.38 K/UL — SIGNIFICANT CHANGE UP (ref 3.8–10.5)
WBC # FLD AUTO: 7.38 K/UL — SIGNIFICANT CHANGE UP (ref 3.8–10.5)

## 2019-12-16 PROCEDURE — 99231 SBSQ HOSP IP/OBS SF/LOW 25: CPT

## 2019-12-16 RX ADMIN — HEPARIN SODIUM 5000 UNIT(S): 5000 INJECTION INTRAVENOUS; SUBCUTANEOUS at 13:11

## 2019-12-16 RX ADMIN — HEPARIN SODIUM 5000 UNIT(S): 5000 INJECTION INTRAVENOUS; SUBCUTANEOUS at 06:28

## 2019-12-16 RX ADMIN — Medication 85 MILLIMOLE(S): at 13:10

## 2019-12-16 RX ADMIN — HEPARIN SODIUM 5000 UNIT(S): 5000 INJECTION INTRAVENOUS; SUBCUTANEOUS at 23:02

## 2019-12-16 RX ADMIN — QUETIAPINE FUMARATE 50 MILLIGRAM(S): 200 TABLET, FILM COATED ORAL at 18:37

## 2019-12-16 RX ADMIN — QUETIAPINE FUMARATE 50 MILLIGRAM(S): 200 TABLET, FILM COATED ORAL at 06:28

## 2019-12-16 NOTE — DISCHARGE NOTE NURSING/CASE MANAGEMENT/SOCIAL WORK - NSSCNAMETXT_GEN_ALL_CORE
[facility]...[facility] at home

## 2019-12-16 NOTE — PROGRESS NOTE ADULT - SUBJECTIVE AND OBJECTIVE BOX
Morning Surgical Progress Note  Patient is a 89y old  Female who presents with a chief complaint of Small bowel obstruction (15 Dec 2019 04:19)      SUBJECTIVE: Patient seen and examined at bedside with surgical team, patient without complaints.     PAST MEDICAL & SURGICAL HISTORY:  Dementia  History of Spinal Stenosis  Lung Nodules  Hepatitis C: possible during transfusion in 1970&#x27;s  Carotid Artery Disease  Tendonitis: left hand: s/p steroid injection 3/10  Back Pain  Hypertension  Hyperlipidemia  S/P small bowel resection  History of Carotid Endarterectomy: right 2008  S/P Dilation and Curettage: 1970&#x27;s menorrhagia  After Cataract, Bilateral  S/P Cholecystectomy: laparoscopic 2 years ago  History of Laminectomy: 1970&#x27;s  S/P Knee Replacement: left knee 1997  right knee 2004    FAMILY HISTORY:  FH: hypertension    REVIEW OF SYSTEMS:    CONSTITUTIONAL: No weakness, fevers or chills  EYES/ENT: No visual changes;  No vertigo or throat pain   NECK: No pain or stiffness  RESPIRATORY: No cough, wheezing, hemoptysis; No shortness of breath  CARDIOVASCULAR: No chest pain or palpitations  GASTROINTESTINAL: No abdominal or epigastric pain. No nausea, vomiting, or hematemesis; No diarrhea or constipation. No melena or hematochezia.  GENITOURINARY: No dysuria, frequency or hematuria  NEUROLOGICAL: No numbness or weakness  SKIN: No itching, rashes    Vital Signs Last 24 Hrs  T(C): 36.7 (16 Dec 2019 06:29), Max: 36.7 (15 Dec 2019 14:11)  T(F): 98.1 (16 Dec 2019 06:29), Max: 98.1 (16 Dec 2019 06:29)  HR: 67 (16 Dec 2019 06:29) (55 - 102)  BP: 106/71 (16 Dec 2019 06:29) (106/71 - 135/108)  BP(mean): --  RR: 16 (16 Dec 2019 06:29) (16 - 17)  SpO2: 95% (16 Dec 2019 06:29) (90% - 97%)I&O's Detail    15 Dec 2019 07:01  -  16 Dec 2019 07:00  --------------------------------------------------------  IN:    dextrose 5% + sodium chloride 0.45%: 750 mL    IV PiggyBack: 50 mL    Oral Fluid: 1240 mL  Total IN: 2040 mL    OUT:    Voided: 1 mL  Total OUT: 1 mL    Total NET: 2039 mL        Medications  MEDICATIONS  (STANDING):  heparin  Injectable 5000 Unit(s) SubCutaneous every 8 hours  influenza   Vaccine 0.5 milliLiter(s) IntraMuscular once  QUEtiapine 50 milliGRAM(s) Oral every 12 hours    MEDICATIONS  (PRN):      Physical Exam  Constitutional: Awake and alert, NAD  Eyes: Scleras clear, PERRLA/ EOMI, Gross vision intact  Gastrointestinal: Soft nontender, nondistended  Extremities: Moving all extremities, no edema  Skin: No Rashes, Hematoma, Ecchymosis    LABS:                        13.6   7.38  )-----------( 192      ( 16 Dec 2019 06:01 )             42.3     12-16    139  |  106  |  3<L>  ----------------------------<  83  4.3   |  24  |  0.61    Ca    9.1      16 Dec 2019 06:01  Phos  2.6     12-16  Mg     2.3     12-16 Morning Surgical Progress Note  Patient is a 89y old  Female who presents with a chief complaint of Small bowel obstruction (15 Dec 2019 04:19)      SUBJECTIVE: Patient seen and examined at bedside with surgical team, patient with dementia, denies complaints, states "let me sleep."     PAST MEDICAL & SURGICAL HISTORY:  Dementia  History of Spinal Stenosis  Lung Nodules  Hepatitis C: possible during transfusion in 1970&#x27;s  Carotid Artery Disease  Tendonitis: left hand: s/p steroid injection 3/10  Back Pain  Hypertension  Hyperlipidemia  S/P small bowel resection  History of Carotid Endarterectomy: right 2008  S/P Dilation and Curettage: 1970&#x27;s menorrhagia  After Cataract, Bilateral  S/P Cholecystectomy: laparoscopic 2 years ago  History of Laminectomy: 1970&#x27;s  S/P Knee Replacement: left knee 1997  right knee 2004    FAMILY HISTORY:  FH: hypertension    REVIEW OF SYSTEMS:  unable to obtain due to dementia    Vital Signs Last 24 Hrs  T(C): 36.7 (16 Dec 2019 06:29), Max: 36.7 (15 Dec 2019 14:11)  T(F): 98.1 (16 Dec 2019 06:29), Max: 98.1 (16 Dec 2019 06:29)  HR: 67 (16 Dec 2019 06:29) (55 - 102)  BP: 106/71 (16 Dec 2019 06:29) (106/71 - 135/108)  BP(mean): --  RR: 16 (16 Dec 2019 06:29) (16 - 17)  SpO2: 95% (16 Dec 2019 06:29) (90% - 97%)I&O's Detail    15 Dec 2019 07:01  -  16 Dec 2019 07:00  --------------------------------------------------------  IN:    dextrose 5% + sodium chloride 0.45%: 750 mL    IV PiggyBack: 50 mL    Oral Fluid: 1240 mL  Total IN: 2040 mL    OUT:    Voided: 1 mL  Total OUT: 1 mL    Total NET: 2039 mL        Medications  MEDICATIONS  (STANDING):  heparin  Injectable 5000 Unit(s) SubCutaneous every 8 hours  influenza   Vaccine 0.5 milliLiter(s) IntraMuscular once  QUEtiapine 50 milliGRAM(s) Oral every 12 hours    Physical Exam  Constitutional: Awake and alert x0, NAD  Gastrointestinal: Soft nontender, nondistended  Extremities: Moving all extremities, no edema    LABS:                        13.6   7.38  )-----------( 192      ( 16 Dec 2019 06:01 )             42.3     12-16    139  |  106  |  3<L>  ----------------------------<  83  4.3   |  24  |  0.61    Ca    9.1      16 Dec 2019 06:01  Phos  2.6     12-16  Mg     2.3     12-16

## 2019-12-16 NOTE — DISCHARGE NOTE PROVIDER - HOSPITAL COURSE
89 yo F PMHx of dementia and PSHx of Exploratory Laparotomy, small bowel resection for closed loop Small bowel obstruction,  presented to [facility] ED with 1 day hx of nausea and non bilious/ nonbloody emesis. 89 yo F PMHx of dementia and PSHx of Exploratory Laparotomy, small bowel resection for closed loop Small bowel obstruction,  presented to [facility] ED with 1 day hx of nausea and non bilious/ nonbloody emesis. Patient was initially NPO;  she regained bowel function on 12/12- her diet was advanced to clears, and then advanced to regular diet on 12/15. On day of discharge patient is tolerating a mechanical soft diet, has bowel function and pain controlled. Patient is ready for discharge to home with assistance. 89 yo F PMHx of dementia and PSHx of Exploratory Laparotomy, small bowel resection for closed loop Small bowel obstruction,  presented to [facility] ED with 1 day hx of nausea and non bilious/ nonbloody emesis. Patient was initially NPO;  she regained bowel function on 12/12- her diet was advanced to clears, and then advanced to regular diet on 12/15. On day of discharge patient is tolerating a mechanical soft diet, has bowel function and pain controlled. Patient is ready for discharge to home with assistance. Patient will see Dr. Vargas for routine follow up as outpatient.

## 2019-12-16 NOTE — DISCHARGE NOTE PROVIDER - CARE PROVIDER_API CALL
Luke Vargas)  Surgery  [address], [address]  [place] [address]  Phone: [phone]  Fax: [phone]  Follow Up Time:

## 2019-12-16 NOTE — DISCHARGE NOTE PROVIDER - NSDCFUADDINST_GEN_ALL_CORE_FT
Please visit Dr. Vargas's office early or go to ED if patient has nausea/ vomiting/ severe abdominal pain not controlled by medication/ bloody or black stools/ profuse diarrhea

## 2019-12-16 NOTE — PROGRESS NOTE ADULT - ASSESSMENT
89F with past medical history of dementia and PSHx of small bowel resection '15; presents with one day of nausea and vomiting, found to have SBO now resolved    Plan:  - Regular diet  - PT evaluated and state pt has no skilled needs  - DVT ppx  - home meds  - DC home today    B Team Surgery Pager #[phone] 89F with past medical history of dementia and PSHx of small bowel resection '15; presents with one day of nausea and vomiting, found to have SBO now resolved    Plan:  - Regular diet  - PT evaluated and state pt has no skilled needs  - DVT ppx  - home meds    B Team Surgery Pager #[phone] 89F with past medical history of dementia and PSHx of small bowel resection '15; presents with one day of nausea and vomiting, found to have SBO now resolved, as per chart patient with 3 bm yesterday    Plan:  - Regular diet  - PT evaluated and state pt has no skilled needs  - DVT ppx  - home meds    B Team Surgery Pager #[phone]

## 2019-12-16 NOTE — DISCHARGE NOTE NURSING/CASE MANAGEMENT/SOCIAL WORK - NSSCTYPOFSERV_GEN_ALL_CORE
24hr/7 resumption of home care 24hr/7 resumption of home care...RN, physical therapy and social work with start   of care 12/18/19

## 2019-12-16 NOTE — DISCHARGE NOTE NURSING/CASE MANAGEMENT/SOCIAL WORK - PATIENT PORTAL LINK FT
You can access the FollowMyHealth Patient Portal offered by [facility] by registering at the following website: [email or web address]. By joining [employer]’s FollowMyHealth portal, you will also be able to view your health information using other applications (apps) compatible with our system.

## 2019-12-17 VITALS
DIASTOLIC BLOOD PRESSURE: 64 MMHG | OXYGEN SATURATION: 99 % | SYSTOLIC BLOOD PRESSURE: 120 MMHG | TEMPERATURE: 98 F | HEART RATE: 98 BPM | RESPIRATION RATE: 18 BRPM

## 2019-12-17 LAB
ANION GAP SERPL CALC-SCNC: 13 MMO/L — SIGNIFICANT CHANGE UP (ref 7–14)
BASOPHILS # BLD AUTO: 0.04 K/UL — SIGNIFICANT CHANGE UP (ref 0–0.2)
BASOPHILS NFR BLD AUTO: 0.5 % — SIGNIFICANT CHANGE UP (ref 0–2)
BUN SERPL-MCNC: 5 MG/DL — LOW (ref 7–23)
CALCIUM SERPL-MCNC: 9.4 MG/DL — SIGNIFICANT CHANGE UP (ref 8.4–10.5)
CHLORIDE SERPL-SCNC: 104 MMOL/L — SIGNIFICANT CHANGE UP (ref 98–107)
CO2 SERPL-SCNC: 24 MMOL/L — SIGNIFICANT CHANGE UP (ref 22–31)
CREAT SERPL-MCNC: 0.57 MG/DL — SIGNIFICANT CHANGE UP (ref 0.5–1.3)
EOSINOPHIL # BLD AUTO: 0.37 K/UL — SIGNIFICANT CHANGE UP (ref 0–0.5)
EOSINOPHIL NFR BLD AUTO: 4.8 % — SIGNIFICANT CHANGE UP (ref 0–6)
GLUCOSE SERPL-MCNC: 83 MG/DL — SIGNIFICANT CHANGE UP (ref 70–99)
HCT VFR BLD CALC: 44.2 % — SIGNIFICANT CHANGE UP (ref 34.5–45)
HGB BLD-MCNC: 14.4 G/DL — SIGNIFICANT CHANGE UP (ref 11.5–15.5)
IMM GRANULOCYTES NFR BLD AUTO: 0.4 % — SIGNIFICANT CHANGE UP (ref 0–1.5)
LYMPHOCYTES # BLD AUTO: 2.91 K/UL — SIGNIFICANT CHANGE UP (ref 1–3.3)
LYMPHOCYTES # BLD AUTO: 37.7 % — SIGNIFICANT CHANGE UP (ref 13–44)
MAGNESIUM SERPL-MCNC: 2.1 MG/DL — SIGNIFICANT CHANGE UP (ref 1.6–2.6)
MCHC RBC-ENTMCNC: 29.7 PG — SIGNIFICANT CHANGE UP (ref 27–34)
MCHC RBC-ENTMCNC: 32.6 % — SIGNIFICANT CHANGE UP (ref 32–36)
MCV RBC AUTO: 91.1 FL — SIGNIFICANT CHANGE UP (ref 80–100)
MONOCYTES # BLD AUTO: 0.64 K/UL — SIGNIFICANT CHANGE UP (ref 0–0.9)
MONOCYTES NFR BLD AUTO: 8.3 % — SIGNIFICANT CHANGE UP (ref 2–14)
NEUTROPHILS # BLD AUTO: 3.73 K/UL — SIGNIFICANT CHANGE UP (ref 1.8–7.4)
NEUTROPHILS NFR BLD AUTO: 48.3 % — SIGNIFICANT CHANGE UP (ref 43–77)
NRBC # FLD: 0 K/UL — SIGNIFICANT CHANGE UP (ref 0–0)
PHOSPHATE SERPL-MCNC: 3.5 MG/DL — SIGNIFICANT CHANGE UP (ref 2.5–4.5)
PLATELET # BLD AUTO: 193 K/UL — SIGNIFICANT CHANGE UP (ref 150–400)
PMV BLD: 11.5 FL — SIGNIFICANT CHANGE UP (ref 7–13)
POTASSIUM SERPL-MCNC: 4 MMOL/L — SIGNIFICANT CHANGE UP (ref 3.5–5.3)
POTASSIUM SERPL-SCNC: 4 MMOL/L — SIGNIFICANT CHANGE UP (ref 3.5–5.3)
RBC # BLD: 4.85 M/UL — SIGNIFICANT CHANGE UP (ref 3.8–5.2)
RBC # FLD: 12.9 % — SIGNIFICANT CHANGE UP (ref 10.3–14.5)
SODIUM SERPL-SCNC: 141 MMOL/L — SIGNIFICANT CHANGE UP (ref 135–145)
WBC # BLD: 7.72 K/UL — SIGNIFICANT CHANGE UP (ref 3.8–10.5)
WBC # FLD AUTO: 7.72 K/UL — SIGNIFICANT CHANGE UP (ref 3.8–10.5)

## 2019-12-17 RX ADMIN — HEPARIN SODIUM 5000 UNIT(S): 5000 INJECTION INTRAVENOUS; SUBCUTANEOUS at 06:24

## 2019-12-17 RX ADMIN — QUETIAPINE FUMARATE 50 MILLIGRAM(S): 200 TABLET, FILM COATED ORAL at 06:24

## 2019-12-17 NOTE — PROGRESS NOTE ADULT - ATTENDING COMMENTS
Above noted.  Plan: Discharge today.
Above noted. Doesn't appear to be in pain, no nausea, no documented bowel function.  Physical Exam: Afebrile.  Abdomen: Soft, non-tender, non- distended.  Labs: WBC: [id number]. Lactate normalized.  Plan: Continue present management.
Above noted. Tolerating a liquid diet, passing flatus according to the nurses.  Physical Exam: Afebrile.  Abdomen: Soft, non-tender.  Plan: Continue present management.
Above noted. Unclear if having bowel movements.  Physical Exam: Afebrile.  Abdomen: Soft, non-tender.  Plan: Advance to a regular diet.
Above noted.   No complaints. Eating very well, having bowel function.  Plan: Discharge today.

## 2019-12-17 NOTE — PROGRESS NOTE ADULT - SUBJECTIVE AND OBJECTIVE BOX
General Surgery Progress Note  Patient is a 89y old  Female who presents with a chief complaint of Small bowel obstruction (16 Dec 2019 13:56)      SUBJECTIVE: Patient seen and examined at bedside with surgical team, patient with dementia, without complaints this morning. Unable to obtain full ROS.    OBJECTIVE:    Vital Signs Last 24 Hrs  T(C): 36.6 (17 Dec 2019 04:12), Max: 36.7 (16 Dec 2019 18:36)  T(F): 97.8 (17 Dec 2019 04:12), Max: 98 (16 Dec 2019 18:36)  HR: 70 (17 Dec 2019 04:12) (55 - 70)  BP: 144/70 (17 Dec 2019 04:12) (128/63 - 144/70)  BP(mean): --  RR: 18 (17 Dec 2019 04:12) (16 - 18)  SpO2: 98% (17 Dec 2019 04:12) (95% - 99%)I&O's Detail    16 Dec 2019 07:01  -  17 Dec 2019 07:00  --------------------------------------------------------  IN:    IV PiggyBack: 500 mL    Oral Fluid: 840 mL  Total IN: 1340 mL    OUT:  Total OUT: 0 mL    Total NET: 1340 mL      MEDICATIONS  (STANDING):  heparin  Injectable 5000 Unit(s) SubCutaneous every 8 hours  influenza   Vaccine 0.5 milliLiter(s) IntraMuscular once  QUEtiapine 50 milliGRAM(s) Oral every 12 hours    MEDICATIONS  (PRN):      PHYSICAL EXAM:  Constitutional: A&Ox0, NAD  Respiratory: Unlabored breathing  Abdomen: Soft, nondistended, NTTP  Extremities: WWP, JAIN without limitations    LABS:                        14.4   7.72  )-----------( 193      ( 17 Dec 2019 06:19 )             44.2     12-17    141  |  104  |  5<L>  ----------------------------<  83  4.0   |  24  |  0.57    Ca    9.4      17 Dec 2019 06:19  Phos  3.5     12-17  Mg     2.1     12-17        ASSESSMENT:  89F with past medical history of dementia and PSHx of small bowel resection '15; presents with one day of nausea and vomiting, found to have SBO now resolved, having BMs per chart    Plan:  - Regular diet  - c/w home medications  - PT evaluated and state pt has no skilled needs  - DVT ppx  - Discharge home today    B Team Surgery  [id number]

## 2019-12-17 NOTE — PROGRESS NOTE ADULT - REASON FOR ADMISSION
Small bowel obstruction

## 2020-02-11 NOTE — DISCHARGE NOTE ADULT - NSFTFATTENDCERTRD_GEN_ALL_CORE
declines My signature below certifies that the above stated patient is homebound and upon completion of the Face-To-Face encounter, has the need for intermittent skilled nursing, physical therapy and/or speech or occupational therapy services in their home for their current diagnosis as outlined in their initial plan of care. These services will continue to be monitored by myself or another physician.

## 2020-03-04 NOTE — PHYSICAL THERAPY INITIAL EVALUATION ADULT - CRITERIA FOR SKILLED THERAPEUTIC INTERVENTIONS
Discussed with Dr. Javi Hanson re:  No show/labs not complete. He stated if I can get a hold of patient today, he can get his labs done asap and see Ligia or Carrie Mead for pre-op. If he does not follow through this time, surgery will be cancelled. I called patient and was able to leave a message this morning to call me back asap. impairments found

## 2020-04-19 NOTE — PROGRESS NOTE ADULT - PROBLEM SELECTOR PLAN 2
Called to discuss + BV on vaginal culture and recs for oral or vaginal Metronidazole. I also left message with hormone levels c/w pre-menopause. Detailed VM left. I sent Rx for MetroGel vaginal with EtOH precautions given on VM.  We'll await other test re
Resolved
- Suspected PNA.   - No other suspected sources.   - Blood and urine cultures are no growth to date.
-patient febrile (101.8), tachycardic, elevated lactate 3.6, w/ leukocytosis, meeting sepsis criteria, w/ potential source-RLL opacity seen on CXR, likely pna. urinalysis-negative for LE/nitrites  -s/p ceftriaxone 1g x 1, azithromycin 500mg x 1  -will switch to zosyn for better coverage as most likely aspiration pneumonia, will f/u BCx, ordered legionella urine antigen, will hold off on vanc for now  -trend fever curve, CBCw/ diff qd  -will place on dysphagia III diet, aspiration precautions, speech and swallow
Resolved

## 2020-10-13 NOTE — H&P ADULT - PROBLEM SELECTOR PLAN 1
rate of administration sepsis 2/2  RML/RLL PNA and L > R pleural effusion found on CT A/P most likely, RVP neg, UA wnl   -c/w Zosyn q8h and Vanco 1gm qd (renally dose)  -f/u BCx2, urine legionella  -fever trend and monitor for aspiration precautions  -NPO for now, f/u MBS and S&S recs sepsis 2/2  RML/RLL PNA and L > R pleural effusion found on CT A/P most likely, RVP neg, UA wnl, leukocytosis 18, lactate 2.4  -c/w Zosyn q8h and Vanco 1gm qd (renally dose)  -f/u BCx2, urine legionella  -fever trend and monitor for aspiration precautions  -NPO for now, f/u MBS and S&S recs Statement Selected sepsis 2/2  RML/RLL PNA (likely aspiration) and L > R pleural effusion found on CT A/P most likely, RVP neg, UA wnl, leukocytosis 18, lactate 2.4  -c/w Zosyn q8h and Vanco 1gm qd (renally dosed)  -f/u BCx2, urine legionella  -fever trend and monitor for aspiration precautions  -NPO for now, f/u MBS and S&S recs Sepsis 2/2  RML/RLL PNA (likely aspiration) and L > R pleural effusion found on CT A/P, RVP neg, UA wnl, leukocytosis 18, lactate 2.4  -c/w Zosyn q8h and Vanco 1gm qd (renally dosed) to cover possible HCAP;  -f/u BCx2, urine legionella  -fever trend and monitor for aspiration precautions  -NPO for now, f/u MBS and S&S recs

## 2021-02-11 NOTE — PHYSICAL THERAPY INITIAL EVALUATION ADULT - NAME OF CLINICIAN
Patient was advised of today's exam findings.  Fill glasses prescription  Allow 2 weeks to adapt to change in glasses  Monitor mild cataracts yearly   Keep blood sugar under good control  Return in 1 year for diabetic eye exam      Diabetes weakens the blood vessels all over the body, including the eyes. Damage to the blood vessels in the eyes can cause swelling or bleeding into part of the eye (called the retina). This is called diabetic retinopathy (TSERING-tin--pu-thee). If not treated, this disease can cause vision loss or blindness.   Symptoms may include blurred or distorted vision, but many people have no symptoms. It's important to see your eye doctor regularly to check for problems.   Early treatment and good control can help protect your vision. Here are the things you can do to help prevent vision loss:      1. Keep your blood sugar levels under tight control.      2. Bring high blood pressure under control.      3. No smoking.      4. Have yearly dilated eye exams.      Ami Zavala O.D.  [facility]   [address] [place] [address]  [phone]       KENNETH SAUCEDO.

## 2021-03-22 NOTE — ED ADULT TRIAGE NOTE - NS ED NURSE AMBULANCES
Caller: Arash Simmons    Relationship: Self    Best call back number:[phone]    Medication needed:   Requested Prescriptions     Pending Prescriptions Disp Refills   • FLUoxetine (PROzac) 20 MG tablet 90 tablet 1     Sig: Take 1 tablet by mouth Daily.       When do you need the refill by: ASAP    What additional details did the patient provide when requesting the medication: PATIENT IS OUT    Does the patient have less than a 3 day supply:  [x] Yes  [] No    What is the patient's preferred pharmacy: [address] [phone] [facility] [phone]        
[facility]

## 2021-04-18 NOTE — CHART NOTE - NSCHARTNOTEFT_GEN_A_CORE
pt seen and evaluated by me  has been on constant observation for a while  periods of agitation  will discontinue constant observation and re assess No

## 2021-06-02 NOTE — ED PROVIDER NOTE - MEDICAL DECISION MAKING DETAILS
PAIN CLINIC FOLLOW UP PROGRESS NOTE    CC:  Chief Complaint   Patient presents with     Back Pain     Neck Pain       HPI  Cynthia Lyons is a 48 y.o. female who presents for evaluation   Chief Complaint   Patient presents with     Back Pain     Neck Pain    that is causing continued pain. Since the last visit the patient denies any trips to the urgent care or ED specifically for their pain. The patient denies any new medications, diagnoses since the last visit. Specific questions indicated the patient wanted addressed today include: to treat her ongoing chronic pain related to her low back       Major issues:  1. Chronic pain syndrome      Alleviating factors: Include-  Opioids and position changes. Jacuzzi tub helps  Aggravating factors: activity including bending, standing is the worse position, laying down or lifting, The patient denies using any assistive devices to help with mobilization. Sleep for about 5 hours at a time.   Pain level today: On a scale of 1-10, the patient rates their pain at a 7  Function Rating:affects her ability to walk, sit and stand. She is unable to work at this time.     Previous Medical History  Social History     Substance and Sexual Activity   Alcohol Use Not Currently     Social History     Substance and Sexual Activity   Drug Use Yes     Types: Oxycodone     Social History     Tobacco Use   Smoking Status Current Every Day Smoker   Smokeless Tobacco Never Used       Pertinent Pain Medications/interventions:    She currently takes Cymbalta, Wellbutrin, gabapentin, robaxin and percocet three times a day prn     previously had tried ms contin, effexor, fentanyl patches and tramadol    Review of Systems:  12 point systems were reviewed with pt as documented on pt health form and the patient denies any new diagnosis or changes in 12 point system review since the last visit.     Physical Exam  Vitals:    10/07/19 1010   BP: 119/63   Patient Site: Right Arm   Patient Position:  "Sitting   Pulse: 80   Resp: 16   Weight: 199 lb 1.6 oz (90.3 kg)   Height: 5' 5\" (1.651 m)     General- patient is alert and oriented, in NAD, well-groomed, well-nourished  Psych- Judgment and insight normal, AOx4, recent and remote memory normal, mood and affect normal  Eyes- pupils are equal and reactive, conjunctiva is clear bilaterally, no ptosis is noted.   Respiratory- breathing is non-labored  Cardiovascular- extremities warm and well perfused, no peripheral edema or varicosities.  Musculoskeletal- gait is normal, extremities with no joint swelling, erythema, or warmth.  Palpation:  Inspection and palpatory examination of the spine and upper/lower extremities is unremarkable. Tenderness is noted in the midline tenderness, c spine, t spine and lumbar spine as well as left SI joint pain.   Neuro:  Bilateral upper and lower extremity coordination and muscle stretch reflexes are physiologic and symmetric 2/4.  Babinski responses are downgoing.  + clonus bilaterally. Negative hoffmans sign bilaterally.   Integumentary: no rashes or breaks in the skin, no open wounds    Medications    Current Outpatient Medications:      atorvastatin (LIPITOR) 20 MG tablet, Take 20 mg by mouth daily., Disp: , Rfl: 1     buPROPion (WELLBUTRIN SR) 200 MG 12 hr tablet, Take 200 mg by mouth daily., Disp: , Rfl:      cetirizine (ZYRTEC) 10 MG tablet, Take 10 mg by mouth 2 (two) times a day., Disp: , Rfl:      diphenhydramine HCl (BENADRYL ALLERGY ORAL), Take by mouth at bedtime., Disp: , Rfl:      DULoxetine (CYMBALTA) 60 MG capsule, Take 60 mg by mouth., Disp: , Rfl:      gabapentin (NEURONTIN) 600 MG tablet, Take 600 mg by mouth 4 (four) times a day., Disp: , Rfl:      lactulose (GENERLAC) 10 gram/15 mL solution, SEE NOTES, Disp: , Rfl:      lisinopril-hydrochlorothiazide (PRINZIDE,ZESTORETIC) 20-12.5 mg per tablet, Take 1 tablet by mouth daily., Disp: , Rfl:      methocarbamol (ROBAXIN) 750 MG tablet, Take 750 mg by mouth 3 (three) " times a day., Disp: , Rfl:      oxyCODONE-acetaminophen (PERCOCET/ENDOCET) 7.5-325 mg per tablet, Take 1 tablet by mouth every 8 (eight) hours as needed for pain (up to 3 per day)., Disp: 84 tablet, Rfl: 0     phenylephrine HCl 5 mg Tab, Take by mouth as needed., Disp: , Rfl:      valACYclovir (VALTREX) 500 MG tablet, TAKE 1 TABLET BY MOUTH DAILY, Disp: , Rfl:      nicotine (NICODERM CQ) 14 mg/24 hr, Place 1 patch on the skin., Disp: , Rfl:      nicotine (NICODERM CQ) 14 mg/24 hr, , Disp: , Rfl:      nicotine (NICODERM CQ) 7 mg/24 hr, Place 1 patch on the skin., Disp: , Rfl:     Lab:  Last UDS on 9/13/2019 had expected results. Any abnormal results have been discussed with the patient and may change the plan of care. Please see the scanned document from the outside lab under the media tab. This was reviewed with the patient.      Imaging:  Any imaging viewed today was discussed with the patient on 10/7/2019     EXAM: XR PELVIS AP  LOCATION: [facility]  DATE/TIME: 9/14/2019 11:26 AM     INDICATION: Low back pain radiating down to legs, chronic low back pain without sciatica  COMPARISON: None.     IMPRESSION:   Negative pelvis. No fracture or dislocation. No arthritic changes    Final read- addendum- right acetabular height is lower the the left in a standing position- over read- Leonor Ramirez CNP    EXAM: XR LUMBAR SPINE 4 OR MORE VWS  LOCATION: [facility]  DATE/TIME: 9/14/2019 11:26 AM     INDICATION: Spinal stability  COMPARISON: None.     IMPRESSION:   There are 5 nonrib-bearing lumbar type vertebral bodies in normal vertebral body heights and AP alignment. In upright neutral position, there is exaggeration of the normal lumbar lordosis, which measures approximately 78 degrees from the   superior L1 endplate to the inferior L5 endplate, though without spondylolisthesis. No evidence for dynamic spinal instability between flexion and extension.     Hypoplastic L5-S1 disc space.  Intervertebral disc spaces are otherwise maintained. Mild degenerative facet arthropathy at L4-5 and L5-S1. Moderate degenerative changes in the bilateral sacroiliac joints. Partially visualized bilateral thoracic South   rods. Gastric lap band device in place with left abdominal reservoir. Soft tissues are otherwise unremarkable.    Recent   Dated 10/7/2019 was reviewed with the patient today.   Paper copy reivewed    Assessment:   Cynthia Lyons is a 48 y.o. female seen in clinic today for   Chief Complaint   Patient presents with     Back Pain     Neck Pain       They are here for follow up and continued medical management of their pain. Today we reviewed the lab work of the UDT test and the results are expected because of the prescribed narcotics found in the urine drug screen.     I have also reviewed the information obtained from the last visit encounter after the CICI was signed and have asked any pertintent questions needed from other healthcare providers/family/patient to continue care and formulate a treatment plan.     Low back pain- reviewed films, pelvis is uneven due to uneven leg length, will refer to orthotic, she is working with spine as well, will send to postural restoration and continue the use of the short acting opioids. Agreement reviewed today. Patient may be a candidate for long acting, no morphine per allergies. She does have a lap band as well. Will consider the use of the Butrans for a long acting. Ok to reduce gabapentin as she reports this is not helpful.     Mental health- continue to follow with current provider     Bowel health- magnesium and B-12 as well as improved diet and water intake.    Neck pain- following with [facility]  Patients current MME is 33.75  Patient to call clinic for next month's prescription 5 days in advance. Based on the patients response to their previous narcotic therapy and quality of life, which includes their participation in ADLs, I recommend that the  narcotics therapy continue, however the changes listed below will be incorporated into their plan of care.     Patient set goals today in the office to achieve with the pain centers help. They are:  1. To treat her ongoing chronic pain as well as help her maintain her ongoing function that is independently.      Plan:   Interventions-    Follow up in 4 weeks to evaluate the effectiveness of the treatment interventions ordered today. Scheduling your appointment prior to leaving assists in reduction of running out of medication prior to the next appointment.     If you are receiving medications from the pain clinic, it is your responsibility to call 3-5 days in advance for a refill. The clinic has up to five days to provide a refill for you.     Therapy- PT/OT- kinetic referral- postural restoration for the acetabular height difference- right is lower then the left. Orthotics referral will be placed.     Bowel health is important, use of magnesium 400 mg at bedtime, vitamin B 12 or complex needed at bedtime this will also support your nerve health.     Low back pain related to the exaggerated lordosis- yoga- superman lifts as discussed in the office to strengthen this.     Agree with continued care with psychiatry for anxiety     Will continue use the percocet as you are up to 3 per day ok to fill     Ok to reduce the gabapentin as you are unsure if it is helping. Reduce the nighttime dose to 600 mg for 5 days. Then eliminate a daytime dose for 5 days, then eliminate all daytime doses for 5 days, then the night time dose.     At anytime if your pain increases ok to resume your gabapentin.     At the next visit may consider adding a long acting such as butrans or belbuca     As a reminder- chronic pain is generally stable, if you experience a new injury or new different pain it is expected that you present to the ED or urgent care for evaluation. DO NOT use your chronic pain medications to treat any new or different  pain other then what it is intended for. We do not replace any lost or stolen prescriptions or provide early refills for overtaking your medications.     Orders placed today  Medications that were ordered today   Requested Prescriptions     Signed Prescriptions Disp Refills     oxyCODONE-acetaminophen (PERCOCET/ENDOCET) 7.5-325 mg per tablet 84 tablet 0     Sig: Take 1 tablet by mouth every 8 (eight) hours as needed for pain (up to 3 per day).     Orders Placed This Encounter   Procedures     Ambulatory referral to Adult PT- External     Referral Priority:   Routine     Referral Type:   Physical Therapy     Referral Reason:   Evaluation and Treatment     Requested Specialty:   Physical Therapy     Number of Visits Requested:   1       UDT/SWAB:  Patient required a random Urine Drug Testing, due to the need to comply with [place] Model Policy Guidelines and CDC Guideline for the use of any controlled substances. This is to ensure that patient is compliant with treatment, and monitor for risks such as diversion, abuse, or any other aberrant behaviors. Patient is either being considered for or taking a controlled substance. Unexpected findings will be discussed and treatment decision may be adjusted. Testing is being implemented across the board randomly w/o bias related to age, race, gender, socioeconomic status or Mandaeism affiliation.    The patient understand todays plan and has their questions answered in regards to expectations and current treatment plan.     SAFETY REMINDERS  No alcohol while taking controlled substances. Alcohol is not an illegal substance, it is unsafe to use in combination. It is a build up of substances in the body that can be extremely hazardous and may cause respirations to slow to a dangerous rate resulting in hospitalization, brain damage, or death.    Opioid medications have been associated with sharp rise in unintentional overdose and death.  Overdose is a condition characterized  by the consumption in excess of a particular drug causing adverse effects. This can happen b/c you are sick, accidentally or intentionally took an extra dose, are on multiple medication that can interact. Someone took your medication and they are not use to the medication.  Symptoms of overdose include:   !breathing slow and shallow, erratic or not at all  !pinpoint pupils, hallucinations  !confusion  !muscle jerks, slack muscles   !extreme sleepiness or loss of alertness   !awake but not able to talk   !face pale or clammy, vomiting, for lighter skinned people, the skin tone turns bluish purple, for darker skinned people, it turns grayish or ashen   If in a situation where overdose is a concern engage the emergency response system (dial 911).    In one study it was noted that 80% of unintentional overdoses occurred in people who were taking a combination of opioids and benzodiazepines.    Do not sell, loan, borrow or share your opioid medication with anyone. Deaths have occurred as a result of this practice. It is illegal and patients are being prosecuted.     Prevent unexpected access/loss of medication: Keep medication locked. Only carry what you need with you.    Leonor Ramirez, [facility]  [address]  [place] [address]  Ph: [phone]  Fax: [phone]   Patient with dementia s/p fall but no collateral. Per EMS son said not to ask him to come in tonight and none of the numbers on file, although I called at least 3 times during er stay, were unsuccessful. will do cardiac workup, CT head, Trauma scan, basic labs, dispo pending workup.

## 2021-06-10 NOTE — PATIENT PROFILE ADULT. - MEDICATION ADMINISTRATION INFO, PROFILE
Skyrizi Dosing: 150 mg SC week 0 and week 4 then every 12 weeks thereafter
Diagnosis (Required): Psoriasis
none
Methotrexate Specific Contraindications (Override- The Patient.....): pt was inadequately controlled
Is Phototherapy Contraindicated?: No
Is Methotrexate Contraindicated?: Yes
Pregnancy And Lactation Warning Text: The risk during pregnancy and breastfeeding is uncertain with this medication.
Detail Level: Zone
Skyrizi Monitoring Guidelines: A yearly test for tuberculosis is required while taking Skyrizi.

## 2021-09-16 NOTE — PROGRESS NOTE ADULT - SUBJECTIVE AND OBJECTIVE BOX
CC:   Chief Complaint   Patient presents with   • Office Visit   • Eye Exam     Was seen in March 2021.   states that wife complains of problems reading at times and has dementia.  Denies flashes, floaters, eye pain.  Does have problems with the eyes watering.    • Diabetes     Hemoglobin A1C (%)       Date                     Value                 03/31/2021               6.9 (H)          ----------  States not diabetic- but is taking metforming.            HPI: agree with tech note      Diabetes mellitus type II                                     Hyperlipidemia                                                Osteoarthritis                                                Osteopenia                                                    Bilateral pseudophakia                                        Posterior capsular opacification non visually *               Macular cyst, hole, or pseudohole, left eye                   Choroidal nevus of both eyes                                  Nonexudative senile macular degeneration of re*                 Comment: both eyes    Glaucoma suspect                                1991          Background diabetic retinopathy (CMS/HCC)       2011          RPE (retinal pigment epithelium) atrophy                        Comment: RPE changes left eye     Shingles                                                      Diverticulosis of large intestine without hemo*               Internal and external hemorrhoids without comp*               BMI 30.0-30.9,adult                                           Aortic valve stenosis                                         Essential tremor                                              Osteoarthritis                                                Urinary urgency                                               Post-herpetic polyneuropathy                                  Essential (primary) hypertension                              Chronic kidney disease                                           Comment: stage III    Breast cancer (CMS/HCC)                                     Comment: Left breast    Cataracts, bilateral                                          Colon polyps                                    2017      Comment: tubulovillous adenoma    Murmur, heart                                                 Wears glasses                                                 Wears partial dentures                                          Comment: full upper/lower partial  Past Surgical History:   Procedure Laterality Date   • Appendectomy     • Cardiac surgery      two stents and heart valve [facility]   • Cataract extraction w/  intraocular lens implant Right 2005    Dr Lou SN60WF 21.0   • Cataract extraction w/  intraocular lens implant Left 10/19/2005    Dr Lou SN60WF 21.0   •  section, low transverse  1950's    x1   • Colonoscopy  2006   • Colonoscopy  1017    polyps (tubulovillous adenoma), mild diverticulosis     repeat due    • Laparoscopy,cholyst w/ cholgpy  2007   • Removal anal fistula,subcutaneous     • Simple mastectomy  2007    Left breast     Family History   Problem Relation Age of Onset   • Heart disease Mother    • Dementia/Alzheimers Father    • Patient is unaware of any medical problems Brother    • Patient is unaware of any medical problems Sister    • Cancer Sister         Breast   • Patient is unaware of any medical problems Sister    • Patient is unaware of any medical problems Sister    • Patient is unaware of any medical problems Brother    • Obesity Brother      Social History     Socioeconomic History   • Marital status: /Civil Union     Spouse name: Not on file   • Number of children: Not on file   • Years of education: Not on file   • Highest education level: Not on file   Occupational History   • Occupation: retired   Tobacco Use   • Smoking status: Never  Patient is a 89y old  Female who presents with a chief complaint of unwitnessed fall precautions (30 Aug 2017 11:19)      SUBJECTIVE / OVERNIGHT EVENTS: No nausea, vomiting or diarrhea, no fever or chills, no dizziness or chest pain, no dysuria or hematuria, no joint pain or swelling  no chest pain     MEDICATIONS  (STANDING):  heparin  Injectable 5000 Unit(s) SubCutaneous every 8 hours  diVALproex  milliGRAM(s) Oral two times a day  NIFEdipine XL 30 milliGRAM(s) Oral daily  pantoprazole    Tablet 40 milliGRAM(s) Oral before breakfast    MEDICATIONS  (PRN):  acetaminophen   Tablet. 650 milliGRAM(s) Oral every 6 hours PRN Mild Pain (1 - 3)  ALPRAZolam 0.25 milliGRAM(s) Oral two times a day PRN anxiety, agitation, restlessness        CAPILLARY BLOOD GLUCOSE        I&O's Summary    05 Sep 2017 07:01  -  06 Sep 2017 07:00  --------------------------------------------------------  IN: 240 mL / OUT: 0 mL / NET: 240 mL    PE:  vital signs as above  in no apparent distress awake verbal  HEENT: GEM EOMI  Neck: Supple, no JVD, no thyromegaly  Lungs: clear, no rhonchi, no wheeze, no crackles decreased effort  improved breath sounds left base   CVS: S1 S2 no M/R/G  Abdomen: no tenderness, no organomegaly, BS present  Neuro: confused but talking, no focal weakness evident  Psych: confused  Skin: warm, dry  Ext: no edema, no clubbing  Msk: no joint swelling or deformities  Back: no CVA tenderness, no kyphosis/scoliosis      LABS:                        13.4   9.14  )-----------( 369      ( 05 Sep 2017 07:36 )             39.4     09-06    135  |  95<L>  |  20  ----------------------------<  112<H>  4.2   |  28  |  0.69    Ca    10.0      06 Sep 2017 06:15                  Consultant(s) Notes Reviewed:      Care Discussed with Consultants/Other Providers:    Contact Number, Dr Dexter [phone] Smoker   • Smokeless tobacco: Never Used   Substance and Sexual Activity   • Alcohol use: Yes     Comment: Rarely   • Drug use: No     Comment: none   • Sexual activity: Never   Other Topics Concern   • Not on file   Social History Narrative   • Not on file     Social Determinants of Health     Financial Resource Strain:    • Social Determinants: Financial Resource Strain:    Food Insecurity:    • Social Determinants: Food Insecurity:    Transportation Needs:    • Lack of Transportation (Medical):    • Lack of Transportation (Non-Medical):    Physical Activity:    • Days of Exercise per Week:    • Minutes of Exercise per Session:    Stress:    • Social Determinants: Stress:    Social Connections:    • Social Determinants: Social Connections:    Intimate Partner Violence: Not At Risk   • Social Determinants: Intimate Partner Violence Past Fear: No   • Social Determinants: Intimate Partner Violence Current Fear: No       Rooming documentation of social history includes tobacco screening only.      REVIEW OF SYSTEMS:  Eye Problem(s):glasses and watering, pseudophakia  ENT Problem(s):negative  Cardiovascular problem(s):negative  Respiratory problem(s):negative  Gastro-intestinal problem(s):negative GI  Genito-urinary problem(s):negative  Musculoskeletal problem(s):negative  Integumentary problem(s):negative  Neurological problem(s):dementia   Psychiatric problem(s):negative  Endocrine problem(s):diabetic  Hematologic and/or Lymphatic problem(s):negative    ASSESSMENT   1. Presbyopia    2. Subretinal hemorrhage of left eye    3. Macular hole of left eye    4. Nevus of choroid of left eye      OPTICAL COHERENCE TOMOGRAPHY  Macula Thickness - Macular Cube 512x128  Right: Signal strength 5/10. Central ILM-RPE thickness 238um. No elevation of the macular region  Left: Signal 4/10. Central ILM-RPE thickness 317um. Diffuse elevation of the macular region. Loss of foveal contour. Epiretinal membrane present with cystoid  formations. Gross disruption of the RPE layer  Interpretation: Macular pseudohole and subretinal hemorrhage of the left eye    PLAN  1. Update spectacle Rx to improve acuity of right eye  2-4. Referral to retinal specialist consult/treatment of subretinal hemorrhage and macular hole. Also requesting consult for evaluation of suspicious nevus of the left eye

## 2021-10-06 NOTE — PHYSICAL THERAPY INITIAL EVALUATION ADULT - DID THE PATIENT HAVE SURGERY?
Reece Kauffman is a 39 year old male presenting with reports of \"cold fingers\".  Date of injury or onset: ongoing  worsening the last few months White waxy with painful numb tingling sensation  Location: different fingers, both hands  Treatments used: gloves     Additional concerns:none  Standard labs entered No         Medications and allergies reviewed and updated. Denies latex allergy or sensitivity.  Preferred pharmacy loaded, Refills needed today?  No        Health Maintenance Due   Topic Date Due   • Influenza Vaccine (1) 09/01/2021   • Depression Screening  10/05/2021     Patient is up to date, no discussion needed.    Social History     Tobacco Use   Smoking Status Never Smoker   Smokeless Tobacco Never Used     Patient's  would like communication of their results via:    [facility]    Cell Phone:   Telephone Information:   Mobile [phone]     Okay to leave a message containing results? Yes   n/a

## 2021-10-08 NOTE — DISCHARGE NOTE ADULT - PROVIDER TOKENS
No FREE:[LAST:[[facility]],PHONE:[phone]],FAX:[(   )    -]]

## 2021-10-31 NOTE — ED PROVIDER NOTE - EYES, MLM
Patient : Chelsy Deng Age: 64 year old Sex: female   MRN: [id number] Encounter Date: 10/30/2021      History     Chief Complaint   Patient presents with   • Back Pain     HPI     Patient is a 64-year-old woman with a history of hypertension, prior MI, history of stroke, COPD, aortic valve stenosis, presenting to the ER for concerns of back pain.  Patient has had thoracic back pain for two weeks. She says that the pain was mostly manageable for the 1st week but since has been getting worse.  She describes bilateral midthoracic pain.  Sometimes the pain will wrap around towards the sides or even the front of her chest.  She does not have any chest pain currently.  She describes the pain almost like she has electric shocks.  She denies any paresthesias, weakness, or numbness.  Patient is a chronic smoker and always has a cough.  She says that her cough is no worse than usual but she does currently have a cough.  She did have a pneumonia of recently for which she was treated.  She denies any fevers or shortness of breath.  She denies any sputum production with her cough.      Allergies   Allergen Reactions   • Penicillins DIZZINESS   • Pneumovax [Pneumococcal Polysaccharides] SWELLING and Muscle Spasm     LOCKED ARM IN PLACE   • Influenza Vaccines HEADACHES     Migraine       Discharge Medication List as of 10/30/2021  8:47 PM      Prior to Admission Medications    Details   fluticasone-salmeterol (WIXELA INHUB) 250-50 MCG/DOSE inhaler Inhale 1 puff into the lungs two times daily.Eprescribe, Disp-30 each, R-11      albuterol (VENTOLIN HFA) 108 (90 Base) MCG/ACT inhaler Inhale 2 puffs into the lungs every 6 hours as needed for Shortness of Breath or Wheezing.Eprescribe, Disp-1 Inhaler, R-12      clopidogrel (PLAVIX) 75 MG tablet Take 1 tablet by mouth daily.Eprescribe, Disp-90 tablet, R-3      VITAMIN D, CHOLECALCIFEROL, PO Take 10,000 Units by mouth daily.Historical Med      amLODIPine (NORVASC) 5 MG tablet Take 1 tablet  by mouth daily.Eprescribe, Disp-90 tablet, R-3      pantoprazole (PROTONIX) 40 MG tablet Take 1 tablet by mouth 2 times daily (before meals).Eprescribe, Disp-60 tablet, R-5      metoPROLOL succinate (TOPROL-XL) 50 MG 24 hr tablet Take 1 tablet by mouth daily.Eprescribe, Disp-90 tablet, R-3      albuterol (VENTOLIN) (2.5 MG/3ML) 0.083% nebulizer solution Take 3 mLs by nebulization every 6 hours as needed for Wheezing or Shortness of Breath.Eprescribe, Nebulization, EVERY 6 HOURS PRN, Disp-360 mL, R-11Each vial = 3 mL (2.5 mg). Each box = 25 vials (75 mL).      Enter dispense quantity of 75 mL per box.      375 mL = QS for 30 days with Q6H dosing.      Aspirin (ANACIN PO) Take 325 mg by mouth daily. Historical Med         New Prescriptions    Details   cyclobenzaprine (FLEXERIL) 10 MG tablet Take 0.5 tablets by mouth 3 times daily as needed for Muscle spasms.Eprescribe, Oral, 3 TIMES DAILY PRN, Disp-15 tablet, R-0Consider prescribing in whole tablet strengths, as the tablets can be difficult to break (depends on product carried by the [facility] pharmacy).      lidocaine (LIDOCARE) 4 % patch Place 1 patch onto the skin every 24 hours.Eprescribe, Disp-10 patch, R-0             Past Medical History:   Diagnosis Date   • Anemia    • Aortic valve stenosis    • Breaux's esophagus     last EGD 9/15/17, repeat in 1 year   • Bronchitis    • CAD (coronary artery disease)    • Cerebral infarction (CMS/HCC) 3/2014, 6/2014    TIA   • Claustrophobia     do not cover mouth or nose   • COPD (chronic obstructive pulmonary disease) (CMS/HCC)    • Failed moderate sedation during procedure     slow to wake   • Fracture of metacarpal of right hand, closed 12/8/14   • High cholesterol    • History of MI (myocardial infarction) 2005, 2014   • History of TIA (transient ischemic attack) 06/2013   • HTN (hypertension)    • Myocardial infarction (CMS/HCC) 2005   • Osteopenia 03/21/2011   • Pneumonia        Past Surgical History:   Procedure  Laterality Date   • CARDIAC CATHERIZATION  2014    stent x 1   • CARDIAC CATHETERIZATION/POSSIBLE PTCA/POSSIBLE STENT      MI/ Stent   •  SECTION, CLASSIC     • COLONOSCOPY  2/13/15    repeat in 5 years ()   • COLONOSCOPY W/ POLYPECTOMY  2020    Repeat in 5 years - Dr. Bruno    • CT LUNG CANCER SCREENING LOW DOSE WO CONTRAST  2016   • DEXA BONE DENSITY AXIAL SKELETON  10/22/2010   • ECHOCARDIOGRAM  16   • ESOPHAGOGASTRODUODENOSCOPY  09/15/2017    Breaux's, repeat in 1yr   • ESOPHAGOGASTRODUODENOSCOPY  2018    Dr. Bruno. repeat in 3 yrs   • ESOPHAGOGASTRODUODENOSCOPY  2020    Repeat in 3 years - Dr. Bruno   • EYE SURGERY     • NM HEATHER PER RST/STRS PHARMACOLO         Family History   Problem Relation Age of Onset   • Heart disease Mother         MI   • Cancer Father         colon   • Heart disease Father         heart attack   • Stroke/TIA Sister    • Heart disease Sister         cabg x 5   • Diabetes Daughter         borderline   • Cancer Sister         bone   • Cancer Sister         thinks uterus cancer   • Diabetes Sister        Social History     Tobacco Use   • Smoking status: Current Every Day Smoker     Packs/day: 1.50     Years: 48.00     Pack years: 72.00     Types: Cigarettes   • Smokeless tobacco: Never Used   • Tobacco comment: 1.5 ppd hx/1 pack lasting 3 days   Vaping Use   • Vaping Use: never used   Substance Use Topics   • Alcohol use: No   • Drug use: No       E-cigarette/Vaping   • E-Cigarette/Vaping Use Never Used      E-Cigarette/Vaping Substances & Devices       Review of Systems   Constitutional: Negative for chills and fever.   HENT: Negative for congestion and rhinorrhea.    Eyes: Negative for visual disturbance.   Respiratory: Positive for cough (Chronic). Negative for shortness of breath.    Cardiovascular: Negative for chest pain.   Gastrointestinal: Negative for abdominal pain, diarrhea, nausea and vomiting.   Endocrine: Negative  for polyuria.   Genitourinary: Negative for difficulty urinating and hematuria.   Musculoskeletal: Positive for back pain. Negative for neck pain.   Skin: Negative for wound.   Neurological: Negative for light-headedness and headaches.   Hematological: Does not bruise/bleed easily.   Psychiatric/Behavioral: Negative for confusion.       Physical Exam     ED Triage Vitals [10/30/21 1915]   ED Triage Vitals Group      Temp 98.5 °F (36.9 °C)      Heart Rate 77      Resp 20      BP (!) 162/80      SpO2 95 %      EtCO2 mmHg       Height 5' 5\" (1.651 m)      Weight 112 lb (50.8 kg)      Weight Scale Used Standing scale      BMI (Calculated) 18.64      IBW/kg (Calculated) 57       Physical Exam  Vitals and nursing note reviewed.   Constitutional:       General: She is not in acute distress.     Appearance: Normal appearance. She is not ill-appearing or diaphoretic.   HENT:      Head: Normocephalic.      Nose: No congestion or rhinorrhea.      Mouth/Throat:      Mouth: Mucous membranes are moist.   Eyes:      Extraocular Movements: Extraocular movements intact.   Cardiovascular:      Rate and Rhythm: Normal rate and regular rhythm.      Heart sounds: No murmur heard.     Pulmonary:      Effort: Pulmonary effort is normal. No respiratory distress.      Breath sounds: No wheezing, rhonchi or rales.   Abdominal:      General: Abdomen is flat.      Palpations: Abdomen is soft.      Tenderness: There is no abdominal tenderness. There is no guarding.   Musculoskeletal:         General: No tenderness.      Cervical back: Neck supple. No tenderness.      Right lower leg: No edema.      Left lower leg: No edema.   Skin:     General: Skin is warm and dry.      Capillary Refill: Capillary refill takes less than 2 seconds.   Neurological:      Mental Status: She is alert and oriented to person, place, and time.      Sensory: No sensory deficit.      Motor: No weakness.      Gait: Gait normal.   Psychiatric:         Mood and Affect:  Mood normal.         Behavior: Behavior normal.         ED Course     Procedures    Lab Results     No results found for this visit on 10/30/21.    EKG Results     EKG Interpretation  Rate: 66  Rhythm: normal sinus rhythm   Abnormality:  Normal sinus rhythm with sinus arrhythmia, nonspecific ST changes done any evidence of acute ischemic changes.  Normal intervals.    EKG tracing interpreted by ED physician.  Final interpretation pending Cardiology review.    Radiology Results     Imaging Results          XR Chest PA and Lateral (Final result)  Result time 10/30/21 20:10:41    Final result                 Narrative:    INTERPRETATION LOCATION: [facility]    PROCEDURE:  XR CHEST PA AND LATERAL    DATE: 10/30/2021 20:06    COMPARISONS: September 15, 2021    CLINICAL INDICATION/ORDERING DIAGNOSES:  Back pain cough, recent  PNA  No Clinical Info    FINDINGS: Underlying COPD with slight interstitial coarsening. No  large infiltrate or overt pulmonary edema.    No pleural effusion or pneumothorax.    Heart size normal.    Signed by: Alfred Farah                              ED Medication Orders (From admission, onward)    Ordered Start     Status Ordering Provider    10/30/21 1947 10/30/21 1948  acetaminophen (TYLENOL) tablet 1,000 mg  ONCE         Last MAR action: Given RACHEL PHAN    10/30/21 1947 10/30/21 1948  lidocaine (LIDOCARE) 4 % patch 1 patch  ONCE         Last MAR action: Patch Applied RACHEL PHAN               MDM     Patient presents with concerns of constant back pain for 2 weeks.  Distal pulses intact.  No epigastric tenderness.  No rhonchi or decreased breath sounds on exam.  No wheezing.  EKG and chest x-ray performed to assess for occult pneumonia or cardiac involvement.  Noted as above.  Patient updated on the results.      Pt has no red flag symptoms such as fever, trauma, bowel or bladder incontinence, weakness, numbness, tingling, IV drug use, or groin numbness.       Pt's exam is  remarkable for reproducible bilateral thoracic paraspinal tenderness, no focal neuro deficits noted.       Patient was treated with Tylenol and a lidocaine patch.       Pt comfortable with discharge at this time.       Pt to follow-up with pcp as outpt.       Discussed following up outpatient for possible advanced imaging to help better determine etiology of their pain.       Discussed reasons to return to the ED, including, but not limited to:  Chest pain, shortness of breath, loss of sensation/strength/movement in any limb or extremity, acute onset tremors/shakes/seizure like activity, acute loss of bowel or bladder function, acute disturbances in gait/ambulation, or general worsening of today's symptoms. Answered all patient questions at this time.  Patient comfortable with discharge at this time. Patient verbalized understanding and was agreeable with the plan.    Clinical Impression     ED Diagnosis   1. Acute bilateral thoracic back pain         Disposition        Discharge 10/30/2021  8:43 PM  Chelsy Deng discharge to home/self care.                         Fer Coulter MD  10/30/21 0689     Clear bilaterally, pupils equal, round and reactive to light.

## 2021-11-19 NOTE — DIETITIAN INITIAL EVALUATION ADULT. - ENERGY NEEDS
No HT 61 inches,  pounds,  pounds, BMI 27.1   pounds.  Dxd with PNA, sepsis, r/o aspiration.  Skin intact.  Good strength.

## 2022-03-30 NOTE — PROGRESS NOTE ADULT - PROBLEM SELECTOR PLAN 7
detailed exam
hsq for dvt ppx   IMPROVE VTE Individual Risk Assessment    RISK                                                          Points  [] Previous VTE                                           3  [] Thrombophilia                                        2  [] Lower limb paralysis                              2   [] Current Cancer                                       2   [x] Immobilization > 24 hrs                        1  [] ICU/CCU stay > 24 hours                       1  [x] Age > 60                                                   1    IMPROVE VTE Score: 2
hsq for dvt ppx   IMPROVE VTE Individual Risk Assessment    RISK                                                          Points  [] Previous VTE                                           3  [] Thrombophilia                                        2  [] Lower limb paralysis                              2   [] Current Cancer                                       2   [x] Immobilization > 24 hrs                        1  [] ICU/CCU stay > 24 hours                       1  [x] Age > 60                                                   1    IMPROVE VTE Score: 2

## 2022-04-25 NOTE — PATIENT PROFILE ADULT. - PRO EXPECT LENGTH STAY
Medicine Refill Request    Last Office: 3/31/2021   Last Consult Visit: 5/11/2021  Last Telemedicine Visit: Visit date not found    Next Appointment: 8/8/2022      Current Outpatient Medications:   •  dextroamphetamine-amphetamine 15 mg tablet, , Disp: , Rfl:   •  fluticasone propionate 50 mcg/actuation nasal spray, Administer 2 sprays into each nostril daily., Disp: 16 g, Rfl: 0  •  norethindrone-e.estradioL-iron (JUNEL FE 1.5/30, 28,) 1.5 mg-30 mcg (21)/75 mg (7) per tablet, Take 1 tablet by mouth daily., Disp: 84 tablet, Rfl: 3  •  omeprazole 20 mg tablet, Take 20 mg by mouth daily before breakfast., Disp: , Rfl:       BP Readings from Last 3 Encounters:   11/05/21 120/72   05/11/21 (!) 142/84   03/31/21 130/80       Recent Lab results:  No results found for: CHOL, No results found for: HDL, No results found for: LDLCALC, No results found for: TRIG     Lab Results   Component Value Date    GLUCOSE 123 (H) 01/21/2018   ,   Lab Results   Component Value Date    HGBA1C 5.0 02/12/2018         Lab Results   Component Value Date    CREATININE 0.5 (L) 01/21/2018       Lab Results   Component Value Date    TSH 1.80 09/27/2018              unsure

## 2022-05-17 NOTE — H&P ADULT - FAMILY HISTORY
Pt called. States she tried to schedule mammogram and US. Was told the mammogram has to be changed to diagnostic as well. Not screening.      PLEASE ADVISE No pertinent family history in first degree relatives

## 2022-06-01 NOTE — ED ADULT NURSE NOTE - RN DISCHARGE SIGNATURE
Detail Level: Detailed Quality 431: Preventive Care And Screening: Unhealthy Alcohol Use - Screening: Patient not identified as an unhealthy alcohol user when screened for unhealthy alcohol use using a systematic screening method Quality 226: Preventive Care And Screening: Tobacco Use: Screening And Cessation Intervention: Patient screened for tobacco use and is an ex/non-smoker 19-Sep-2017

## 2022-07-28 NOTE — ED ADULT NURSE NOTE - AGGRAVATING FACTORS
Patient was in bathroom comp[laining of weakness. Called nurse and two tech to assist with getting patient back to bed. Patient dropped all of her body weight and refused to walk on own. Team assisted patient in bed. Tele sitter order was placed for patient. Tele monitor in place. Patient bed alarm is on.   none

## 2022-07-28 NOTE — CHART NOTE - NSCHARTNOTESELECT_GEN_ALL_CORE
OPERATIVE REPORT  PATIENT NAME: Fernando Archuleta    :  1996  MRN: [id number]  Pt Location: [facility] L&D OR ROOM 01    SURGERY DATE: 2022    Surgeon(s) and Role:     Drury Jeans, MD - Primary     * Isabella Lozada MD - Assisting    Preop Diagnosis:  Premature rupture of membranes [O42 90]    Post-Op Diagnosis Codes:     * Premature rupture of membranes [O42 90]    Procedure(s) (LRB):   SECTION () (N/A)    Specimen(s):  ID Type Source Tests Collected by Time Destination   1 : placenta for exam Tissue (Placenta on Hold) OB Only Placenta TISSUE EXAM OB (PLACENTA) ONLY Willye Phlegm, MD 2022 0127    A : cord gases Cord Blood Cord BLOOD GAS, VENOUS, CORD, BLOOD GAS, ARTERIAL, CORD Willye Phlegm, MD 2022 0124       cc    Drains:  Urethral Catheter Non-latex 16 Fr  (Active)   Reasons to continue Urinary Catheter  Post-operative urological requirements 22 0230   Goal for Removal Remove POD#1 22 0230   Site Assessment Clean;Skin intact 22 0230   Collection Container Standard drainage bag 22 0230   Securement Method Securing device (Describe) 22 0230   Number of days: 0       Anesthesia Type:   Spinal    Operative Indications:  Premature rupture of membranes [S84 12]    Complications:   None    Procedure and Technique:  FINDINGS:  1  Viable male at 0123, with APGARS of 7 and 9 at 1 and 5 minutes respectively, Weight not available at time of dictation  2  Placenta expressed at 0125  3  Normal appearing uterus, bilateral tubes and ovaries    Zahira Mccullough is a 24yo  who was admitted for PPROM at 28w2d  She received latency antibiotics, magnesium for fetal neuroprotection, and betamethasone for fetal lung maturity  On hospital day #10, she began to feel back pain and pressure, and was found to be 3 cm dilated, with the fetus in transverse presentation  The decision was made to proceed with a primary  delivery       The patient was Event Note/Agitation taken to the operating room where spinal anesthesia was noted to be adequate  She was prepped and draped in the usual sterile fashion in the dorsal supine position with leftward tilt  A Pfannenstiel skin incision was made and carried down to the fascia  The fascia was incised in the midline and extended laterally  Blunt dissection was used to reach the level of the rectus muscles  The rectus muscles were  in the midline, and the peritoneum was entered bluntly  The peritoneum was free of adhesions, and the peritoneal incision was extended superiorly and inferiorly to the bladder reflection with good visualization of the bladder  The bladder blade was inserted  The lower uterine segment was not well developed; therefore, the decision was made to proceed with classical hysterotomy  The uterus was incised in a vertical fashion through the contractile portion  The incision was extended using bandage scissors  The fetus was noted to be in transverse presentation; the fetal head was grasped and brought through the hysterotomy  Gentle fundal pressure was applied and the infant was delivered without difficulty  The mouth and nose were bulb-suctioned  The cord was clamped and cut  The infant had a spontaneous cry  The infant was handed off to the awaiting NICU team  The placenta delivered manually  The uterus was exteriorized and cleared of all clot and debris with a lap sponge  The hysterotomy was repaired in running locked fashion using 0-Vicryl  A second layer closure was made using 0-Vicryl  Additional figure of eight sutures of 0 Vicryl were placed along the hysterotomy to enhance hemostasis  Additional stitch of 2-0 Vicryl was placed in a baseball fashion with hemostasis confirmed  The uterus was returned to the abdomen  The hysterotomy closure was reinspected and noted to be hemostatic  The fascia was reapproximated using 0 Vicryl   The subcutaneous area was cleared of all clots, and Bovie electrocautery was used ensure hemostasis  The subcutaneous tissue was reapproximated using 2-0 Plain suture  The skin was closed in a subcuticular fashion using 3-0 Monocryl, with Steri Strips placed over top   The patient tolerated the procedure well  Sponge, lap, needle and instrument counts were correct times two at the end of the procedure  Pressure dressing was applied, and the patient was taken to recovery in stable condition      Patient Disposition:  PACU       SIGNATURE: Martha Hirsch MD  DATE: July 28, 2022  TIME: 2:48 AM

## 2022-08-05 NOTE — PATIENT PROFILE ADULT. - MENTAL HEALTH CONDITIONS/SYMPTOMS, PROFILE
Attempted to call report to [facility]. No answer. Will try again shortly. Report given to nurse at [facility] at [phone], all questions answered. [id number]

## 2022-11-14 NOTE — PATIENT PROFILE ADULT. - INFORMATION COULD NOT BE OBTAINED DETAILS
Quality 110: Preventive Care And Screening: Influenza Immunization: Influenza Immunization not Administered because Patient Refused.
Quality 226: Preventive Care And Screening: Tobacco Use: Screening And Cessation Intervention: Patient screened for tobacco use and is an ex/non-smoker
Detail Level: Detailed
Quality 130: Documentation Of Current Medications In The Medical Record: Current Medications Documented
Quality 431: Preventive Care And Screening: Unhealthy Alcohol Use - Screening: Patient not identified as an unhealthy alcohol user when screened for unhealthy alcohol use using a systematic screening method
pt poor historian

## 2023-01-10 NOTE — ED ADULT TRIAGE NOTE - BP NONINVASIVE DIASTOLIC (MM HG)
80 Muscle weakness M68.2; deficits in balance, coordination, endurance, ADL tasks & functional mobility

## 2023-01-22 NOTE — PATIENT PROFILE ADULT. - PRO ANTICIPATED DISCH DISP
Pt is a 74yom w/hx of T2DM, HTN, HLD, who presented initially to [facility] for elective cardiac cath after outpatient CT coronaries showed CAD, now He is s/p LHC w/ ZHOU to prox-Cx (80%) and POBA to OM1 (70%) via RRA on 1/20/23 with Dr. Duran, course complicated by pseudoaneursym, urinary retention and vasovagal episode. unsure

## 2023-01-24 NOTE — H&P ADULT - NSHPLABSRESULTS_GEN_ALL_CORE
Personally reviewed labs, ekg, imaging      137  |  97  |  25<H>  ----------------------------<  126<H>  5.0   |  26  |  1.02    Ca    9.6      2018 20:32    TPro  6.8  /  Alb  4.1  /  TBili  0.3  /  DBili  x   /  AST  15  /  ALT  8<L>  /  AlkPhos  73        PT/INR - ( 2018 21:43 )   PT: 10.4 sec;   INR: 0.96 ratio         PTT - ( 2018 21:43 )  PTT:33.5 sec              Urinalysis Basic - ( 2018 21:42 )    Color: PL Yellow / Appearance: Clear / S.011 / pH: x  Gluc: x / Ketone: Negative  / Bili: Negative / Urobili: Negative   Blood: x / Protein: Negative / Nitrite: Negative   Leuk Esterase: Negative / RBC: 0-2 /HPF / WBC 0-2 /HPF   Sq Epi: x / Non Sq Epi: x / Bacteria: x                              13.0   15.9  )-----------( 200      ( 2018 20:32 )             38.7     CAPILLARY BLOOD GLUCOSE        Blood Gas Source Venous: Venous ( @ 20:32)    < from: [facility] Duplex Lower Ext Vein Scan, Left (18 @ 21:56) >    MPRESSION:     No evidence of left lower extremity deep venous thrombosis.    < end of copied text >    < from: Xray Chest 1 View- PORTABLE-Urgent (18 @ 21:03) >      Patchy right lower lung opacity likely infectious in etiology. Consider   further evaluation with PA  and  Lateral chest radiographs.    Follow up final report.    < end of copied text > Personally reviewed labs, ekg, imaging      137  |  97  |  25<H>  ----------------------------<  126<H>  5.0   |  26  |  1.02    Ca    9.6      2018 20:32    TPro  6.8  /  Alb  4.1  /  TBili  0.3  /  DBili  x   /  AST  15  /  ALT  8<L>  /  AlkPhos  73        PT/INR - ( 2018 21:43 )   PT: 10.4 sec;   INR: 0.96 ratio         PTT - ( 2018 21:43 )  PTT:33.5 sec              Urinalysis Basic - ( 2018 21:42 )    Color: PL Yellow / Appearance: Clear / S.011 / pH: x  Gluc: x / Ketone: Negative  / Bili: Negative / Urobili: Negative   Blood: x / Protein: Negative / Nitrite: Negative   Leuk Esterase: Negative / RBC: 0-2 /HPF / WBC 0-2 /HPF   Sq Epi: x / Non Sq Epi: x / Bacteria: x                              13.0   15.9  )-----------( 200      ( 2018 20:32 )             38.7     CAPILLARY BLOOD GLUCOSE        Blood Gas Source Venous: Venous ( @ 20:32)    < from: [facility] Duplex Lower Ext Vein Scan, Left (18 @ 21:56) >    MPRESSION:     No evidence of left lower extremity deep venous thrombosis.    < end of copied text >    < from: Xray Chest 1 View- PORTABLE-Urgent (18 @ 21:03) >      Patchy right lower lung opacity likely infectious in etiology. Consider   further evaluation with PA  and  Lateral chest radiographs.    Follow up final report.    < end of copied text >  no EKG-patient extremely agitated and will not allow for ekg Hemigard Postcare Instructions: The HEMIGARD strips are to remain completely dry for at least 5-7 days.

## 2023-04-03 NOTE — DISCHARGE NOTE NURSING/CASE MANAGEMENT/SOCIAL WORK - NSDPDISTO_GEN_ALL_CORE
Griseofulvin Counseling:  I discussed with the patient the risks of griseofulvin including but not limited to photosensitivity, cytopenia, liver damage, nausea/vomiting and severe allergy.  The patient understands that this medication is best absorbed when taken with a fatty meal (e.g., ice cream or french fries). Home with home care

## 2023-05-09 NOTE — PHYSICAL THERAPY INITIAL EVALUATION ADULT - WEIGHT-BEARING RESTRICTIONS: SIT/STAND, REHAB EVAL
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
Additional History: Patient presents for evaluation and f/u after biopsy. Patient is noted with Grandmother at visit.
.
weight-bearing as tolerated

## 2023-07-14 NOTE — ED PROVIDER NOTE - PRINCIPAL DIAGNOSIS
Potassium 4.3 3.5 - 5.1 mmol/L    Chloride 104 97 - 108 mmol/L    CO2 29 21 - 32 mmol/L    Anion Gap 5 5 - 15 mmol/L    Glucose 84 65 - 100 mg/dL    BUN 17 6 - 20 mg/dL    Creatinine 1.55 (H) 0.70 - 1.30 mg/dL    Bun/Cre Ratio 11 (L) 12 - 20      Est, Glom Filt Rate 47 (L) >60 ml/min/1.73m2    Calcium 9.2 8.5 - 10.1 mg/dL    Total Bilirubin 0.6 0.2 - 1.0 mg/dL    AST 17 15 - 37 U/L    ALT 24 12 - 78 U/L    Alk Phosphatase 72 45 - 117 U/L    Total Protein 7.0 6.4 - 8.2 g/dL    Albumin 3.4 (L) 3.5 - 5.0 g/dL    Globulin 3.6 2.0 - 4.0 g/dL    Albumin/Globulin Ratio 0.9 (L) 1.1 - 2.2     Procalcitonin    Collection Time: 07/14/23 12:13 PM   Result Value Ref Range    Procalcitonin 29.46 (H) 0 ng/mL   Troponin    Collection Time: 07/14/23 12:13 PM   Result Value Ref Range    Troponin, High Sensitivity 16 0 - 76 ng/L   COVID-19, Rapid    Collection Time: 07/14/23 12:13 PM    Specimen: Nasopharyngeal   Result Value Ref Range    SARS-CoV-2, Rapid Not Detected Not Detected     Brain Natriuretic Peptide    Collection Time: 07/14/23 12:13 PM   Result Value Ref Range    NT Pro- <125 pg/mL   Ethanol    Collection Time: 07/14/23 12:13 PM   Result Value Ref Range    Ethanol Lvl <10 <10 mg/dL   Magnesium    Collection Time: 07/14/23 12:13 PM   Result Value Ref Range    Magnesium 1.5 (L) 1.6 - 2.4 mg/dL   Rapid influenza A/B antigens    Collection Time: 07/14/23 12:13 PM    Specimen: Nasal Washing   Result Value Ref Range    Influenza A Ag Negative Negative      Influenza B Ag Negative Negative     EKG 12 Lead    Collection Time: 07/14/23 12:23 PM   Result Value Ref Range    Ventricular Rate 106 BPM    Atrial Rate 106 BPM    P-R Interval 222 ms    QRS Duration 120 ms    Q-T Interval 306 ms    QTc Calculation (Bazett) 406 ms    P Axis 33 degrees    R Axis 5 degrees    T Axis 27 degrees    Diagnosis       Sinus tachycardia with 1st degree A-V block  Right bundle branch block  Abnormal ECG  No previous ECGs available  Confirmed
Small bowel obstruction

## 2023-07-31 NOTE — ED ADULT NURSE NOTE - NSFALLRSKHRMRISKTYPE_ED_ALL_ED
How Severe Is Your Rash?: moderate Is This A New Presentation, Or A Follow-Up?: Rash age(85 years old or older)

## 2023-09-19 NOTE — H&P ADULT - POSTERIOR CERVICAL L
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  Inflammatory changes seen on CT scan probably represented pressure injury from obstipation.  There is no evidence of ongoing inflammation. normal

## 2023-09-26 NOTE — DISCHARGE NOTE ADULT - FUNCTIONAL SCREEN CURRENT LEVEL: AMBULATION, MLM
Abdomen , soft, nontender, nondistended , no guarding or rigidity , no masses palpable , normal bowel sounds , Liver and Spleen , no hepatomegaly present , no hepatosplenomegaly , liver nontender , spleen not palpable
(3) assistive equipment and person

## 2024-07-04 NOTE — PATIENT PROFILE ADULT - FUNCTIONAL SCREEN CURRENT LEVEL: EATING, MLM
Spiritual Care Visit Note    Patient Name: Rosy Singh Date of Spiritual Care Visit: 24   : 1932 Primary Dx: SIRS (systemic inflammatory response syndrome) (HCC)       Referred By: Referral From: Nurse    Spiritual Care Taxonomy:    Intended Effects: Aligning care plan with patient's values      Visit Type/Summary:     received consult for Advanced Directives, checked EMR and found none.   Writer spoke with RN, confirmed patient is at times confused. Writer attempted to visit but patient was resting in bed.      - PoA: Patient unable to complete PoAH at this time:  remains available for follow up.    Spiritual Care support can be requested via an Epic consult.   For urgent/immediate needs, please contact the On Call  at: [place]: ext [phone]    Chaplain Lynn.      
  Spiritual Care Visit Note    Patient Name: Rosy Singh Date of Spiritual Care Visit: 24   : 1932 Primary Dx: SIRS (systemic inflammatory response syndrome) (HCC)       Referred By: Referral From: Nurse;Patient, Advanced Directives    Spiritual Care Taxonomy:    Intended Effects: Journeying with someone in the grief process    Methods: Offer spiritual/Sabianist support;Demonstrate acceptance    Interventions: Ask guided questions about cultural and Sabianist values;Acknowledge response to difficult experience;Assist someone with Advance Directives;Facilitate decision making    Visit Type/Summary:     - Spiritual Care: Responded to a request for spiritual care and assessed the patient for spiritual care needs. Consulted with RN prior to visit. Offered empathic listening and emotional support.  - PoA: New PoAH Created: Visited patient in response to PoA for Healthcare consult/request. Created a new PoAH for Healthcare document that, per the patient, accurately reflects their wishes. Gave the patient the original PoAH document along with copies. Confirmed that the PoAH primary agent name and contact information have been entered into the Epic, Advance Directives section. A copy of the new PoAH document has been placed on the patient's paper chart.  remains available for follow up.    Spiritual Care support can be requested via an Epic consult. For urgent/immediate needs, please contact the On Call  at: [place]: ext [phone]    Chaplain Lynn     
4 = completely dependent

## 2024-09-11 NOTE — ED PROVIDER NOTE - NSCAREINITIATED _GEN_ER
Consent: Written consent obtained, risks reviewed including but not limited to crusting, scabbing, blistering, scarring, darker or lighter pigmentary change, bruising, and/or incomplete response. Applicator: [facility] [place] Cooling: 100 Number Of Passes: 3 Pulse Duration (In Milliseconds): 15 Sandra Galdamez(Resident) Treatment Number: 1 Detail Level: Zone Frequency: 2 Hz External Cooling Fan Speed: 0 Fluence Units: J/cm2 Post-Care Instructions: I reviewed with the patient in detail post-care instructions. Patient should stay away from the sun and wear sun protection until treated areas are fully healed. Skin Type (Optional): III Anesthesia Type: 1% lidocaine with epinephrine Pulse Mode: single Fluence: 12

## 2024-09-19 NOTE — DIETITIAN INITIAL EVALUATION ADULT. - PROBLEM SELECTOR PLAN 6
Cardiac Catheterization Appropriate Use    History of Heart Failure? []  Yes  [x]  No     Newly Diagnosed? []  Yes  []  No     NYHA class    [] I  [] II  [] III  [] IV            Stress Test Peformed * Important for AUC criteria []  Yes  [x]  No         If yes, specify test performed and must include risk of ischemia      StressTest Type   Test Result   Risk of Ischemia   [] Standard Exercise ST             (without imaging)  [] Stress Echo  [] ST Nuclear   [] ST with CMR    [] Positive                   []Negative  [] Indeterminate  [] Unavailable [] High   [] Intermediate  [] Low  [] Unavailable        Indication(s) for Cath Lab Visit  (select all that apply)    [x] ACS  [] New onset angina<=2 months  [x] Worsening Angina  [] Resuscitated Cardiac Arrest   [x] Stable Known CAD  [x] Suspected CAD  [] Valvular Disease  [] Pericardial Disease  [] Pre-Operative Evaluation  []  Syncope []Post-Cardiac Transplant  []Cardiac Arrhythmias   [x] Cardiomyopathy  [] LV dysfunction  [] Evaluation for Exercise Clearance  [] Other         Chest Pain Symptoms     [x] Typical Angina      []   Atypical                                Angina      [] Non-anginal Chest Pain []Asymptomatic      Cardiovascular Instability  [] Yes  [x]  No         If yes, specify instability type  (select all that apply)    [] Persistent Ischemic                     Symptoms(chest pain)   [] Hemodynamic Instability(not        Cardiogenic shock)  [] Cardiogenic Shock  [] Refractory Cardiogenic Shock   [] Acute Heart Failure Symptoms  [] Ventricular Arrhythmia        Evaluation for Surgery []  Cardiac Surgery        []  Non-Cardiac Surgery      Functional Capacity []<4 METS  []>= 4 METS without symptoms  []  >=4 mets with symptoms    []  Unknown     Surgical Risk []  Low     []  Intermediate    []High Risk Vascular  []  High Risk Non-Vascular                         DNR as per HCP Son phone #: [phone]  however would like trial of intubation and pressors  will bring HCP paperwork tomorrow

## 2024-09-26 NOTE — PROGRESS NOTE ADULT - ASSESSMENT
Well controlled with estrace cream  
89 y/o F w  HTN, Dementia, admitted recently for pna, reported diarrhea, sent in by son again for reported diarrhea.
89 y/o F w  HTN, Dementia, admitted recently for pna, reported diarrhea, sent in by son again for reported diarrhea.

## 2024-11-05 NOTE — DIETITIAN INITIAL EVALUATION ADULT. - PROBLEM/PLAN-7
Head, normocephalic, atraumatic, Face, Face within normal limits, Ears, External ears within normal limits, Nose/Nasopharynx, External nose normal appearance, nares patent, no nasal discharge, Mouth and Throat, Oral cavity appearance normal, Lips, Appearance normal
DISPLAY PLAN FREE TEXT

## 2025-01-29 NOTE — BEHAVIORAL HEALTH ASSESSMENT NOTE - COLLATERAL SOURCE
Attempted to reach patient to advise, per Dr. Nguyen: results of labs reveal worsening BNP - fluid level - need to double up lasix as dicussed, recommend double up potassium tablets as well for now, and after 3-4 days resume daily lasix and potassium. Follow up with me as scheduled or sooner for repeat eval and bloodwork.     ----- Message from Deni Nguyen MD sent at 1/28/2025  5:42 PM CST -----  Please contact the patient and let them know that their results of labs reveal worsening BNP - fluid level - need to double up lasix as dicussed, recommend double up potassium tablets as well for now, and after 3-4 days resume daily lasix . Follow up with me as scheduled or sooner for repeat eval and bloodwork. Thanks   
None available

## 2025-03-13 NOTE — PATIENT PROFILE ADULT. - TEACHING/LEARNING FACTORS IMPACT ABILITY TO LEARN
This is a 41-year-old female who developed severe low back pain with right leg and nerve root pain caused by a fracture of the endplate at the L4-5 region on the right side.  The MRI clearly shows this with increased Modic change around the periphery of this.  This is been described as a Schmorl's node.  This puts increased pressure on the facet joint which causes delayed healing.  I have recommended the use of a back brace, medial branch blocks on the right at L4-5 followed by an RFA and continued physical therapy.  This will take time to heal but it is unlikely to require surgical intervention.  Surgery would be reserved as a final alternative if conservative measures were ineffective after a year.  Orders:    Ambulatory referral to Spine & Pain Management; Future    Lso Back Brace     none

## 2025-06-05 NOTE — ED PROVIDER NOTE - CAS EDP CONSULT REGARDING 1
Multifactorial but mostly due to disuse and hypoalbuminemia  Needs therapy and rehab preferably in an LTAC setting.  Physical and occupational therapy ordered for inpatient stay  Significant nutritional support.  Supplements ordered   consult

## 2025-06-24 NOTE — PATIENT PROFILE ADULT. - BLOOD TRANSFUSION, PREVIOUS, PROFILE
[FreeTextEntry1] :     Plan: - Will check CBC, CMP, B12, Folate, MMA, Hepatitis B/C, HIV, flow cytometry, Kenney Null, TSH, and CRP/ESR.     yes